# Patient Record
Sex: MALE | Race: WHITE | Employment: OTHER | URBAN - METROPOLITAN AREA
[De-identification: names, ages, dates, MRNs, and addresses within clinical notes are randomized per-mention and may not be internally consistent; named-entity substitution may affect disease eponyms.]

---

## 2019-01-01 ENCOUNTER — HOSPITAL ENCOUNTER (OUTPATIENT)
Dept: RADIATION ONCOLOGY | Age: 64
Discharge: HOME | End: 2019-08-19
Payer: SUBSIDIZED

## 2019-01-01 ENCOUNTER — HOSPITAL ENCOUNTER (OUTPATIENT)
Dept: RADIATION ONCOLOGY | Age: 64
Discharge: HOME | End: 2019-09-13
Payer: SUBSIDIZED

## 2019-01-01 ENCOUNTER — HOSPITAL ENCOUNTER (OUTPATIENT)
Dept: LAB | Age: 64
Discharge: HOME | End: 2019-08-08
Payer: SUBSIDIZED

## 2019-01-01 ENCOUNTER — HOSPITAL ENCOUNTER (OUTPATIENT)
Dept: LAB | Age: 64
Discharge: HOME | End: 2019-10-22
Payer: SUBSIDIZED

## 2019-01-01 ENCOUNTER — HOSPITAL ENCOUNTER (OUTPATIENT)
Dept: LAB | Age: 64
Discharge: HOME | End: 2019-08-27
Payer: SUBSIDIZED

## 2019-01-01 ENCOUNTER — PATIENT OUTREACH (OUTPATIENT)
Dept: CASE MANAGEMENT | Age: 64
End: 2019-01-01

## 2019-01-01 ENCOUNTER — HOSPITAL ENCOUNTER (OUTPATIENT)
Dept: RADIATION ONCOLOGY | Age: 64
Discharge: HOME | End: 2019-08-12
Payer: SUBSIDIZED

## 2019-01-01 ENCOUNTER — HOSPITAL ENCOUNTER (OUTPATIENT)
Dept: LAB | Age: 64
Discharge: HOME | End: 2019-09-03
Payer: SUBSIDIZED

## 2019-01-01 ENCOUNTER — APPOINTMENT (OUTPATIENT)
Dept: GENERAL RADIOLOGY | Age: 64
DRG: 864 | End: 2019-01-01
Attending: INTERNAL MEDICINE
Payer: SUBSIDIZED

## 2019-01-01 ENCOUNTER — HOSPITAL ENCOUNTER (OUTPATIENT)
Dept: SURGERY | Age: 64
Discharge: HOME | End: 2019-06-25

## 2019-01-01 ENCOUNTER — HOSPITAL ENCOUNTER (OUTPATIENT)
Dept: RADIATION ONCOLOGY | Age: 64
Discharge: HOME | End: 2019-09-12
Payer: SUBSIDIZED

## 2019-01-01 ENCOUNTER — HOSPITAL ENCOUNTER (OUTPATIENT)
Dept: RADIATION ONCOLOGY | Age: 64
Discharge: HOME | End: 2019-09-26
Payer: SUBSIDIZED

## 2019-01-01 ENCOUNTER — HOSPITAL ENCOUNTER (OUTPATIENT)
Dept: MRI IMAGING | Age: 64
Discharge: HOME | End: 2019-07-25
Attending: RADIOLOGY
Payer: SUBSIDIZED

## 2019-01-01 ENCOUNTER — ANESTHESIA EVENT (OUTPATIENT)
Dept: SURGERY | Age: 64
DRG: 023 | End: 2019-01-01
Payer: SUBSIDIZED

## 2019-01-01 ENCOUNTER — APPOINTMENT (OUTPATIENT)
Dept: MRI IMAGING | Age: 64
DRG: 023 | End: 2019-01-01
Attending: NEUROLOGICAL SURGERY
Payer: SUBSIDIZED

## 2019-01-01 ENCOUNTER — HOSPITAL ENCOUNTER (OUTPATIENT)
Dept: RADIATION ONCOLOGY | Age: 64
Discharge: HOME | End: 2019-07-25
Payer: SUBSIDIZED

## 2019-01-01 ENCOUNTER — HOSPITAL ENCOUNTER (OUTPATIENT)
Dept: RADIATION ONCOLOGY | Age: 64
Discharge: HOME | End: 2019-10-29
Payer: SUBSIDIZED

## 2019-01-01 ENCOUNTER — HOSPITAL ENCOUNTER (OUTPATIENT)
Dept: RADIATION ONCOLOGY | Age: 64
Discharge: HOME | End: 2019-09-04
Payer: SUBSIDIZED

## 2019-01-01 ENCOUNTER — HOSPITAL ENCOUNTER (OUTPATIENT)
Dept: RADIATION ONCOLOGY | Age: 64
Discharge: HOME | End: 2019-09-20
Payer: SUBSIDIZED

## 2019-01-01 ENCOUNTER — HOSPITAL ENCOUNTER (OUTPATIENT)
Dept: RADIATION ONCOLOGY | Age: 64
Discharge: HOME | End: 2019-08-28
Payer: SUBSIDIZED

## 2019-01-01 ENCOUNTER — HOSPITAL ENCOUNTER (OUTPATIENT)
Dept: RADIATION ONCOLOGY | Age: 64
Discharge: HOME | End: 2019-08-16
Payer: SUBSIDIZED

## 2019-01-01 ENCOUNTER — HOSPITAL ENCOUNTER (OUTPATIENT)
Dept: LAB | Age: 64
Discharge: HOME | End: 2019-09-09
Payer: SUBSIDIZED

## 2019-01-01 ENCOUNTER — HOSPITAL ENCOUNTER (OUTPATIENT)
Dept: RADIATION ONCOLOGY | Age: 64
Discharge: HOME | End: 2019-08-30
Payer: SUBSIDIZED

## 2019-01-01 ENCOUNTER — HOSPITAL ENCOUNTER (OUTPATIENT)
Dept: RADIATION ONCOLOGY | Age: 64
Discharge: HOME | End: 2019-08-20
Payer: SUBSIDIZED

## 2019-01-01 ENCOUNTER — HOSPITAL ENCOUNTER (OUTPATIENT)
Dept: RADIATION ONCOLOGY | Age: 64
Discharge: HOME | End: 2019-09-18
Payer: SUBSIDIZED

## 2019-01-01 ENCOUNTER — HOSPITAL ENCOUNTER (INPATIENT)
Age: 64
LOS: 2 days | Discharge: HOME HEALTH | DRG: 023 | End: 2019-06-28
Attending: NEUROLOGICAL SURGERY | Admitting: NEUROLOGICAL SURGERY
Payer: SUBSIDIZED

## 2019-01-01 ENCOUNTER — APPOINTMENT (OUTPATIENT)
Dept: RADIATION ONCOLOGY | Age: 64
End: 2019-01-01
Payer: SUBSIDIZED

## 2019-01-01 ENCOUNTER — HOSPITAL ENCOUNTER (OUTPATIENT)
Dept: RADIATION ONCOLOGY | Age: 64
Discharge: HOME | End: 2019-09-10
Payer: SUBSIDIZED

## 2019-01-01 ENCOUNTER — HOSPITAL ENCOUNTER (OUTPATIENT)
Dept: RADIATION ONCOLOGY | Age: 64
Discharge: HOME | End: 2019-08-07
Payer: SUBSIDIZED

## 2019-01-01 ENCOUNTER — APPOINTMENT (OUTPATIENT)
Dept: CT IMAGING | Age: 64
DRG: 864 | End: 2019-01-01
Attending: EMERGENCY MEDICINE
Payer: SUBSIDIZED

## 2019-01-01 ENCOUNTER — HOSPITAL ENCOUNTER (OUTPATIENT)
Dept: RADIATION ONCOLOGY | Age: 64
Discharge: HOME | End: 2019-09-06
Payer: SUBSIDIZED

## 2019-01-01 ENCOUNTER — HOSPITAL ENCOUNTER (OUTPATIENT)
Dept: LAB | Age: 64
Discharge: HOME | End: 2019-07-31
Payer: SUBSIDIZED

## 2019-01-01 ENCOUNTER — HOSPITAL ENCOUNTER (OUTPATIENT)
Age: 64
Discharge: HOME | End: 2019-07-23
Attending: EMERGENCY MEDICINE | Admitting: NEUROLOGICAL SURGERY
Payer: SUBSIDIZED

## 2019-01-01 ENCOUNTER — HOSPITAL ENCOUNTER (OUTPATIENT)
Dept: RADIATION ONCOLOGY | Age: 64
Discharge: HOME | End: 2019-08-15
Payer: SUBSIDIZED

## 2019-01-01 ENCOUNTER — HOSPITAL ENCOUNTER (OUTPATIENT)
Dept: MRI IMAGING | Age: 64
Discharge: HOME | End: 2019-11-12
Attending: INTERNAL MEDICINE
Payer: SUBSIDIZED

## 2019-01-01 ENCOUNTER — HOSPITAL ENCOUNTER (OUTPATIENT)
Dept: RADIATION ONCOLOGY | Age: 64
Discharge: HOME | End: 2019-09-17
Payer: SUBSIDIZED

## 2019-01-01 ENCOUNTER — DOCUMENTATION ONLY (OUTPATIENT)
Dept: HEMATOLOGY | Age: 64
End: 2019-01-01

## 2019-01-01 ENCOUNTER — HOSPITAL ENCOUNTER (OUTPATIENT)
Dept: RADIATION ONCOLOGY | Age: 64
Discharge: HOME | End: 2019-08-22
Payer: SUBSIDIZED

## 2019-01-01 ENCOUNTER — APPOINTMENT (OUTPATIENT)
Dept: CT IMAGING | Age: 64
End: 2019-01-01
Attending: EMERGENCY MEDICINE
Payer: SUBSIDIZED

## 2019-01-01 ENCOUNTER — APPOINTMENT (OUTPATIENT)
Dept: CT IMAGING | Age: 64
DRG: 023 | End: 2019-01-01
Attending: NEUROLOGICAL SURGERY
Payer: SUBSIDIZED

## 2019-01-01 ENCOUNTER — HOSPITAL ENCOUNTER (OUTPATIENT)
Dept: LAB | Age: 64
Discharge: HOME | End: 2019-07-16
Payer: SUBSIDIZED

## 2019-01-01 ENCOUNTER — APPOINTMENT (OUTPATIENT)
Dept: MRI IMAGING | Age: 64
DRG: 864 | End: 2019-01-01
Attending: NEUROLOGICAL SURGERY
Payer: SUBSIDIZED

## 2019-01-01 ENCOUNTER — HOSPITAL ENCOUNTER (OUTPATIENT)
Dept: RADIATION ONCOLOGY | Age: 64
Discharge: HOME | End: 2019-09-24
Payer: SUBSIDIZED

## 2019-01-01 ENCOUNTER — HOSPITAL ENCOUNTER (OUTPATIENT)
Dept: RADIATION ONCOLOGY | Age: 64
Discharge: HOME | End: 2019-07-24
Payer: SUBSIDIZED

## 2019-01-01 ENCOUNTER — HOSPITAL ENCOUNTER (OUTPATIENT)
Dept: LAB | Age: 64
Discharge: HOME | End: 2019-12-19
Payer: SUBSIDIZED

## 2019-01-01 ENCOUNTER — HOSPITAL ENCOUNTER (OUTPATIENT)
Dept: RADIATION ONCOLOGY | Age: 64
Discharge: HOME | End: 2019-09-25
Payer: SUBSIDIZED

## 2019-01-01 ENCOUNTER — HOSPITAL ENCOUNTER (OUTPATIENT)
Dept: RADIATION ONCOLOGY | Age: 64
Discharge: HOME | End: 2019-08-23
Payer: SUBSIDIZED

## 2019-01-01 ENCOUNTER — HOSPITAL ENCOUNTER (OUTPATIENT)
Dept: RADIATION ONCOLOGY | Age: 64
Discharge: HOME | End: 2019-09-03
Payer: SUBSIDIZED

## 2019-01-01 ENCOUNTER — ANESTHESIA (OUTPATIENT)
Dept: SURGERY | Age: 64
DRG: 023 | End: 2019-01-01
Payer: SUBSIDIZED

## 2019-01-01 ENCOUNTER — HOSPITAL ENCOUNTER (OUTPATIENT)
Dept: RADIATION ONCOLOGY | Age: 64
Discharge: HOME | End: 2019-08-06
Payer: SUBSIDIZED

## 2019-01-01 ENCOUNTER — HOSPITAL ENCOUNTER (OUTPATIENT)
Dept: RADIATION ONCOLOGY | Age: 64
Discharge: HOME | End: 2019-08-21
Payer: SUBSIDIZED

## 2019-01-01 ENCOUNTER — APPOINTMENT (OUTPATIENT)
Dept: CT IMAGING | Age: 64
End: 2019-01-01
Attending: NEUROLOGICAL SURGERY
Payer: SUBSIDIZED

## 2019-01-01 ENCOUNTER — HOSPITAL ENCOUNTER (OUTPATIENT)
Dept: RADIATION ONCOLOGY | Age: 64
Discharge: HOME | End: 2019-08-26
Payer: SUBSIDIZED

## 2019-01-01 ENCOUNTER — HOSPITAL ENCOUNTER (OUTPATIENT)
Dept: RADIATION ONCOLOGY | Age: 64
Discharge: HOME | End: 2019-09-09
Payer: SUBSIDIZED

## 2019-01-01 ENCOUNTER — HOSPITAL ENCOUNTER (OUTPATIENT)
Dept: LAB | Age: 64
Discharge: HOME | End: 2019-09-19
Payer: SUBSIDIZED

## 2019-01-01 ENCOUNTER — HOSPITAL ENCOUNTER (OUTPATIENT)
Dept: RADIATION ONCOLOGY | Age: 64
Discharge: HOME | End: 2019-11-19
Payer: SUBSIDIZED

## 2019-01-01 ENCOUNTER — HOSPITAL ENCOUNTER (OUTPATIENT)
Dept: RADIATION ONCOLOGY | Age: 64
Discharge: HOME | End: 2019-08-14
Payer: SUBSIDIZED

## 2019-01-01 ENCOUNTER — HOSPITAL ENCOUNTER (OUTPATIENT)
Dept: RADIATION ONCOLOGY | Age: 64
Discharge: HOME | End: 2019-09-05
Payer: SUBSIDIZED

## 2019-01-01 ENCOUNTER — HOSPITAL ENCOUNTER (OUTPATIENT)
Dept: RADIATION ONCOLOGY | Age: 64
Discharge: HOME | End: 2019-09-19
Payer: SUBSIDIZED

## 2019-01-01 ENCOUNTER — HOSPITAL ENCOUNTER (OUTPATIENT)
Dept: LAB | Age: 64
Discharge: HOME | End: 2019-08-19
Payer: SUBSIDIZED

## 2019-01-01 ENCOUNTER — HOSPITAL ENCOUNTER (INPATIENT)
Age: 64
LOS: 2 days | Discharge: HOME | DRG: 864 | End: 2019-08-14
Attending: EMERGENCY MEDICINE | Admitting: INTERNAL MEDICINE
Payer: SUBSIDIZED

## 2019-01-01 ENCOUNTER — HOSPITAL ENCOUNTER (OUTPATIENT)
Dept: RADIATION ONCOLOGY | Age: 64
Discharge: HOME | End: 2019-08-29
Payer: SUBSIDIZED

## 2019-01-01 ENCOUNTER — HOSPITAL ENCOUNTER (OUTPATIENT)
Dept: RADIATION ONCOLOGY | Age: 64
Discharge: HOME | End: 2019-09-16
Payer: SUBSIDIZED

## 2019-01-01 ENCOUNTER — HOSPITAL ENCOUNTER (OUTPATIENT)
Dept: RADIATION ONCOLOGY | Age: 64
Discharge: HOME | End: 2019-09-11
Payer: SUBSIDIZED

## 2019-01-01 ENCOUNTER — HOSPITAL ENCOUNTER (OUTPATIENT)
Dept: RADIATION ONCOLOGY | Age: 64
Discharge: HOME | End: 2019-08-27
Payer: SUBSIDIZED

## 2019-01-01 ENCOUNTER — HOSPITAL ENCOUNTER (OUTPATIENT)
Dept: LAB | Age: 64
Discharge: HOME | End: 2019-11-19
Payer: SUBSIDIZED

## 2019-01-01 VITALS
SYSTOLIC BLOOD PRESSURE: 109 MMHG | RESPIRATION RATE: 16 BRPM | BODY MASS INDEX: 31.92 KG/M2 | HEART RATE: 73 BPM | TEMPERATURE: 98.4 F | DIASTOLIC BLOOD PRESSURE: 69 MMHG | WEIGHT: 209.9 LBS | OXYGEN SATURATION: 92 %

## 2019-01-01 VITALS
BODY MASS INDEX: 31.28 KG/M2 | RESPIRATION RATE: 19 BRPM | SYSTOLIC BLOOD PRESSURE: 135 MMHG | WEIGHT: 206.4 LBS | OXYGEN SATURATION: 90 % | DIASTOLIC BLOOD PRESSURE: 81 MMHG | HEART RATE: 73 BPM | TEMPERATURE: 97.7 F | HEIGHT: 68 IN

## 2019-01-01 VITALS
WEIGHT: 199 LBS | HEART RATE: 78 BPM | HEIGHT: 68 IN | RESPIRATION RATE: 16 BRPM | OXYGEN SATURATION: 98 % | TEMPERATURE: 98.3 F | BODY MASS INDEX: 30.16 KG/M2 | SYSTOLIC BLOOD PRESSURE: 136 MMHG | DIASTOLIC BLOOD PRESSURE: 87 MMHG

## 2019-01-01 VITALS
RESPIRATION RATE: 16 BRPM | WEIGHT: 198.5 LBS | BODY MASS INDEX: 30.18 KG/M2 | DIASTOLIC BLOOD PRESSURE: 78 MMHG | HEART RATE: 90 BPM | OXYGEN SATURATION: 97 % | TEMPERATURE: 98.7 F | SYSTOLIC BLOOD PRESSURE: 138 MMHG

## 2019-01-01 VITALS
OXYGEN SATURATION: 94 % | BODY MASS INDEX: 30.41 KG/M2 | DIASTOLIC BLOOD PRESSURE: 71 MMHG | HEART RATE: 80 BPM | HEIGHT: 68 IN | RESPIRATION RATE: 18 BRPM | SYSTOLIC BLOOD PRESSURE: 135 MMHG | TEMPERATURE: 98.3 F | WEIGHT: 200.62 LBS

## 2019-01-01 VITALS
HEART RATE: 70 BPM | RESPIRATION RATE: 18 BRPM | OXYGEN SATURATION: 96 % | BODY MASS INDEX: 29.84 KG/M2 | TEMPERATURE: 98.3 F | WEIGHT: 196.87 LBS | HEIGHT: 68 IN | DIASTOLIC BLOOD PRESSURE: 95 MMHG | SYSTOLIC BLOOD PRESSURE: 165 MMHG

## 2019-01-01 DIAGNOSIS — C71.9 GBM (GLIOBLASTOMA MULTIFORME) (HCC): ICD-10-CM

## 2019-01-01 DIAGNOSIS — L24.A9 WOUND DRAINAGE: ICD-10-CM

## 2019-01-01 DIAGNOSIS — R68.89 INTOLERANCE TO COLD: ICD-10-CM

## 2019-01-01 DIAGNOSIS — C71.9 MALIGNANT NEOPLASM OF BRAIN, UNSPECIFIED LOCATION (HCC): ICD-10-CM

## 2019-01-01 DIAGNOSIS — G93.89 BRAIN MASS: ICD-10-CM

## 2019-01-01 DIAGNOSIS — E03.9 HYPOTHYROIDISM, UNSPECIFIED TYPE: ICD-10-CM

## 2019-01-01 DIAGNOSIS — G96.00 POSTOPERATIVE CSF LEAK: ICD-10-CM

## 2019-01-01 DIAGNOSIS — C71.9 GBM (GLIOBLASTOMA MULTIFORME) (HCC): Primary | ICD-10-CM

## 2019-01-01 DIAGNOSIS — C71.9 MALIGNANT NEOPLASM OF BRAIN, UNSPECIFIED LOCATION (HCC): Primary | ICD-10-CM

## 2019-01-01 DIAGNOSIS — R41.82 ALTERED MENTAL STATUS, UNSPECIFIED ALTERED MENTAL STATUS TYPE: ICD-10-CM

## 2019-01-01 DIAGNOSIS — G93.6 BRAIN EDEMA (HCC): Primary | ICD-10-CM

## 2019-01-01 DIAGNOSIS — G97.82 POSTOPERATIVE CSF LEAK: ICD-10-CM

## 2019-01-01 DIAGNOSIS — R11.11 NON-INTRACTABLE VOMITING WITHOUT NAUSEA, UNSPECIFIED VOMITING TYPE: Primary | ICD-10-CM

## 2019-01-01 LAB
ABO + RH BLD: NORMAL
ALBUMIN SERPL-MCNC: 3.1 G/DL (ref 3.2–4.6)
ALBUMIN SERPL-MCNC: 3.2 G/DL (ref 3.2–4.6)
ALBUMIN SERPL-MCNC: 3.2 G/DL (ref 3.2–4.6)
ALBUMIN SERPL-MCNC: 3.3 G/DL (ref 3.2–4.6)
ALBUMIN SERPL-MCNC: 3.3 G/DL (ref 3.2–4.6)
ALBUMIN SERPL-MCNC: 3.4 G/DL (ref 3.2–4.6)
ALBUMIN SERPL-MCNC: 3.5 G/DL (ref 3.2–4.6)
ALBUMIN SERPL-MCNC: 3.7 G/DL (ref 3.2–4.6)
ALBUMIN SERPL-MCNC: 3.8 G/DL (ref 3.2–4.6)
ALBUMIN/GLOB SERPL: 0.7 {RATIO} (ref 1.2–3.5)
ALBUMIN/GLOB SERPL: 0.7 {RATIO} (ref 1.2–3.5)
ALBUMIN/GLOB SERPL: 0.8 {RATIO} (ref 1.2–3.5)
ALBUMIN/GLOB SERPL: 0.9 {RATIO} (ref 1.2–3.5)
ALBUMIN/GLOB SERPL: 1 {RATIO} (ref 1.2–3.5)
ALBUMIN/GLOB SERPL: 1.1 {RATIO} (ref 1.2–3.5)
ALBUMIN/GLOB SERPL: 1.1 {RATIO} (ref 1.2–3.5)
ALP SERPL-CCNC: 54 U/L (ref 50–136)
ALP SERPL-CCNC: 58 U/L (ref 50–136)
ALP SERPL-CCNC: 64 U/L (ref 50–136)
ALP SERPL-CCNC: 67 U/L (ref 50–136)
ALP SERPL-CCNC: 68 U/L (ref 50–136)
ALP SERPL-CCNC: 69 U/L (ref 50–136)
ALP SERPL-CCNC: 70 U/L (ref 50–136)
ALP SERPL-CCNC: 70 U/L (ref 50–136)
ALP SERPL-CCNC: 77 U/L (ref 50–136)
ALP SERPL-CCNC: 77 U/L (ref 50–136)
ALP SERPL-CCNC: 78 U/L (ref 50–136)
ALT SERPL-CCNC: 22 U/L (ref 12–65)
ALT SERPL-CCNC: 23 U/L (ref 12–65)
ALT SERPL-CCNC: 28 U/L (ref 12–65)
ALT SERPL-CCNC: 29 U/L (ref 12–65)
ALT SERPL-CCNC: 30 U/L (ref 12–65)
ALT SERPL-CCNC: 33 U/L (ref 12–65)
ALT SERPL-CCNC: 34 U/L (ref 12–65)
ALT SERPL-CCNC: 34 U/L (ref 12–65)
ALT SERPL-CCNC: 35 U/L (ref 12–65)
ALT SERPL-CCNC: 37 U/L (ref 12–65)
ALT SERPL-CCNC: 38 U/L (ref 12–65)
ANION GAP SERPL CALC-SCNC: 11 MMOL/L (ref 7–16)
ANION GAP SERPL CALC-SCNC: 4 MMOL/L (ref 7–16)
ANION GAP SERPL CALC-SCNC: 5 MMOL/L (ref 7–16)
ANION GAP SERPL CALC-SCNC: 6 MMOL/L (ref 7–16)
ANION GAP SERPL CALC-SCNC: 7 MMOL/L (ref 7–16)
ANION GAP SERPL CALC-SCNC: 8 MMOL/L (ref 7–16)
ANION GAP SERPL CALC-SCNC: 9 MMOL/L (ref 7–16)
ANION GAP SERPL CALC-SCNC: 9 MMOL/L (ref 7–16)
APPEARANCE UR: CLEAR
AST SERPL-CCNC: 11 U/L (ref 15–37)
AST SERPL-CCNC: 16 U/L (ref 15–37)
AST SERPL-CCNC: 17 U/L (ref 15–37)
AST SERPL-CCNC: 17 U/L (ref 15–37)
AST SERPL-CCNC: 18 U/L (ref 15–37)
AST SERPL-CCNC: 18 U/L (ref 15–37)
AST SERPL-CCNC: 19 U/L (ref 15–37)
AST SERPL-CCNC: 45 U/L (ref 15–37)
AST SERPL-CCNC: 9 U/L (ref 15–37)
ATRIAL RATE: 79 BPM
BACTERIA SPEC CULT: ABNORMAL
BACTERIA SPEC CULT: ABNORMAL
BACTERIA SPEC CULT: NORMAL
BASOPHILS # BLD: 0 K/UL (ref 0–0.2)
BASOPHILS # BLD: 0.1 K/UL (ref 0–0.2)
BASOPHILS NFR BLD: 0 % (ref 0–2)
BASOPHILS NFR BLD: 1 % (ref 0–2)
BILIRUB SERPL-MCNC: 0.3 MG/DL (ref 0.2–1.1)
BILIRUB SERPL-MCNC: 0.4 MG/DL (ref 0.2–1.1)
BILIRUB SERPL-MCNC: 0.4 MG/DL (ref 0.2–1.1)
BILIRUB SERPL-MCNC: 0.5 MG/DL (ref 0.2–1.1)
BILIRUB SERPL-MCNC: 0.5 MG/DL (ref 0.2–1.1)
BILIRUB SERPL-MCNC: 0.6 MG/DL (ref 0.2–1.1)
BILIRUB SERPL-MCNC: 0.8 MG/DL (ref 0.2–1.1)
BILIRUB SERPL-MCNC: 0.9 MG/DL (ref 0.2–1.1)
BILIRUB UR QL: NEGATIVE
BLOOD GROUP ANTIBODIES SERPL: NORMAL
BUN SERPL-MCNC: 10 MG/DL (ref 8–23)
BUN SERPL-MCNC: 11 MG/DL (ref 8–23)
BUN SERPL-MCNC: 12 MG/DL (ref 8–23)
BUN SERPL-MCNC: 13 MG/DL (ref 8–23)
BUN SERPL-MCNC: 13 MG/DL (ref 8–23)
BUN SERPL-MCNC: 14 MG/DL (ref 8–23)
BUN SERPL-MCNC: 14 MG/DL (ref 8–23)
BUN SERPL-MCNC: 15 MG/DL (ref 8–23)
BUN SERPL-MCNC: 16 MG/DL (ref 8–23)
BUN SERPL-MCNC: 17 MG/DL (ref 8–23)
BUN SERPL-MCNC: 18 MG/DL (ref 8–23)
BUN SERPL-MCNC: 19 MG/DL (ref 8–23)
BUN SERPL-MCNC: 24 MG/DL (ref 8–23)
BUN SERPL-MCNC: 6 MG/DL (ref 8–23)
BUN SERPL-MCNC: 8 MG/DL (ref 8–23)
BUN SERPL-MCNC: 8 MG/DL (ref 8–23)
CALCIUM SERPL-MCNC: 8.4 MG/DL (ref 8.3–10.4)
CALCIUM SERPL-MCNC: 8.5 MG/DL (ref 8.3–10.4)
CALCIUM SERPL-MCNC: 8.7 MG/DL (ref 8.3–10.4)
CALCIUM SERPL-MCNC: 8.7 MG/DL (ref 8.3–10.4)
CALCIUM SERPL-MCNC: 8.8 MG/DL (ref 8.3–10.4)
CALCIUM SERPL-MCNC: 9.1 MG/DL (ref 8.3–10.4)
CALCIUM SERPL-MCNC: 9.2 MG/DL (ref 8.3–10.4)
CALCIUM SERPL-MCNC: 9.3 MG/DL (ref 8.3–10.4)
CALCIUM SERPL-MCNC: 9.3 MG/DL (ref 8.3–10.4)
CALCIUM SERPL-MCNC: 9.5 MG/DL (ref 8.3–10.4)
CALCULATED P AXIS, ECG09: 54 DEGREES
CALCULATED R AXIS, ECG10: 10 DEGREES
CALCULATED T AXIS, ECG11: 32 DEGREES
CHLORIDE SERPL-SCNC: 100 MMOL/L (ref 98–107)
CHLORIDE SERPL-SCNC: 101 MMOL/L (ref 98–107)
CHLORIDE SERPL-SCNC: 102 MMOL/L (ref 98–107)
CHLORIDE SERPL-SCNC: 102 MMOL/L (ref 98–107)
CHLORIDE SERPL-SCNC: 103 MMOL/L (ref 98–107)
CHLORIDE SERPL-SCNC: 104 MMOL/L (ref 98–107)
CHLORIDE SERPL-SCNC: 105 MMOL/L (ref 98–107)
CHLORIDE SERPL-SCNC: 105 MMOL/L (ref 98–107)
CHLORIDE SERPL-SCNC: 90 MMOL/L (ref 98–107)
CHLORIDE SERPL-SCNC: 99 MMOL/L (ref 98–107)
CHLORIDE SERPL-SCNC: 99 MMOL/L (ref 98–107)
CO2 SERPL-SCNC: 25 MMOL/L (ref 21–32)
CO2 SERPL-SCNC: 26 MMOL/L (ref 21–32)
CO2 SERPL-SCNC: 27 MMOL/L (ref 21–32)
CO2 SERPL-SCNC: 27 MMOL/L (ref 21–32)
CO2 SERPL-SCNC: 28 MMOL/L (ref 21–32)
CO2 SERPL-SCNC: 29 MMOL/L (ref 21–32)
CO2 SERPL-SCNC: 29 MMOL/L (ref 21–32)
CO2 SERPL-SCNC: 30 MMOL/L (ref 21–32)
CO2 SERPL-SCNC: 30 MMOL/L (ref 21–32)
COLOR UR: YELLOW
CREAT SERPL-MCNC: 0.61 MG/DL (ref 0.8–1.5)
CREAT SERPL-MCNC: 0.74 MG/DL (ref 0.8–1.5)
CREAT SERPL-MCNC: 0.76 MG/DL (ref 0.8–1.5)
CREAT SERPL-MCNC: 0.77 MG/DL (ref 0.8–1.5)
CREAT SERPL-MCNC: 0.78 MG/DL (ref 0.8–1.5)
CREAT SERPL-MCNC: 0.85 MG/DL (ref 0.8–1.5)
CREAT SERPL-MCNC: 0.96 MG/DL (ref 0.8–1.5)
CREAT SERPL-MCNC: 0.98 MG/DL (ref 0.8–1.5)
CREAT SERPL-MCNC: 0.98 MG/DL (ref 0.8–1.5)
CREAT SERPL-MCNC: 0.99 MG/DL (ref 0.8–1.5)
CREAT SERPL-MCNC: 1.04 MG/DL (ref 0.8–1.5)
CREAT SERPL-MCNC: 1.06 MG/DL (ref 0.8–1.5)
CREAT SERPL-MCNC: 1.14 MG/DL (ref 0.8–1.5)
CREAT SERPL-MCNC: 1.17 MG/DL (ref 0.8–1.5)
CREAT SERPL-MCNC: 1.21 MG/DL (ref 0.8–1.5)
CREAT SERPL-MCNC: 1.23 MG/DL (ref 0.8–1.5)
DIAGNOSIS, 93000: NORMAL
DIFFERENTIAL METHOD BLD: ABNORMAL
DIFFERENTIAL METHOD BLD: NORMAL
EOSINOPHIL # BLD: 0 K/UL (ref 0–0.8)
EOSINOPHIL # BLD: 0.1 K/UL (ref 0–0.8)
EOSINOPHIL # BLD: 0.2 K/UL (ref 0–0.8)
EOSINOPHIL NFR BLD: 0 % (ref 0.5–7.8)
EOSINOPHIL NFR BLD: 1 % (ref 0.5–7.8)
ERYTHROCYTE [DISTWIDTH] IN BLOOD BY AUTOMATED COUNT: 12.1 % (ref 11.9–14.6)
ERYTHROCYTE [DISTWIDTH] IN BLOOD BY AUTOMATED COUNT: 12.2 % (ref 11.9–14.6)
ERYTHROCYTE [DISTWIDTH] IN BLOOD BY AUTOMATED COUNT: 13.2 % (ref 11.9–14.6)
ERYTHROCYTE [DISTWIDTH] IN BLOOD BY AUTOMATED COUNT: 13.5 % (ref 11.9–14.6)
ERYTHROCYTE [DISTWIDTH] IN BLOOD BY AUTOMATED COUNT: 13.6 % (ref 11.9–14.6)
ERYTHROCYTE [DISTWIDTH] IN BLOOD BY AUTOMATED COUNT: 13.8 % (ref 11.9–14.6)
ERYTHROCYTE [DISTWIDTH] IN BLOOD BY AUTOMATED COUNT: 13.9 % (ref 11.9–14.6)
ERYTHROCYTE [DISTWIDTH] IN BLOOD BY AUTOMATED COUNT: 14 % (ref 11.9–14.6)
ERYTHROCYTE [DISTWIDTH] IN BLOOD BY AUTOMATED COUNT: 14.4 % (ref 11.9–14.6)
ERYTHROCYTE [DISTWIDTH] IN BLOOD BY AUTOMATED COUNT: 14.4 % (ref 11.9–14.6)
GLOBULIN SER CALC-MCNC: 3.4 G/DL (ref 2.3–3.5)
GLOBULIN SER CALC-MCNC: 3.4 G/DL (ref 2.3–3.5)
GLOBULIN SER CALC-MCNC: 3.5 G/DL (ref 2.3–3.5)
GLOBULIN SER CALC-MCNC: 3.5 G/DL (ref 2.3–3.5)
GLOBULIN SER CALC-MCNC: 3.6 G/DL (ref 2.3–3.5)
GLOBULIN SER CALC-MCNC: 3.7 G/DL (ref 2.3–3.5)
GLOBULIN SER CALC-MCNC: 4.3 G/DL (ref 2.3–3.5)
GLOBULIN SER CALC-MCNC: 4.3 G/DL (ref 2.3–3.5)
GLOBULIN SER CALC-MCNC: 4.6 G/DL (ref 2.3–3.5)
GLUCOSE BLD STRIP.AUTO-MCNC: 121 MG/DL (ref 65–100)
GLUCOSE BLD STRIP.AUTO-MCNC: 136 MG/DL (ref 65–100)
GLUCOSE BLD STRIP.AUTO-MCNC: 157 MG/DL (ref 65–100)
GLUCOSE BLD STRIP.AUTO-MCNC: 174 MG/DL (ref 65–100)
GLUCOSE SERPL-MCNC: 105 MG/DL (ref 65–100)
GLUCOSE SERPL-MCNC: 106 MG/DL (ref 65–100)
GLUCOSE SERPL-MCNC: 106 MG/DL (ref 65–100)
GLUCOSE SERPL-MCNC: 111 MG/DL (ref 65–100)
GLUCOSE SERPL-MCNC: 114 MG/DL (ref 65–100)
GLUCOSE SERPL-MCNC: 114 MG/DL (ref 65–100)
GLUCOSE SERPL-MCNC: 123 MG/DL (ref 65–100)
GLUCOSE SERPL-MCNC: 129 MG/DL (ref 65–100)
GLUCOSE SERPL-MCNC: 136 MG/DL (ref 65–100)
GLUCOSE SERPL-MCNC: 140 MG/DL (ref 65–100)
GLUCOSE SERPL-MCNC: 143 MG/DL (ref 65–100)
GLUCOSE SERPL-MCNC: 199 MG/DL (ref 65–100)
GLUCOSE SERPL-MCNC: 84 MG/DL (ref 65–100)
GLUCOSE SERPL-MCNC: 86 MG/DL (ref 65–100)
GLUCOSE SERPL-MCNC: 95 MG/DL (ref 65–100)
GLUCOSE SERPL-MCNC: 96 MG/DL (ref 65–100)
GLUCOSE UR STRIP.AUTO-MCNC: NEGATIVE MG/DL
GRAM STN SPEC: ABNORMAL
GRAM STN SPEC: ABNORMAL
HCT VFR BLD AUTO: 38.2 % (ref 41.1–50.3)
HCT VFR BLD AUTO: 38.9 % (ref 41.1–50.3)
HCT VFR BLD AUTO: 40.7 % (ref 41.1–50.3)
HCT VFR BLD AUTO: 41 % (ref 41.1–50.3)
HCT VFR BLD AUTO: 41.2 % (ref 41.1–50.3)
HCT VFR BLD AUTO: 41.4 % (ref 41.1–50.3)
HCT VFR BLD AUTO: 41.6 % (ref 41.1–50.3)
HCT VFR BLD AUTO: 41.8 % (ref 41.1–50.3)
HCT VFR BLD AUTO: 42.3 % (ref 41.1–50.3)
HCT VFR BLD AUTO: 42.9 % (ref 41.1–50.3)
HCT VFR BLD AUTO: 43.3 % (ref 41.1–50.3)
HCT VFR BLD AUTO: 43.4 % (ref 41.1–50.3)
HCT VFR BLD AUTO: 44.6 % (ref 41.1–50.3)
HGB BLD-MCNC: 13.1 G/DL (ref 13.6–17.2)
HGB BLD-MCNC: 13.7 G/DL (ref 13.6–17.2)
HGB BLD-MCNC: 13.9 G/DL (ref 13.6–17.2)
HGB BLD-MCNC: 14 G/DL (ref 13.6–17.2)
HGB BLD-MCNC: 14.2 G/DL (ref 13.6–17.2)
HGB BLD-MCNC: 14.3 G/DL (ref 13.6–17.2)
HGB BLD-MCNC: 14.3 G/DL (ref 13.6–17.2)
HGB BLD-MCNC: 14.5 G/DL (ref 13.6–17.2)
HGB BLD-MCNC: 14.5 G/DL (ref 13.6–17.2)
HGB BLD-MCNC: 14.6 G/DL (ref 13.6–17.2)
HGB BLD-MCNC: 14.6 G/DL (ref 13.6–17.2)
HGB BLD-MCNC: 14.9 G/DL (ref 13.6–17.2)
HGB BLD-MCNC: 15.1 G/DL (ref 13.6–17.2)
HGB UR QL STRIP: NEGATIVE
IMM GRANULOCYTES # BLD AUTO: 0.1 K/UL (ref 0–0.5)
IMM GRANULOCYTES # BLD AUTO: 0.2 K/UL (ref 0–0.5)
IMM GRANULOCYTES # BLD AUTO: 0.3 K/UL (ref 0–0.5)
IMM GRANULOCYTES # BLD AUTO: 0.4 K/UL (ref 0–0.5)
IMM GRANULOCYTES # BLD AUTO: 1.2 K/UL (ref 0–0.5)
IMM GRANULOCYTES NFR BLD AUTO: 1 % (ref 0–5)
IMM GRANULOCYTES NFR BLD AUTO: 2 % (ref 0–5)
IMM GRANULOCYTES NFR BLD AUTO: 3 % (ref 0–5)
IMM GRANULOCYTES NFR BLD AUTO: 3 % (ref 0–5)
IMM GRANULOCYTES NFR BLD AUTO: 9 % (ref 0–5)
KETONES UR QL STRIP.AUTO: NEGATIVE MG/DL
LACTATE BLD-SCNC: 0.8 MMOL/L (ref 0.5–1.9)
LACTATE BLD-SCNC: 1.09 MMOL/L (ref 0.5–1.9)
LEUKOCYTE ESTERASE UR QL STRIP.AUTO: NEGATIVE
LIPASE SERPL-CCNC: 182 U/L (ref 73–393)
LYMPHOCYTES # BLD: 1.2 K/UL (ref 0.5–4.6)
LYMPHOCYTES # BLD: 1.4 K/UL (ref 0.5–4.6)
LYMPHOCYTES # BLD: 1.5 K/UL (ref 0.5–4.6)
LYMPHOCYTES # BLD: 1.8 K/UL (ref 0.5–4.6)
LYMPHOCYTES # BLD: 1.9 K/UL (ref 0.5–4.6)
LYMPHOCYTES # BLD: 1.9 K/UL (ref 0.5–4.6)
LYMPHOCYTES # BLD: 2 K/UL (ref 0.5–4.6)
LYMPHOCYTES # BLD: 2 K/UL (ref 0.5–4.6)
LYMPHOCYTES # BLD: 2.1 K/UL (ref 0.5–4.6)
LYMPHOCYTES # BLD: 2.5 K/UL (ref 0.5–4.6)
LYMPHOCYTES # BLD: 2.7 K/UL (ref 0.5–4.6)
LYMPHOCYTES # BLD: 3.2 K/UL (ref 0.5–4.6)
LYMPHOCYTES # BLD: 4.3 K/UL (ref 0.5–4.6)
LYMPHOCYTES NFR BLD: 12 % (ref 13–44)
LYMPHOCYTES NFR BLD: 15 % (ref 13–44)
LYMPHOCYTES NFR BLD: 15 % (ref 13–44)
LYMPHOCYTES NFR BLD: 17 % (ref 13–44)
LYMPHOCYTES NFR BLD: 18 % (ref 13–44)
LYMPHOCYTES NFR BLD: 19 % (ref 13–44)
LYMPHOCYTES NFR BLD: 22 % (ref 13–44)
LYMPHOCYTES NFR BLD: 23 % (ref 13–44)
LYMPHOCYTES NFR BLD: 27 % (ref 13–44)
LYMPHOCYTES NFR BLD: 27 % (ref 13–44)
LYMPHOCYTES NFR BLD: 38 % (ref 13–44)
MCH RBC QN AUTO: 30.7 PG (ref 26.1–32.9)
MCH RBC QN AUTO: 30.8 PG (ref 26.1–32.9)
MCH RBC QN AUTO: 30.9 PG (ref 26.1–32.9)
MCH RBC QN AUTO: 30.9 PG (ref 26.1–32.9)
MCH RBC QN AUTO: 31 PG (ref 26.1–32.9)
MCH RBC QN AUTO: 31 PG (ref 26.1–32.9)
MCH RBC QN AUTO: 31.1 PG (ref 26.1–32.9)
MCH RBC QN AUTO: 31.2 PG (ref 26.1–32.9)
MCH RBC QN AUTO: 31.3 PG (ref 26.1–32.9)
MCH RBC QN AUTO: 31.4 PG (ref 26.1–32.9)
MCH RBC QN AUTO: 31.8 PG (ref 26.1–32.9)
MCH RBC QN AUTO: 32.1 PG (ref 26.1–32.9)
MCH RBC QN AUTO: 32.3 PG (ref 26.1–32.9)
MCHC RBC AUTO-ENTMCNC: 33.3 G/DL (ref 31.4–35)
MCHC RBC AUTO-ENTMCNC: 33.6 G/DL (ref 31.4–35)
MCHC RBC AUTO-ENTMCNC: 33.8 G/DL (ref 31.4–35)
MCHC RBC AUTO-ENTMCNC: 33.9 G/DL (ref 31.4–35)
MCHC RBC AUTO-ENTMCNC: 34 G/DL (ref 31.4–35)
MCHC RBC AUTO-ENTMCNC: 34.3 G/DL (ref 31.4–35)
MCHC RBC AUTO-ENTMCNC: 34.4 G/DL (ref 31.4–35)
MCHC RBC AUTO-ENTMCNC: 34.4 G/DL (ref 31.4–35)
MCHC RBC AUTO-ENTMCNC: 34.5 G/DL (ref 31.4–35)
MCHC RBC AUTO-ENTMCNC: 34.9 G/DL (ref 31.4–35)
MCHC RBC AUTO-ENTMCNC: 34.9 G/DL (ref 31.4–35)
MCHC RBC AUTO-ENTMCNC: 35 G/DL (ref 31.4–35)
MCHC RBC AUTO-ENTMCNC: 35.2 G/DL (ref 31.4–35)
MCV RBC AUTO: 87.8 FL (ref 79.6–97.8)
MCV RBC AUTO: 88.9 FL (ref 79.6–97.8)
MCV RBC AUTO: 90 FL (ref 79.6–97.8)
MCV RBC AUTO: 90.2 FL (ref 79.6–97.8)
MCV RBC AUTO: 90.7 FL (ref 79.6–97.8)
MCV RBC AUTO: 90.9 FL (ref 79.6–97.8)
MCV RBC AUTO: 91 FL (ref 79.6–97.8)
MCV RBC AUTO: 91.4 FL (ref 79.6–97.8)
MCV RBC AUTO: 91.6 FL (ref 79.6–97.8)
MCV RBC AUTO: 92.6 FL (ref 79.6–97.8)
MCV RBC AUTO: 92.9 FL (ref 79.6–97.8)
MCV RBC AUTO: 92.9 FL (ref 79.6–97.8)
MCV RBC AUTO: 93.9 FL (ref 79.6–97.8)
MONOCYTES # BLD: 0.2 K/UL (ref 0.1–1.3)
MONOCYTES # BLD: 0.5 K/UL (ref 0.1–1.3)
MONOCYTES # BLD: 0.6 K/UL (ref 0.1–1.3)
MONOCYTES # BLD: 0.7 K/UL (ref 0.1–1.3)
MONOCYTES # BLD: 0.8 K/UL (ref 0.1–1.3)
MONOCYTES # BLD: 1.1 K/UL (ref 0.1–1.3)
MONOCYTES NFR BLD: 4 % (ref 4–12)
MONOCYTES NFR BLD: 4 % (ref 4–12)
MONOCYTES NFR BLD: 5 % (ref 4–12)
MONOCYTES NFR BLD: 5 % (ref 4–12)
MONOCYTES NFR BLD: 6 % (ref 4–12)
MONOCYTES NFR BLD: 7 % (ref 4–12)
MONOCYTES NFR BLD: 8 % (ref 4–12)
NEUTS SEG # BLD: 10 K/UL (ref 1.7–8.2)
NEUTS SEG # BLD: 10.3 K/UL (ref 1.7–8.2)
NEUTS SEG # BLD: 3.8 K/UL (ref 1.7–8.2)
NEUTS SEG # BLD: 4.6 K/UL (ref 1.7–8.2)
NEUTS SEG # BLD: 5.9 K/UL (ref 1.7–8.2)
NEUTS SEG # BLD: 7.4 K/UL (ref 1.7–8.2)
NEUTS SEG # BLD: 7.5 K/UL (ref 1.7–8.2)
NEUTS SEG # BLD: 7.7 K/UL (ref 1.7–8.2)
NEUTS SEG # BLD: 7.9 K/UL (ref 1.7–8.2)
NEUTS SEG # BLD: 7.9 K/UL (ref 1.7–8.2)
NEUTS SEG # BLD: 8 K/UL (ref 1.7–8.2)
NEUTS SEG # BLD: 8.3 K/UL (ref 1.7–8.2)
NEUTS SEG # BLD: 9.4 K/UL (ref 1.7–8.2)
NEUTS SEG NFR BLD: 51 % (ref 43–78)
NEUTS SEG NFR BLD: 62 % (ref 43–78)
NEUTS SEG NFR BLD: 64 % (ref 43–78)
NEUTS SEG NFR BLD: 68 % (ref 43–78)
NEUTS SEG NFR BLD: 68 % (ref 43–78)
NEUTS SEG NFR BLD: 72 % (ref 43–78)
NEUTS SEG NFR BLD: 73 % (ref 43–78)
NEUTS SEG NFR BLD: 73 % (ref 43–78)
NEUTS SEG NFR BLD: 74 % (ref 43–78)
NEUTS SEG NFR BLD: 75 % (ref 43–78)
NEUTS SEG NFR BLD: 76 % (ref 43–78)
NEUTS SEG NFR BLD: 78 % (ref 43–78)
NEUTS SEG NFR BLD: 79 % (ref 43–78)
NITRITE UR QL STRIP.AUTO: NEGATIVE
NRBC # BLD: 0 K/UL (ref 0–0.2)
NRBC # BLD: 0.02 K/UL (ref 0–0.2)
OSMOLALITY SERPL: 288 MOSM/KG H2O (ref 275–295)
OSMOLALITY SERPL: 290 MOSM/KG H2O (ref 275–295)
P-R INTERVAL, ECG05: 206 MS
PH UR STRIP: 6.5 [PH] (ref 5–9)
PLATELET # BLD AUTO: 211 K/UL (ref 150–450)
PLATELET # BLD AUTO: 233 K/UL (ref 150–450)
PLATELET # BLD AUTO: 250 K/UL (ref 150–450)
PLATELET # BLD AUTO: 269 K/UL (ref 150–450)
PLATELET # BLD AUTO: 276 K/UL (ref 150–450)
PLATELET # BLD AUTO: 280 K/UL (ref 150–450)
PLATELET # BLD AUTO: 296 K/UL (ref 150–450)
PLATELET # BLD AUTO: 311 K/UL (ref 150–450)
PLATELET # BLD AUTO: 314 K/UL (ref 150–450)
PLATELET # BLD AUTO: 343 K/UL (ref 150–450)
PLATELET # BLD AUTO: 356 K/UL (ref 150–450)
PLATELET # BLD AUTO: 377 K/UL (ref 150–450)
PLATELET # BLD AUTO: 404 K/UL (ref 150–450)
PLATELET COMMENTS,PCOM: ADEQUATE
PMV BLD AUTO: 10.3 FL (ref 9.4–12.3)
PMV BLD AUTO: 8.7 FL (ref 9.4–12.3)
PMV BLD AUTO: 9 FL (ref 9.4–12.3)
PMV BLD AUTO: 9.1 FL (ref 9.4–12.3)
PMV BLD AUTO: 9.1 FL (ref 9.4–12.3)
PMV BLD AUTO: 9.2 FL (ref 9.4–12.3)
PMV BLD AUTO: 9.2 FL (ref 9.4–12.3)
PMV BLD AUTO: 9.3 FL (ref 9.4–12.3)
PMV BLD AUTO: 9.4 FL (ref 9.4–12.3)
PMV BLD AUTO: 9.5 FL (ref 9.4–12.3)
PMV BLD AUTO: 9.6 FL (ref 9.4–12.3)
PMV BLD AUTO: 9.6 FL (ref 9.4–12.3)
PMV BLD AUTO: 9.8 FL (ref 9.4–12.3)
POTASSIUM SERPL-SCNC: 3.1 MMOL/L (ref 3.5–5.1)
POTASSIUM SERPL-SCNC: 3.2 MMOL/L (ref 3.5–5.1)
POTASSIUM SERPL-SCNC: 3.3 MMOL/L (ref 3.5–5.1)
POTASSIUM SERPL-SCNC: 3.4 MMOL/L (ref 3.5–5.1)
POTASSIUM SERPL-SCNC: 3.4 MMOL/L (ref 3.5–5.1)
POTASSIUM SERPL-SCNC: 3.5 MMOL/L (ref 3.5–5.1)
POTASSIUM SERPL-SCNC: 3.6 MMOL/L (ref 3.5–5.1)
POTASSIUM SERPL-SCNC: 3.7 MMOL/L (ref 3.5–5.1)
POTASSIUM SERPL-SCNC: 3.9 MMOL/L (ref 3.5–5.1)
POTASSIUM SERPL-SCNC: 4 MMOL/L (ref 3.5–5.1)
POTASSIUM SERPL-SCNC: 4 MMOL/L (ref 3.5–5.1)
POTASSIUM SERPL-SCNC: 4.1 MMOL/L (ref 3.5–5.1)
POTASSIUM SERPL-SCNC: 4.1 MMOL/L (ref 3.5–5.1)
POTASSIUM SERPL-SCNC: 4.2 MMOL/L (ref 3.5–5.1)
POTASSIUM SERPL-SCNC: 4.2 MMOL/L (ref 3.5–5.1)
POTASSIUM SERPL-SCNC: 4.6 MMOL/L (ref 3.5–5.1)
PROCALCITONIN SERPL-MCNC: <0.1 NG/ML
PROT SERPL-MCNC: 6.7 G/DL (ref 6.3–8.2)
PROT SERPL-MCNC: 6.9 G/DL (ref 6.3–8.2)
PROT SERPL-MCNC: 6.9 G/DL (ref 6.3–8.2)
PROT SERPL-MCNC: 7.1 G/DL (ref 6.3–8.2)
PROT SERPL-MCNC: 7.1 G/DL (ref 6.3–8.2)
PROT SERPL-MCNC: 7.2 G/DL (ref 6.3–8.2)
PROT SERPL-MCNC: 7.2 G/DL (ref 6.3–8.2)
PROT SERPL-MCNC: 7.3 G/DL (ref 6.3–8.2)
PROT SERPL-MCNC: 7.5 G/DL (ref 6.3–8.2)
PROT SERPL-MCNC: 7.6 G/DL (ref 6.3–8.2)
PROT SERPL-MCNC: 7.7 G/DL (ref 6.3–8.2)
PROT UR STRIP-MCNC: NEGATIVE MG/DL
Q-T INTERVAL, ECG07: 370 MS
QRS DURATION, ECG06: 90 MS
QTC CALCULATION (BEZET), ECG08: 424 MS
RBC # BLD AUTO: 4.2 M/UL (ref 4.23–5.6)
RBC # BLD AUTO: 4.43 M/UL (ref 4.23–5.6)
RBC # BLD AUTO: 4.43 M/UL (ref 4.23–5.67)
RBC # BLD AUTO: 4.5 M/UL (ref 4.23–5.67)
RBC # BLD AUTO: 4.52 M/UL (ref 4.23–5.67)
RBC # BLD AUTO: 4.54 M/UL (ref 4.23–5.67)
RBC # BLD AUTO: 4.58 M/UL (ref 4.23–5.6)
RBC # BLD AUTO: 4.61 M/UL (ref 4.23–5.6)
RBC # BLD AUTO: 4.67 M/UL (ref 4.23–5.67)
RBC # BLD AUTO: 4.7 M/UL (ref 4.23–5.67)
RBC # BLD AUTO: 4.72 M/UL (ref 4.23–5.67)
RBC # BLD AUTO: 4.74 M/UL (ref 4.23–5.67)
RBC # BLD AUTO: 4.75 M/UL (ref 4.23–5.67)
RBC MORPH BLD: ABNORMAL
SERVICE CMNT-IMP: ABNORMAL
SERVICE CMNT-IMP: NORMAL
SODIUM SERPL-SCNC: 122 MMOL/L (ref 136–145)
SODIUM SERPL-SCNC: 125 MMOL/L (ref 136–145)
SODIUM SERPL-SCNC: 132 MMOL/L (ref 136–145)
SODIUM SERPL-SCNC: 132 MMOL/L (ref 136–145)
SODIUM SERPL-SCNC: 133 MMOL/L (ref 136–145)
SODIUM SERPL-SCNC: 133 MMOL/L (ref 136–145)
SODIUM SERPL-SCNC: 134 MMOL/L (ref 136–145)
SODIUM SERPL-SCNC: 134 MMOL/L (ref 136–145)
SODIUM SERPL-SCNC: 135 MMOL/L (ref 136–145)
SODIUM SERPL-SCNC: 136 MMOL/L (ref 136–145)
SODIUM SERPL-SCNC: 137 MMOL/L (ref 136–145)
SODIUM SERPL-SCNC: 138 MMOL/L (ref 136–145)
SODIUM SERPL-SCNC: 139 MMOL/L (ref 136–145)
SODIUM SERPL-SCNC: 139 MMOL/L (ref 136–145)
SODIUM SERPL-SCNC: 140 MMOL/L (ref 136–145)
SODIUM SERPL-SCNC: 141 MMOL/L (ref 136–145)
SP GR UR REFRACTOMETRY: 1.01 (ref 1–1.02)
SPECIMEN EXP DATE BLD: NORMAL
T4 FREE SERPL-MCNC: 0.9 NG/DL (ref 0.78–1.46)
TSH SERPL DL<=0.005 MIU/L-ACNC: 3.49 UIU/ML (ref 0.36–3.74)
TSH SERPL DL<=0.005 MIU/L-ACNC: 9.03 UIU/ML (ref 0.36–3.74)
UROBILINOGEN UR QL STRIP.AUTO: 1 EU/DL (ref 0.2–1)
VENTRICULAR RATE, ECG03: 79 BPM
WBC # BLD AUTO: 10.6 K/UL (ref 4.3–11.1)
WBC # BLD AUTO: 10.7 K/UL (ref 4.3–11.1)
WBC # BLD AUTO: 10.7 K/UL (ref 4.3–11.1)
WBC # BLD AUTO: 11.3 K/UL (ref 4.3–11.1)
WBC # BLD AUTO: 11.4 K/UL (ref 4.3–11.1)
WBC # BLD AUTO: 11.7 K/UL (ref 4.3–11.1)
WBC # BLD AUTO: 11.7 K/UL (ref 4.3–11.1)
WBC # BLD AUTO: 12.9 K/UL (ref 4.3–11.1)
WBC # BLD AUTO: 13.1 K/UL (ref 4.3–11.1)
WBC # BLD AUTO: 13.8 K/UL (ref 4.3–11.1)
WBC # BLD AUTO: 5.2 K/UL (ref 4.3–11.1)
WBC # BLD AUTO: 7.5 K/UL (ref 4.3–11.1)
WBC # BLD AUTO: 9.5 K/UL (ref 4.3–11.1)
WBC MORPH BLD: ABNORMAL

## 2019-01-01 PROCEDURE — 36415 COLL VENOUS BLD VENIPUNCTURE: CPT

## 2019-01-01 PROCEDURE — 74011250637 HC RX REV CODE- 250/637: Performed by: INTERNAL MEDICINE

## 2019-01-01 PROCEDURE — 99285 EMERGENCY DEPT VISIT HI MDM: CPT | Performed by: EMERGENCY MEDICINE

## 2019-01-01 PROCEDURE — 77338 DESIGN MLC DEVICE FOR IMRT: CPT

## 2019-01-01 PROCEDURE — 74011000250 HC RX REV CODE- 250: Performed by: NEUROLOGICAL SURGERY

## 2019-01-01 PROCEDURE — 74011000258 HC RX REV CODE- 258: Performed by: INTERNAL MEDICINE

## 2019-01-01 PROCEDURE — 99211 OFF/OP EST MAY X REQ PHY/QHP: CPT

## 2019-01-01 PROCEDURE — 74011250637 HC RX REV CODE- 250/637: Performed by: NURSE PRACTITIONER

## 2019-01-01 PROCEDURE — 99283 EMERGENCY DEPT VISIT LOW MDM: CPT | Performed by: EMERGENCY MEDICINE

## 2019-01-01 PROCEDURE — 84443 ASSAY THYROID STIM HORMONE: CPT

## 2019-01-01 PROCEDURE — 80048 BASIC METABOLIC PNL TOTAL CA: CPT

## 2019-01-01 PROCEDURE — 77386 HC IMRT TRMT DLVR COMPL: CPT

## 2019-01-01 PROCEDURE — A9575 INJ GADOTERATE MEGLUMI 0.1ML: HCPCS | Performed by: NEUROLOGICAL SURGERY

## 2019-01-01 PROCEDURE — 74011250636 HC RX REV CODE- 250/636: Performed by: NEUROLOGICAL SURGERY

## 2019-01-01 PROCEDURE — A9575 INJ GADOTERATE MEGLUMI 0.1ML: HCPCS | Performed by: INTERNAL MEDICINE

## 2019-01-01 PROCEDURE — 77030019908 HC STETH ESOPH SIMS -A: Performed by: ANESTHESIOLOGY

## 2019-01-01 PROCEDURE — 74011250636 HC RX REV CODE- 250/636: Performed by: INTERNAL MEDICINE

## 2019-01-01 PROCEDURE — 00B70ZZ EXCISION OF CEREBRAL HEMISPHERE, OPEN APPROACH: ICD-10-PCS | Performed by: NEUROLOGICAL SURGERY

## 2019-01-01 PROCEDURE — 85025 COMPLETE CBC W/AUTO DIFF WBC: CPT

## 2019-01-01 PROCEDURE — 70552 MRI BRAIN STEM W/DYE: CPT

## 2019-01-01 PROCEDURE — 76010000173 HC OR TIME 3 TO 3.5 HR INTENSV-TIER 1: Performed by: NEUROLOGICAL SURGERY

## 2019-01-01 PROCEDURE — 77334 RADIATION TREATMENT AID(S): CPT

## 2019-01-01 PROCEDURE — 77030016683 HC BUR ZYPHR1 STRY -C: Performed by: NEUROLOGICAL SURGERY

## 2019-01-01 PROCEDURE — 77030030163 HC BN WAX J&J -A: Performed by: NEUROLOGICAL SURGERY

## 2019-01-01 PROCEDURE — 70553 MRI BRAIN STEM W/O & W/DYE: CPT

## 2019-01-01 PROCEDURE — 77030014355 HC CVR BUR H TI BIOM -C: Performed by: NEUROLOGICAL SURGERY

## 2019-01-01 PROCEDURE — 77030034850: Performed by: NEUROLOGICAL SURGERY

## 2019-01-01 PROCEDURE — 77030025006 HC BUR STRY -C: Performed by: NEUROLOGICAL SURGERY

## 2019-01-01 PROCEDURE — 77030013138 HC MRK XR SPHR IZIM -B: Performed by: NEUROLOGICAL SURGERY

## 2019-01-01 PROCEDURE — 80053 COMPREHEN METABOLIC PANEL: CPT

## 2019-01-01 PROCEDURE — 74011000258 HC RX REV CODE- 258: Performed by: NEUROLOGICAL SURGERY

## 2019-01-01 PROCEDURE — 96375 TX/PRO/DX INJ NEW DRUG ADDON: CPT | Performed by: EMERGENCY MEDICINE

## 2019-01-01 PROCEDURE — 74011250636 HC RX REV CODE- 250/636

## 2019-01-01 PROCEDURE — 74011250637 HC RX REV CODE- 250/637: Performed by: NEUROLOGICAL SURGERY

## 2019-01-01 PROCEDURE — 77030002916 HC SUT ETHLN J&J -A: Performed by: NEUROLOGICAL SURGERY

## 2019-01-01 PROCEDURE — 97165 OT EVAL LOW COMPLEX 30 MIN: CPT

## 2019-01-01 PROCEDURE — 77030005401 HC CATH RAD ARRO -A: Performed by: ANESTHESIOLOGY

## 2019-01-01 PROCEDURE — 76210000006 HC OR PH I REC 0.5 TO 1 HR: Performed by: NEUROLOGICAL SURGERY

## 2019-01-01 PROCEDURE — 77030008542 HC TBNG MON PRSS EDWD -A: Performed by: ANESTHESIOLOGY

## 2019-01-01 PROCEDURE — 87205 SMEAR GRAM STAIN: CPT

## 2019-01-01 PROCEDURE — 77030040356 HC CORD BPLR FRCP COVD -A: Performed by: NEUROLOGICAL SURGERY

## 2019-01-01 PROCEDURE — 77412 RADIATION TX DELIVERY LVL 3: CPT

## 2019-01-01 PROCEDURE — 77030038600 HC TU BPLR IRR DISP STRY -B: Performed by: NEUROLOGICAL SURGERY

## 2019-01-01 PROCEDURE — 99223 1ST HOSP IP/OBS HIGH 75: CPT | Performed by: INTERNAL MEDICINE

## 2019-01-01 PROCEDURE — 82962 GLUCOSE BLOOD TEST: CPT

## 2019-01-01 PROCEDURE — 77030002946 HC SUT NRLN J&J -B: Performed by: NEUROLOGICAL SURGERY

## 2019-01-01 PROCEDURE — 92507 TX SP LANG VOICE COMM INDIV: CPT

## 2019-01-01 PROCEDURE — 77336 RADIATION PHYSICS CONSULT: CPT

## 2019-01-01 PROCEDURE — 65610000001 HC ROOM ICU GENERAL

## 2019-01-01 PROCEDURE — 77030013705 HC HK ELAS STAY COOP -B: Performed by: NEUROLOGICAL SURGERY

## 2019-01-01 PROCEDURE — 84145 PROCALCITONIN (PCT): CPT

## 2019-01-01 PROCEDURE — 84295 ASSAY OF SERUM SODIUM: CPT

## 2019-01-01 PROCEDURE — 70450 CT HEAD/BRAIN W/O DYE: CPT

## 2019-01-01 PROCEDURE — A9575 INJ GADOTERATE MEGLUMI 0.1ML: HCPCS | Performed by: RADIOLOGY

## 2019-01-01 PROCEDURE — 77030002700 HC GRFT DURA SUTRBL INLC -D: Performed by: NEUROLOGICAL SURGERY

## 2019-01-01 PROCEDURE — 92526 ORAL FUNCTION THERAPY: CPT

## 2019-01-01 PROCEDURE — 88305 TISSUE EXAM BY PATHOLOGIST: CPT

## 2019-01-01 PROCEDURE — 97162 PT EVAL MOD COMPLEX 30 MIN: CPT

## 2019-01-01 PROCEDURE — 77030030722 HC PIN SKULL MAYFLD INLC -B: Performed by: NEUROLOGICAL SURGERY

## 2019-01-01 PROCEDURE — 99239 HOSP IP/OBS DSCHRG MGMT >30: CPT | Performed by: INTERNAL MEDICINE

## 2019-01-01 PROCEDURE — 71045 X-RAY EXAM CHEST 1 VIEW: CPT

## 2019-01-01 PROCEDURE — 74011250636 HC RX REV CODE- 250/636: Performed by: NURSE PRACTITIONER

## 2019-01-01 PROCEDURE — 77030032490 HC SLV COMPR SCD KNE COVD -B: Performed by: NEUROLOGICAL SURGERY

## 2019-01-01 PROCEDURE — 74011250636 HC RX REV CODE- 250/636: Performed by: EMERGENCY MEDICINE

## 2019-01-01 PROCEDURE — 74011250636 HC RX REV CODE- 250/636: Performed by: RADIOLOGY

## 2019-01-01 PROCEDURE — 87077 CULTURE AEROBIC IDENTIFY: CPT

## 2019-01-01 PROCEDURE — 77030039425 HC BLD LARYNG TRULITE DISP TELE -A: Performed by: ANESTHESIOLOGY

## 2019-01-01 PROCEDURE — 87040 BLOOD CULTURE FOR BACTERIA: CPT

## 2019-01-01 PROCEDURE — 77399 UNLISTED PX MED RADJ PHYSICS: CPT

## 2019-01-01 PROCEDURE — 76060000038 HC ANESTHESIA 3.5 TO 4 HR: Performed by: NEUROLOGICAL SURGERY

## 2019-01-01 PROCEDURE — 77030020480 HC CLP SCLP RANY DISP J&J -A: Performed by: NEUROLOGICAL SURGERY

## 2019-01-01 PROCEDURE — 77300 RADIATION THERAPY DOSE PLAN: CPT

## 2019-01-01 PROCEDURE — 87186 SC STD MICRODIL/AGAR DIL: CPT

## 2019-01-01 PROCEDURE — 74011000250 HC RX REV CODE- 250

## 2019-01-01 PROCEDURE — 77030013292 HC BOWL MX PRSM J&J -A: Performed by: ANESTHESIOLOGY

## 2019-01-01 PROCEDURE — 77030027138 HC INCENT SPIROMETER -A

## 2019-01-01 PROCEDURE — 92523 SPEECH SOUND LANG COMPREHEN: CPT

## 2019-01-01 PROCEDURE — 77030008468 HC STPLR SKN VISIS TELE -A: Performed by: NEUROLOGICAL SURGERY

## 2019-01-01 PROCEDURE — 97166 OT EVAL MOD COMPLEX 45 MIN: CPT

## 2019-01-01 PROCEDURE — 65660000000 HC RM CCU STEPDOWN

## 2019-01-01 PROCEDURE — 81003 URINALYSIS AUTO W/O SCOPE: CPT

## 2019-01-01 PROCEDURE — 83605 ASSAY OF LACTIC ACID: CPT

## 2019-01-01 PROCEDURE — 99232 SBSQ HOSP IP/OBS MODERATE 35: CPT | Performed by: INTERNAL MEDICINE

## 2019-01-01 PROCEDURE — 65270000029 HC RM PRIVATE

## 2019-01-01 PROCEDURE — 94762 N-INVAS EAR/PLS OXIMTRY CONT: CPT

## 2019-01-01 PROCEDURE — 77301 RADIOTHERAPY DOSE PLAN IMRT: CPT

## 2019-01-01 PROCEDURE — 74011250636 HC RX REV CODE- 250/636: Performed by: ANESTHESIOLOGY

## 2019-01-01 PROCEDURE — 83690 ASSAY OF LIPASE: CPT

## 2019-01-01 PROCEDURE — 77030018390 HC SPNG HEMSTAT2 J&J -B: Performed by: NEUROLOGICAL SURGERY

## 2019-01-01 PROCEDURE — 74011000258 HC RX REV CODE- 258: Performed by: NURSE PRACTITIONER

## 2019-01-01 PROCEDURE — 77030029372 HC ADH SKN CLSR PRINEO J&J -C: Performed by: NEUROLOGICAL SURGERY

## 2019-01-01 PROCEDURE — 65270000015 HC RM PRIVATE ONCOLOGY

## 2019-01-01 PROCEDURE — 77030004472 HC BUR TAPR MEDT -B: Performed by: NEUROLOGICAL SURGERY

## 2019-01-01 PROCEDURE — 93005 ELECTROCARDIOGRAM TRACING: CPT | Performed by: HOSPITALIST

## 2019-01-01 PROCEDURE — 97161 PT EVAL LOW COMPLEX 20 MIN: CPT

## 2019-01-01 PROCEDURE — 99215 OFFICE O/P EST HI 40 MIN: CPT | Performed by: INTERNAL MEDICINE

## 2019-01-01 PROCEDURE — 77030013794 HC KT TRNSDUC BLD EDWD -B: Performed by: ANESTHESIOLOGY

## 2019-01-01 PROCEDURE — C1713 ANCHOR/SCREW BN/BN,TIS/BN: HCPCS | Performed by: NEUROLOGICAL SURGERY

## 2019-01-01 PROCEDURE — 84439 ASSAY OF FREE THYROXINE: CPT

## 2019-01-01 PROCEDURE — 97530 THERAPEUTIC ACTIVITIES: CPT

## 2019-01-01 PROCEDURE — 77030014007 HC SPNG HEMSTAT J&J -B: Performed by: NEUROLOGICAL SURGERY

## 2019-01-01 PROCEDURE — 77030003029 HC SUT VCRL J&J -B: Performed by: NEUROLOGICAL SURGERY

## 2019-01-01 PROCEDURE — 77030037088 HC TUBE ENDOTRACH ORAL NSL COVD-A: Performed by: ANESTHESIOLOGY

## 2019-01-01 PROCEDURE — 83930 ASSAY OF BLOOD OSMOLALITY: CPT

## 2019-01-01 PROCEDURE — 77030016570 HC BLNKT BAIR HGGR 3M -B: Performed by: ANESTHESIOLOGY

## 2019-01-01 PROCEDURE — 88331 PATH CONSLTJ SURG 1 BLK 1SPC: CPT

## 2019-01-01 PROCEDURE — 96374 THER/PROPH/DIAG INJ IV PUSH: CPT | Performed by: EMERGENCY MEDICINE

## 2019-01-01 PROCEDURE — 74011000250 HC RX REV CODE- 250: Performed by: ANESTHESIOLOGY

## 2019-01-01 PROCEDURE — 77030018836 HC SOL IRR NACL ICUM -A: Performed by: NEUROLOGICAL SURGERY

## 2019-01-01 PROCEDURE — 3E0Q005 INTRODUCTION OF OTHER ANTINEOPLASTIC INTO CRANIAL CAVITY AND BRAIN, OPEN APPROACH: ICD-10-PCS | Performed by: NEUROLOGICAL SURGERY

## 2019-01-01 PROCEDURE — 77470 SPECIAL RADIATION TREATMENT: CPT

## 2019-01-01 PROCEDURE — 92610 EVALUATE SWALLOWING FUNCTION: CPT

## 2019-01-01 PROCEDURE — 94760 N-INVAS EAR/PLS OXIMETRY 1: CPT

## 2019-01-01 PROCEDURE — 77030019557 HC ELECTRD VES SEAL MEDT -F: Performed by: NEUROLOGICAL SURGERY

## 2019-01-01 PROCEDURE — 86900 BLOOD TYPING SEROLOGIC ABO: CPT

## 2019-01-01 DEVICE — CLIP BONE SCR CRANIOMAXILLOFACIAL W/ 1.5X4MM TI SELF DRL HI: Type: IMPLANTABLE DEVICE | Site: SKULL | Status: FUNCTIONAL

## 2019-01-01 DEVICE — PLATE BONE LNG L16MM THK0.6MM 2 H TI STR FOR 1.5MM SCR: Type: IMPLANTABLE DEVICE | Site: BRAIN | Status: FUNCTIONAL

## 2019-01-01 DEVICE — DURAGEN® DURAL GRAFT MATRIX 1PK, 2X2 DOM
Type: IMPLANTABLE DEVICE | Site: BRAIN | Status: FUNCTIONAL
Brand: DURAGEN®

## 2019-01-01 DEVICE — COVER BUR H L DIA18.5MM THK0.5MM 5 H NEURO TI W/O TAB: Type: IMPLANTABLE DEVICE | Site: BRAIN | Status: FUNCTIONAL

## 2019-01-01 RX ORDER — MANNITOL 20 G/100ML
25 INJECTION, SOLUTION INTRAVENOUS EVERY 6 HOURS
Status: DISCONTINUED | OUTPATIENT
Start: 2019-01-01 | End: 2019-01-01 | Stop reason: SDUPTHER

## 2019-01-01 RX ORDER — SODIUM CHLORIDE 0.9 % (FLUSH) 0.9 %
10 SYRINGE (ML) INJECTION
Status: COMPLETED | OUTPATIENT
Start: 2019-01-01 | End: 2019-01-01

## 2019-01-01 RX ORDER — SODIUM CHLORIDE 0.9 % (FLUSH) 0.9 %
5-40 SYRINGE (ML) INJECTION EVERY 8 HOURS
Status: DISCONTINUED | OUTPATIENT
Start: 2019-01-01 | End: 2019-01-01 | Stop reason: SDUPTHER

## 2019-01-01 RX ORDER — CEFAZOLIN SODIUM/WATER 2 G/20 ML
2 SYRINGE (ML) INTRAVENOUS ONCE
Status: COMPLETED | OUTPATIENT
Start: 2019-01-01 | End: 2019-01-01

## 2019-01-01 RX ORDER — DEXAMETHASONE SODIUM PHOSPHATE 100 MG/10ML
10 INJECTION INTRAMUSCULAR; INTRAVENOUS
Status: COMPLETED | OUTPATIENT
Start: 2019-01-01 | End: 2019-01-01

## 2019-01-01 RX ORDER — LABETALOL HYDROCHLORIDE 5 MG/ML
5 INJECTION, SOLUTION INTRAVENOUS
Status: DISCONTINUED | OUTPATIENT
Start: 2019-01-01 | End: 2019-01-01 | Stop reason: HOSPADM

## 2019-01-01 RX ORDER — FENTANYL CITRATE 50 UG/ML
100 INJECTION, SOLUTION INTRAMUSCULAR; INTRAVENOUS ONCE
Status: ACTIVE | OUTPATIENT
Start: 2019-01-01 | End: 2019-01-01

## 2019-01-01 RX ORDER — PANTOPRAZOLE SODIUM 40 MG/1
40 TABLET, DELAYED RELEASE ORAL
Status: DISCONTINUED | OUTPATIENT
Start: 2019-01-01 | End: 2019-01-01 | Stop reason: HOSPADM

## 2019-01-01 RX ORDER — CEFAZOLIN SODIUM/WATER 2 G/20 ML
2 SYRINGE (ML) INTRAVENOUS EVERY 8 HOURS
Status: COMPLETED | OUTPATIENT
Start: 2019-01-01 | End: 2019-01-01

## 2019-01-01 RX ORDER — OXYCODONE AND ACETAMINOPHEN 5; 325 MG/1; MG/1
1 TABLET ORAL
Status: DISCONTINUED | OUTPATIENT
Start: 2019-01-01 | End: 2019-01-01 | Stop reason: HOSPADM

## 2019-01-01 RX ORDER — AMLODIPINE BESYLATE 10 MG/1
10 TABLET ORAL DAILY
Status: DISCONTINUED | OUTPATIENT
Start: 2019-01-01 | End: 2019-01-01 | Stop reason: HOSPADM

## 2019-01-01 RX ORDER — SODIUM CHLORIDE 0.9 % (FLUSH) 0.9 %
5-40 SYRINGE (ML) INJECTION AS NEEDED
Status: DISCONTINUED | OUTPATIENT
Start: 2019-01-01 | End: 2019-01-01 | Stop reason: HOSPADM

## 2019-01-01 RX ORDER — VANCOMYCIN 2 GRAM/500 ML IN 0.9 % SODIUM CHLORIDE INTRAVENOUS
2000
Status: COMPLETED | OUTPATIENT
Start: 2019-01-01 | End: 2019-01-01

## 2019-01-01 RX ORDER — FAMOTIDINE 20 MG/1
20 TABLET, FILM COATED ORAL 2 TIMES DAILY
Status: DISCONTINUED | OUTPATIENT
Start: 2019-01-01 | End: 2019-01-01

## 2019-01-01 RX ORDER — ROCURONIUM BROMIDE 10 MG/ML
INJECTION, SOLUTION INTRAVENOUS AS NEEDED
Status: DISCONTINUED | OUTPATIENT
Start: 2019-01-01 | End: 2019-01-01 | Stop reason: HOSPADM

## 2019-01-01 RX ORDER — VANCOMYCIN/0.9 % SOD CHLORIDE 1.5G/250ML
1500 PLASTIC BAG, INJECTION (ML) INTRAVENOUS EVERY 12 HOURS
Status: DISCONTINUED | OUTPATIENT
Start: 2019-01-01 | End: 2019-01-01 | Stop reason: HOSPADM

## 2019-01-01 RX ORDER — PROCHLORPERAZINE MALEATE 10 MG
10 TABLET ORAL
Qty: 30 TAB | Refills: 2 | Status: SHIPPED | OUTPATIENT
Start: 2019-01-01 | End: 2019-01-01

## 2019-01-01 RX ORDER — LEVETIRACETAM 500 MG/1
500 TABLET ORAL EVERY 12 HOURS
Status: DISCONTINUED | OUTPATIENT
Start: 2019-01-01 | End: 2019-01-01 | Stop reason: HOSPADM

## 2019-01-01 RX ORDER — MIDAZOLAM HYDROCHLORIDE 1 MG/ML
2 INJECTION, SOLUTION INTRAMUSCULAR; INTRAVENOUS
Status: ACTIVE | OUTPATIENT
Start: 2019-01-01 | End: 2019-01-01

## 2019-01-01 RX ORDER — SODIUM CHLORIDE, SODIUM LACTATE, POTASSIUM CHLORIDE, CALCIUM CHLORIDE 600; 310; 30; 20 MG/100ML; MG/100ML; MG/100ML; MG/100ML
75 INJECTION, SOLUTION INTRAVENOUS CONTINUOUS
Status: DISCONTINUED | OUTPATIENT
Start: 2019-01-01 | End: 2019-01-01 | Stop reason: HOSPADM

## 2019-01-01 RX ORDER — SODIUM CHLORIDE 9 MG/ML
25 INJECTION, SOLUTION INTRAVENOUS AS NEEDED
Status: DISCONTINUED | OUTPATIENT
Start: 2019-01-01 | End: 2019-01-01 | Stop reason: HOSPADM

## 2019-01-01 RX ORDER — 3% SODIUM CHLORIDE 3 G/100ML
10 INJECTION, SOLUTION INTRAVENOUS CONTINUOUS
Status: DISCONTINUED | OUTPATIENT
Start: 2019-01-01 | End: 2019-01-01

## 2019-01-01 RX ORDER — GADOTERATE MEGLUMINE 376.9 MG/ML
20 INJECTION INTRAVENOUS
Status: COMPLETED | OUTPATIENT
Start: 2019-01-01 | End: 2019-01-01

## 2019-01-01 RX ORDER — SODIUM CHLORIDE 9 MG/ML
100 INJECTION, SOLUTION INTRAVENOUS CONTINUOUS
Status: DISCONTINUED | OUTPATIENT
Start: 2019-01-01 | End: 2019-01-01 | Stop reason: HOSPADM

## 2019-01-01 RX ORDER — PANTOPRAZOLE SODIUM 40 MG/1
40 TABLET, DELAYED RELEASE ORAL
Qty: 30 TAB | Refills: 2 | Status: SHIPPED | OUTPATIENT
Start: 2019-01-01 | End: 2019-01-01 | Stop reason: SDUPTHER

## 2019-01-01 RX ORDER — DEXAMETHASONE 4 MG/1
4 TABLET ORAL EVERY 6 HOURS
Status: DISCONTINUED | OUTPATIENT
Start: 2019-01-01 | End: 2019-01-01 | Stop reason: HOSPADM

## 2019-01-01 RX ORDER — AMLODIPINE BESYLATE 5 MG/1
5 TABLET ORAL DAILY
Status: DISCONTINUED | OUTPATIENT
Start: 2019-01-01 | End: 2019-01-01

## 2019-01-01 RX ORDER — DEXAMETHASONE SODIUM PHOSPHATE 4 MG/ML
INJECTION, SOLUTION INTRA-ARTICULAR; INTRALESIONAL; INTRAMUSCULAR; INTRAVENOUS; SOFT TISSUE AS NEEDED
Status: DISCONTINUED | OUTPATIENT
Start: 2019-01-01 | End: 2019-01-01 | Stop reason: HOSPADM

## 2019-01-01 RX ORDER — LEVETIRACETAM 5 MG/ML
500 INJECTION INTRAVASCULAR EVERY 12 HOURS
Status: DISCONTINUED | OUTPATIENT
Start: 2019-01-01 | End: 2019-01-01 | Stop reason: SDUPTHER

## 2019-01-01 RX ORDER — NEOSTIGMINE METHYLSULFATE 1 MG/ML
INJECTION INTRAVENOUS AS NEEDED
Status: DISCONTINUED | OUTPATIENT
Start: 2019-01-01 | End: 2019-01-01 | Stop reason: HOSPADM

## 2019-01-01 RX ORDER — SULFAMETHOXAZOLE AND TRIMETHOPRIM 800; 160 MG/1; MG/1
1 TABLET ORAL
Qty: 24 TAB | Refills: 1 | Status: SHIPPED | OUTPATIENT
Start: 2019-01-01 | End: 2019-01-01 | Stop reason: SDUPTHER

## 2019-01-01 RX ORDER — PROPOFOL 10 MG/ML
INJECTION, EMULSION INTRAVENOUS AS NEEDED
Status: DISCONTINUED | OUTPATIENT
Start: 2019-01-01 | End: 2019-01-01 | Stop reason: HOSPADM

## 2019-01-01 RX ORDER — METRONIDAZOLE 500 MG/100ML
500 INJECTION, SOLUTION INTRAVENOUS EVERY 8 HOURS
Status: DISCONTINUED | OUTPATIENT
Start: 2019-01-01 | End: 2019-01-01 | Stop reason: HOSPADM

## 2019-01-01 RX ORDER — VANCOMYCIN/0.9 % SOD CHLORIDE 1.5G/250ML
1500 PLASTIC BAG, INJECTION (ML) INTRAVENOUS EVERY 12 HOURS
Status: DISCONTINUED | OUTPATIENT
Start: 2019-01-01 | End: 2019-01-01

## 2019-01-01 RX ORDER — GADOTERATE MEGLUMINE 376.9 MG/ML
18 INJECTION INTRAVENOUS
Status: COMPLETED | OUTPATIENT
Start: 2019-01-01 | End: 2019-01-01

## 2019-01-01 RX ORDER — MIDAZOLAM HYDROCHLORIDE 1 MG/ML
5 INJECTION, SOLUTION INTRAMUSCULAR; INTRAVENOUS ONCE
Status: ACTIVE | OUTPATIENT
Start: 2019-01-01 | End: 2019-01-01

## 2019-01-01 RX ORDER — DEXAMETHASONE SODIUM PHOSPHATE 100 MG/10ML
10 INJECTION INTRAMUSCULAR; INTRAVENOUS ONCE
Status: DISPENSED | OUTPATIENT
Start: 2019-01-01 | End: 2019-01-01

## 2019-01-01 RX ORDER — SODIUM CHLORIDE 0.9 % (FLUSH) 0.9 %
5-40 SYRINGE (ML) INJECTION AS NEEDED
Status: DISCONTINUED | OUTPATIENT
Start: 2019-01-01 | End: 2019-01-01 | Stop reason: SDUPTHER

## 2019-01-01 RX ORDER — DEXAMETHASONE 0.5 MG/1
2 TABLET ORAL 2 TIMES DAILY
Qty: 30 TAB | Refills: 2 | Status: SHIPPED | OUTPATIENT
Start: 2019-01-01 | End: 2019-01-01 | Stop reason: ALTCHOICE

## 2019-01-01 RX ORDER — CIPROFLOXACIN 500 MG/1
750 TABLET ORAL EVERY 12 HOURS
Status: DISCONTINUED | OUTPATIENT
Start: 2019-01-01 | End: 2019-01-01 | Stop reason: HOSPADM

## 2019-01-01 RX ORDER — EPHEDRINE SULFATE 50 MG/ML
INJECTION, SOLUTION INTRAVENOUS AS NEEDED
Status: DISCONTINUED | OUTPATIENT
Start: 2019-01-01 | End: 2019-01-01 | Stop reason: HOSPADM

## 2019-01-01 RX ORDER — DEXAMETHASONE 0.5 MG/1
2 TABLET ORAL 4 TIMES DAILY
Qty: 40 TAB | Refills: 0 | Status: SHIPPED | OUTPATIENT
Start: 2019-01-01 | End: 2019-01-01 | Stop reason: ALTCHOICE

## 2019-01-01 RX ORDER — LIDOCAINE HYDROCHLORIDE 10 MG/ML
0.1 INJECTION INFILTRATION; PERINEURAL AS NEEDED
Status: DISCONTINUED | OUTPATIENT
Start: 2019-01-01 | End: 2019-01-01 | Stop reason: HOSPADM

## 2019-01-01 RX ORDER — HYDROCODONE BITARTRATE AND ACETAMINOPHEN 5; 325 MG/1; MG/1
1 TABLET ORAL
Status: DISCONTINUED | OUTPATIENT
Start: 2019-01-01 | End: 2019-01-01 | Stop reason: HOSPADM

## 2019-01-01 RX ORDER — LIDOCAINE HYDROCHLORIDE 20 MG/ML
INJECTION, SOLUTION EPIDURAL; INFILTRATION; INTRACAUDAL; PERINEURAL AS NEEDED
Status: DISCONTINUED | OUTPATIENT
Start: 2019-01-01 | End: 2019-01-01 | Stop reason: HOSPADM

## 2019-01-01 RX ORDER — SODIUM CHLORIDE 0.9 % (FLUSH) 0.9 %
5-40 SYRINGE (ML) INJECTION EVERY 8 HOURS
Status: DISCONTINUED | OUTPATIENT
Start: 2019-01-01 | End: 2019-01-01 | Stop reason: HOSPADM

## 2019-01-01 RX ORDER — SODIUM CHLORIDE, SODIUM LACTATE, POTASSIUM CHLORIDE, CALCIUM CHLORIDE 600; 310; 30; 20 MG/100ML; MG/100ML; MG/100ML; MG/100ML
75 INJECTION, SOLUTION INTRAVENOUS CONTINUOUS
Status: DISPENSED | OUTPATIENT
Start: 2019-01-01 | End: 2019-01-01

## 2019-01-01 RX ORDER — LIDOCAINE HYDROCHLORIDE AND EPINEPHRINE 10; 10 MG/ML; UG/ML
INJECTION, SOLUTION INFILTRATION; PERINEURAL AS NEEDED
Status: DISCONTINUED | OUTPATIENT
Start: 2019-01-01 | End: 2019-01-01 | Stop reason: HOSPADM

## 2019-01-01 RX ORDER — HYDRALAZINE HYDROCHLORIDE 20 MG/ML
10 INJECTION INTRAMUSCULAR; INTRAVENOUS
Status: DISCONTINUED | OUTPATIENT
Start: 2019-01-01 | End: 2019-01-01

## 2019-01-01 RX ORDER — ESMOLOL HYDROCHLORIDE 10 MG/ML
INJECTION INTRAVENOUS AS NEEDED
Status: DISCONTINUED | OUTPATIENT
Start: 2019-01-01 | End: 2019-01-01 | Stop reason: HOSPADM

## 2019-01-01 RX ORDER — CLONAZEPAM 1 MG/1
0.5 TABLET ORAL
Status: DISCONTINUED | OUTPATIENT
Start: 2019-01-01 | End: 2019-01-01 | Stop reason: HOSPADM

## 2019-01-01 RX ORDER — LABETALOL HYDROCHLORIDE 5 MG/ML
INJECTION, SOLUTION INTRAVENOUS
Status: COMPLETED
Start: 2019-01-01 | End: 2019-01-01

## 2019-01-01 RX ORDER — OXYCODONE HYDROCHLORIDE 5 MG/1
5 TABLET ORAL
Status: DISCONTINUED | OUTPATIENT
Start: 2019-01-01 | End: 2019-01-01 | Stop reason: HOSPADM

## 2019-01-01 RX ORDER — ACETAMINOPHEN 325 MG/1
650 TABLET ORAL
Status: DISCONTINUED | OUTPATIENT
Start: 2019-01-01 | End: 2019-01-01 | Stop reason: HOSPADM

## 2019-01-01 RX ORDER — CIPROFLOXACIN 750 MG/1
750 TABLET, FILM COATED ORAL 2 TIMES DAILY
Qty: 28 TAB | Refills: 1 | Status: SHIPPED | OUTPATIENT
Start: 2019-01-01 | End: 2019-01-01 | Stop reason: ALTCHOICE

## 2019-01-01 RX ORDER — MANNITOL 20 G/100ML
25 INJECTION, SOLUTION INTRAVENOUS EVERY 6 HOURS
Status: DISCONTINUED | OUTPATIENT
Start: 2019-01-01 | End: 2019-01-01

## 2019-01-01 RX ORDER — DEXAMETHASONE 4 MG/1
4 TABLET ORAL 2 TIMES DAILY WITH MEALS
Qty: 60 TAB | Refills: 2 | Status: SHIPPED | OUTPATIENT
Start: 2019-01-01 | End: 2019-01-01

## 2019-01-01 RX ORDER — DEXAMETHASONE SODIUM PHOSPHATE 4 MG/ML
2 INJECTION, SOLUTION INTRA-ARTICULAR; INTRALESIONAL; INTRAMUSCULAR; INTRAVENOUS; SOFT TISSUE EVERY 6 HOURS
Status: DISCONTINUED | OUTPATIENT
Start: 2019-01-01 | End: 2019-01-01 | Stop reason: HOSPADM

## 2019-01-01 RX ORDER — GADOTERATE MEGLUMINE 376.9 MG/ML
19 INJECTION INTRAVENOUS
Status: COMPLETED | OUTPATIENT
Start: 2019-01-01 | End: 2019-01-01

## 2019-01-01 RX ORDER — FENTANYL CITRATE 50 UG/ML
INJECTION, SOLUTION INTRAMUSCULAR; INTRAVENOUS AS NEEDED
Status: DISCONTINUED | OUTPATIENT
Start: 2019-01-01 | End: 2019-01-01 | Stop reason: HOSPADM

## 2019-01-01 RX ORDER — ONDANSETRON 2 MG/ML
INJECTION INTRAMUSCULAR; INTRAVENOUS AS NEEDED
Status: DISCONTINUED | OUTPATIENT
Start: 2019-01-01 | End: 2019-01-01 | Stop reason: HOSPADM

## 2019-01-01 RX ORDER — MANNITOL 20 G/100ML
25 INJECTION, SOLUTION INTRAVENOUS ONCE
Status: COMPLETED | OUTPATIENT
Start: 2019-01-01 | End: 2019-01-01

## 2019-01-01 RX ORDER — HYDROMORPHONE HYDROCHLORIDE 1 MG/ML
1 INJECTION, SOLUTION INTRAMUSCULAR; INTRAVENOUS; SUBCUTANEOUS
Status: DISCONTINUED | OUTPATIENT
Start: 2019-01-01 | End: 2019-01-01 | Stop reason: HOSPADM

## 2019-01-01 RX ORDER — MANNITOL 250 MG/ML
25 INJECTION, SOLUTION INTRAVENOUS ONCE
Status: DISCONTINUED | OUTPATIENT
Start: 2019-01-01 | End: 2019-01-01 | Stop reason: SDUPTHER

## 2019-01-01 RX ORDER — SODIUM CHLORIDE AND POTASSIUM CHLORIDE .9; .15 G/100ML; G/100ML
SOLUTION INTRAVENOUS CONTINUOUS
Status: DISCONTINUED | OUTPATIENT
Start: 2019-01-01 | End: 2019-01-01

## 2019-01-01 RX ORDER — DEXAMETHASONE SODIUM PHOSPHATE 4 MG/ML
4 INJECTION, SOLUTION INTRA-ARTICULAR; INTRALESIONAL; INTRAMUSCULAR; INTRAVENOUS; SOFT TISSUE EVERY 6 HOURS
Status: DISCONTINUED | OUTPATIENT
Start: 2019-01-01 | End: 2019-01-01

## 2019-01-01 RX ORDER — ONDANSETRON 2 MG/ML
4 INJECTION INTRAMUSCULAR; INTRAVENOUS
Status: COMPLETED | OUTPATIENT
Start: 2019-01-01 | End: 2019-01-01

## 2019-01-01 RX ORDER — HYDRALAZINE HYDROCHLORIDE 20 MG/ML
10 INJECTION INTRAMUSCULAR; INTRAVENOUS
Status: DISCONTINUED | OUTPATIENT
Start: 2019-01-01 | End: 2019-01-01 | Stop reason: HOSPADM

## 2019-01-01 RX ORDER — GLYCOPYRROLATE 0.2 MG/ML
INJECTION INTRAMUSCULAR; INTRAVENOUS AS NEEDED
Status: DISCONTINUED | OUTPATIENT
Start: 2019-01-01 | End: 2019-01-01 | Stop reason: HOSPADM

## 2019-01-01 RX ORDER — SODIUM CHLORIDE 9 MG/ML
1000 INJECTION, SOLUTION INTRAVENOUS ONCE
Status: COMPLETED | OUTPATIENT
Start: 2019-01-01 | End: 2019-01-01

## 2019-01-01 RX ORDER — HYDROMORPHONE HYDROCHLORIDE 2 MG/ML
0.5 INJECTION, SOLUTION INTRAMUSCULAR; INTRAVENOUS; SUBCUTANEOUS
Status: DISCONTINUED | OUTPATIENT
Start: 2019-01-01 | End: 2019-01-01 | Stop reason: HOSPADM

## 2019-01-01 RX ORDER — VANCOMYCIN 1.75 GRAM/500 ML IN 0.9 % SODIUM CHLORIDE INTRAVENOUS
1750 EVERY 12 HOURS
Status: DISCONTINUED | OUTPATIENT
Start: 2019-01-01 | End: 2019-01-01

## 2019-01-01 RX ORDER — NALOXONE HYDROCHLORIDE 0.4 MG/ML
0.4 INJECTION, SOLUTION INTRAMUSCULAR; INTRAVENOUS; SUBCUTANEOUS AS NEEDED
Status: DISCONTINUED | OUTPATIENT
Start: 2019-01-01 | End: 2019-01-01 | Stop reason: HOSPADM

## 2019-01-01 RX ORDER — CEFAZOLIN SODIUM 1 G/3ML
INJECTION, POWDER, FOR SOLUTION INTRAMUSCULAR; INTRAVENOUS AS NEEDED
Status: DISCONTINUED | OUTPATIENT
Start: 2019-01-01 | End: 2019-01-01 | Stop reason: HOSPADM

## 2019-01-01 RX ORDER — OXYCODONE AND ACETAMINOPHEN 7.5; 325 MG/1; MG/1
1 TABLET ORAL
Status: DISCONTINUED | OUTPATIENT
Start: 2019-01-01 | End: 2019-01-01 | Stop reason: HOSPADM

## 2019-01-01 RX ORDER — SODIUM CHLORIDE 900 MG/100ML
INJECTION INTRAVENOUS AS NEEDED
Status: DISCONTINUED | OUTPATIENT
Start: 2019-01-01 | End: 2019-01-01 | Stop reason: HOSPADM

## 2019-01-01 RX ORDER — DEXAMETHASONE SODIUM PHOSPHATE 4 MG/ML
4 INJECTION, SOLUTION INTRA-ARTICULAR; INTRALESIONAL; INTRAMUSCULAR; INTRAVENOUS; SOFT TISSUE EVERY 6 HOURS
Status: DISCONTINUED | OUTPATIENT
Start: 2019-01-01 | End: 2019-01-01 | Stop reason: HOSPADM

## 2019-01-01 RX ORDER — FLUTICASONE PROPIONATE 50 MCG
1 SPRAY, SUSPENSION (ML) NASAL DAILY
Status: DISCONTINUED | OUTPATIENT
Start: 2019-01-01 | End: 2019-01-01 | Stop reason: HOSPADM

## 2019-01-01 RX ORDER — CITALOPRAM 10 MG/1
10 TABLET ORAL DAILY
Status: DISCONTINUED | OUTPATIENT
Start: 2019-01-01 | End: 2019-01-01 | Stop reason: HOSPADM

## 2019-01-01 RX ORDER — HYDROCODONE BITARTRATE AND ACETAMINOPHEN 5; 325 MG/1; MG/1
2 TABLET ORAL AS NEEDED
Status: DISCONTINUED | OUTPATIENT
Start: 2019-01-01 | End: 2019-01-01 | Stop reason: HOSPADM

## 2019-01-01 RX ORDER — ONDANSETRON 2 MG/ML
8 INJECTION INTRAMUSCULAR; INTRAVENOUS
Status: DISCONTINUED | OUTPATIENT
Start: 2019-01-01 | End: 2019-01-01 | Stop reason: HOSPADM

## 2019-01-01 RX ORDER — ONDANSETRON 2 MG/ML
4 INJECTION INTRAMUSCULAR; INTRAVENOUS
Status: DISCONTINUED | OUTPATIENT
Start: 2019-01-01 | End: 2019-01-01 | Stop reason: HOSPADM

## 2019-01-01 RX ORDER — HYDROCODONE BITARTRATE AND ACETAMINOPHEN 7.5; 325 MG/1; MG/1
1 TABLET ORAL
Qty: 15 TAB | Refills: 0 | Status: SHIPPED | OUTPATIENT
Start: 2019-01-01 | End: 2019-01-01

## 2019-01-01 RX ORDER — SODIUM CHLORIDE 0.9 % (FLUSH) 0.9 %
10 SYRINGE (ML) INJECTION
Status: ACTIVE | OUTPATIENT
Start: 2019-01-01 | End: 2019-01-01

## 2019-01-01 RX ADMIN — ESMOLOL HYDROCHLORIDE 30 MG: 10 INJECTION INTRAVENOUS at 11:06

## 2019-01-01 RX ADMIN — CEFEPIME 2 G: 2 INJECTION, POWDER, FOR SOLUTION INTRAVENOUS at 03:04

## 2019-01-01 RX ADMIN — Medication 10 ML: at 23:16

## 2019-01-01 RX ADMIN — DEXAMETHASONE SODIUM PHOSPHATE 4 MG: 4 INJECTION, SOLUTION INTRAMUSCULAR; INTRAVENOUS at 10:21

## 2019-01-01 RX ADMIN — MANNITOL 25 G: 20 INJECTION, SOLUTION INTRAVENOUS at 05:19

## 2019-01-01 RX ADMIN — FAMOTIDINE 20 MG: 10 INJECTION INTRAVENOUS at 08:09

## 2019-01-01 RX ADMIN — FAMOTIDINE 20 MG: 10 INJECTION INTRAVENOUS at 08:52

## 2019-01-01 RX ADMIN — DEXAMETHASONE 4 MG: 4 TABLET ORAL at 03:32

## 2019-01-01 RX ADMIN — DEXAMETHASONE SODIUM PHOSPHATE 2 MG: 4 INJECTION, SOLUTION INTRAMUSCULAR; INTRAVENOUS at 16:22

## 2019-01-01 RX ADMIN — DEXAMETHASONE SODIUM PHOSPHATE 10 MG: 10 INJECTION INTRAMUSCULAR; INTRAVENOUS at 19:33

## 2019-01-01 RX ADMIN — AMLODIPINE BESYLATE 10 MG: 10 TABLET ORAL at 08:52

## 2019-01-01 RX ADMIN — AMLODIPINE BESYLATE 10 MG: 10 TABLET ORAL at 08:12

## 2019-01-01 RX ADMIN — PANTOPRAZOLE SODIUM 40 MG: 40 TABLET, DELAYED RELEASE ORAL at 05:19

## 2019-01-01 RX ADMIN — VANCOMYCIN HYDROCHLORIDE 1500 MG: 10 INJECTION, POWDER, LYOPHILIZED, FOR SOLUTION INTRAVENOUS at 11:59

## 2019-01-01 RX ADMIN — CIPROFLOXACIN HYDROCHLORIDE 750 MG: 250 TABLET, FILM COATED ORAL at 08:52

## 2019-01-01 RX ADMIN — HYDROMORPHONE HYDROCHLORIDE 1 MG: 1 INJECTION, SOLUTION INTRAMUSCULAR; INTRAVENOUS; SUBCUTANEOUS at 16:07

## 2019-01-01 RX ADMIN — CEFEPIME 2 G: 2 INJECTION, POWDER, FOR SOLUTION INTRAVENOUS at 09:13

## 2019-01-01 RX ADMIN — SODIUM CHLORIDE AND POTASSIUM CHLORIDE: .9; .15 SOLUTION INTRAVENOUS at 04:57

## 2019-01-01 RX ADMIN — MANNITOL 25 G: 20 INJECTION, SOLUTION INTRAVENOUS at 11:30

## 2019-01-01 RX ADMIN — CEFTRIAXONE SODIUM 1 G: 1 INJECTION, POWDER, FOR SOLUTION INTRAMUSCULAR; INTRAVENOUS at 08:09

## 2019-01-01 RX ADMIN — Medication 10 ML: at 08:58

## 2019-01-01 RX ADMIN — NICARDIPINE HYDROCHLORIDE 5 MG/HR: 25 INJECTION INTRAVENOUS at 04:49

## 2019-01-01 RX ADMIN — MANNITOL 25 G: 20 INJECTION, SOLUTION INTRAVENOUS at 23:54

## 2019-01-01 RX ADMIN — MANNITOL 25 G: 20 INJECTION, SOLUTION INTRAVENOUS at 12:00

## 2019-01-01 RX ADMIN — DEXAMETHASONE SODIUM PHOSPHATE 4 MG: 4 INJECTION, SOLUTION INTRAMUSCULAR; INTRAVENOUS at 17:23

## 2019-01-01 RX ADMIN — CEFEPIME HYDROCHLORIDE 2 G: 2 INJECTION, POWDER, FOR SOLUTION INTRAVENOUS at 16:13

## 2019-01-01 RX ADMIN — Medication 10 ML: at 05:02

## 2019-01-01 RX ADMIN — DEXAMETHASONE 4 MG: 4 TABLET ORAL at 05:30

## 2019-01-01 RX ADMIN — FLUTICASONE PROPIONATE 1 SPRAY: 50 SPRAY, METERED NASAL at 09:20

## 2019-01-01 RX ADMIN — PROPOFOL 150 MG: 10 INJECTION, EMULSION INTRAVENOUS at 13:50

## 2019-01-01 RX ADMIN — DEXAMETHASONE 4 MG: 4 TABLET ORAL at 16:13

## 2019-01-01 RX ADMIN — Medication 10 ML: at 21:13

## 2019-01-01 RX ADMIN — PROMETHAZINE HYDROCHLORIDE 12.5 MG: 25 INJECTION INTRAMUSCULAR; INTRAVENOUS at 09:25

## 2019-01-01 RX ADMIN — DEXAMETHASONE SODIUM PHOSPHATE 2 MG: 4 INJECTION, SOLUTION INTRAMUSCULAR; INTRAVENOUS at 21:32

## 2019-01-01 RX ADMIN — FAMOTIDINE 20 MG: 10 INJECTION INTRAVENOUS at 08:32

## 2019-01-01 RX ADMIN — Medication 10 ML: at 14:00

## 2019-01-01 RX ADMIN — Medication 10 ML: at 05:15

## 2019-01-01 RX ADMIN — Medication 10 ML: at 15:04

## 2019-01-01 RX ADMIN — DEXAMETHASONE SODIUM PHOSPHATE 2 MG: 4 INJECTION, SOLUTION INTRAMUSCULAR; INTRAVENOUS at 08:52

## 2019-01-01 RX ADMIN — DEXAMETHASONE SODIUM PHOSPHATE 4 MG: 4 INJECTION, SOLUTION INTRAMUSCULAR; INTRAVENOUS at 09:44

## 2019-01-01 RX ADMIN — MANNITOL 25 G: 20 INJECTION, SOLUTION INTRAVENOUS at 00:00

## 2019-01-01 RX ADMIN — SODIUM NITROPRUSSIDE 0.6 MCG/KG/MIN: 50 INJECTION INTRAVENOUS at 09:00

## 2019-01-01 RX ADMIN — DEXAMETHASONE SODIUM PHOSPHATE 4 MG: 4 INJECTION, SOLUTION INTRAMUSCULAR; INTRAVENOUS at 03:45

## 2019-01-01 RX ADMIN — FAMOTIDINE 20 MG: 10 INJECTION INTRAVENOUS at 21:12

## 2019-01-01 RX ADMIN — DEXAMETHASONE SODIUM PHOSPHATE 4 MG: 4 INJECTION, SOLUTION INTRAMUSCULAR; INTRAVENOUS at 23:14

## 2019-01-01 RX ADMIN — DEXAMETHASONE SODIUM PHOSPHATE 2 MG: 4 INJECTION, SOLUTION INTRAMUSCULAR; INTRAVENOUS at 05:04

## 2019-01-01 RX ADMIN — HYDRALAZINE HYDROCHLORIDE 10 MG: 20 INJECTION INTRAMUSCULAR; INTRAVENOUS at 18:50

## 2019-01-01 RX ADMIN — DEXAMETHASONE SODIUM PHOSPHATE 4 MG: 4 INJECTION, SOLUTION INTRAMUSCULAR; INTRAVENOUS at 05:37

## 2019-01-01 RX ADMIN — NEOSTIGMINE METHYLSULFATE 4 MG: 1 INJECTION INTRAVENOUS at 14:03

## 2019-01-01 RX ADMIN — CEFEPIME HYDROCHLORIDE 2 G: 2 INJECTION, POWDER, FOR SOLUTION INTRAVENOUS at 23:36

## 2019-01-01 RX ADMIN — VANCOMYCIN HYDROCHLORIDE 2500 MG: 10 INJECTION, POWDER, LYOPHILIZED, FOR SOLUTION INTRAVENOUS at 00:24

## 2019-01-01 RX ADMIN — SODIUM CHLORIDE 100 ML/HR: 900 INJECTION, SOLUTION INTRAVENOUS at 23:56

## 2019-01-01 RX ADMIN — CEFTRIAXONE SODIUM 1 G: 1 INJECTION, POWDER, FOR SOLUTION INTRAMUSCULAR; INTRAVENOUS at 22:19

## 2019-01-01 RX ADMIN — VANCOMYCIN HYDROCHLORIDE 2000 MG: 10 INJECTION, POWDER, LYOPHILIZED, FOR SOLUTION INTRAVENOUS at 23:13

## 2019-01-01 RX ADMIN — DEXAMETHASONE SODIUM PHOSPHATE 4 MG: 4 INJECTION, SOLUTION INTRAMUSCULAR; INTRAVENOUS at 21:12

## 2019-01-01 RX ADMIN — DEXAMETHASONE SODIUM PHOSPHATE 4 MG: 4 INJECTION, SOLUTION INTRAMUSCULAR; INTRAVENOUS at 09:10

## 2019-01-01 RX ADMIN — Medication 10 ML: at 21:03

## 2019-01-01 RX ADMIN — MANNITOL 25 G: 20 INJECTION, SOLUTION INTRAVENOUS at 18:00

## 2019-01-01 RX ADMIN — LIDOCAINE HYDROCHLORIDE 70 MG: 20 INJECTION, SOLUTION EPIDURAL; INFILTRATION; INTRACAUDAL; PERINEURAL at 13:54

## 2019-01-01 RX ADMIN — GADOTERATE MEGLUMINE 19 ML: 376.9 INJECTION INTRAVENOUS at 15:04

## 2019-01-01 RX ADMIN — LABETALOL HYDROCHLORIDE 5 MG: 5 INJECTION INTRAVENOUS at 14:45

## 2019-01-01 RX ADMIN — VANCOMYCIN HYDROCHLORIDE 1500 MG: 10 INJECTION, POWDER, LYOPHILIZED, FOR SOLUTION INTRAVENOUS at 22:37

## 2019-01-01 RX ADMIN — Medication 10 ML: at 05:20

## 2019-01-01 RX ADMIN — MANNITOL 25 G: 20 INJECTION, SOLUTION INTRAVENOUS at 17:57

## 2019-01-01 RX ADMIN — OXYCODONE HYDROCHLORIDE AND ACETAMINOPHEN 1 TABLET: 7.5; 325 TABLET ORAL at 07:42

## 2019-01-01 RX ADMIN — Medication 10 ML: at 21:38

## 2019-01-01 RX ADMIN — FAMOTIDINE 20 MG: 10 INJECTION INTRAVENOUS at 21:32

## 2019-01-01 RX ADMIN — MANNITOL 25 G: 20 INJECTION, SOLUTION INTRAVENOUS at 23:43

## 2019-01-01 RX ADMIN — DEXAMETHASONE SODIUM PHOSPHATE 4 MG: 4 INJECTION, SOLUTION INTRAMUSCULAR; INTRAVENOUS at 03:05

## 2019-01-01 RX ADMIN — Medication 10 ML: at 14:57

## 2019-01-01 RX ADMIN — GLYCOPYRROLATE 0.6 MG: 0.2 INJECTION INTRAMUSCULAR; INTRAVENOUS at 14:03

## 2019-01-01 RX ADMIN — GADOTERATE MEGLUMINE 20 ML: 376.9 INJECTION INTRAVENOUS at 15:06

## 2019-01-01 RX ADMIN — Medication 10 ML: at 21:39

## 2019-01-01 RX ADMIN — DEXAMETHASONE 4 MG: 4 TABLET ORAL at 10:17

## 2019-01-01 RX ADMIN — CEFEPIME HYDROCHLORIDE 2 G: 2 INJECTION, POWDER, FOR SOLUTION INTRAVENOUS at 09:09

## 2019-01-01 RX ADMIN — ROCURONIUM BROMIDE 10 MG: 10 INJECTION, SOLUTION INTRAVENOUS at 12:15

## 2019-01-01 RX ADMIN — PROPOFOL 200 MG: 10 INJECTION, EMULSION INTRAVENOUS at 11:05

## 2019-01-01 RX ADMIN — AMLODIPINE BESYLATE 10 MG: 10 TABLET ORAL at 09:10

## 2019-01-01 RX ADMIN — Medication 2 G: at 11:23

## 2019-01-01 RX ADMIN — LEVETIRACETAM 500 MG: 500 TABLET ORAL at 20:54

## 2019-01-01 RX ADMIN — SODIUM CHLORIDE 10 ML/HR: 3 INJECTION, SOLUTION INTRAVENOUS at 22:29

## 2019-01-01 RX ADMIN — Medication 10 ML: at 17:23

## 2019-01-01 RX ADMIN — PANTOPRAZOLE SODIUM 40 MG: 40 TABLET, DELAYED RELEASE ORAL at 09:49

## 2019-01-01 RX ADMIN — EPHEDRINE SULFATE 5 MG: 50 INJECTION, SOLUTION INTRAVENOUS at 12:57

## 2019-01-01 RX ADMIN — MANNITOL 25 G: 20 INJECTION, SOLUTION INTRAVENOUS at 17:26

## 2019-01-01 RX ADMIN — NICARDIPINE HYDROCHLORIDE 7.5 MG/HR: 25 INJECTION INTRAVENOUS at 13:58

## 2019-01-01 RX ADMIN — CEFEPIME 2 G: 2 INJECTION, POWDER, FOR SOLUTION INTRAVENOUS at 01:50

## 2019-01-01 RX ADMIN — Medication 10 ML: at 06:00

## 2019-01-01 RX ADMIN — FAMOTIDINE 20 MG: 10 INJECTION INTRAVENOUS at 22:06

## 2019-01-01 RX ADMIN — FENTANYL CITRATE 100 MCG: 50 INJECTION, SOLUTION INTRAMUSCULAR; INTRAVENOUS at 11:05

## 2019-01-01 RX ADMIN — PANTOPRAZOLE SODIUM 40 MG: 40 TABLET, DELAYED RELEASE ORAL at 06:32

## 2019-01-01 RX ADMIN — EPHEDRINE SULFATE 5 MG: 50 INJECTION, SOLUTION INTRAVENOUS at 13:19

## 2019-01-01 RX ADMIN — Medication 10 ML: at 22:06

## 2019-01-01 RX ADMIN — Medication 10 ML: at 06:20

## 2019-01-01 RX ADMIN — LEVETIRACETAM 500 MG: 500 TABLET ORAL at 09:19

## 2019-01-01 RX ADMIN — SODIUM CHLORIDE, SODIUM LACTATE, POTASSIUM CHLORIDE, AND CALCIUM CHLORIDE 75 ML/HR: 600; 310; 30; 20 INJECTION, SOLUTION INTRAVENOUS at 08:07

## 2019-01-01 RX ADMIN — AMLODIPINE BESYLATE 10 MG: 10 TABLET ORAL at 08:33

## 2019-01-01 RX ADMIN — METRONIDAZOLE 500 MG: 500 INJECTION, SOLUTION INTRAVENOUS at 21:03

## 2019-01-01 RX ADMIN — Medication 3 AMPULE: at 08:07

## 2019-01-01 RX ADMIN — ONDANSETRON 8 MG: 2 INJECTION INTRAMUSCULAR; INTRAVENOUS at 22:46

## 2019-01-01 RX ADMIN — HYDROMORPHONE HYDROCHLORIDE 1 MG: 1 INJECTION, SOLUTION INTRAMUSCULAR; INTRAVENOUS; SUBCUTANEOUS at 22:46

## 2019-01-01 RX ADMIN — DEXAMETHASONE SODIUM PHOSPHATE 4 MG: 4 INJECTION, SOLUTION INTRAMUSCULAR; INTRAVENOUS at 22:06

## 2019-01-01 RX ADMIN — NICARDIPINE HYDROCHLORIDE 5 MG/HR: 25 INJECTION INTRAVENOUS at 09:11

## 2019-01-01 RX ADMIN — LEVETIRACETAM 500 MG: 500 TABLET ORAL at 09:20

## 2019-01-01 RX ADMIN — Medication 10 ML: at 22:07

## 2019-01-01 RX ADMIN — HYDRALAZINE HYDROCHLORIDE 10 MG: 20 INJECTION INTRAMUSCULAR; INTRAVENOUS at 10:48

## 2019-01-01 RX ADMIN — Medication 10 ML: at 21:11

## 2019-01-01 RX ADMIN — LIDOCAINE HYDROCHLORIDE 20 MG: 20 INJECTION, SOLUTION EPIDURAL; INFILTRATION; INTRACAUDAL; PERINEURAL at 11:05

## 2019-01-01 RX ADMIN — CEFEPIME 2 G: 2 INJECTION, POWDER, FOR SOLUTION INTRAVENOUS at 09:44

## 2019-01-01 RX ADMIN — CARMUSTINE 61.6 MG: 7.7 WAFER INTRACAVITARY at 14:00

## 2019-01-01 RX ADMIN — DEXAMETHASONE SODIUM PHOSPHATE 10 MG: 10 INJECTION INTRAMUSCULAR; INTRAVENOUS at 22:19

## 2019-01-01 RX ADMIN — SODIUM CHLORIDE, SODIUM LACTATE, POTASSIUM CHLORIDE, AND CALCIUM CHLORIDE 75 ML/HR: 600; 310; 30; 20 INJECTION, SOLUTION INTRAVENOUS at 15:53

## 2019-01-01 RX ADMIN — Medication 10 ML: at 13:07

## 2019-01-01 RX ADMIN — SODIUM CHLORIDE, SODIUM LACTATE, POTASSIUM CHLORIDE, AND CALCIUM CHLORIDE: 600; 310; 30; 20 INJECTION, SOLUTION INTRAVENOUS at 14:15

## 2019-01-01 RX ADMIN — CEFEPIME 2 G: 2 INJECTION, POWDER, FOR SOLUTION INTRAVENOUS at 17:38

## 2019-01-01 RX ADMIN — Medication 5 ML: at 12:19

## 2019-01-01 RX ADMIN — Medication 10 ML: at 13:24

## 2019-01-01 RX ADMIN — Medication 2 G: at 20:54

## 2019-01-01 RX ADMIN — LEVETIRACETAM 500 MG: 500 TABLET ORAL at 22:04

## 2019-01-01 RX ADMIN — Medication 10 ML: at 05:31

## 2019-01-01 RX ADMIN — MANNITOL 25 G: 20 INJECTION, SOLUTION INTRAVENOUS at 05:38

## 2019-01-01 RX ADMIN — EPHEDRINE SULFATE 5 MG: 50 INJECTION, SOLUTION INTRAVENOUS at 12:06

## 2019-01-01 RX ADMIN — CLONAZEPAM 0.5 MG: 0.5 TABLET ORAL at 00:24

## 2019-01-01 RX ADMIN — PROPOFOL 50 MG: 10 INJECTION, EMULSION INTRAVENOUS at 11:18

## 2019-01-01 RX ADMIN — DEXAMETHASONE 4 MG: 4 TABLET ORAL at 21:03

## 2019-01-01 RX ADMIN — DEXAMETHASONE SODIUM PHOSPHATE 4 MG: 4 INJECTION, SOLUTION INTRAMUSCULAR; INTRAVENOUS at 05:01

## 2019-01-01 RX ADMIN — MANNITOL 25 G: 20 INJECTION, SOLUTION INTRAVENOUS at 11:07

## 2019-01-01 RX ADMIN — SODIUM CHLORIDE 100 ML/HR: 900 INJECTION, SOLUTION INTRAVENOUS at 16:16

## 2019-01-01 RX ADMIN — CITALOPRAM HYDROBROMIDE 10 MG: 10 TABLET ORAL at 09:11

## 2019-01-01 RX ADMIN — DEXAMETHASONE SODIUM PHOSPHATE 2 MG: 4 INJECTION, SOLUTION INTRAMUSCULAR; INTRAVENOUS at 10:03

## 2019-01-01 RX ADMIN — PIPERACILLIN SODIUM,TAZOBACTAM SODIUM 3.38 G: 3; .375 INJECTION, POWDER, FOR SOLUTION INTRAVENOUS at 23:57

## 2019-01-01 RX ADMIN — CIPROFLOXACIN HYDROCHLORIDE 750 MG: 250 TABLET, FILM COATED ORAL at 21:32

## 2019-01-01 RX ADMIN — CEFEPIME 2 G: 2 INJECTION, POWDER, FOR SOLUTION INTRAVENOUS at 09:10

## 2019-01-01 RX ADMIN — DEXAMETHASONE SODIUM PHOSPHATE 10 MG: 4 INJECTION, SOLUTION INTRA-ARTICULAR; INTRALESIONAL; INTRAMUSCULAR; INTRAVENOUS; SOFT TISSUE at 11:44

## 2019-01-01 RX ADMIN — MANNITOL 25 G: 20 INJECTION, SOLUTION INTRAVENOUS at 18:30

## 2019-01-01 RX ADMIN — GADOTERATE MEGLUMINE 18 ML: 376.9 INJECTION INTRAVENOUS at 08:58

## 2019-01-01 RX ADMIN — SODIUM CHLORIDE 5 MG/HR: 900 INJECTION, SOLUTION INTRAVENOUS at 09:52

## 2019-01-01 RX ADMIN — ROCURONIUM BROMIDE 50 MG: 10 INJECTION, SOLUTION INTRAVENOUS at 11:05

## 2019-01-01 RX ADMIN — DEXAMETHASONE SODIUM PHOSPHATE 4 MG: 4 INJECTION, SOLUTION INTRAMUSCULAR; INTRAVENOUS at 23:41

## 2019-01-01 RX ADMIN — FAMOTIDINE 20 MG: 10 INJECTION INTRAVENOUS at 21:39

## 2019-01-01 RX ADMIN — DEXAMETHASONE SODIUM PHOSPHATE 4 MG: 4 INJECTION, SOLUTION INTRAMUSCULAR; INTRAVENOUS at 17:08

## 2019-01-01 RX ADMIN — SODIUM NITROPRUSSIDE 0.3 MCG/KG/MIN: 50 INJECTION INTRAVENOUS at 23:13

## 2019-01-01 RX ADMIN — SODIUM CHLORIDE AND POTASSIUM CHLORIDE: .9; .15 SOLUTION INTRAVENOUS at 16:11

## 2019-01-01 RX ADMIN — SODIUM CHLORIDE 10 ML/HR: 3 INJECTION, SOLUTION INTRAVENOUS at 13:59

## 2019-01-01 RX ADMIN — Medication 2 G: at 04:30

## 2019-01-01 RX ADMIN — LABETALOL HYDROCHLORIDE 5 MG: 5 INJECTION INTRAVENOUS at 15:00

## 2019-01-01 RX ADMIN — CEFEPIME 2 G: 2 INJECTION, POWDER, FOR SOLUTION INTRAVENOUS at 17:57

## 2019-01-01 RX ADMIN — ROCURONIUM BROMIDE 10 MG: 10 INJECTION, SOLUTION INTRAVENOUS at 12:53

## 2019-01-01 RX ADMIN — EPHEDRINE SULFATE 5 MG: 50 INJECTION, SOLUTION INTRAVENOUS at 11:36

## 2019-01-01 RX ADMIN — MANNITOL 25 G: 20 INJECTION, SOLUTION INTRAVENOUS at 06:09

## 2019-01-01 RX ADMIN — ROCURONIUM BROMIDE 10 MG: 10 INJECTION, SOLUTION INTRAVENOUS at 13:04

## 2019-01-01 RX ADMIN — FAMOTIDINE 20 MG: 10 INJECTION INTRAVENOUS at 10:21

## 2019-01-01 RX ADMIN — Medication 10 ML: at 05:04

## 2019-01-01 RX ADMIN — CEFEPIME 2 G: 2 INJECTION, POWDER, FOR SOLUTION INTRAVENOUS at 02:17

## 2019-01-01 RX ADMIN — Medication 10 ML: at 06:09

## 2019-01-01 RX ADMIN — CEFTRIAXONE SODIUM 1 G: 1 INJECTION, POWDER, FOR SOLUTION INTRAMUSCULAR; INTRAVENOUS at 09:07

## 2019-01-01 RX ADMIN — MANNITOL 25 G: 20 INJECTION, SOLUTION INTRAVENOUS at 06:19

## 2019-01-01 RX ADMIN — SODIUM NITROPRUSSIDE 0.2 MCG/KG/MIN: 50 INJECTION INTRAVENOUS at 03:02

## 2019-01-01 RX ADMIN — MANNITOL 25 G: 20 INJECTION, SOLUTION INTRAVENOUS at 23:59

## 2019-01-01 RX ADMIN — FAMOTIDINE 20 MG: 10 INJECTION INTRAVENOUS at 08:12

## 2019-01-01 RX ADMIN — Medication 10 ML: at 15:06

## 2019-01-01 RX ADMIN — Medication 10 ML: at 15:56

## 2019-01-01 RX ADMIN — DEXAMETHASONE SODIUM PHOSPHATE 4 MG: 4 INJECTION, SOLUTION INTRAMUSCULAR; INTRAVENOUS at 15:12

## 2019-01-01 RX ADMIN — ONDANSETRON 4 MG: 2 INJECTION INTRAMUSCULAR; INTRAVENOUS at 13:18

## 2019-01-01 RX ADMIN — DEXAMETHASONE SODIUM PHOSPHATE 4 MG: 4 INJECTION, SOLUTION INTRAMUSCULAR; INTRAVENOUS at 21:39

## 2019-01-01 RX ADMIN — VANCOMYCIN HYDROCHLORIDE 1500 MG: 10 INJECTION, POWDER, LYOPHILIZED, FOR SOLUTION INTRAVENOUS at 13:41

## 2019-01-01 RX ADMIN — METRONIDAZOLE 500 MG: 500 INJECTION, SOLUTION INTRAVENOUS at 05:15

## 2019-01-01 RX ADMIN — Medication 10 ML: at 22:37

## 2019-01-01 RX ADMIN — Medication 10 ML: at 16:08

## 2019-01-01 RX ADMIN — PIPERACILLIN SODIUM,TAZOBACTAM SODIUM 3.38 G: 3; .375 INJECTION, POWDER, FOR SOLUTION INTRAVENOUS at 08:31

## 2019-01-01 RX ADMIN — DEXAMETHASONE SODIUM PHOSPHATE 4 MG: 4 INJECTION, SOLUTION INTRAMUSCULAR; INTRAVENOUS at 04:03

## 2019-01-01 RX ADMIN — METRONIDAZOLE 500 MG: 500 INJECTION, SOLUTION INTRAVENOUS at 17:04

## 2019-01-01 RX ADMIN — DEXAMETHASONE 4 MG: 4 TABLET ORAL at 09:11

## 2019-01-01 RX ADMIN — SODIUM CHLORIDE 500 MG: 900 INJECTION, SOLUTION INTRAVENOUS at 11:46

## 2019-01-01 RX ADMIN — DEXAMETHASONE SODIUM PHOSPHATE 4 MG: 4 INJECTION, SOLUTION INTRAMUSCULAR; INTRAVENOUS at 05:19

## 2019-01-01 RX ADMIN — Medication 5 ML: at 16:22

## 2019-01-01 RX ADMIN — PROPOFOL 50 MG: 10 INJECTION, EMULSION INTRAVENOUS at 11:15

## 2019-01-01 RX ADMIN — DEXAMETHASONE SODIUM PHOSPHATE 4 MG: 4 INJECTION, SOLUTION INTRAMUSCULAR; INTRAVENOUS at 15:29

## 2019-01-01 RX ADMIN — CEFTRIAXONE SODIUM 1 G: 1 INJECTION, POWDER, FOR SOLUTION INTRAMUSCULAR; INTRAVENOUS at 20:37

## 2019-01-01 RX ADMIN — GADOTERATE MEGLUMINE 20 ML: 376.9 INJECTION INTRAVENOUS at 09:24

## 2019-01-01 RX ADMIN — CITALOPRAM HYDROBROMIDE 10 MG: 10 TABLET ORAL at 08:30

## 2019-01-01 RX ADMIN — DEXAMETHASONE SODIUM PHOSPHATE 4 MG: 4 INJECTION, SOLUTION INTRAMUSCULAR; INTRAVENOUS at 17:50

## 2019-01-01 RX ADMIN — MANNITOL: 20 INJECTION, SOLUTION INTRAVENOUS at 11:28

## 2019-01-01 RX ADMIN — FAMOTIDINE 20 MG: 10 INJECTION INTRAVENOUS at 20:37

## 2019-01-01 RX ADMIN — DEXAMETHASONE SODIUM PHOSPHATE 4 MG: 4 INJECTION, SOLUTION INTRAMUSCULAR; INTRAVENOUS at 11:52

## 2019-01-01 RX ADMIN — DEXAMETHASONE SODIUM PHOSPHATE 4 MG: 4 INJECTION, SOLUTION INTRAMUSCULAR; INTRAVENOUS at 22:37

## 2019-01-01 RX ADMIN — FAMOTIDINE 20 MG: 10 INJECTION INTRAVENOUS at 09:10

## 2019-01-01 RX ADMIN — DEXAMETHASONE SODIUM PHOSPHATE 4 MG: 4 INJECTION, SOLUTION INTRAMUSCULAR; INTRAVENOUS at 09:13

## 2019-01-01 RX ADMIN — Medication 10 ML: at 06:32

## 2019-01-01 RX ADMIN — DEXAMETHASONE SODIUM PHOSPHATE 4 MG: 4 INJECTION, SOLUTION INTRAMUSCULAR; INTRAVENOUS at 03:52

## 2019-01-01 RX ADMIN — ONDANSETRON 4 MG: 2 INJECTION INTRAMUSCULAR; INTRAVENOUS at 19:00

## 2019-01-01 RX ADMIN — SODIUM CHLORIDE 1000 ML: 900 INJECTION, SOLUTION INTRAVENOUS at 19:00

## 2019-01-01 RX ADMIN — VANCOMYCIN HYDROCHLORIDE 1500 MG: 10 INJECTION, POWDER, LYOPHILIZED, FOR SOLUTION INTRAVENOUS at 01:02

## 2019-01-01 RX ADMIN — MANNITOL 25 G: 20 INJECTION, SOLUTION INTRAVENOUS at 06:23

## 2019-01-01 RX ADMIN — DEXAMETHASONE SODIUM PHOSPHATE 4 MG: 4 INJECTION, SOLUTION INTRAMUSCULAR; INTRAVENOUS at 16:06

## 2019-01-01 RX ADMIN — ROCURONIUM BROMIDE 20 MG: 10 INJECTION, SOLUTION INTRAVENOUS at 11:34

## 2019-01-01 RX ADMIN — Medication 10 ML: at 05:19

## 2019-01-01 RX ADMIN — CEFEPIME 2 G: 2 INJECTION, POWDER, FOR SOLUTION INTRAVENOUS at 17:26

## 2019-06-25 PROBLEM — J61 ASBESTOSIS (HCC): Status: ACTIVE | Noted: 2019-01-01

## 2019-06-25 PROBLEM — G93.89 BRAIN MASS: Status: ACTIVE | Noted: 2019-01-01

## 2019-06-25 NOTE — PERIOP NOTES
Patient verified name and . Patient provided medical/health information and PTA medications to the best of their ability. TYPE  CASE:3  Order for consent yes found in EHR and matches case posting. Labs per surgeon:none. Results: -  Labs per anesthesia protocol: cbc,bmp-care everywhere-19, type and screen- dos. Results -  EKG  :  na    Patient provided with and instructed on education handouts including Guide to Surgery, blood transfusions, pain management, and hand hygiene for the family and community, and [facility] brochure.     hibiclens and instructions given per hospital policy. Instructed patient to continue previous medications as prescribed prior to surgery unless otherwise directed and to take the following medications the day of surgery according to anesthesia guidelines : decadron . Instructed patient to hold  the following medications: none. Original medication prescription bottles none visualized during patient appointment. Patient teach back successful and patient demonstrates knowledge of instruction.

## 2019-06-26 PROBLEM — C71.9 GBM (GLIOBLASTOMA MULTIFORME) (HCC): Status: ACTIVE | Noted: 2019-01-01

## 2019-06-26 NOTE — PROGRESS NOTES
Patient arrived to unit with Edita Alegre RN. Patient alert, opens eyes spontaneously and to voice. Patient oriented to person and place, disoriented to time and situation. Patient follows commands. Pupils equal, round, brisk, 3 mm. Purposeful movement of all extremities.  equal. Speech clear. NSR/SB on the monitor. NIBP 149/69. ART line positional and not correlating well. O2 sat 98% on 2L NC. Lung sounds clear. Abdomen semi soft, non tender, hypoactive bowel sounds. Dumont draining clear, yellow, urine. PIV's flushed and patent. Family members updated and at bedside. SCD's. HOB @ 30 degrees. Dual skin assessment performed with Duran Petty RN. Skin warm, dry, intact, no breakdown or redness noted. Small scar-like appearance above sacrum. Allevyn placed. Left head inc with transparent dressing in place. Small amount sanguinous breakthrough drainage. Staples x 2 present to right forehead. No drainage noted.

## 2019-06-26 NOTE — PROGRESS NOTES
HR 39, NIBP 171/78. ART line not correlating. Oriented x 2 with some confusion. Flaco James MD notified. Orders received to treat cuff pressure for BP, do not administer labetalol, and give hydralazine PRN to keep NIBP <160/100.

## 2019-06-26 NOTE — PROGRESS NOTES
TRANSFER - IN REPORT:    Verbal report received from YORDY Tapia on Shaka Jolly  being received from [facility]) for routine progression of care      Report consisted of patients Situation, Background, Assessment and   Recommendations(SBAR). Information from the following report(s) SBAR, Procedure Summary, Intake/Output, MAR, Recent Results and Cardiac Rhythm NSR was reviewed with the receiving nurse. Opportunity for questions and clarification was provided. Assessment completed upon patients arrival to unit and care assumed.

## 2019-06-26 NOTE — PROGRESS NOTES
LEAPFROG EVALUATION: [facility]    The patient is currently in the ICU S/P Carniotomy for tumor. Management by Dr Prudence Dunham. Patient is currently hemodynamically stable. Patient has no needs identified for Intensivist management in the ICU setting at this time. Please notify us if can be of assistance. No charge billed to the patient. Thank you.     .  Visit Vitals  BP (!) 179/92 (BP 1 Location: Left arm, BP Patient Position: At rest)   Pulse (!) 59   Temp 97.6 °F (36.4 °C)   Resp 22   Ht 5' 8\" (1.727 m)   Wt 200 lb 9.9 oz (91 kg)   SpO2 98%   BMI 30.50 kg/m²             Richie Hector NP

## 2019-06-26 NOTE — BRIEF OP NOTE
BRIEF OPERATIVE NOTE    Date of Procedure: 6/26/2019   Preoperative Diagnosis: Brain mass [G93.9]  Postoperative Diagnosis: Brain mass [G93.9]    Procedure(s):  STEALTH GUIDED LEFT FRONTAL  TEMPORAL CRANIOTOMY FOR TUMOR  Surgeon(s) and Role:     * Javier Wong MD - Primary         Surgical Assistant: NONE    Surgical Staff:  Circ-1: Delphine Jones RN  Circ-Relief: Antonella Nieves RN  Scrub Tech-1: Kush Gallardo  Scrub Tech-2: Tashia Garcia  Scrub Private/Assistant: Prabha Gaspar  Event Time In Time Out   Incision Start 1120    Incision Close       Anesthesia: General   Estimated Blood Loss: MINIMAL  Specimens:   ID Type Source Tests Collected by Time Destination   1 : left temporal tumor Frozen Section Brain  Javier Wong MD 6/26/2019 1225 Pathology   2 : left temporal tumor Preservative Brain  Javier Wong MD 6/26/2019 1327 Pathology      Findings: GBM  Complications: NONE  Implants:   Implant Name Type Inv.  Item Serial No.  Lot No. LRB No. Used Action   SCR BNE CLP RAP FIRE 5EA 1.5X4 --  - [id number]  SCR BNE CLP RAP FIRE 5EA 1.5X4 --   [employer] [id number] Left 2 Implanted   GRAFT DURA MATRX RESRB 2X2 IN -- DURAGEN ORDER AS EA - [id number]  GRAFT DURA MATRX RESRB 2X2 IN -- DURAGEN ORDER AS EA  [employer] [id number] Left 1 Implanted   PLATE BNE NEUR GAP STR 1.5MM -- 2H - [id number]  PLATE BNE NEUR GAP STR 1.5MM -- 2H  [employer] [id number] Left 2 Implanted   COVER BUR H LG 0.5X18.5MM TI --  - [id number]  COVER BUR H LG 0.5X18.5MM TI --   [employer] [id number] Left 1 Implanted

## 2019-06-26 NOTE — PERIOP NOTES
TRANSFER - OUT REPORT:    Verbal report given to Murray Libman RN (name) on Stanley Melo  being transferred to [address] [place] [address] (unit) for routine post - op       Report consisted of patients Situation, Background, Assessment and   Recommendations(SBAR). Information from the following report(s) SBAR, OR Summary and Procedure Summary was reviewed with the receiving nurse. Lines:   Peripheral IV 06/26/19 Right;Posterior Hand (Active)   Site Assessment Clean, dry, & intact 6/26/2019  2:27 PM   Phlebitis Assessment 0 6/26/2019  2:27 PM   Infiltration Assessment 0 6/26/2019  2:27 PM   Dressing Status Clean, dry, & intact 6/26/2019  2:27 PM   Dressing Type Tape;Transparent 6/26/2019  2:27 PM   Hub Color/Line Status Green; Infusing 6/26/2019  2:27 PM       Peripheral IV 06/26/19 Left Forearm (Active)   Site Assessment Clean, dry, & intact 6/26/2019  2:27 PM   Phlebitis Assessment 0 6/26/2019  2:27 PM   Infiltration Assessment 0 6/26/2019  2:27 PM   Dressing Status Clean, dry, & intact 6/26/2019  2:27 PM   Dressing Type Tape;Transparent 6/26/2019  2:27 PM   Hub Color/Line Status Green; Infusing 6/26/2019  2:27 PM       Arterial Line 06/26/19 Right Radial artery (Active)   Site Assessment Clean, dry, & intact 6/26/2019  2:27 PM   Dressing Status Clean, dry, & intact 6/26/2019  2:27 PM   Dressing Type Tape;Transparent 6/26/2019  2:27 PM   Line Status Intact and in place 6/26/2019  2:27 PM   Treatment Arm board off;Zeroed or re-zeroed 6/26/2019  2:27 PM   Affected Extremity/Extremities Color distal to insertion site pink (or appropriate for race) 6/26/2019  2:27 PM        Opportunity for questions and clarification was provided. Patient transported with:   Monitor  O2 @ 2 liters  Registered Nurse    VTE prophylaxis orders have been written for Stanley Melo. Patient and family given floor number and nurses name. Family updated re: pt status after security code verified. n/a

## 2019-06-26 NOTE — ANESTHESIA POSTPROCEDURE EVALUATION
Procedure(s): STEALTH GUIDED LEFT FRONTAL  TEMPORAL CRANIOTOMY FOR TUMOR. 
 
general 
 
Anesthesia Post Evaluation Multimodal analgesia: multimodal analgesia used between 6 hours prior to anesthesia start to PACU discharge Patient location during evaluation: bedside Patient participation: complete - patient participated Level of consciousness: awake and alert Pain score: 3 Pain management: adequate Airway patency: patent Anesthetic complications: no 
Cardiovascular status: acceptable and hemodynamically stable Respiratory status: acceptable Hydration status: acceptable Post anesthesia nausea and vomiting:  none Vitals Value Taken Time /65 6/26/2019  3:07 PM  
Temp 36.4 °C (97.5 °F) 6/26/2019  2:27 PM  
Pulse 60 6/26/2019  3:11 PM  
Resp 8 6/26/2019  3:11 PM  
SpO2 98 % 6/26/2019  3:11 PM  
Vitals shown include unvalidated device data.

## 2019-06-26 NOTE — ANESTHESIA PREPROCEDURE EVALUATION
Relevant Problems No relevant active problems Anesthetic History No history of anesthetic complications Review of Systems / Medical History Patient summary reviewed and pertinent labs reviewed Pulmonary Within defined limits Comments: Asbestosis no issues Neuro/Psych Comments: left-sided temporoparietal mass Cardiovascular Within defined limits Exercise tolerance: >4 METS 
  
GI/Hepatic/Renal 
Within defined limits Endo/Other Within defined limits Other Findings Comments: Hb 15.7 Physical Exam 
 
Airway Mallampati: II 
TM Distance: 4 - 6 cm Neck ROM: normal range of motion Mouth opening: Normal 
 
 Cardiovascular Regular rate and rhythm,  S1 and S2 normal,  no murmur, click, rub, or gallop Dental 
 
Dentition: Full upper dentures Pulmonary Breath sounds clear to auscultation Abdominal 
 
 
 
 Other Findings Anesthetic Plan ASA: 2 Anesthesia type: general 
 
Monitoring Plan: Arterial line Induction: Intravenous Anesthetic plan and risks discussed with: Patient and Family

## 2019-06-27 NOTE — PROGRESS NOTES
Interdisciplinary team rounds were held 6/27/2019 with the following team members:Nursing, Physician and Clinical Coordinator and the patient, spouse and child(harper). Plan of care discussed. See clinical pathway and/or care plan for interventions and desired outcomes.

## 2019-06-27 NOTE — PROGRESS NOTES
Problem: Mobility Impaired (Adult and Pediatric)  Goal: *Acute Goals and Plan of Care (Insert Text)  Description  LTG:  (1.)Mr. Mac Khalil will move from supine to sit and sit to supine, scoot up and down and roll side to side INDEPENDENTLY within 7 treatment day(s). (2.)Mr. Mac Khalil will transfer from bed to chair and chair to bed INDEPENDENTLY within 7 treatment day(s). (3.)Mr. Mac Khalil will ambulate with SUPERVISION for 500+ feet with the least restrictive device within 7 treatment day(s). (4.)Mr. Mac Khalil will ascend and descend 3 steps with STAND BY ASSIST using handrail(s) within 7 days. ________________________________________________________________________________________________   Outcome: Progressing Towards Goal     PHYSICAL THERAPY: Initial Assessment and AM 6/27/2019  INPATIENT: PT Visit Days : 1  Payor: SELF PAY / Plan: [facility] SELF PAY / Product Type: Self Pay /       NAME/AGE/GENDER: Keesha Treviño is a 59 y.o. male   PRIMARY DIAGNOSIS: Brain mass [G93.9]  Brain mass [G93.9]  GBM (glioblastoma multiforme) (HCC) [C71.9] GBM (glioblastoma multiforme) (HCC)   GBM (glioblastoma multiforme) (HCC)    Procedure(s) (LRB):  STEALTH GUIDED LEFT FRONTAL  TEMPORAL CRANIOTOMY FOR TUMOR (Left)  1 Day Post-Op  ICD-10: Treatment Diagnosis:    Difficulty in walking, Not elsewhere classified (R26.2)  Other abnormalities of gait and mobility (R26.89)   Precaution/Allergies:  Iodine and iodide containing products and Other plant, animal, environmental      ASSESSMENT:     Mr. Mac Khalil is a 59year old male seen for initial therapy assessment following left frontal-temporal craniotomy for glioblastoma. Pt is fully independent at baseline and works as . Family noticed changes with speech starting three weeks ago. Pt presents in supine this morning with wife present. He is confused and oriented to person only. Has difficulty at times following simple one step commands and needs repeated cueing and instruction. Transfers to sitting with supervision. Intact balance. MMT limited by cognition. AROM is WFL bilaterally with strength generally 4 to 4+/5 to best of assessment. Pt performs sit-stand transfer with CGA. Demonstrates fair+ standing balance at edge of bed; c/o nausea and has some vomiting. Stood for ~5 minutes at bedside without assistance from therapist, holding onto trash can. Took seated break and feels better after drinking water. Pt ambulates 100 ft in ICU with close CGA and gait belt for safety. Demonstrates slow, slightly unsteady gait however no major loss of balance noted. Returned to room and up to chair with CGA/SBA. Positioned comfortably in recliner with legs elevated, family present, and RN notified. /97. Laury Gomez is currently functioning well below baseline. Does have some weakness however biggest deficit appear to be cognition. Will need 24/7 supervision at discharge. Wife reports possible dc home tomorrow. This section established at most recent assessment   PROBLEM LIST (Impairments causing functional limitations):  Decreased Strength  Decreased Ambulation Ability/Technique  Decreased Balance  Increased Pain  Decreased Activity Tolerance  Decreased Cognition   INTERVENTIONS PLANNED: (Benefits and precautions of physical therapy have been discussed with the patient.)  Balance Exercise  Bed Mobility  Gait Training  Therapeutic Activites  Therapeutic Exercise/Strengthening  Transfer Training     TREATMENT PLAN: Frequency/Duration: 3 times a week for duration of hospital stay  Rehabilitation Potential For Stated Goals: Good     REHAB RECOMMENDATIONS (at time of discharge pending progress):    Placement: It is my opinion, based on this patient's performance to date, that Mr. Mariann Rodrigez may benefit from participating in 1-2 additional therapy sessions in order to continue to assess for rehab potential and then make recommendation for disposition at discharge.   Equipment:   tbd HISTORY:   History of Present Injury/Illness (Reason for Referral):  Pt s/p above surgery for glioblastoma  Past Medical History/Comorbidities:   Mr. Margot Quiroz  has a past medical history of Asbestosis ([facility] [address] (6/22/2019) and Brain mass (6/21/2019). Mr. Margot Quiroz  has a past surgical history that includes hx lumbar laminectomy and hx hernia repair (Left). Social History/Living Environment:   Home Environment: Private residence  # Steps to Enter: 3  Rails to Enter: Yes  Wheelchair Ramp: No  One/Two Story Residence: One story  Living Alone: No  Support Systems: Child(harper), Family member(s), Spouse/Significant Other/Partner  Patient Expects to be Discharged to[de-identified] Private residence  Current DME Used/Available at Home: Walker, Wheelchair  Prior Level of Function/Work/Activity:  Lives with wife in single story home. Typically fully independent; drives and works. Number of Personal Factors/Comorbidities that affect the Plan of Care: 1-2: MODERATE COMPLEXITY   EXAMINATION:   Most Recent Physical Functioning:   Gross Assessment:  AROM: Within functional limits  Strength: Generally decreased, functional  Coordination: Generally decreased, functional               Posture:  Posture (WDL): Exceptions to WDL  Posture Assessment: Forward head, Rounded shoulders  Balance:  Sitting: Intact  Standing: Impaired  Standing - Static: Fair  Standing - Dynamic : Fair Bed Mobility:  Rolling: Supervision  Supine to Sit: Supervision  Scooting: Supervision  Wheelchair Mobility:     Transfers:  Sit to Stand: Contact guard assistance  Stand to Sit: Contact guard assistance  Bed to Chair: Contact guard assistance;Stand-by assistance  Interventions: Verbal cues; Safety awareness training  Duration: 10 Minutes  Gait:     Base of Support: Center of gravity altered  Speed/Annmarie: Pace decreased (<100 feet/min); Slow  Step Length: Left shortened;Right shortened  Gait Abnormalities: Trunk sway increased;Decreased step clearance; Path deviations  Distance (ft): 100 Feet (ft)  Assistive Device: Gait belt  Ambulation - Level of Assistance: Contact guard assistance  Interventions: Verbal cues; Safety awareness training      Body Structures Involved:  Nerves  Muscles Body Functions Affected:  Mental  Sensory/Pain  Neuromusculoskeletal  Movement Related Activities and Participation Affected:  Learning and Applying Knowledge  General Tasks and Demands  Communication  Mobility  Domestic Life  Interpersonal Interactions and Relationships  Community, Social and Civic Life   Number of elements that affect the Plan of Care: 4+: HIGH COMPLEXITY   CLINICAL PRESENTATION:   Presentation: Evolving clinical presentation with changing clinical characteristics: MODERATE COMPLEXITY   CLINICAL DECISION MAKIN [address]? ?6 Clicks? Basic Mobility Inpatient Short Form  How much difficulty does the patient currently have. .. Unable A Lot A Little None   1. Turning over in bed (including adjusting bedclothes, sheets and blankets)? ? 1   ? 2   ? 3   ? 4   2. Sitting down on and standing up from a chair with arms ( e.g., wheelchair, bedside commode, etc.)   ? 1   ? 2   ? 3   ? 4   3. Moving from lying on back to sitting on the side of the bed?   ? 1   ? 2   ? 3   ? 4   How much help from another person does the patient currently need. .. Total A Lot A Little None   4. Moving to and from a bed to a chair (including a wheelchair)? ? 1   ? 2   ? 3   ? 4   5. Need to walk in hospital room? ? 1   ? 2   ? 3   ? 4   6. Climbing 3-5 steps with a railing? ? 1   ? 2   ? 3   ? 4   © , Trustees of [address] [place] [address], under license to [employer]. All rights reserved      Score:  Initial: 19 Most Recent: X (Date: -- )    Interpretation of Tool:  Represents activities that are increasingly more difficult (i.e. Bed mobility, Transfers, Gait).     Medical Necessity:     Patient demonstrates good   rehab potential due to higher previous functional level.  Reason for Services/Other Comments:  Patient continues to demonstrate capacity to improve strength, mobility, balance, activity tolerance which will increase independence, decrease amount of assistance required from caregiver and increase safety  . Use of outcome tool(s) and clinical judgement create a POC that gives a: Questionable prediction of patient's progress: MODERATE COMPLEXITY            TREATMENT:   (In addition to Assessment/Re-Assessment sessions the following treatments were rendered)   Pre-treatment Symptoms/Complaints:  \"What are we doing? \"  Pain: Initial:   Pain Intensity 1: 2  Pain Location 1: Head  Pain Intervention(s) 1: Repositioned, Ambulation/Increased Activity  Post Session:  0/10     Therapeutic Activity: (  10 Minutes ):  Therapeutic activities including Bed transfers, Chair transfers, Ambulation on level ground and standing dynamic activities  to improve mobility, strength, balance and activity tolerance . Required minimal to moderate Verbal cues; Safety awareness training to promote static and dynamic balance in standing and promote motor control of bilateral, lower extremity(s). Braces/Orthotics/Lines/Etc:   none   Treatment/Session Assessment:    Response to Treatment:  pt performs mobility with CGA/SBA  Interdisciplinary Collaboration:   Physical Therapist  Registered Nurse  After treatment position/precautions:   Up in chair  Bed/Chair-wheels locked  Bed in low position  Call light within reach  RN notified  Family at bedside   Compliance with Program/Exercises: Will assess as treatment progresses  Recommendations/Intent for next treatment session: \"Next visit will focus on advancements to more challenging activities and reduction in assistance provided\".   Total Treatment Duration:  PT Patient Time In/Time Out  Time In: 0844  Time Out: 1110 Mikala Kumar, DPT

## 2019-06-27 NOTE — CONSULTS
[facility] Hematology & Oncology        Inpatient Hematology / Oncology Consult Note    Reason for Consult:  Brain mass [G93.9]  Brain mass [G93.9]  GBM (glioblastoma multiforme) ([facility] [C71.9]  Referring Physician:  Darrick Chiu MD    History of Present Illness:  Mr. Barney Caballero is a 59 y.o. male admitted on 6/26/2019 with brain mass. He presented to Dr. Kenyatta Jimenez with c/o 2-3 week history of language dysfunction and difficulty forming words at times. CT and MRI brain showed a left frontal temporal contrast-enhancing tumor with mass-effect and edema and possibly dural based. He is s/p L frontotemporal craniotomy for resection of his malignant glioma and Gliadel wafer placement yesterday. On Dex 4mg q 6 hours. We were consulted for GBM. Review of Systems:  Constitutional Denies fever, chills, weight loss, appetite changes, fatigue, night sweats. HEENT Denies trauma, blurry vision, hearing loss, ear pain, nosebleeds, sore throat, neck pain and ear discharge. Skin Denies lesions or rashes. Lungs Denies dyspnea, cough, sputum production or hemoptysis. Cardiovascular Denies chest pain, palpitations, or lower extremity edema. Gastrointestinal Denies nausea, vomiting, changes in bowel habits, bloody or black stools, abdominal pain.  Denies dysuria, frequency or hesitancy of urination. Neuro +speech change. Denies headaches, visual changes or ataxia. Denies dizziness, tingling, tremors, sensory change, focal weakness or headaches. Hematology Denies easy bruising or bleeding, denies gingival bleeding or epistaxis. Endo Denies heat/cold intolerance, denies diabetes or thyroid abnormalities. MSK Denies back pain, arthralgias, myalgias or frequent falls. Psychiatric/Behavioral Denies depression and substance abuse. The patient is not nervous/anxious.          Allergies   Allergen Reactions    Iodine And Iodide Containing Products Unknown (comments)     Contrast dye    Other Plant, Animal, Environmental Swelling     bees     Past Medical History:   Diagnosis Date    Asbestosis ([facility] 6/22/2019    Last Assessment & Plan:   Was Diagnosed in November . Appears Asymptomatic Currently. Not Short of Breath Breathing Well.  Brain mass 6/21/2019    Last Assessment & Plan:  Is a neurosurgical consultation with Dr. Blas Baez  is evaluated the patient. Patient is on IV Decadron every 6 hourly. Patient has evidence of a left-sided temporoparietal mass  I Understanded to the plan is for surgery On Wednesday Will change decadrone to 4 mg po qid  Pt will follow with Dr Blas Baez outpatient.      Past Surgical History:   Procedure Laterality Date    HX HERNIA REPAIR Left     HX LUMBAR LAMINECTOMY       Family History   Problem Relation Age of Onset    Lung Disease Father      Social History     Socioeconomic History    Marital status:      Spouse name: Not on file    Number of children: Not on file    Years of education: Not on file    Highest education level: Not on file   Occupational History    Not on file   Social Needs    Financial resource strain: Not on file    Food insecurity:     Worry: Not on file     Inability: Not on file    Transportation needs:     Medical: Not on file     Non-medical: Not on file   Tobacco Use    Smoking status: Former Smoker    Smokeless tobacco: Never Used   Substance and Sexual Activity    Alcohol use: Not Currently    Drug use: Not on file    Sexual activity: Not on file   Lifestyle    Physical activity:     Days per week: Not on file     Minutes per session: Not on file    Stress: Not on file   Relationships    Social connections:     Talks on phone: Not on file     Gets together: Not on file     Attends [employer] service: Not on file     Active member of club or organization: Not on file     Attends meetings of clubs or organizations: Not on file     Relationship status: Not on file    Intimate partner violence:     Fear of current or ex partner: Not on file     Emotionally abused: Not on file     Physically abused: Not on file     Forced sexual activity: Not on file   Other Topics Concern    Not on file   Social History Narrative    Not on file     Current Facility-Administered Medications   Medication Dose Route Frequency Provider Last Rate Last Dose    pantoprazole (PROTONIX) tablet 40 mg  40 mg Oral ACB Sobeida Baez NP        lidocaine (XYLOCAINE) 10 mg/mL (1 %) injection 0.1 mL  0.1 mL SubCUTAneous PRN Blaze Whaley MD        sodium chloride (NS) flush 5-40 mL  5-40 mL IntraVENous Q8H Blaze Whaley MD   10 mL at 06/27/19 0502    sodium chloride (NS) flush 5-40 mL  5-40 mL IntraVENous PRN Blaze Whaley MD        fluticasone propionate (FLONASE) 50 mcg/actuation nasal spray 1 Spray  1 Spray Both Nostrils DAILY Blaze Whaley MD   1 [place] at 06/27/19 0920    sodium chloride (NS) flush 5-40 mL  5-40 mL IntraVENous Q8H Blaze Whaley MD   10 mL at 06/26/19 1608    sodium chloride (NS) flush 5-40 mL  5-40 mL IntraVENous PRN Blaze Whaley MD        dexamethasone (DECADRON) 4 mg/mL injection 4 mg  4 mg IntraVENous Q6H Blaze Whaley MD   4 mg at 06/27/19 0501    acetaminophen (TYLENOL) tablet 650 mg  650 mg Oral Q4H PRN Blaze Whaley MD        oxyCODONE-acetaminophen (PERCOCET 7.5) 7.5-325 mg per tablet 1 Tab  1 Tab Oral Q4H PRN Blaze Whaley MD   1 Tab at 06/27/19 0742    HYDROmorphone (PF) (DILAUDID) injection 1 mg  1 mg IntraVENous Q4H PRN Blaze Whaley MD   1 mg at 06/26/19 1607    levETIRAcetam (KEPPRA) tablet 500 mg  500 mg Oral Q12H Blaze Whaley MD   500 mg at 06/27/19 0920    promethazine (PHENERGAN) with saline injection 12.5 mg  12.5 mg IntraVENous Q6H PRN Blaze Whaley MD   12.5 mg at 06/27/19 0925    hydrALAZINE (APRESOLINE) 20 mg/mL injection 10 mg  10 mg IntraVENous Q4H PRN Blaze Whaley MD   10 mg at 06/26/19 7590       OBJECTIVE:  Patient Vitals for the past 8 hrs:   BP Temp Pulse Resp SpO2   19 0759 150/78 -- 69 18 92 %   19 0741 153/78 98.3 °F (36.8 °C) 75 8 94 %   19 0730 153/78 -- 70 (!) 7 95 %   19 0715 148/76 -- 73 12 95 %   19 0700 148/72 -- 64 17 93 %   19 0630 140/72 -- 70 16 94 %   19 0615 144/72 -- 64 15 94 %   19 0600 130/71 -- 72 17 93 %   19 0545 156/85 -- 88 18 94 %   19 0530 132/69 -- 65 16 94 %   19 0518 135/69 -- 66 16 93 %   19 0415 142/75 -- 75 16 97 %   19 0359 150/73 -- 73 14 96 %   19 0330 146/71 -- 65 17 97 %   19 0315 147/73 97.9 °F (36.6 °C) 73 16 98 %   19 0259 146/82 -- 84 13 96 %   19 0244 147/81 -- 63 16 97 %   19 0230 138/76 -- 64 14 97 %   19 0215 137/76 -- 65 18 97 %   19 0159 142/78 -- 65 19 97 %   19 0144 146/78 -- 69 17 98 %     Temp (24hrs), Av °F (36.7 °C), Min:97.5 °F (36.4 °C), Max:98.5 °F (36.9 °C)     0701 -  1900  In: -   Out: 400 [Urine:400]    Physical Exam:  Constitutional: Well developed, well nourished male in no acute distress, lying comfortably in the hospital bed. Family at bedside. HEENT: Normocephalic and atraumatic. Oropharynx is clear, mucous membranes are moist.  Neck supple without JVD. No thyromegaly present. Skin Warm and dry. No bruising and no rash noted. No erythema. No pallor. Respiratory Lungs are clear to auscultation bilaterally without wheezes, rales or rhonchi, normal air exchange without accessory muscle use. CVS Normal rate, regular rhythm and normal S1 and S2. No murmurs, gallops, or rubs. Abdomen Soft, nontender and nondistended, normoactive bowel sounds. No palpable mass. No hepatosplenomegaly. Neuro Somnolent. MSK Normal range of motion in general.  No edema and no tenderness. Psych Somnolent.        Labs:    Recent Results (from the past 24 hour(s))   METABOLIC PANEL, BASIC    Collection Time: 19  3:35 AM   Result Value Ref Range    Sodium 133 (L) 136 - 145 mmol/L    Potassium 4.2 3.5 - 5.1 mmol/L    Chloride 99 98 - 107 mmol/L    CO2 28 21 - 32 mmol/L    Anion gap 6 (L) 7 - 16 mmol/L    Glucose 114 (H) 65 - 100 mg/dL    BUN 16 8 - 23 MG/DL    Creatinine 0.77 (L) 0.8 - 1.5 MG/DL    GFR est AA >60 >60 ml/min/1.73m2    GFR est non-AA >60 >60 ml/min/1.73m2    Calcium 8.4 8.3 - 10.4 MG/DL   CBC WITH AUTOMATED DIFF    Collection Time: 06/27/19  3:35 AM   Result Value Ref Range    WBC 12.9 (H) 4.3 - 11.1 K/uL    RBC 4.58 4.23 - 5.6 M/uL    HGB 14.2 13.6 - 17.2 g/dL    HCT 40.7 (L) 41.1 - 50.3 %    MCV 88.9 79.6 - 97.8 FL    MCH 31.0 26.1 - 32.9 PG    MCHC 34.9 31.4 - 35.0 g/dL    RDW 12.2 11.9 - 14.6 %    PLATELET 331 346 - 750 K/uL    MPV 10.3 9.4 - 12.3 FL    ABSOLUTE NRBC 0.00 0.0 - 0.2 K/uL    DF AUTOMATED      NEUTROPHILS 79 (H) 43 - 78 %    LYMPHOCYTES 12 (L) 13 - 44 %    MONOCYTES 8 4.0 - 12.0 %    EOSINOPHILS 0 (L) 0.5 - 7.8 %    BASOPHILS 0 0.0 - 2.0 %    IMMATURE GRANULOCYTES 1 0.0 - 5.0 %    ABS. NEUTROPHILS 10.3 (H) 1.7 - 8.2 K/UL    ABS. LYMPHOCYTES 1.5 0.5 - 4.6 K/UL    ABS. MONOCYTES 1.1 0.1 - 1.3 K/UL    ABS. EOSINOPHILS 0.0 0.0 - 0.8 K/UL    ABS. BASOPHILS 0.0 0.0 - 0.2 K/UL    ABS. IMM. GRANS. 0.1 0.0 - 0.5 K/UL       Imaging:  CT HEAD W/O CONTRAST [[id number]] Collected: 06/27/19 0451   Order Status: Completed Updated: 06/27/19 0454   Narrative:     CT HEAD WITHOUT CONTRAST     HISTORY:  Brain tumor. COMPARISON: None    TECHNIQUE: Axial imaging was performed without intravenous contrast utilizing  5mm slice thickness. Sagittal and coronal reformats were performed. Radiation  dose reduction techniques were used for this study. Our CT scanner uses one or  all of the following:    Automated exposure control, adjustment of the MAS or KUB according to patient's  size and iterative reconstruction. FINDINGS:        *BRAIN:      -  There are no early signs of territorial or lacunar infarction by CT.     -  A biopsy cavity is seen in the left temporal lobe. There is minimal  hemorrhage and vasogenic edema.     -  No gross white matter abnormality by CT.    *VISUALIZED PARANASAL SINUSES: Well aerated. *MASTOIDS:  Clear. *CALVARIUM AND SCALP: Unremarkable. Impression:     IMPRESSION:    Postop changes seen in the left temporal lobe. Date of Dictation: 6/27/2019 4:51 AM   MRI BRAIN W CONT [[id number]] Collected: 06/26/19 5596   Order Status: Completed Updated: 06/26/19 0944   Narrative:     MRI brain with contrast    HISTORY: Brain mass, Stealth protocol. TECHNIQUE: Postcontrast T1-weighted images were submitted for localization  purposes. Imaging was performed on a 1.5 Ana Cristina magnet, utilizing the uneventful  administration of 20 mL of intravenous  Dotarem in order to better evaluate for  intracranial pathology. Multiplanar reformatted images were submitted. COMPARISON: 06/22/2019. FINDINGS: There is a heterogeneous enhancing intra-axial mass within the left  temporal lobe measuring approximately 5.3 x 4.4 x 4.1 cm in AP, transverse and  craniocaudal dimensions, respectively. There is slight extension into the  inferior aspect of the posterior margin of the left frontal lobe. There is mass  effect upon the left lateral ventricle. There is approximately 3 mm of  left-to-right midline shift. No additional areas of abnormal enhancement are  identified. Impression:     IMPRESSION: Redemonstrated intra-axial enhancing mass within the left temporal  lobe. ASSESSMENT:  Problem List  Never Reviewed          Codes Class Noted    * (Principal) GBM [facility]li[facility] ([facility] ICD-10-CM: C71.9  ICD-9-CM: 191.9  6/26/2019        [facility]bestosis ([facility] ICD-10-CM: B40  ICD-9-CM: 112  6/22/2019    Overview Signed 6/25/2019  7:59 AM by Irene Mattson CMA     Last Assessment & Plan:    Was Diagnosed in November  . Appears Asymptomatic Currently. Not Short of Breath Breathing Well.              Brain mass ICD-10-CM: G93.9  ICD-9-CM: 348.9  6/21/2019    Overview Signed 6/25/2019  7:59 AM by Joie Petersen CMA     Last Assessment & Plan:   Is a neurosurgical consultation with Dr. Kasi Alex   is evaluated the patient. Patient is on IV Decadron every 6 hourly. Patient has evidence of a left-sided temporoparietal mass    I Understanded to the plan is for surgery On Wednesday  Will change decadrone to 4 mg po qid    Pt will follow with Dr Kasi Alex outpatient. RECOMMENDATIONS:  Brain Mass / GBM  -  CT and MRI brain showed a left frontal temporal contrast-enhancing tumor with mass-effect and edema and possibly dural based. - POD #1 s/p L frontotemporal craniotomy for resection of his malignant glioma and Gliadel wafer placement. Awaiting final path. - On Dex 4mg q 6 hours. PPI ordered for GI prophylaxis  - Discussed possible treatment options including radiation, Temodar, and Optune. Will be discussed further as OP. Placed referral to Rad Onc. Lab studies and imaging studies were personally reviewed. Thank you for allowing us to participate in the care of Mr. Shweta Farmer. Mr. Shweta Farmer will need to f/u with Dr. Dai Christianson in a week after discharge.          Monique Burdick NP   [facility] Hematology & Oncology  [id number] [place] [address]  Office : [phone]  Fax : [phone]

## 2019-06-27 NOTE — PROGRESS NOTES
06/27/19 1030 Dual Skin Pressure Injury Assessment Dual Skin Pressure Injury Assessment WDL Second Care Provider (Based on [address] [place] [address]) 1300 [facility] Skin Integumentary Skin Integumentary (WDL) X Skin Integrity Incision (comment) (Head, left side)

## 2019-06-27 NOTE — PROGRESS NOTES
TRANSFER - IN REPORT:    Verbal report received from Caesar Wilhelm RN(name) on Laura Christopher  being received from ICU(unit) for routine progression of care      Report consisted of patients Situation, Background, Assessment and   Recommendations(SBAR). Information from the following report(s) SBAR, Kardex, Procedure Summary, Intake/Output, MAR and Recent Results was reviewed with the receiving nurse. Opportunity for questions and clarification was provided. Assessment completed upon patients arrival to unit and care assumed.

## 2019-06-27 NOTE — PROGRESS NOTES
NS   POD#1  AFEBRILE  DOING WELL  5/5 POWER  DRY DRESSINGS  MILD CONFUSION  CT:  POST OP CHANGES   LOOKS  GOOD  A/P TRANSFER TO FLOOR   ONCOLOGY  C/S  PT/OT/SPECCH  Skip Jenkins MD

## 2019-06-27 NOTE — PROGRESS NOTES
SPEECH PATHOLOGY NOTE:    Speech therapy consult received and appreciated. Attempted to see patient this AM, but unavailable. Will re-attempt at later time today. ADDENDUM:  Second speech therapy attempt this PM. Patient sleeping soundly, and wife at bedside. She reports patient with nausea and has received multiple medications that are impacting his alertness. Will hold evaluation today. Plan to follow up for speech evaluation tomorrow.     Id[address] CCC-SLP

## 2019-06-27 NOTE — PROGRESS NOTES
A follow up visit was made to the patient. Emotional support, spiritual presence and   prayer were provided. His wife, Divine Grant, and their two sons were present. One of the sons is Lou Crain. Patient appeared to be sedated.       [employer]

## 2019-06-27 NOTE — PROGRESS NOTES
Spoke with wife Jackie at bedside regarding discharge planning. Patient sleeping in bed at this time. Lives with wife in own one story home with 3 step entry. Prior to admission independent with ADL's and driving. PCP-  Dr. Cindy Lagos and wife states she is their niece and he has not actually seen him in a very long time. DME- access to walker, electric WC, BSC cane  Denies any previous [facility] services  Denies any problems affording Rx. Wife states patient has applied for Medicaid and Disability. [place] note confirms pending Medicaid and disability applications have been submitted. Therapy is recommending OP services at time of discharge but wife is requesting [facility] services. 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 \"  Order and patient information faxed to [facility] for PT/OT/ST disciplines upon discharge home. CM will continue to follow. Care Management Interventions  PCP Verified by CM: Yes  Mode of Transport at Discharge:  Other (see comment)(family)  Transition of Care Consult (CM Consult): Discharge Planning  Discharge Durable Medical Equipment: No  Physical Therapy Consult: Yes  Occupational Therapy Consult: Yes  Speech Therapy Consult: Yes  Current Support Network: Lives with Spouse, Own Home  Confirm Follow Up Transport: Family  Plan discussed with Pt/Family/Caregiver: Yes  Freedom of Choice Offered: Yes  Discharge Location  Discharge Placement: Home with home health

## 2019-06-27 NOTE — PROGRESS NOTES
Problem: Self Care Deficits Care Plan (Adult)  Goal: *Acute Goals and Plan of Care (Insert Text)  Description  1. Patient will complete full body bathing and dressing with mod I, minimal verbal cues, and adaptive equipment as needed. 2. Patient will complete toileting with mod I safely with no verbal cues needed. 3. Patient will tolerate 25 minutes of OT treatment with 2 rest breaks to increase activity tolerance for ADLs. 4. Patient will complete functional transfers with mod I safely and adaptive equipment as needed. \  5. Patient will complete grooming tasks in standing at sink level including gathering supplies independently and safely. 6. Patient will complete simple home management tasks independently and safely with no verbal cues. Timeframe: 7 visits    Outcome: Progressing Towards Goal       OCCUPATIONAL THERAPY: Initial Assessment, Daily Note and AM   6/27/2019  INPATIENT: OT Visit Days: 1  Payor: SELF PAY / Plan: [facility] SELF PAY / Product Type: Self Pay /      NAME/AGE/GENDER: Domi Saenz is a 59 y.o. male   PRIMARY DIAGNOSIS:  Brain mass [G93.9]  Brain mass [G93.9]  GBM (glioblastoma multiforme) (HCC) [C71.9] GBM (glioblastoma multiforme) (HCC)   GBM (glioblastoma multiforme) (HCC)    Procedure(s) (LRB):  STEALTH GUIDED LEFT FRONTAL  TEMPORAL CRANIOTOMY FOR TUMOR (Left)  1 Day Post-Op  ICD-10: Treatment Diagnosis:    Generalized Muscle Weakness (M62.81)  Other lack of cordination (R27.8)  Difficulty in walking, Not elsewhere classified (R26.2)   Precautions/Allergies:    Falls,  Iodine and iodide containing products and Other plant, animal, environmental      ASSESSMENT:     Mr. Marquis Sandy is a 58 YO R dominant WM admitted to the hospital after 3 weeks of symptoms of slurred speech. Pt's wife is a retired RN and knew something was wrong. Imaging found glioblastoma multiforme by Dr Sweta Montano. Pt seen for OT assessment following left frontal-temporal craniotomy completed 6/26/2019.   Pt was fully independent at baseline and works FT as , driving, manages household bills, active. Pt presents supine in bed this morning with wife present. He is confused and oriented to person only. Does recognize people when they enter his room. Has difficulty at times following simple one step commands and needs repeated cueing and instruction. Supine to sit with supervision. Intact balance, but some balance challenges while crossing legs to flo socks. MMT limited by cognition and ability to follow commands, but B UEs appear to be [facility] for basic self care tasks, with good strength 4+/5 and AROM. Pt impulsive and sit-stand transfer with CGA. Cautioned him to slow down and choose safety first.  Demonstrated fair+ standing balance at edge of bed c/o nausea and had already vomited earlier. RN gave meds for nausea and pt lethargic now. Stood for ~30 seconds at bedside without assistance from therapist, took steps to head of bed and returned to supine with SBA. Pt positioned himself in R sidelying and fell asleep almost immediately. Suspect cognition will be limiting safety factor. Will follow and see how pt does with self care tasks. Definitely needs more cognitive evaluation and work. SLP and OT will work together. Davin Quiroga is currently functioning well below his baseline and would benefit from skilled OT to maximize his rehab potential. Does have some weakness, however biggest deficit appear to be cognition. Will need 24/7 supervision at discharge. Wife reports possible dc home tomorrow. She is aware of his deficits and will seek help.     This section established at most recent assessment   PROBLEM LIST (Impairments causing functional limitations):  Decreased Strength  Decreased ADL/Functional Activities  Decreased Transfer Abilities  Decreased Ambulation Ability/Technique  Decreased Balance  Increased Pain  Decreased Activity Tolerance  Decreased Knowledge of Precautions  Decreased Cognition INTERVENTIONS PLANNED: (Benefits and precautions of occupational therapy have been discussed with the patient.)  Activities of daily living training  Adaptive equipment training  Balance training  Clothing management  Cognitive training  Community reintergration  Donning&doroblesing training  Group therapy  Neuromuscular re-eduation  Re-evaluation  Therapeutic activity  Therapeutic exercise     TREATMENT PLAN: Frequency/Duration: Follow patient 3x per week to address above goals. Rehabilitation Potential For Stated Goals: Good     REHAB RECOMMENDATIONS (at time of discharge pending progress):    Placement: It is my opinion, based on this patient's performance to date, that Mr. Aroldo Juarez may benefit from [facility] [address] after discharge due to the functional deficits listed above that are likely to improve with skilled rehabilitation because because he/she is able to safely attend regular and reoccurring therapy sessions at an outpatient facility, because he/she will benefit from the individualized approach tailored to his/her deficits and because his/her necessity for specific cognitive therapeutic intervention. Equipment:   TBD based on need               OCCUPATIONAL PROFILE AND HISTORY:   History of Present Injury/Illness (Reason for Referral):  See H&P  Past Medical History/Comorbidities:   Mr. Aroldo Juarez  has a past medical history of Asbestosis ([address] (6/22/2019) and Brain mass (6/21/2019). Mr. Aroldo Juarez  has a past surgical history that includes hx lumbar laminectomy and hx hernia repair (Left).   Social History/Living Environment:   Home Environment: Private residence  # Steps to Enter: 3  Rails to Enter: Yes  Hand Rails : Right  Wheelchair Ramp: No  One/Two Story Residence: One story  Living Alone: No  Support Systems: Spouse/Significant Other/Partner, Child(harper), Family member(s), Friends \ neighbors  Patient Expects to be Discharged to[de-identified] Private residence  Current DME Used/Available at Home: None(wife had WC and RW from prior use)  Tub or Shower Type: Shower  Prior Level of Function/Work/Activity:  Lives with wife in 1 level home with 3 steps at entrance with R HR, uses walk in shower with GBs, was independent, working as , driving, active. Wife is retired and will be with pt 24/7. Dominant Side:         RIGHT   Number of Personal Factors/Comorbidities that affect the Plan of Care: Extensive review of physical, cognitive, and psychosocial performance (3+):  HIGH COMPLEXITY   ASSESSMENT OF OCCUPATIONAL PERFORMANCE[de-identified]   Activities of Daily Living:   Basic ADLs (From Assessment) Complex ADLs (From Assessment)   Feeding: Setup  Oral Facial Hygiene/Grooming: Stand-by assistance  Bathing: Minimum assistance  Upper Body Dressing: Minimum assistance  Lower Body Dressing: Moderate assistance, Minimum assistance  Toileting: Minimum assistance(parks just removed 1 hour ago) Instrumental ADL  Meal Preparation: Maximum assistance  Homemaking: Maximum assistance  Medication Management: Maximum assistance  Financial Management: Maximum assistance   Grooming/Bathing/Dressing Activities of Daily Living     Cognitive Retraining  Safety/Judgement: Decreased awareness of need for assistance;Decreased awareness of environment;Decreased awareness of need for safety;Decreased insight into deficits; Fall prevention                 Functional Transfers  Bathroom Mobility: Contact guard assistance  Toilet Transfer : Contact guard assistance;Minimum assistance(from lower surfaces)  Tub Transfer: Maximum assistance  Shower Transfer: Minimum assistance(on wet surfaces)     Bed/Mat Mobility  Rolling: Supervision  Supine to Sit: Supervision  Sit to Supine: Supervision  Sit to Stand: Contact guard assistance  Stand to Sit: Contact guard assistance  Bed to Chair: Contact guard assistance  Scooting: Supervision     Most Recent Physical Functioning:   Gross Assessment:  AROM: Within functional limits  Strength: Generally decreased, functional  Coordination: Generally decreased, functional  Sensation: Intact               Posture:  Posture (WDL): Exceptions to WDL  Posture Assessment: Forward head, Rounded shoulders  Balance:  Sitting: Impaired  Sitting - Static: Good (unsupported)  Sitting - Dynamic: Fair (occasional)  Standing: Impaired  Standing - Static: Fair  Standing - Dynamic : Fair Bed Mobility:  Rolling: Supervision  Supine to Sit: Supervision  Sit to Supine: Supervision  Scooting: Supervision  Wheelchair Mobility:     Transfers:  Sit to Stand: Contact guard assistance  Stand to Sit: Contact guard assistance  Bed to Chair: Contact guard assistance  Interventions: Verbal cues; Safety awareness training  Duration: 10 Minutes            Patient Vitals for the past 6 hrs:   BP BP Patient Position SpO2 O2 Flow Rate (L/min) Pulse   06/27/19 0518 135/69 -- 93 % -- 66   06/27/19 0530 132/69 -- 94 % -- 65   06/27/19 0545 156/85 -- 94 % -- 88   06/27/19 0600 130/71 -- 93 % -- 72   06/27/19 0615 144/72 -- 94 % -- 64   06/27/19 0630 140/72 -- 94 % -- 70   06/27/19 0700 148/72 -- 93 % -- 64   06/27/19 0715 148/76 -- 95 % -- 73   06/27/19 0730 153/78 -- 95 % -- 70   06/27/19 0741 153/78 At rest 94 % 2 l/min 75   06/27/19 0759 150/78 -- 92 % -- 69   06/27/19 0909 (!) 142/97 -- 93 % -- 90   06/27/19 1032 163/84 -- 96 % -- 68       Mental Status  Neurologic State: Alert  Orientation Level: Oriented to person, Disoriented to place, Disoriented to situation, Disoriented to time  Cognition: Decreased attention/concentration, Recognition of people/places, Follows commands, Impaired decision making  Perception: Appears intact  Perseveration: Perseverates during conversation  Safety/Judgement: Decreased awareness of need for assistance, Decreased awareness of environment, Decreased awareness of need for safety, Decreased insight into deficits, Fall prevention                          Physical Skills Involved:  Range of Motion  Balance  Strength  Activity Tolerance  Pain (acute)  Edema  Skin Integrity Cognitive Skills Affected (resulting in the inability to perform in a timely and safe manner):  Perception  Executive Function  Short Term Recall  Sustained Attention  Divided Attention  Comprehension Psychosocial Skills Affected:  Habits/Routines  Environmental Adaptation  Social Interaction  Emotional Regulation  Self-Awareness  Awareness of Others  Social Roles   Number of elements that affect the Plan of Care: 5+:  HIGH COMPLEXITY   CLINICAL DECISION MAKIN [address]? ?6 Clicks? Daily Activity Inpatient Short Form  How much help from another person does the patient currently need. .. Total A Lot A Little None   1. Putting on and taking off regular lower body clothing? ? 1   ? 2   ? 3   ? 4   2. Bathing (including washing, rinsing, drying)? ? 1   ? 2   ? 3   ? 4   3. Toileting, which includes using toilet, bedpan or urinal?   ? 1   ? 2   ? 3   ? 4   4. Putting on and taking off regular upper body clothing? ? 1   ? 2   ? 3   ? 4   5. Taking care of personal grooming such as brushing teeth? ? 1   ? 2   ? 3   ? 4   6. Eating meals? ? 1   ? 2   ? 3   ? 4   © 2007, [employer] [place] [address], under license to [employer]. All rights reserved      Score:  Initial: 15, completed 2019 Most Recent: X (Date: -- )    Interpretation of Tool:  Represents activities that are increasingly more difficult (i.e. Bed mobility, Transfers, Gait). Medical Necessity:     Patient demonstrates good   rehab potential due to higher previous functional level. Reason for Services/Other Comments:  Patient continues to require skilled intervention due to s/p above and decreased ADLs and mobility   .    Use of outcome tool(s) and clinical judgement create a POC that gives a: MODERATE COMPLEXITY         TREATMENT:   (In addition to Assessment/Re-Assessment sessions the following treatments were rendered)     Pre-treatment Symptoms/Complaints:  \"I am just tired. \"  Pain: Initial:   Pain Intensity 1: 2  Pain Location 1: Head  Pain Orientation 1: Left  Pain Intervention(s) 1: Repositioned, Rest(RN gave meds since pt just vomited)  Post Session:  unchanged     Therapeutic Activity: (  17 mins ):  Therapeutic activities including Bed transfers, Chair transfers, Toilet transfers, Ambulation on level ground and sitting balance  to improve mobility, strength, balance, coordination and activity tolerance for ADLs . Required moderate Verbal cues; Safety awareness training to promote dynamic balance in standing. Evaluation: 15 mins     Braces/Orthotics/Lines/Etc:   IV  O2 Device: Nasal cannula  Treatment/Session Assessment:    Response to Treatment:  has been nauseous and vomiting most of the morning  Interdisciplinary Collaboration:   Physical Therapist  Occupational Therapist  Speech Therapist  Registered Nurse    Certified Nursing Assistant/Patient Care Technician  After treatment position/precautions:   Supine in bed  Bed alarm/tab alert on  Bed/Chair-wheels locked  Bed in low position  Call light within reach  RN notified  Family at bedside  Side rails x 3   Compliance with Program/Exercises: Compliant most of the time. Recommendations/Intent for next treatment session: \"Next visit will focus on advancements to more challenging activities and reduction in assistance provided\".   Total Treatment Duration:  32 mins  OT Patient Time In/Time Out  Time In: 1030  Time Out: [address] EZEQUIEL Duncan MS, OTR/L

## 2019-06-27 NOTE — OP NOTES
[facility] 
OPERATIVE REPORT Name:  Cliff Antunez 
MR#:  [id number] :  1955 ACCOUNT #:  [de-identified] DATE OF SERVICE:  2019 PREOPERATIVE DIAGNOSIS:  Left temporal lobe mass, rule out malignant neoplasm. POSTOPERATIVE DIAGNOSIS:  Left temporal lobe mass, rule out malignant neoplasm; malignant glioma. PROCEDURES PERFORMED: 1. Left frontotemporal craniotomy for resection of malignant glioma. 2.  Stealth guided intraoperative imaging. 3.  Intraoperative Gliadel wafer placement. SURGEON:  Dimple Tripp MD 
 
ASSISTANT:  None. ANESTHESIA:  General endotracheal. 
 
COMPLICATIONS:  None. SPECIMENS REMOVED:  Frozen section and permanent section of tumor. IMPLANTS:  See below. ESTIMATED BLOOD LOSS:  Minimal. 
 
PREPARATION:  ChloraPrep HISTORY OF PRESENT ILLNESS:  A 80-year-old gentleman with a 3-week history of language dysfunction. CT and MRI brain scanning confirmed a solitary contrast enhancing lesion with salient cystic features in the left frontotemporal region with edema and mass effect. The lesion does come to the cortical surface. Possible dural attachment noted preoperatively. However, likely etiology was intra-axial neoplasm. The patient was taken to the operating room today for surgery. OPERATIVE NOTE:  The patient was brought to preop holding and eight fiducial markers were placed on his scalp and he was taken to the MRI scanner where a stealth MRI scan was obtained. The stealth MRI was loaded in the stealth station and a 3D model was created. Registration points were obtained. The patient was then taken to the operating room, was carefully placed under general endotracheal anesthesia without complications. Dumont catheter, thigh-high pneumatic hose, RIO hose and arterial line were placed. He was placed in Velazquez pins, head turned slightly to the right with a left shoulder roll.   Intraoperative stealth imaging was used to obtain eight registration points and an eventual registration value of 1.6. Mapping of the tumor on the surface of the scalp was made with a marking pen. This facilitated the outline of the cranial flap. The entire scalp was then prepped and draped in the usual sterile fashion. A question theron incision was drawn out in the left frontotemporal region. This was then infiltrated with 1%  Xylocaine with epinephrine, incised with 15 blade and carried down to the temporalis muscle. Hemostasis achieved using bipolar cautery and Kevin clips. Temporalis muscle was incised with Bovie cautery, was reflected anteroinferiorly and protected with fish hook retractors. The stealth wand localization created a surface theron for the tumor on top of the bone. The Chetan  was used and a bur hole was created down the dura. The B1 attachment was used and the circular bone plate was elevated. The dura was then opened with a 15-blade Metzenbaum scissors and dural forceps and was peeled posteroinferiorly and no tumor was attached to the dura. However, tumor was evident on the cortical surface. Intraoperative stealth wanding confirmed that this was the center portion of the tumor and that the flap was directly in the center of the exposed tumor. Bipolar cautery and irrigation were used to create a circular area around the abnormal visible brain. Using micro scissors the peeled surface was then transected. Using a small pituitary rongeur several biopsies were obtained and sent for frozen section diagnosis which confirmed malignant glioma. Additional specimens were sent for permanent pathological identification. The tumor was then debulked using bipolar cautery and irrigation using intraoperative stealth wanding to guide the extent of the resection. Resection was carried down through all the abnormal bluish-colored tumor and into the white matter.   Bleeding was cauterized with bipolar cautery. Eventually all bleeding stopped. The wound was copiously irrigated until clear and a large cavity was present. Eight Gliadel wafers were carefully placed in line to the cavity and were protected by thrombin-soaked Gelfoam and Surgiflo. At this point, the dura was reapproximated however several areas required reinforcement with Duragen. The bone flap was reapproximated and placed with miniplates and screws and a nice tight fit was achieved. The wound was irrigated until clear. No further bleeding was encountered. Temporalis muscle was reapproximated with interrupted 3-0 Vicryl. The galea was reapproximated with interrupted 3-0 Vicryl and the scalp was closed with small staples. A dressing was placed. The patient tolerated the procedure well, was taken out of [facility], awakened, extubated and taken to PACU in stable condition. MD MONALISA [email or web address] 
D:  06/26/2019 13:57 
T:  06/26/2019 14:04 
JOB #:  [id number]

## 2019-06-27 NOTE — PROGRESS NOTES
TRANSFER - OUT REPORT:    Verbal report given to Neela Lu RN(name) on Domi Saenz  being transferred to 734(unit) for routine progression of care       Report consisted of patients Situation, Background, Assessment and   Recommendations(SBAR). Information from the following report(s) SBAR, Kardex, Procedure Summary, Intake/Output, MAR, Recent Results and Cardiac Rhythm NSR was reviewed with the receiving nurse. Opportunity for questions and clarification was provided.       Patient transported with:   O2 @ 2 liters  Registered Nurse

## 2019-06-28 NOTE — PROGRESS NOTES
SPEECH PATHOLOGY NOTE:    Patient participated in language evaluation this date. Patient with moderate-severe expressive/receptive aphasia. Full report to follow.       Magdi Babin MS, CCC-SLP

## 2019-06-28 NOTE — PROGRESS NOTES
[facility] Hematology & Oncology        Inpatient Hematology / Oncology Daily Progress Note    Reason for Consult:  Brain mass [G93.9]  Brain mass [G93.9]  GBM (glioblastoma multiforme) ([facility] [C71.9]  Referring Physician:  Pat Bush MD    24 Hour Events:  Afebrile, VSS  Final path pending  Going home today  Wife at bedside    ROS:  Constitutional: Negative for fever, chills. CV: Negative for chest pain, palpitations, edema. Respiratory: Negative for dyspnea, cough, wheezing. GI: Negative for nausea, abdominal pain, diarrhea. 10 point review of systems is otherwise negative with the exception of the elements mentioned above in the HPI. Allergies   Allergen Reactions    Iodine And Iodide Containing Products Unknown (comments)     Contrast dye    Other Plant, Animal, Environmental Swelling     bees     Past Medical History:   Diagnosis Date    [facility] ([facility] 6/22/2019    Last Assessment & Plan:   Was Diagnosed in November . Appears Asymptomatic Currently. Not Short of Breath Breathing Well.  Brain mass 6/21/2019    Last Assessment & Plan:  Is a neurosurgical consultation with Dr. Kiki Guillory  is evaluated the patient. Patient is on IV Decadron every 6 hourly. Patient has evidence of a left-sided temporoparietal mass  I Understanded to the plan is for surgery On Wednesday Will change decadrone to 4 mg po qid  Pt will follow with Dr Kiki Guillory outpatient.      Past Surgical History:   Procedure Laterality Date    HX HERNIA REPAIR Left     HX LUMBAR LAMINECTOMY       Family History   Problem Relation Age of Onset    Lung Disease Father      Social History     Socioeconomic History    Marital status:      Spouse name: Not on file    Number of children: Not on file    Years of education: Not on file    Highest education level: Not on file   Occupational History    Not on file   Social Needs    Financial resource strain: Not on file    Food insecurity:     Worry: Not on file Inability: Not on file    Transportation needs:     Medical: Not on file     Non-medical: Not on file   Tobacco Use    Smoking status: Former Smoker    Smokeless tobacco: Never Used   Substance and Sexual Activity    Alcohol use: Not Currently    Drug use: Not on file    Sexual activity: Not on file   Lifestyle    Physical activity:     Days per week: Not on file     Minutes per session: Not on file    Stress: Not on file   Relationships    Social connections:     Talks on phone: Not on file     Gets together: Not on file     Attends Scientologist service: Not on file     Active member of club or organization: Not on file     Attends meetings of clubs or organizations: Not on file     Relationship status: Not on file    Intimate partner violence:     Fear of current or ex partner: Not on file     Emotionally abused: Not on file     Physically abused: Not on file     Forced sexual activity: Not on file   Other Topics Concern    Not on file   Social History Narrative    Not on file     Current Facility-Administered Medications   Medication Dose Route Frequency Provider Last Rate Last Dose    pantoprazole (PROTONIX) tablet 40 mg  40 mg Oral ACB Kathy Pickard, NP   40 mg at 06/28/19 0519    lidocaine (XYLOCAINE) 10 mg/mL (1 %) injection 0.1 mL  0.1 mL SubCUTAneous PRN Rain Stack MD        fluticasone propionate (FLONASE) 50 mcg/actuation nasal spray 1 Spray  1 Spray Both Nostrils DAILY Rain Stack MD   1 [place] at 06/27/19 0920    sodium chloride (NS) flush 5-40 mL  5-40 mL IntraVENous Q8H Rain Stack MD   10 mL at 06/28/19 0520    sodium chloride (NS) flush 5-40 mL  5-40 mL IntraVENous PRN Rain Stack MD        dexamethasone (DECADRON) 4 mg/mL injection 4 mg  4 mg IntraVENous Q6H Rain Stack MD   4 mg at 06/28/19 0519    acetaminophen (TYLENOL) tablet 650 mg  650 mg Oral Q4H PRN Rain Stack MD        oxyCODONE-acetaminophen (PERCOCET 7.5) 7.5-325 mg per tablet 1 Tab  1 Tab Oral Q4H PRN Raine Pablo MD   1 Tab at 19 8898    HYDROmorphone (PF) (DILAUDID) injection 1 mg  1 mg IntraVENous Q4H PRN Raine Pablo MD   1 mg at 19 1607    levETIRAcetam (KEPPRA) tablet 500 mg  500 mg Oral Q12H Raine Pablo MD   500 mg at 19 0919    promethazine (PHENERGAN) with saline injection 12.5 mg  12.5 mg IntraVENous Q6H PRN Raine Pablo MD   12.5 mg at 19 0638    hydrALAZINE (APRESOLINE) 20 mg/mL injection 10 mg  10 mg IntraVENous Q4H PRN Raine Pablo MD   10 mg at 19 1048       OBJECTIVE:  Patient Vitals for the past 8 hrs:   BP Temp Pulse Resp SpO2   19 1039 135/71 98.3 °F (36.8 °C) 80 18 94 %   19 0713 151/87 97.6 °F (36.4 °C) 70 19 94 %   19 0321 112/68 99.1 °F (37.3 °C) 88 20 94 %     Temp (24hrs), Av.6 °F (37 °C), Min:97.6 °F (36.4 °C), Max:99.1 °F (37.3 °C)    No intake/output data recorded. Physical Exam:  Constitutional: Well developed, well nourished male in no acute distress, sitting comfortably in the hospital bed. HEENT: Normocephalic and atraumatic. Oropharynx is clear, mucous membranes are moist.  Neck supple without JVD. No thyromegaly present. Skin Warm and dry. No bruising and no rash noted. No erythema. No pallor. Respiratory Lungs are clear to auscultation bilaterally without wheezes, rales or rhonchi, normal air exchange without accessory muscle use. CVS Normal rate, regular rhythm and normal S1 and S2. No murmurs, gallops, or rubs. Abdomen Soft, nontender and nondistended, normoactive bowel sounds. No palpable mass. No hepatosplenomegaly. Neuro Aphasia. MSK Normal range of motion in general.  No edema and no tenderness. Psych Appropriate mood and affect. Labs:    No results found for this or any previous visit (from the past 24 hour(s)).     Imaging:  CT HEAD W/O CONTRAST [[id number]] Collected: 19   Order Status: Completed Updated: 19   Narrative:     CT HEAD WITHOUT CONTRAST     HISTORY:  Brain tumor. COMPARISON: None    TECHNIQUE: Axial imaging was performed without intravenous contrast utilizing  5mm slice thickness. Sagittal and coronal reformats were performed. Radiation  dose reduction techniques were used for this study. Our CT scanner uses one or  all of the following:    Automated exposure control, adjustment of the MAS or KUB according to patient's  size and iterative reconstruction. FINDINGS:        *BRAIN:      -  There are no early signs of territorial or lacunar infarction by CT.     -  A biopsy cavity is seen in the left temporal lobe. There is minimal  hemorrhage and vasogenic edema.     -  No gross white matter abnormality by CT.    *VISUALIZED PARANASAL SINUSES: Well aerated. *MASTOIDS:  Clear. *CALVARIUM AND SCALP: Unremarkable. Impression:     IMPRESSION:    Postop changes seen in the left temporal lobe. Date of Dictation: 6/27/2019 4:51 AM   MRI BRAIN W CONT [[id number]] Collected: 06/26/19 7654   Order Status: Completed Updated: 06/26/19 0944   Narrative:     MRI brain with contrast    HISTORY: Brain mass, Stealth protocol. TECHNIQUE: Postcontrast T1-weighted images were submitted for localization  purposes. Imaging was performed on a 1.5 Ana Cristina magnet, utilizing the uneventful  administration of 20 mL of intravenous  Dotarem in order to better evaluate for  intracranial pathology. Multiplanar reformatted images were submitted. COMPARISON: 06/22/2019. FINDINGS: There is a heterogeneous enhancing intra-axial mass within the left  temporal lobe measuring approximately 5.3 x 4.4 x 4.1 cm in AP, transverse and  craniocaudal dimensions, respectively. There is slight extension into the  inferior aspect of the posterior margin of the left frontal lobe. There is mass  effect upon the left lateral ventricle. There is approximately 3 mm of  left-to-right midline shift.  No additional areas of abnormal enhancement are  identified. Impression:     IMPRESSION: Redemonstrated intra-axial enhancing mass within the left temporal  lobe. ASSESSMENT:  Problem List  Never Reviewed          Codes Class Noted    * (Principal) GBM (glioblastoma multiforme) ([facility] ICD-10-CM: C71.9  ICD-9-CM: 191.9  6/26/2019        Asbestosis ([facility] ICD-10-CM: S07  ICD-9-CM: 227  6/22/2019    Overview Signed 6/25/2019  7:59 AM by Lesly Lu CMA     Last Assessment & Plan:    Was Diagnosed in November  . Appears Asymptomatic Currently. Not Short of Breath Breathing Well. Brain mass ICD-10-CM: G93.9  ICD-9-CM: 348.9  6/21/2019    Overview Signed 6/25/2019  7:59 AM by Lesly Lu CMA     Last Assessment & Plan:   Is a neurosurgical consultation with Dr. Blas Baez   is evaluated the patient. Patient is on IV Decadron every 6 hourly. Patient has evidence of a left-sided temporoparietal mass    I Understanded to the plan is for surgery On Wednesday  Will change decadrone to 4 mg po qid    Pt will follow with Dr Blas Baez outpatient. Mr. Viki Frost is a 59 y.o. male admitted on 6/26/2019 with brain mass. He presented to Dr. Blas Baez with c/o 2-3 week history of language dysfunction and difficulty forming words at times. CT and MRI brain showed a left frontal temporal contrast-enhancing tumor with mass-effect and edema and possibly dural based. He is s/p L frontotemporal craniotomy for resection of his malignant glioma and Gliadel wafer placement yesterday. On Dex 4mg q 6 hours. We were consulted for GBM. RECOMMENDATIONS:  Brain Mass / GBM  -  CT and MRI brain showed a left frontal temporal contrast-enhancing tumor with mass-effect and edema and possibly dural based. - POD #1 s/p L frontotemporal craniotomy for resection of his malignant glioma and Gliadel wafer placement. Awaiting final path. - On Dex 4mg q 6 hours.   PPI ordered for GI prophylaxis  - Discussed possible treatment options including radiation, Temodar, and Optune. Will be discussed further as OP. Placed referral to Rad Onc.  6/28 Final path pending. Con't steroids and Keppra. Mr. Micha Solis has f/u scheduled with Dr. Cresencio Soriano on 7/16. Lab studies and imaging studies were personally reviewed. Thank you for allowing us to participate in the care of Mr. Mciha Solis. Mr. Micha Solis has f/u appt with Dr. Cresencio Soriano on 7/16.          Moisés Fitch NP   [facility] Hematology & Oncology  [address] [place] [address]  Office : [phone]  Fax : [phone]

## 2019-06-28 NOTE — PROGRESS NOTES
Problem: Neurolinguistics Impaired (Adult)  Goal: *Acute Goals and Plan of Care (Insert Text)  Description  LTG: Patient will increase cognitive linguistic skills to improve communication across environments. STG: Patient will respond to simple yes/no questions with 70% accuracy given moderate verbal/visual cues. STG: Patient will identify object named in FO2 objects with 80% accuracy given moderate verbal cues. STG: Patient will follow 1 step directions with 90% accuracy with moderate visual/verbal cues. STG: Patient will complete automatic speech tasks with 90% accuracy given moderate verbal cues. STG: Patient will complete confrontation naming with 80% accuracy given moderate verbal/visual cues. Outcome: Progressing Towards Goal    SPEECH LANGUAGE PATHOLOGY: SPEECH-LANGUAGE AND COGNITIVE NOTE: Initial Assessment    NAME/AGE/GENDER: Isac Olson is a 59 y.o. male  DATE: 6/28/2019  PRIMARY DIAGNOSIS: Brain mass [G93.9]  Brain mass [G93.9]  GBM (glioblastoma multiforme) ([facility]) [C71.9]  Procedure(s) (LRB):  STEALTH GUIDED LEFT FRONTAL  TEMPORAL CRANIOTOMY FOR TUMOR (Left) 2 Days Post-Op  ICD-10: Treatment Diagnosis: R47.01 Aphasia  INTERDISCIPLINARY COLLABORATION: Registered Nurse  ASSESSMENT:   Based on the objective data described below, Mr. Dominguez Pi presents with moderate-severe expressive/receptive aphasia. Patient with nonfluent speech in conversation. Perseverative throughout utilizing jargon. Noted difficulty responding appropriately to yes/no and open ended questions in conversation. Patient was administered portions of the Brief Cognitive/Communication Evaluation yielding the following results:    Orientation: 20% accuracy (1 out of 5)  Simple Yes/No Questions: 20% accuracy (1 out of 5)  1 Step directions presented verbally 20% (1 out of 5)- When given visual model of each task 80% accuracy.   Written 1 step directions/reading comprehension: 0% accuracy- patient unable to accurately read directions  Object Identification in FO2: 80% accuracy (4 out of 5)  Automatic Speech Tasks: 40% accuracy  Naming Objects: 20% accuracy (1 out of 5)  Phrase Completion: 0% accuracy (0 out of 4)  Repeating Single Words: 0% accuracy (o out of 4)    Recommend ongoing speech therapy targeting expressive/receptive language in order to increase patient's ability to express daily wants/needs, safety with following directions during ADLs, and overall communication competence. ?????? ? ? This section established at most recent assessment??????????  PROBLEM LIST (Impairments causing functional limitations):  1. Moderate-severe expressive/receptive aphasia  REHABILITATION POTENTIAL FOR STATED GOALS: Fair  PLAN OF CARE:   Patient will benefit from skilled intervention to address the following impairments. INTERVENTIONS PLANNED: (Benefits and precautions of therapy have been discussed with the patient.)  1. Expressive and Receptive Language  FREQUENCY/DURATION: Continue to follow patient 3  times a week for duration of hospital stay to address above goals. RECOMMENDED REHABILITATION/EQUIPMENT: (at time of discharge pending progress): Due to the probability of continued deficits (see above) this patient will likely need continued skilled speech therapy after discharge. SUBJECTIVE:   Oriented to self only. Wife at bedside. \"Am I going to get better? \"  History of Present Injury/Illness: Mr. Tyler Sultana  has a past medical history of Asbestosis ([facility] (6/22/2019) and Brain mass (6/21/2019). .  He also  has a past surgical history that includes hx lumbar laminectomy and hx hernia repair (Left). Present Symptoms:    Pain Intensity 1: 0  Pain Location 1: Head  Pain Orientation 1: Left  Pain Intervention(s) 1: Repositioned, Rest(RN gave meds since pt just vomited)  Current Medications:   No current facility-administered medications on file prior to encounter.         Current Outpatient Medications on File Prior to Encounter Medication Sig Dispense Refill    dexamethasone (DECADRON) 4 mg tablet Take 4 mg by mouth every six (6) hours.  diphenhydrAMINE (BENADRYL) 25 mg capsule Take 50 mg by mouth nightly.  fluticasone propionate (FLONASE) 50 mcg/actuation nasal spray 1 [place] by Nasal route. Current Dietary Status:  Regular/thin         Social History/Home Situation:    Home Environment: Private residence  # Steps to Enter: 3  Rails to Enter: Yes  Hand Rails : Right  Wheelchair Ramp: No  One/Two Story Residence: One story  Living Alone: No  Support Systems: Spouse/Significant Other/Partner, Child(harper), Family member(s), Friends \ neighbors  Patient Expects to be Discharged to[de-identified] Private residence  Current DME Used/Available at Home: None(wife had WC and RW from prior use)  Tub or Shower Type: Shower  Work/Activity History:   OBJECTIVE:   Oral Motor Structure/Speech:  Oral-Motor Structure/Motor Speech  Intelligibility: No impairment    SPEECH-LANGUAGE COGNITIVE EVALUATION  Tests Given:Poritions of the Brief Cognitive Communication Rating Scale    Mental Status:  Neurologic State: Alert  Orientation Level: Oriented to person;Disoriented to place; Disoriented to situation;Disoriented to time  Cognition: Decreased command following  Perception: Appears intact  Perseveration: Perseverates during conversation  Safety/Judgement: Not assessed    Motor Speech:  Oral-Motor Structure/Motor Speech  Intelligibility: No impairment    Auditory Comprehension:   Auditory Comprehension  Auditory Impairment: Yes  Response to Basic Yes/No Questions (%): 20 %  One-Step Basic Commands (%): 20 %  Interfering Components: Attention - sustained; Working memory    Reading Comprehension:        Verbal Expression:   Verbal Expression  Primary Mode of Expression: Verbal  Initiation: No impairment  Automatic Speech Task: Impaired (comment)  Repetition: Impaired  Word Repetition (%): 20 %  Naming: Impaired  Confrontation (%): 20 %  Sentence Completion: Impaired  Word level (%): 0 %  Conversation: Non-fluent  Speech Characteristics: Jargon;Paraphasias;Circumlocutions;Perseveration; Word retrieval    Written Expression:        Neuro-Linguistics:                         Pragmatics:          Assessment/Reassessment only, no treatment provided today    Tool Used: Functional [place] Measure (FIMTM)   Score Comments   Eating       Comprehension       Expression  2 Primary Mode of Expression: Verbal   Social Interaction       Problem Solving       Memory          Score:  Initial: 2 Most Recent: X (Date: -- )   Interpretation of Tool: Provides a uniform system of measurement for disability based on the International Classification of Impairment, Disabilities and Handicaps; measures the level of a patient's disability and indicates how much assistance is required for the individual to carry out activities of daily living. Score 7 6 5 4 3 2 1   Modifier CH CI CJ CK CL CM CN     Payor: MEDICAID PENDING / Plan: [place] MEDICAID PENDING / Product Type: Medicaid /   __________________________________________________________________________________________________  Safety:   After treatment position/precautions:  · Call light within reach  · Family at bedside  · Upright in Bed  Treatment Assessment:  Aphasia. Progression/Medical Necessity:   · Skilled intervention continues to be required due to decreased communication with family/caregivers, decreased ability to follow commands, decreased reading comprehension, decreased independence with activities of daily living and medical complications. Compliance with Program/Exercises: Will assess as treatment progresses. Reason for Continuation of Services/Other Comments:  · Patient continues to require skilled intervention due to medical complications.   Recommendations/Intent for next treatment session: \"Treatment next visit will focus on advancements to more challenging activities, reduction in assistance provided and patient/family education. \".     Total Treatment Duration:  Time In: 5476  Time Out: Maria Scott

## 2019-06-28 NOTE — DISCHARGE INSTRUCTIONS
MAY shower-->NO tub baths    LEAVE incision open to the air    NO lifting anything heavier than 5LBS     NO driving until directed by [facility]    Avoid having pets sleep in bed with you until incision is completely healed    CALL DR. Beltran Tubbs if:  Fever >100.5    ([phone])                Incision becomes red, swollen or opens up                Incision has yellow, thick drainage or an odor                Pain is not managed with prescribed medications                Excessive nausea and/or vomiting                                      Sudden visual changes                                      Seizures                                                       HEADACHE           CHANGES IN MENTAL STATUS        DISCHARGE SUMMARY from Nurse    PATIENT INSTRUCTIONS:    After general anesthesia or intravenous sedation, for 24 hours or while taking prescription Narcotics:  · Limit your activities  · Do not drive and operate hazardous machinery  · Do not make important personal or business decisions  · Do  not drink alcoholic beverages  · If you have not urinated within 8 hours after discharge, please contact your surgeon on call. Report the following to your surgeon:  · Excessive pain, swelling, redness or odor of or around the surgical area  · Temperature over 100.5  · Nausea and vomiting lasting longer than 4 hours or if unable to take medications  · Any signs of decreased circulation or nerve impairment to extremity: change in color, persistent  numbness, tingling, coldness or increase pain  · Any questions    What to do at Home:  Recommended activity: per PT/OT/MD    If you experience any of the following symptoms per surgical instructions above, please follow up with MD.    *  Please give a list of your current medications to your Primary Care Provider. *  Please update this list whenever your medications are discontinued, doses are      changed, or new medications (including over-the-counter products) are added.     * Please carry medication information at all times in case of emergency situations. These are general instructions for a healthy lifestyle:    No smoking/ No tobacco products/ Avoid exposure to second hand smoke  Surgeon General's Warning:  Quitting smoking now greatly reduces serious risk to your health. Obesity, smoking, and sedentary lifestyle greatly increases your risk for illness    A healthy diet, regular physical exercise & weight monitoring are important for maintaining a healthy lifestyle    You may be retaining fluid if you have a history of heart failure or if you experience any of the following symptoms:  Weight gain of 3 pounds or more overnight or 5 pounds in a week, increased swelling in our hands or feet or shortness of breath while lying flat in bed. Please call your doctor as soon as you notice any of these symptoms; do not wait until your next office visit. The discharge information has been reviewed with the patient. The patient verbalized understanding. Discharge medications reviewed with the patient and appropriate educational materials and side effects teaching were provided.   ___________________________________________________________________________________________________________________________________ No

## 2019-06-28 NOTE — PROGRESS NOTES
NS   POD#2  AFEBRILE  DRY DRESSINGS  5/5 POWER  SPEECH IS BETTER  A/P PATH PENDING  OK  TO YUSRA Fry MD

## 2019-06-29 NOTE — DISCHARGE SUMMARY
[facility] 
DISCHARGE SUMMARY Name:  Myesha Fong 
MR#:  [id number] :  1955 ACCOUNT #:  [de-identified] ADMIT DATE:  2019 DISCHARGE DATE:  2019 DISCHARGE DIAGNOSES: 
1. Left frontotemporal glioblastoma multiforme. 2.  Asbestosis. OPERATIONS AND PROCEDURES:  Left frontotemporal craniotomy for tumor. Stealth guidance. Gliadel wafer placement. COMPLICATIONS:  None. DISCHARGE CONDITION:  Satisfactory. HISTORY OF PRESENT ILLNESS:  A 67-year-old gentleman with a 3- to 4-week history of language dysfunction. CT and MRI brain scanning confirmed a large left-sided lesion in the frontotemporal region with mass effect and edema consistent with neoplasm. The patient was admitted for surgery. PAST HISTORY:  As listed above. ALLERGIES:  IODINE DYE. GENERAL EXAMINATION:  Normal.  Neurologically, he was intact in terms of cranial nerves, motor sensory and reflex testings with some word-finding difficulty. HOSPITAL COURSE:  The patient was taken to the operating room on  and underwent Stealth-guided left frontotemporal craniotomy for tumor. Final pathology is pending; however, frozen section confirmed glioblastoma multiforme. The patient was placed in the ICU overnight and did well. Postoperative CT scanning showed postsurgical changes and a nicely decompressed tumor cavity. He was transferred to the floor. He was seen by Oncology and arranges were made for outpatient followup. It was decided it was safe for him to be discharged home on the second postoperative day. DISCHARGE INSTRUCTIONS:  Diet, regular. Activity as tolerated. He may shower. No heavy lifting, bending or automobile driving. Showers are permissible. He will contact the physician if he develops any fever, drainage, swelling or neurological change.   Return visit in 10-14 days for staple removal. 
 
DISCHARGE MEDICATIONS:  Include admission medicines plus Decadron 2 mg four times a day, Norco 7.5 q.8 hours p.r.n. Sushil Brown MD 
 
 
[id number] 
D:  06/28/2019 8:19 
T:  06/28/2019 8:29 
JOB #:  [id number]

## 2019-07-16 NOTE — PROGRESS NOTES
7/16/2019 Saw the patient with Dr Shelby Talavera. He is here for new diagnosis of GBM. He did see Dr Collette Kay but his wound was cultured today and antibiotic was started. Dr Shelby Talavera discussed temodar with radiation and then followed by Temodar monthly and finally Optune. We will arrange for chemo education. We will refer to radiation. Temodar printed information given to the patient as well as navigation information. Will continue to follow.

## 2019-07-17 NOTE — ED NOTES
Pt arrived via EMS with c/o draining surgical incision. Drainage does not smell but noted to have brown streaks per Home Health RN. Pt has hx of gliostoma and had surgery 2 weeks ago with Dr. Brie Alvarenga. Dressing and staples noted to left side of head. Wife states he started dry heaving this morning and more lethargic since last night. Pt stated had a fever and chills Friday (100.1), pt refused to come to ER then. Wife states he has been hot since then but her thermometer has not read a fever.

## 2019-07-17 NOTE — ED PROVIDER NOTES
Per nurses notes: \"Pt arrived via EMS with c/o draining surgical incision. Drainage does not smell but noted to have brown streaks per Home Health RN. Pt has hx of gliostoma and had surgery 2 weeks ago with Dr. Rosalva Ramey. Dressing and staples noted to left side of head. Wife states he started dry heaving this morning and more lethargic since last night. Pt stated had a fever and chills Friday (100.1), pt refused to come to ER then. Wife states he has been hot since then but her thermometer has not read a fever. \"    The history is provided by the patient and the spouse. The history is limited by the condition of the patient. Altered mental status    This is a new problem. The current episode started 6 to 12 hours ago. The problem has not changed since onset. Associated symptoms include somnolence and weakness. Pertinent negatives include no seizures, no unresponsiveness, no agitation, no delusions, no hallucinations, no self-injury, no violence, no tingling and no numbness. Mental status baseline is normal.  Risk factors: recent brain surgery. His past medical history is significant for head trauma. Past Medical History:   Diagnosis Date    Asbestosis ([address] 6/22/2019    Last Assessment & Plan:   Was Diagnosed in November . Appears Asymptomatic Currently. Not Short of Breath Breathing Well.  Brain mass 6/21/2019    Last Assessment & Plan:  Is a neurosurgical consultation with Dr. Rosalva Ramey  is evaluated the patient. Patient is on IV Decadron every 6 hourly. Patient has evidence of a left-sided temporoparietal mass  I Understanded to the plan is for surgery On Wednesday Will change decadrone to 4 mg po qid  Pt will follow with Dr Rosalva Ramey outpatient.        Past Surgical History:   Procedure Laterality Date    HX CRANIOTOMY  06/26/2019    left Frontotemporal crani for GBM    HX HERNIA REPAIR Left     HX LUMBAR LAMINECTOMY           Family History:   Problem Relation Age of Onset    Lung Disease Father Social History     Socioeconomic History    Marital status:      Spouse name: Not on file    Number of children: Not on file    Years of education: Not on file    Highest education level: Not on file   Occupational History    Not on file   Social Needs    Financial resource strain: Not on file    Food insecurity:     Worry: Not on file     Inability: Not on file    Transportation needs:     Medical: Not on file     Non-medical: Not on file   Tobacco Use    Smoking status: Former Smoker    Smokeless tobacco: Never Used   Substance and Sexual Activity    Alcohol use: Not Currently    Drug use: Not on file    Sexual activity: Not on file   Lifestyle    Physical activity:     Days per week: Not on file     Minutes per session: Not on file    Stress: Not on file   Relationships    Social connections:     Talks on phone: Not on file     Gets together: Not on file     Attends Zoroastrian service: Not on file     Active member of club or organization: Not on file     Attends meetings of clubs or organizations: Not on file     Relationship status: Not on file    Intimate partner violence:     Fear of current or ex partner: Not on file     Emotionally abused: Not on file     Physically abused: Not on file     Forced sexual activity: Not on file   Other Topics Concern    Not on file   Social History Narrative    Not on file         ALLERGIES: Iodine and iodide containing products and Other plant, animal, environmental    Review of Systems   Neurological: Positive for weakness. Negative for tingling, seizures and numbness. Psychiatric/Behavioral: Negative for agitation, hallucinations and self-injury. Vitals:    07/17/19 1704   BP: 192/83   Pulse: (!) 56   Resp: 16   SpO2: 97%   Weight: 90.3 kg (199 lb)   Height: 5' 8\" (1.727 m)            Physical Exam   Constitutional: He is oriented to person, place, and time. He appears well-developed and well-nourished. He appears lethargic.  He appears distressed (mild). HENT:   Head: Normocephalic and atraumatic. Right Ear: External ear normal.   Left Ear: External ear normal.   Mouth/Throat: Oropharynx is clear and moist.   Eyes: Pupils are equal, round, and reactive to light. Conjunctivae and EOM are normal.   Neck: Normal range of motion. Neck supple. Cardiovascular: Normal rate, regular rhythm, normal heart sounds and intact distal pulses. Pulmonary/Chest: Effort normal and breath sounds normal.   Abdominal: Soft. Bowel sounds are normal. There is no tenderness. Musculoskeletal: Normal range of motion. He exhibits no edema. Neurological: He is oriented to person, place, and time. He has normal strength. He appears lethargic. No cranial nerve deficit or sensory deficit. Skin: Skin is warm and dry. Capillary refill takes less than 2 seconds. Psychiatric: He has a normal mood and affect. He is slowed. Cognition and memory are normal.   Nursing note and vitals reviewed. MDM  Number of Diagnoses or Management Options  Altered mental status, unspecified altered mental status type:   Brain edema ([facility] [address]):   GBM (glioblastoma multiforme) ([facility]):      Amount and/or Complexity of Data Reviewed  Clinical lab tests: ordered and reviewed  Tests in the radiology section of CPT®: ordered and reviewed  Obtain history from someone other than the patient: yes  Review and summarize past medical records: yes (Rain Stack MD  Physician  Neurosurgery  Discharge Summary  Signed  Date of Service:  19 [id number]                 Hide copied text    Hover for details  [address] [place] [address]  DISCHARGE SUMMARY     Name:  Kell Cobb  MR#:  [id number]  :  1955  ACCOUNT #:  [de-identified]  ADMIT DATE:  2019  DISCHARGE DATE:  2019        DISCHARGE DIAGNOSES:  1. Left frontotemporal glioblastoma multiforme. 2.  Asbestosis.     OPERATIONS AND PROCEDURES:  Left frontotemporal craniotomy for tumor. Stealth guidance.   Gliadel wafer placement.     COMPLICATIONS:  None.     DISCHARGE CONDITION:  Satisfactory.     HISTORY OF PRESENT ILLNESS:  A 61-year-old gentleman with a 3- to 4-week history of language dysfunction. CT and MRI brain scanning confirmed a large left-sided lesion in the frontotemporal region with mass effect and edema consistent with neoplasm. The patient was admitted for surgery.     PAST HISTORY:  As listed above.     ALLERGIES:  IODINE DYE.     GENERAL EXAMINATION:  Normal.  Neurologically, he was intact in terms of cranial nerves, motor sensory and reflex testings with some word-finding difficulty.     HOSPITAL COURSE:  The patient was taken to the operating room on 06/26 and underwent Stealth-guided left frontotemporal craniotomy for tumor. Final pathology is pending; however, frozen section confirmed glioblastoma multiforme. The patient was placed in the ICU overnight and did well. Postoperative CT scanning showed postsurgical changes and a nicely decompressed tumor cavity. He was transferred to the floor. He was seen by Oncology and arranges were made for outpatient followup. It was decided it was safe for him to be discharged home on the second postoperative day.     DISCHARGE INSTRUCTIONS:  Diet, regular. Activity as tolerated. He may shower. No heavy lifting, bending or automobile driving. Showers are permissible. He will contact the physician if he develops any fever, drainage, swelling or neurological change. Return visit in 10-14 days for staple removal.     DISCHARGE MEDICATIONS:  Include admission medicines plus Decadron 2 mg four times a day, Norco 7.5 q.8 hours p.r.n.        Surya Hendrix MD      )  Discuss the patient with other providers: yes (CT reviewed by Dr. Ari Ovalle who will admit the patient for severe cerebral edema with altered mental status. Recommends 10 mg of Decadron IV.   Now)  Independent visualization of images, tracings, or specimens: yes    Risk of Complications, Morbidity, and/or Mortality  Presenting problems: high  Diagnostic procedures: high  Management options: high    Patient Progress  Patient progress: stable    ED Course as of Jul 17 2006 Wed Jul 17, 2019   [address] Patient had a reaction to IV contrast in 1994. According to spouse, the patient had an IV contrast with some sort of pretreatment prior to the CT last month without any reaction. They were told contrast he had in 1994 is no longer made or used. Plan will be to get the CT without contrast now, and pretreat the patient per protocol for 5 hours prior to getting the CT with contrast.    [BB]      ED Course User Index  [BB] Sun Nair MD       Procedures      The patient was observed in the ED. Results Reviewed:      Recent Results (from the past 24 hour(s))   CBC WITH AUTOMATED DIFF    Collection Time: 07/17/19  5:19 PM   Result Value Ref Range    WBC 9.5 4.3 - 11.1 K/uL    RBC 4.43 4.23 - 5.6 M/uL    HGB 13.7 13.6 - 17.2 g/dL    HCT 38.9 (L) 41.1 - 50.3 %    MCV 87.8 79.6 - 97.8 FL    MCH 30.9 26.1 - 32.9 PG    MCHC 35.2 (H) 31.4 - 35.0 g/dL    RDW 12.1 11.9 - 14.6 %    PLATELET 148 679 - 753 K/uL    MPV 9.0 (L) 9.4 - 12.3 FL    ABSOLUTE NRBC 0.00 0.0 - 0.2 K/uL    DF AUTOMATED      NEUTROPHILS 78 43 - 78 %    LYMPHOCYTES 15 13 - 44 %    MONOCYTES 6 4.0 - 12.0 %    EOSINOPHILS 0 (L) 0.5 - 7.8 %    BASOPHILS 0 0.0 - 2.0 %    IMMATURE GRANULOCYTES 1 0.0 - 5.0 %    ABS. NEUTROPHILS 7.4 1.7 - 8.2 K/UL    ABS. LYMPHOCYTES 1.4 0.5 - 4.6 K/UL    ABS. MONOCYTES 0.5 0.1 - 1.3 K/UL    ABS. EOSINOPHILS 0.0 0.0 - 0.8 K/UL    ABS. BASOPHILS 0.0 0.0 - 0.2 K/UL    ABS. IMM.  GRANS. 0.1 0.0 - 0.5 K/UL   METABOLIC PANEL, COMPREHENSIVE    Collection Time: 07/17/19  5:19 PM   Result Value Ref Range    Sodium 125 (L) 136 - 145 mmol/L    Potassium 3.7 3.5 - 5.1 mmol/L    Chloride 90 (L) 98 - 107 mmol/L    CO2 26 21 - 32 mmol/L    Anion gap 9 7 - 16 mmol/L    Glucose 111 (H) 65 - 100 mg/dL    BUN 6 (L) 8 - 23 MG/DL    Creatinine 0.61 (L) 0.8 - 1.5 MG/DL    GFR est AA >60 >60 ml/min/1.73m2    GFR est non-AA >60 >60 ml/min/1.73m2    Calcium 8.7 8.3 - 10.4 MG/DL    Bilirubin, total 0.9 0.2 - 1.1 MG/DL    ALT (SGPT) 30 12 - 65 U/L    AST (SGOT) 18 15 - 37 U/L    Alk. phosphatase 77 50 - 136 U/L    Protein, total 7.5 6.3 - 8.2 g/dL    Albumin 3.2 3.2 - 4.6 g/dL    Globulin 4.3 (H) 2.3 - 3.5 g/dL    A-G Ratio 0.7 (L) 1.2 - 3.5     PROCALCITONIN    Collection Time: 07/17/19  5:19 PM   Result Value Ref Range    Procalcitonin <0.1 ng/mL   POC LACTIC ACID    Collection Time: 07/17/19  5:21 PM   Result Value Ref Range    Lactic Acid (POC) 1.09 0.5 - 1.9 mmol/L       CT HEAD WO CONT   Final Result   Impression:    1. No evidence of acute hemorrhage. 2. Increased midline shift, mass effect, and edema throughout the majority of   the left cerebral hemisphere. Craniotomy changes again noted.             CT HEAD W WO CONT    (Results Pending)

## 2019-07-17 NOTE — ED NOTES
Report to [facility]SE EDUARDO [facility], RN. Care transferred at this time.

## 2019-07-18 PROBLEM — E87.1 HYPONATREMIA: Status: ACTIVE | Noted: 2019-01-01

## 2019-07-18 NOTE — PROGRESS NOTES
Initial visit was made, prayer, emotional support and a spiritual presence were provided for the patient and his wife, Dmitry Gamble.  card was left with the patient.         [employer]

## 2019-07-18 NOTE — PROGRESS NOTES
NS  ADMIT NOTE  PATIENT WITH LETHARGY AND HEADACHE  CT: INCREASED EDEMA AND SHIFT; EDEMA IS PRESSING UP BONE FLAP  RESECTION CAVITY INTACT  NA+125  A/P  AGGRESSIVE TUMOR  ADMIT   IV DECADRON  MANNITOL  ICU   IV  ABX    3% NACL IV  AND  HOSPITALIST CONSULT  MANNITOL SHOULD HELP CORRECT SODIUM  Nolan Vance MD      H+P DICTATED

## 2019-07-18 NOTE — PROGRESS NOTES
Problem: Dysphagia (Adult)  Goal: *Acute Goals and Plan of Care (Insert Text)  Description  LTG: Patient will tolerate least restrictive diet without overt signs or symptoms of airway compromise. STG: Patient will tolerate regular diet and thin liquids without overt signs or symptoms of airway compromise. Outcome: Progressing Towards Goal    Progressing Towards Goal  STG: Patient will respond to yes/no questions with 75% accuracy with moderate assistance. STG: Patient will follow 1-step commands with 75% accuracy with minimal assistance. STG: Patient will identify objects in room to assist with expressing wants and needs with 80% accuracy. STG: Patient will participate in functional assessment regarding ability to utilize communication board to assist with verbal expression. - MET 7/18/19  LTG: Patient will increase neuro-linguistic abilities to increase safety and awareness of deficits. SPEECH LANGUAGE PATHOLOGY:SPEECH LANGUAGE NOTE: Daily Note 1    NAME/AGE/GENDER: Dilcia López is a 59 y.o. male  DATE: 7/18/2019  PRIMARY DIAGNOSIS: GBM (glioblastoma multiforme) ([facility]) [C71.9]  GBM [facility]gli[facility] ([facility] [C71.9]      ICD-10: Treatment Diagnosis: R13.11 Oral Dysphagia. R47.01 Aphasia    INTERDISCIPLINARY COLLABORATION: Registered Nurse  ASSESSMENT:   Patient seen for therapeutic assessment of his ability to implement alternative communication/communication board to improved his ability to express wants and needs. Son and daughter in law at bedside. Patient presented with Complex communication board that included basic hospital items and needs. When asked to identify requested requested item on page (greater than 20 items), patient unable to grasp concept of task. With field size reduced to 3, patient identified requested item with 30% accuracy. His comprehension of task/use of board was biggest limiting factor.    Son reports patient has identified reading and writing as strength at home since last hospitalization. Structured assessment of reading abilities via single words attempted, but comprehension again limited his ability to complete task. However, when experiencing word finding difficulty in unstructured conversation, patient independently requested to write word. He was able to write desired word with minimal spelling errors. Despite errors, word was easily recognizable to clinician. On another instance, patient unable to spell word for it to be recognized by clinician. He was able to locate word on his white board (\"RN\"), and point to it independently to correct communication breakdown. Patient with strengths in reading and writing ability. Encouraged patient to utilize writing as able to improve communication. Blank paper and pen provided at end of session. Family to bring in notebook from home to encourage writing. Speech will continue following to address utilization of writing/reading to improve communication. ?????? ? ? This section established at most recent assessment??????????  PROBLEM LIST (Impairments causing functional limitations):  1. Expressive language deficits  2. Receptive language deficits  3. Oral dysphagia  REHABILITATION POTENTIAL FOR STATED GOALS: Good  PLAN OF CARE:   Patient will benefit from skilled intervention to address the following impairments. RECOMMENDATIONS AND PLANNED INTERVENTIONS (Benefits and precautions of therapy have been discussed with the patient.):  · PO:  Regular  · Liquids:  regular thin  MEDICATIONS:  · With liquid  COMPENSATORY STRATEGIES/MODIFICATIONS INCLUDING:  · None  OTHER RECOMMENDATIONS (including follow up treatment recommendations): · Family training/education  · Patient education  RECOMMENDED DIET MODIFICATIONS DISCUSSED WITH:  · Nursing  · Family  · Patient  FREQUENCY/DURATION: Continue to follow patient 3 times a week for duration of hospital stay to address above goals.    RECOMMENDED REHABILITATION/EQUIPMENT: (at time of discharge pending progress): Due to the probability of continued deficits (see above) this patient will likely need continued skilled speech therapy after discharge. SUBJECTIVE:   Up in chair talking with family. Very talkative despite language deficits. History of Present Injury/Illness: Mr. Isai Cochran  has a past medical history of Asbestosis ([facility] [address] (6/22/2019) and Brain mass (6/21/2019). .  He also  has a past surgical history that includes hx hernia repair (Left); hx lumbar laminectomy; and hx craniotomy (06/26/2019). Present Symptoms: oral dysphagia   Pain Intensity 1: 0  Current Medications:   No current facility-administered medications on file prior to encounter. Current Outpatient Medications on File Prior to Encounter   Medication Sig Dispense Refill    cefUROXime (CEFTIN) 500 mg tablet Take 1 Tab by mouth two (2) times a day. Indications: wound drainage 20 Tab 1    temozolomide (TEMODAR) 140 mg capsule 1 Cap daily. 30 Cap 1    diphenhydrAMINE (BENADRYL) 25 mg capsule Take 50 mg by mouth nightly.  fluticasone propionate (FLONASE) 50 mcg/actuation nasal spray 1 Spray by Nasal route.      Current Dietary Status:  NPO  Social History/Home Situation:    Home Environment: Private residence  One/Two Story Residence: One story  Living Alone: No  Support Systems: Child(harper), Friends \ neighbors, Spouse/Significant Other/Partner  Patient Expects to be Discharged to[de-identified] Private residence  Current DME Used/Available at Home: None  OBJECTIVE:   Respiratory Status:  Room air     Oral Motor Structure/Speech:  Oral-Motor Structure/Motor Speech  Labial: Right droop(very subtle)  Dentition: Intact  Lingual: No impairment    Cognitive and Communication Status:  Neurologic State: Alert  Orientation Level: Oriented to person;Oriented to place;Oriented to situation;Disoriented to time(Pointing to head and stating \"it's because of my heart\")  Cognition: Decreased attention/concentration;Decreased command following  Perception: Appears intact  Perseveration: Perseverates during conversation  Safety/Judgement: Insight into deficits      Auditory Comprehension:   Auditory Comprehension  Response to Basic Yes/No Questions (%): 40 %(Repsonding YES or \"sometimes\" to 8/10 questions)  One-Step Basic Commands (%): 60 %  Two-Step Basic Commands (%): 10 %(can copy with demonstration)  Object Identification: Environmental surroundings (%)(65)  Body Part Identification (%): 50 %    Reading Comprehension:   Reading Comprehension  Visual Impairment: Glasses/contacts  Scanning/Tracking : No impairment  Words : Yes  Oral Reading: Impaired (%)  Effective Techniques: Prescription glasses/contact lenses    Verbal Expression:   Verbal Expression  Initiation: No impairment  Automatic Speech Task: Impaired (comment)(Counting 1-10, but perseverative with additional numbers. Perseverated on counting with attempts at days of the week. No improvement with max cues)  Repetition: Impaired(20% for 3 word pharses)  Naming: Impaired  Confrontation (%): 0 %(increased to 75% with phonemic cues)  Sentence Formulation: Impaired (%)(characterized by jargon, errors with pronouns, perseveration)  Conversation: Non-fluent  Speech Characteristics: Jargon;Paraphasias;Perseveration; Word retrieval    Written Expression:        Neuro-Linguistics:                         Pragmatics:          Voice:                 Vocal Quality: No impairment                               Assessment/Reassessment only, no treatment provided today    Tool Used: Dysphagia Outcome and Severity Scale (GUI)    Score Comments   Normal Diet  ? 7 With no strategies or extra time needed       Functional Swallow  ? 6 May have mild oral or pharyngeal delay       Mild Dysphagia    ? 5 Which may require one diet consistency restricted (those who demonstrate penetration which is entirely cleared on MBS would be included)   Mild-Moderate Dysphagia  ?  4 With 1-2 diet consistencies restricted       Moderate Dysphagia  ? 3 With 2 or more diet consistencies restricted       Moderately Severe Dysphagia  ? 2 With partial PO strategies (trials with ST only)       Severe Dysphagia  ? 1 With inability to tolerate any PO safely          Score:  Initial: 6 Most Recent: X (Date: -- )   Interpretation of Tool: The Dysphagia Outcome and Severity Scale (GUI) is a simple, easy-to-use, 7-point scale developed to systematically rate the functional severity of dysphagia based on objective assessment and make recommendations for diet level, independence level, and type of nutrition. Score 7 6 5 4 3 2 1   Modifier CH CI CJ CK CL CM CN     Payor: MEDICAID PENDING / [facility] / Product Type: Medicaid /     ________________________________________________________________________________________________    Safety:   After treatment position/precautions:  · Upright in Bed  Progression/Medical Necessity:   · Patient is expected to demonstrate progress in diet tolerance  ·  to improve swallow safety  · .  · Patient is expected to demonstrate progress in expressive communication and receptive ability  ·  to decrease assistance required communication, increase independence with activities of daily living and increase communication with family/caregivers  · . Compliance with Program/Exercises: Will assess as treatment progresses   Reason for Continuation of Services/Other Comments:  · Patient continues to require skilled intervention due to medical complications and patient unable to attend/participate in therapy as expected  · .  ·   Recommendations/Intent for next treatment session: \"Treatment next visit will focus on advancements to more challenging activities and reduction in assistance provided\".     Total Treatment Duration:  Time In: 1507  Time Out: [address], [address] CCC-SLP

## 2019-07-18 NOTE — PROGRESS NOTES
Problem: Dysphagia (Adult)  Goal: *Acute Goals and Plan of Care (Insert Text)  Description  LTG: Patient will tolerate least restrictive diet without overt signs or symptoms of airway compromise. STG: Patient will tolerate regular diet and thin liquids without overt signs or symptoms of airway compromise. Outcome: Progressing Towards Goal  SPEECH LANGUAGE PATHOLOGY: BEDSIDE SWALLOW AND SPEECH LANGUAGE NOTE: Initial Assessment    NAME/AGE/GENDER: Rafa Rosales is a 59 y.o. male  DATE: 7/18/2019  PRIMARY DIAGNOSIS: GBM (glioblastoma multiforme) ([facility]) [C71.9]  GBM [facility]glioblastoma [facility]iforme) ([facility] [C71.9]       ICD-10: Treatment Diagnosis: R13.11 Oral Dysphagia. R47.01 Aphasia    INTERDISCIPLINARY COLLABORATION: Registered Nurse  ASSESSMENT:   Based on the objective data described below, Mr. Leonila Russell presents with mild oral dysphagia and moderate expressive-receptive language impairments. Swallowing Results: Subtle right facial droop observed on oral motor assessment. Droop did not appear to impact oral phase of swallow. However, patient with disorganized mastication with chewable textures. Adequate oral dysphagia despite disorganization. No cough, throat clear, or change in vocal quality with liquids or solids. Recommend regular diet/thin liquids. Medications whole with liquid wash. Will follow up for diet tolerance x1. Speech Language-Cognitive Results: Initial language evaluation completed. Patient talkative throughout session, but significantly impaired expressive language abilities. Expression was characterized by impaired thought organization, decreased word fining, word substitutions, errors with pronouns, perseveration, and jargon. He is able to acknowledge difficulty with word finding, but awareness appears limited during conversational speech. He compensates well by gesturing and pointing to desired items.  During confrontation naming task, he provided information on function, but could not name item. Phonemic cues were beneficial in improving naming. Receptive language also impaired with decreased awareness of deficits. However, he was able to ID objects in room with 65% accuracy. ? If he will benefit from communication board to assist with expressing wants/needs to family and staff. Patient with significant deficits in language abilities and will benefit from skilled treatment to assist with his ability to communicate with family and staff to increase quality of life. ?????? ? ? This section established at most recent assessment??????????  PROBLEM LIST (Impairments causing functional limitations):  Expressive language deficits  Receptive language deficits  Oral dysphagia  REHABILITATION POTENTIAL FOR STATED GOALS: Good  PLAN OF CARE:   Patient will benefit from skilled intervention to address the following impairments. RECOMMENDATIONS AND PLANNED INTERVENTIONS (Benefits and precautions of therapy have been discussed with the patient.):  PO:  Regular  Liquids:  regular thin  MEDICATIONS:  With liquid  COMPENSATORY STRATEGIES/MODIFICATIONS INCLUDING:  None  OTHER RECOMMENDATIONS (including follow up treatment recommendations):   Family training/education  Patient education  RECOMMENDED DIET MODIFICATIONS DISCUSSED WITH:  Nursing  Family  Patient  FREQUENCY/DURATION: Continue to follow patient 3 times a week for duration of hospital stay to address above goals. RECOMMENDED REHABILITATION/EQUIPMENT: (at time of discharge pending progress): Due to the probability of continued deficits (see above) this patient will likely need continued skilled speech therapy after discharge. SUBJECTIVE:   \"The good Lord will take care [id number] me. \"  History of Present Injury/Illness: Mr. Aroldo Juarez  has a past medical history of Asbestosis ([facility] (6/22/2019) and Brain mass (6/21/2019). .  He also  has a past surgical history that includes hx hernia repair (Left); hx lumbar laminectomy; and hx craniotomy (06/26/2019).    Present Symptoms: oral dysphagia   Pain Intensity 1: 0  Current Medications:   No current facility-administered medications on file prior to encounter. Current Outpatient Medications on File Prior to Encounter   Medication Sig Dispense Refill    cefUROXime (CEFTIN) 500 mg tablet Take 1 Tab by mouth two (2) times a day. Indications: wound drainage 20 Tab 1    temozolomide (TEMODAR) 140 mg capsule 1 Cap daily. 30 Cap 1    diphenhydrAMINE (BENADRYL) 25 mg capsule Take 50 mg by mouth nightly. fluticasone propionate (FLONASE) 50 mcg/actuation nasal spray 1 Shushan by Nasal route. Current Dietary Status:  NPO  Social History/Home Situation:    Home Environment: Private residence  One/Two Story Residence: One story  Living Alone: No  Support Systems: Child(harper), Friends \ neighbors, Spouse/Significant Other/Partner  Patient Expects to be Discharged to[de-identified] Private residence  Current DME Used/Available at Home: None  OBJECTIVE:   Respiratory Status:  Room air     Oral Motor Structure/Speech:  Oral-Motor Structure/Motor Speech  Labial: Right droop(very subtle)  Dentition: Intact  Lingual: No impairment    Cognitive and Communication Status:  Neurologic State: Alert  Orientation Level: Oriented to person;Oriented to place;Oriented to situation;Disoriented to time(Pointing to head and stating \"it's because of my heart\")  Cognition: Decreased attention/concentration;Decreased command following  Perception: Appears intact  Perseveration: Perseverates during conversation  Safety/Judgement: Insight into deficits    BEDSIDE SWALLOW EVALUATION  Oral Assessment:  Oral Assessment  Labial: Right droop(very subtle)  Dentition: Intact  Lingual: No impairment  P.O. Trials: The patient was given tsp-small bite amounts of the following:   Consistency Presented: Thin liquid; Solid;Puree;Mixed consistency  How Presented: Self-fed/presented;Spoon;Cup/sip;Straw;Successive swallows    ORAL PHASE:  Bolus Acceptance: No impairment  Bolus Formation/Control: Impaired  Propulsion: No impairment  Type of Impairment: Mastication  Oral Residue: None    PHARYNGEAL PHASE:  Initiation of Swallow: No impairment  Laryngeal Elevation: Functional  Aspiration Signs/Symptoms: None  Vocal Quality: No impairment     Effective Modifications: None     Pharyngeal Phase Characteristics: No impairment, issues, or problems     OTHER OBSERVATIONS:  Rate/bite size: WNL  Comments:    Endurance: WNL  Comments:     SPEECH-LANGUAGE COGNITIVE EVALUATION  Motor Speech:  Oral-Motor Structure/Motor Speech  Labial: Right droop(very subtle)  Dentition: Intact  Lingual: No impairment    Auditory Comprehension:   Auditory Comprehension  Response to Basic Yes/No Questions (%): 40 %(Repsonding YES or \"sometimes\" to 8/10 questions)  One-Step Basic Commands (%): 60 %  Two-Step Basic Commands (%): 10 %(can copy with demonstration)  Object Identification: Environmental surroundings (%)(65)  Body Part Identification (%): 50 %    Reading Comprehension:        Verbal Expression:   Verbal Expression  Initiation: No impairment  Automatic Speech Task: Impaired (comment)(Counting 1-10, but perseverative with additional numbers. Perseverated on counting with attempts at days of the week. No improvement with max cues)  Repetition: Impaired(20% for 3 word pharses)  Naming: Impaired  Confrontation (%): 0 %(increased to 75% with phonemic cues)  Sentence Formulation: Impaired (%)(characterized by jargon, errors with pronouns, perseveration)  Conversation: Non-fluent  Speech Characteristics: Jargon;Paraphasias;Perseveration; Word retrieval    Written Expression:        Neuro-Linguistics:                         Pragmatics:          Voice:                 Vocal Quality: No impairment                               Assessment/Reassessment only, no treatment provided today    Tool Used: Dysphagia Outcome and Severity Scale (GUI)    Score Comments   Normal Diet  ?  7 With no strategies or extra time needed       Functional Swallow  ? 6 May have mild oral or pharyngeal delay       Mild Dysphagia    ? 5 Which may require one diet consistency restricted (those who demonstrate penetration which is entirely cleared on MBS would be included)   Mild-Moderate Dysphagia  ? 4 With 1-2 diet consistencies restricted       Moderate Dysphagia  ? 3 With 2 or more diet consistencies restricted       Moderately Severe Dysphagia  ? 2 With partial PO strategies (trials with ST only)       Severe Dysphagia  ? 1 With inability to tolerate any PO safely          Score:  Initial: 6 Most Recent: X (Date: -- )   Interpretation of Tool: The Dysphagia Outcome and Severity Scale (GUI) is a simple, easy-to-use, 7-point scale developed to systematically rate the functional severity of dysphagia based on objective assessment and make recommendations for diet level, independence level, and type of nutrition. Score 7 6 5 4 3 2 1   Modifier CH CI CJ CK CL CM CN     Payor: MEDICAID PENDING / Plan: [place] MEDICAID PENDING / Product Type: Medicaid /     ________________________________________________________________________________________________    Safety:   After treatment position/precautions:  Upright in Bed  Progression/Medical Necessity:   Patient is expected to demonstrate progress in diet tolerance   to improve swallow safety  . Patient is expected to demonstrate progress in expressive communication and receptive ability   to decrease assistance required communication, increase independence with activities of daily living and increase communication with family/caregivers  . Compliance with Program/Exercises: Will assess as treatment progresses   Reason for Continuation of Services/Other Comments:  Patient continues to require skilled intervention due to medical complications and patient unable to attend/participate in therapy as expected  .     Recommendations/Intent for next treatment session: \"Treatment next visit will focus on advancements to more challenging activities and reduction in assistance provided\".     Total Treatment Duration:  Time In: 1102  Time Out: [address], MSP, CCC-SLP

## 2019-07-18 NOTE — PROGRESS NOTES
SPEECH PATHOLOGY NOTE:    Speech therapy consult received and appreciated. Patient seen this AM for dysphagia and language evaluation. Mild oral dysphagia with with disorganized mastication, but no oral residue. No s/sx of airway compromise. Recommend regular diet/thin liquids. Will follow x1 for diet tolerance. Moderate expressive and receptive language impairments noted on evaluation. Impaired ability to comprehend basic yes/no questions. Benefit from repetition and use of 1 step commands when giving instructions. May benefit from communication board as he is able to identify items in closed field. Will follow up for assessment of his ability to benefit from communication board. Full report to follow.      Idalia [address] CCC-SLP

## 2019-07-18 NOTE — PROGRESS NOTES
NS  AFEBRILE   FEELS  MUCH BETTER  LESS SWELLING  NA+  122;   3% NACL INCREASED  5/5 POWER  A/P CRITICAL EDEMA  NEEDS TO BE IN  ICU  MANNITOL  AND  DECADRON  ABX  FOR Glenny Frost MD

## 2019-07-18 NOTE — PROGRESS NOTES
Sodium level discussed with Hospitalist orders received to stop 3% saline. Sodium level will be rechecked at 1400.

## 2019-07-18 NOTE — PROGRESS NOTES
NS ADDENDUM  NA+ 132 THIS AM  WILL DECREASE 3% NACL TO  10ML/HOUR    WOUND CULTURE SHOWS A SCANT AMOUNT OF GRAM NEGATIVE RODS.   ON VANCO AND ROCEPHIN    WILL AWAIT FINAL CULTURES AND CHANGE AS NEEDED    DO NOT BELIEVE THAT SURGERY WILL BE REQUIRED AS THE INCISION Elizabeth Williamson MD

## 2019-07-18 NOTE — INTERDISCIPLINARY ROUNDS
Interdisciplinary team rounds were held 7/18/2019 with the following team members:Care Management, Nursing, Nurse Practitioner, Nutrition, Palliative Care, Pastoral Care, Pharmacy, Physician, Respiratory Therapy and Clinical Coordinator and the patient. Plan of care discussed. See clinical pathway and/or care plan for interventions and desired outcomes.

## 2019-07-18 NOTE — PROGRESS NOTES
Progress Note    Patient: Dilcia López MRN: [id number]  SSN: [id number]    YOB: 1955  Age: 59 y.o. Sex: male      Admit Date: 7/17/2019    LOS: 1 day     Mr. Joanne Beach is a 58 yo male patient with a PMH of glioblastoma, s/p brain surgery on 6/26/19. He developed intracranial edema and midline shift. Currently on decadron and mannitol. Neurosurgery contacted hospitalist for hyponatremia management ( Na 122 ). Subjective: The patient was found in no distress. He feels okay. He has no headache, no dizziness, nausea, vomiting, numbness, blurry vision. Objective:     Vitals:    07/18/19 0559 07/18/19 0615 07/18/19 0630 07/18/19 0645   BP: 129/72 124/68 121/74 130/71   Pulse: 61 62 89 66   Resp: 17 14 11 12   Temp:       SpO2: 94% 93%  95%   Weight:       Height:            Intake and Output:  Current Shift: No intake/output data recorded. Last three shifts: 07/16 1901 - 07/18 0700  In: 902.6 [I.V.:902.6]  Out: 1250 [Urine:1250]    Physical Exam:   GENERAL: alert, cooperative, no distress, appears stated age  Head: surgical site covered on sterile gauze   EYE: negative  LYMPHATIC: Cervical, supraclavicular, and axillary nodes normal.   THROAT & NECK: normal and no erythema or exudates noted. LUNG: clear to auscultation bilaterally  HEART: regular rate and rhythm, S1, S2 normal, no murmur, click, rub or gallop  ABDOMEN: soft, non-tender. Bowel sounds normal. No masses,  no organomegaly  EXTREMITIES:  extremities normal, atraumatic, no cyanosis or edema  SKIN: Normal.  NEUROLOGIC: negative, able to move 4 limbs, no palsies  PSYCHIATRIC: non focal    Lab/Data Review: All lab results for the last 24 hours reviewed. Assessment:     Active Problems:    GBM (glioblastoma multiforme) ([facility] (6/26/2019)      Hyponatremia (7/18/2019)        Plan:   -Hyponatremia/Euvolemic: likely chronic   Treated with 3% NS overnight  Sodium is 132 this morning. Corrected 10 meq in 6 hours.  He has no acute neurological signs   Stop hypertonic saline  Neuro checks   Monitor sodium q 8hr    -Glioblastoma s/p neurosurgery  CT showed increased midline shift, mass effect, and edema  On mannitol, decadron  Neurosurgery is primary     DVT ppx: compression stockings    Thank you for allowing us to participate in the care of this patient. We will follow along with you with the above stated plan. No bill charge. Same day consult.      Signed By: Lary Zepeda MD     July 18, 2019

## 2019-07-18 NOTE — H&P
[address] [place] [address]  HISTORY AND PHYSICAL    Name:  Annie Collier  MR#:  [id number]  :  1955  ACCOUNT #:  [de-identified]  ADMIT DATE:  2019      LOCATION:  Emergency room. CHIEF COMPLAINT:  Headache, lethargy and vomiting times hours. HISTORY OF PRESENT ILLNESS:  An unfortunate 69-year-old man three weeks status post craniotomy for left temporal lobe tumor which proved to be a high grade malignant glioblastoma multiforme. The patient developed some drainage from the incision two weeks postop and cultures obtained have been negative to date. He presented to the emergency room today with lethargy, headache and some nausea and vomiting. He was afebrile. White count was minimally elevated at 12.6 but he is taking dexamethasone. CT brain scanning shows persistent residual surgical resection cavity, however, significant edema, mass effect and shift from the tumor are noted with the left ventricular system located in the right hemicranium. No acute hemorrhage. He is admitted for aggressive intervention. The edema is so strong that it has elevated the bone flap out of its normal position. ALLERGIES:  IODINE AND IODIDE. PAST MEDICAL HISTORY:  Significant for asbestosis. FAMILY HISTORY:  Positive for COPD. SOCIAL HISTORY:  Reveals he is a nonsmoker, nondrinker, and he is  and lives in [facility]. REVIEW OF SYSTEMS:  Positive for headache and lethargy. No nausea currently. Vomiting earlier x1. PHYSICAL EXAMINATION:  HEENT:  Reveals staples in place on the left frontotemporal craniotomy incision. Very minimal drainage. Definite soft tissue swelling. Nose and throat clear. CHEST:  Clear bilaterally. CARDIAC EXAM:  Regular rate and rhythm. No murmurs or gallops. ABDOMEN:  Soft, benign, nontender. No masses. Bowel sounds positive. EXTREMITIES:  Free of deformities. NEUROLOGIC:  Awake, alert and oriented x3. Cranial nerves II-XII intact. Gait not assessed. Motor strength 5/5. No drift. Reflexes symmetric. IMPRESSION:  An unfortunate 45-year-old man with a very aggressive glioblastoma multiforme and now with significant edema and mass effect. No hemorrhage. PLAN:  Admit to the intensive care unit. Dexamethasone 4 mg q.6 hours and 10 mg now intravenously. Mannitol 25 g now and q.6 hours. Consultation with hospitalist for admission laboratory abnormality which reveal a sodium of 125.  3% sodium intravenously at 10 mL/hour. Wound prophylaxis to prevent sepsis with vancomycin and Rocephin.   Admission condition is critical.      MD MONALISA [id number]  D:  07/17/2019 20:38  T:  07/17/2019 20:45  JOB #:  [id number]

## 2019-07-18 NOTE — CONSULTS
HOSPITALIST H&P/CONSULT  NAME:  Wilberto Ortiz   Age:  59 y.o.  :   1955   MRN:   [id number]  PCP: May Lane NP  Consulting MD:  Treatment Team: Attending Provider: Ольга Banks MD  HPI:   Patient is a 58yoM admitted by Neurosurgery for intracranial edema and midline shift with h/o surgery 2 weeks ago for glioblastoma. Patient has been more lethargic with worsening surgical drainage. Report of low grade fever as well. Hospitalist consulted for hyponatremia as noted on admission labs. Patient has increased sleepiness, weakness, vomiting (dry heaves), subjective fevers. Complete ROS limited given current mentation of patient. Past Medical History:   Diagnosis Date    Asbestosis ([address] 2019    Last Assessment & Plan:   Was Diagnosed in November . Appears Asymptomatic Currently. Not Short of Breath Breathing Well.  Brain mass 2019    Last Assessment & Plan:  Is a neurosurgical consultation with Dr. Mateo Browne  is evaluated the patient. Patient is on IV Decadron every 6 hourly. Patient has evidence of a left-sided temporoparietal mass  I Understanded to the plan is for surgery On Wednesday Will change decadrone to 4 mg po qid  Pt will follow with Dr Mateo Browne outpatient. Past Surgical History:   Procedure Laterality Date    HX CRANIOTOMY  2019    left Frontotemporal crani for GBM    HX HERNIA REPAIR Left     HX LUMBAR LAMINECTOMY        Prior to Admission Medications   Prescriptions Last Dose Informant Patient Reported? Taking? cefUROXime (CEFTIN) 500 mg tablet   No Yes   Sig: Take 1 Tab by mouth two (2) times a day. Indications: wound drainage   diphenhydrAMINE (BENADRYL) 25 mg capsule   Yes Yes   Sig: Take 50 mg by mouth nightly. fluticasone propionate (FLONASE) 50 mcg/actuation nasal spray   Yes Yes   Si Spray by Nasal route. temozolomide (TEMODAR) 140 mg capsule   No Yes   Si Cap daily.       Facility-Administered Medications: None     Allergies Allergen Reactions    Bee Venom Protein (Honey Bee) Swelling    Iodine And Iodide Containing Products Unknown (comments)     Contrast dye      Social History     Tobacco Use    Smoking status: Former Smoker    Smokeless tobacco: Never Used   Substance Use Topics    Alcohol use: Not Currently      Family History   Problem Relation Age of Onset    Lung Disease Father       Objective:     Visit Vitals  /67   Pulse 63   Temp 97.6 °F (36.4 °C)   Resp 14   Ht 5' 8\" (1.727 m)   Wt 90.3 kg (199 lb)   SpO2 93%   BMI 30.26 kg/m²      Temp (24hrs), Av.9 °F (36.6 °C), Min:97.6 °F (36.4 °C), Max:98.2 °F (36.8 °C)    Oxygen Therapy  O2 Sat (%): 93 % (19)  Pulse via Oximetry: 63 beats per minute (19)  O2 Device: Room air (19 2306)  Physical Exam:  General:    Cooperative, no distress, appears stated age. Head:   Bandage over L lateral head  Nose:  Nares normal. No drainage or sinus tenderness. Lungs:   Clear to auscultation bilaterally. No Wheezing or Rhonchi. No rales. Heart:   Regular rate and rhythm,  no murmur, rub or gallop. Abdomen:   Soft, non-tender. Not distended. Bowel sounds normal.   Extremities: No cyanosis. No edema. No clubbing  Skin:     Texture, turgor normal. No rashes or lesions.   Not Jaundiced  Neurologic: Slightly slow speech, but interactive and cooperative, PEERL    Data Review:   Recent Results (from the past 24 hour(s))   CBC WITH AUTOMATED DIFF    Collection Time: 19  5:19 PM   Result Value Ref Range    WBC 9.5 4.3 - 11.1 K/uL    RBC 4.43 4.23 - 5.6 M/uL    HGB 13.7 13.6 - 17.2 g/dL    HCT 38.9 (L) 41.1 - 50.3 %    MCV 87.8 79.6 - 97.8 FL    MCH 30.9 26.1 - 32.9 PG    MCHC 35.2 (H) 31.4 - 35.0 g/dL    RDW 12.1 11.9 - 14.6 %    PLATELET 121 774 - 026 K/uL    MPV 9.0 (L) 9.4 - 12.3 FL    ABSOLUTE NRBC 0.00 0.0 - 0.2 K/uL    DF AUTOMATED      NEUTROPHILS 78 43 - 78 %    LYMPHOCYTES 15 13 - 44 %    MONOCYTES 6 4.0 - 12.0 %    EOSINOPHILS 0 (L) 0.5 - 7.8 %    BASOPHILS 0 0.0 - 2.0 %    IMMATURE GRANULOCYTES 1 0.0 - 5.0 %    ABS. NEUTROPHILS 7.4 1.7 - 8.2 K/UL    ABS. LYMPHOCYTES 1.4 0.5 - 4.6 K/UL    ABS. MONOCYTES 0.5 0.1 - 1.3 K/UL    ABS. EOSINOPHILS 0.0 0.0 - 0.8 K/UL    ABS. BASOPHILS 0.0 0.0 - 0.2 K/UL    ABS. IMM. GRANS. 0.1 0.0 - 0.5 K/UL   METABOLIC PANEL, COMPREHENSIVE    Collection Time: 07/17/19  5:19 PM   Result Value Ref Range    Sodium 125 (L) 136 - 145 mmol/L    Potassium 3.7 3.5 - 5.1 mmol/L    Chloride 90 (L) 98 - 107 mmol/L    CO2 26 21 - 32 mmol/L    Anion gap 9 7 - 16 mmol/L    Glucose 111 (H) 65 - 100 mg/dL    BUN 6 (L) 8 - 23 MG/DL    Creatinine 0.61 (L) 0.8 - 1.5 MG/DL    GFR est AA >60 >60 ml/min/1.73m2    GFR est non-AA >60 >60 ml/min/1.73m2    Calcium 8.7 8.3 - 10.4 MG/DL    Bilirubin, total 0.9 0.2 - 1.1 MG/DL    ALT (SGPT) 30 12 - 65 U/L    AST (SGOT) 18 15 - 37 U/L    Alk. phosphatase 77 50 - 136 U/L    Protein, total 7.5 6.3 - 8.2 g/dL    Albumin 3.2 3.2 - 4.6 g/dL    Globulin 4.3 (H) 2.3 - 3.5 g/dL    A-G Ratio 0.7 (L) 1.2 - 3.5     PROCALCITONIN    Collection Time: 07/17/19  5:19 PM   Result Value Ref Range    Procalcitonin <0.1 ng/mL   POC LACTIC ACID    Collection Time: 07/17/19  5:21 PM   Result Value Ref Range    Lactic Acid (POC) 1.09 0.5 - 1.9 mmol/L     Imaging /Procedures /Studies     Assessment and Plan:      Active Hospital Problems    Diagnosis Date Noted    Hyponatremia 07/18/2019    GBM (glioblastoma multiforme) ([facility]) 06/26/2019       PLAN  Glioblastoma s/p neurosurgery  - CT showed increased midline shift, mass effect, and edema  - On mannitol, decadron  - Neurosurgery is primary    Hyponatremia  - Agree with current management as started by primary team  - Would monitor to try to keep Na correction to ~8mmol/24hr  - Started on 3% Na solution by admitting MD  - Q6h Na checks  - If Na increases too rapidly, will need to change IVFs to NS from 3%Na  - Will follow for help with fluid management        Signed By: Bill Saldana MD     July 18, 2019

## 2019-07-18 NOTE — PROGRESS NOTES
Spiritual Care Visit, follow up visit. Visited with patient at bedside. Prayed for patient's healing and health. Visit by Harrison Godoy, Staff .  Irlanda, Dinorah.B., B.A.

## 2019-07-19 NOTE — PROGRESS NOTES
Interdisciplinary team rounds were held 7/19/2019 with the following team members:Care Management, Nursing, Nurse Practitioner, Nutrition, Palliative Care, Pastoral Care, Pharmacy, Physical Therapy, Physician, Respiratory Therapy, Speech Therapy and Clinical Coordinator and the patient and spouse. Plan of care discussed. See clinical pathway and/or care plan for interventions and desired outcomes.

## 2019-07-19 NOTE — PROGRESS NOTES
Received bedside shift report from [facility]. Patient alert, oriented to self and place, on room air, normal sinus rhythm on the monitor, parks is patent and draining, NIH completed at handoff, incision on left side of head is has dressing applied and is clean, dry and intact.

## 2019-07-19 NOTE — PROGRESS NOTES
NEUROSURGERY PROGRESS NOTE:   Admit Date: 7/17/2019  Subjective:   No acute overnight events. The patient is doing much better this am since his was started on anti-biotics and his hyponatremia was treated. Objective:  Visit Vitals  /73   Pulse (!) 102   Temp 98.1 °F (36.7 °C)   Resp 19   Ht 5' 8\" (1.727 m)   Wt 199 lb (90.3 kg)   SpO2 96%   BMI 30.26 kg/m²     General: No acute distress  Awake, alert, and oriented to person and place, not year or month   Able to name objects with impaired repetition   Paraphrasic errors appreciated as well. Mild expressive aphasia   Eyes open spontaneously   PERRL, EOMI  Hearing grossly intact   Face symmetric and tongue mid-line on protrusion   No pronation or drift on exam   Patient with 5/5 strength in the bilateral upper and bilateral lower extremities   Incision: Clean, dry, and intact with mild scalp fluctuance and boggyness, non-erythematous    Assessment and Plan:   Edelmirasaul Salazars 59 y.o. male who is now status post-op from a left frontotemporal craniotomy for resection of a malignant glioma performed on 6/26/2019 who presented on 7/17/2019 with headache, lethargy, nausea/emesis, hyponatremia, increased cerebral edema and wound culture that grew out scant enterobacter cloacae. His Na is currently 134 which improved from 122 on presentation. He is on Vancomycin and rocephin and will switch to vancomycin with cefepime per pharmacy recommendations. - Continue ICU level care   - Continue q1H neuro checks for today   - Continue to monitor Na which is 134 today would consider continuing hypertonic saline at 10 cc an hour with a goal of 140 mEq, and a rate of correction no faster than 10 mEq per 24 hours hours.  Agree with previous discontinuation   - Continue SCDs for DVT prophylaxis   - Continue IV vancomycin and change to cefepime from rocephin   - Patient with improved clinical status, at this time do not feel surgery will be necessary   - Continue mannitol 25 g IV q6H for now but will obtain a serum osmolality and hold for > 310    Andrea Eduardo, [facility]

## 2019-07-19 NOTE — PROGRESS NOTES
Bedside shift report given to [facility]. Dual neuro assessment performed.

## 2019-07-19 NOTE — PROGRESS NOTES
Patient was calm, very pleasant  Family was present (wife flavio)  Room was peaceful    Patient receptive to  presence  Provided calming presence    Spiritual:  Patient and his wife were very engaging with   Spent time to learn about patient and to build rapport with them  Patient draws great strength and encouragement from his wife and three children ( he is blessed)  Patient said that he is aware of what is going on and is ok with it (god has appointed this trial)  He is very thankful for dr Ania Balbuena ( doctor has been a family friend since 13)  This trust has given him hope ( trust in God and the care team)  Worked for [employer] for years (great relationship with them)    One of the great benenfits of this illness:    THE PATIENT HAS GROWN CLOSER TO GOD IN HIS DAILY WALK  HE READS HIS BIBLE SEVERAL TIMES EACH DAY  HE HAS A ZEAL FOR [facility]        Patient demonstrates positive coping skills  Demonstrates a positive attitude towards the care team        Will follow patient closely during this admission    Signed by Graeme Melara, staff

## 2019-07-19 NOTE — PROGRESS NOTES
Bedside shift report received from [facility] Dual NIH performed. Patient can move all extremities and does not demonstrate any drift. Patient's pupils are conjugate and reactive to light. At times patient has expressive aphasia and some slurring of speech. Slight R droop to mouth when patient is asked to smile. Patient denies pain.

## 2019-07-19 NOTE — PROGRESS NOTES
Pt seen in ICU s/p surgery with Dr. Beltran Tubbs. Alert and talkative. Family at bedside and super nice. Very supportive of pt. Wife confirms demographics. Interim did not cover pt at d/c. Stated they were out of area per wife. Pt set up currently with speech pathologist outpatient in [facility] where pt lives. [facility] might would work with family, if needs HH at d/c. If needs STR, would check with IRC/9th floor. CM to follow. Care Management Interventions  PCP Verified by CM: Yes(confirm Atul)  Mode of Transport at Discharge: Other (see comment)  Transition of Care Consult (CM Consult): Discharge Planning  Discharge Durable Medical Equipment: (walker, electric w/c, BSC, cane)  Current Support Network: Lives with Spouse(lives with wife, Giovanny Daughters -[phone] - 2 sons nearby that are supportive.  Multiple family members in medical profession. )  Confirm Follow Up Transport: Family  Plan discussed with Pt/Family/Caregiver: Yes  Freedom of Choice Offered: Yes  [employer] Information Provided?: (confirms self pay/HARIS pending)  Discharge Location  Discharge Placement: Unable to determine at this time

## 2019-07-19 NOTE — PROGRESS NOTES
Progress Note    Patient: Aric Tyler MRN: [id number]  SSN: [id number]    YOB: 1955  Age: 59 y.o. Sex: male      Admit Date: 7/17/2019    LOS: 2 days     Mr. Aroldo Juarez is a 58 yo male patient with a PMH of glioblastoma, s/p brain surgery on 6/26/19. He developed intracranial edema and midline shift. Currently on decadron and mannitol. Neurosurgery contacted hospitalist for hyponatremia management ( Na 122 ). He was started on  3% NS. Subjective:   He feels better. Denies headache. sodium levels normal.    Objective:     Vitals:    07/19/19 1129 07/19/19 1145 07/19/19 1200 07/19/19 1215   BP: 157/77 152/76 127/80 123/71   Pulse: (!) 106 (!) 116 (!) 116 (!) 109   Resp: 22 (!) 35 (!) 35 23   Temp:       SpO2: 95% 93% 95% 96%   Weight:       Height:            Intake and Output:  Current Shift: 07/19 0701 - 07/19 1900  In: 1373.6 [P.O.:720; I.V.:653.6]  Out: 1400 [Urine:1400]  Last three shifts: 07/17 1901 - 07/19 0700  In: 1836.6 [I.V.:1836.6]  Out: 5675 [Urine:5675]    Physical Exam:   GENERAL: alert, cooperative, no distress, appears stated age  Head: surgical site covered on sterile gauze   EYE: negative  LYMPHATIC: Cervical, supraclavicular, and axillary nodes normal.   THROAT & NECK: normal and no erythema or exudates noted. LUNG: clear to auscultation bilaterally  HEART: regular rate and rhythm, S1, S2 normal, no murmur, click, rub or gallop  ABDOMEN: soft, non-tender. Bowel sounds normal. No masses,  no organomegaly  EXTREMITIES:  extremities normal, atraumatic, no cyanosis or edema  SKIN: Normal.  NEUROLOGIC: negative, able to move 4 limbs, no palsies  PSYCHIATRIC: non focal    Lab/Data Review: All lab results for the last 24 hours reviewed.      Assessment:     Active Problems:    GBM (glioblastoma multiforme) ([facility] (6/26/2019)      Hyponatremia (7/18/2019)        Plan:   -Hyponatremia/Euvolemic: likely chronic   Resolved   Treated with 3% NS at some point   He has no focal deficits  No need to check sodium serially, daily is okay from now on     -Glioblastoma s/p neurosurgery  CT showed increased midline shift, mass effect, and edema  On mannitol, decadron  Neurosurgery is primary     DVT ppx: compression stockings    Thank you for allowing us to participate in the care of this patient. I am signing off. Call us prn.      Signed By: Sarah Patel MD     July 19, 2019

## 2019-07-19 NOTE — PROGRESS NOTES
Physical Therapy Note:    Participated in interdisciplinary rounds in ICU/CCU and chart reviewed. Patient is experiencing decrease in function from baseline. Patient would benefit from skilled acute therapy to increase independence with self care/ADLs, strength, endurance, and functional mobility. Recommend PT/OT consult when medically stable and MD agrees.     Thank you for your consideration,  Vivi Michael, PT, DPT

## 2019-07-19 NOTE — PROGRESS NOTES
Problem: Dysphagia (Adult)  Goal: *Acute Goals and Plan of Care (Insert Text)  Description  LTG: Patient will tolerate least restrictive diet without overt signs or symptoms of airway compromise. - MET 07/19/19  STG: Patient will tolerate regular diet and thin liquids without overt signs or symptoms of airway compromise. MET 07/19/19  Outcome: Progressing Towards Goal     Progressing Towards Goal  STG: Patient will respond to yes/no questions with 75% accuracy with moderate assistance. STG: Patient will follow 1-step commands with 75% accuracy with minimal assistance. STG: Patient will identify objects in room to assist with expressing wants and needs with 80% accuracy. STG: Patient will participate in functional assessment regarding ability to utilize communication board to assist with verbal expression. - MET 7/18/19  LTG: Patient will increase neuro-linguistic abilities to increase safety and awareness of deficits.      SPEECH LANGUAGE PATHOLOGY: BEDSIDE SWALLOW AND SPEECH LANGUAGE NOTE: Daily Note 2    NAME/AGE/GENDER: Wilberto Ortiz is a 59 y.o. male  DATE: 7/19/2019  PRIMARY DIAGNOSIS: GBM (glioblastoma multiforme) ([facility] [C71.9]  GBM (glioblastoma multiforme) ([facility] [C71.9]      ICD-10: Treatment Diagnosis: R13.11 Oral Dysphagia. R47.01 Aphasia    INTERDISCIPLINARY COLLABORATION: Registered Nurse  ASSESSMENT:   Swallowing Results:Patient seen for diet tolerance with chewable textures this PM. Mastication continues to appear slightly uncoordinated, but he appropriate clears oral cavity. No cough or throat clear observed,  Per family, he is tolerating current diet without difficulty. Recommend continue with current diet- no dysphagia treatment indicated. Speech Language-Cognitive Results: Patient seen for continued language treatment. Improved spontaneous speech today. He was able to engage in information discussion with only mild errors.   He did use circumlocution to compensate for word finding issues. Increased difficulty with more structured task. When asked to named items he has eaten today, only able to name 1 item. Unable to correctly spell items when attempting to write, but he did use correct initial phoneme. He independently used drawing strategy to increase clinician's ability to recognize word. Sentence completion cuing effective in increasing patient's ability to name target item. At times, patient unable to benefit from expressive language cues due to comprehension impairment. He was noted to perseveratively attempt to write/draw items despite mod cues for repetit He remains unable to repeat at single word level. Inconsistent errors with incorrect articulatory placement. Suspect apraxic component also impacting speech production. ion or verbal expression. Patient with significant deficits in language abilities and will benefit from skilled treatment to assist with his ability to communicate with family and staff to increase quality of life. ?????? ? ? This section established at most recent assessment??????????  PROBLEM LIST (Impairments causing functional limitations):  1. Expressive language deficits  2. Receptive language deficits  3. Oral dysphagia  REHABILITATION POTENTIAL FOR STATED GOALS: Good  PLAN OF CARE:   Patient will benefit from skilled intervention to address the following impairments. RECOMMENDATIONS AND PLANNED INTERVENTIONS (Benefits and precautions of therapy have been discussed with the patient.):  · PO:  Regular  · Liquids:  regular thin  MEDICATIONS:  · With liquid  COMPENSATORY STRATEGIES/MODIFICATIONS INCLUDING:  · None  OTHER RECOMMENDATIONS (including follow up treatment recommendations): · Family training/education  · Patient education  RECOMMENDED DIET MODIFICATIONS DISCUSSED WITH:  · Nursing  · Family  · Patient  FREQUENCY/DURATION: Continue to follow patient 3 times a week for duration of hospital stay to address above goals.    RECOMMENDED REHABILITATION/EQUIPMENT: (at time of discharge pending progress): Due to the probability of continued deficits (see above) this patient will likely need continued skilled speech therapy after discharge. SUBJECTIVE:   \"The good Lord will take care [id number] me. \"  History of Present Injury/Illness: Mr. Aroldo Juarez  has a past medical history of Asbestosis ([address] (6/22/2019) and Brain mass (6/21/2019). .  He also  has a past surgical history that includes hx hernia repair (Left); hx lumbar laminectomy; and hx craniotomy (06/26/2019). Present Symptoms: oral dysphagia   Pain Intensity 1: 0  Current Medications:   No current facility-administered medications on file prior to encounter. Current Outpatient Medications on File Prior to Encounter   Medication Sig Dispense Refill    cefUROXime (CEFTIN) 500 mg tablet Take 1 Tab by mouth two (2) times a day. Indications: wound drainage 20 Tab 1    temozolomide (TEMODAR) 140 mg capsule 1 Cap daily. 30 Cap 1    diphenhydrAMINE (BENADRYL) 25 mg capsule Take 50 mg by mouth nightly.  fluticasone propionate (FLONASE) 50 mcg/actuation nasal spray 1 Spray by Nasal route.      Current Dietary Status:  NPO  Social History/Home Situation:    Home Environment: Private residence  One/Two Story Residence: One story  Living Alone: No  Support Systems: Child(harper), Friends \ neighbors, Spouse/Significant Other/Partner  Patient Expects to be Discharged to[de-identified] Private residence  Current DME Used/Available at Home: None  OBJECTIVE:   Respiratory Status:  Room air     Oral Motor Structure/Speech:  Oral-Motor Structure/Motor Speech  Labial: Right droop(very subtle)  Dentition: Intact  Lingual: No impairment    Cognitive and Communication Status:  Neurologic State: Alert  Orientation Level: Oriented X4  Cognition: Decreased command following  Perception: Appears intact  Perseveration: Perseverates during conversation  Safety/Judgement: Insight into deficits    BEDSIDE SWALLOW EVALUATION  Oral Assessment:     P.O. Trials:  Patient Position: Up in chair    The patient was given tsp-small bite amounts of the following:   Consistency Presented: Solid; Thin liquid  How Presented: Self-fed/presented;Spoon;Straw;Successive swallows    ORAL PHASE:  Bolus Acceptance: No impairment  Bolus Formation/Control: Impaired  Propulsion: No impairment  Type of Impairment: Mastication  Oral Residue: None    PHARYNGEAL PHASE:  Initiation of Swallow: No impairment  Laryngeal Elevation: Functional  Aspiration Signs/Symptoms: None  Vocal Quality: No impairment     Effective Modifications: None     Pharyngeal Phase Characteristics: No impairment, issues, or problems     OTHER OBSERVATIONS:  Rate/bite size: WNL  Comments:    Endurance: WNL  Comments:     SPEECH-LANGUAGE COGNITIVE EVALUATION    Sentence completion: 75% accuracy, increasing to 85% with phonemic cues  Writing single words: 0% accuracy, but initial phoneme correct. Able to appropriately draw items to increase expression. Single word repetition: 0% accuracy. Vocal Quality: No impairment                               Assessment/Reassessment only, no treatment provided today    Tool Used: Dysphagia Outcome and Severity Scale (GUI)    Score Comments   Normal Diet  ? 7 With no strategies or extra time needed       Functional Swallow  ? 6 May have mild oral or pharyngeal delay       Mild Dysphagia    ? 5 Which may require one diet consistency restricted (those who demonstrate penetration which is entirely cleared on MBS would be included)   Mild-Moderate Dysphagia  ? 4 With 1-2 diet consistencies restricted       Moderate Dysphagia  ? 3 With 2 or more diet consistencies restricted       Moderately Severe Dysphagia  ? 2 With partial PO strategies (trials with ST only)       Severe Dysphagia  ?  1 With inability to tolerate any PO safely          Score:  Initial: 6 Most Recent: X (Date: -- )   Interpretation of Tool: The Dysphagia Outcome and Severity Scale (GUI) is a simple, easy-to-use, 7-point scale developed to systematically rate the functional severity of dysphagia based on objective assessment and make recommendations for diet level, independence level, and type of nutrition. Score 7 6 5 4 3 2 1   Modifier CH CI CJ CK CL CM CN     Payor: MEDICAID PENDING / Plan: [place] MEDICAID PENDING / Product Type: Medicaid /     ________________________________________________________________________________________________    Safety:   After treatment position/precautions:  · Upright in Bed  Progression/Medical Necessity:     · Patient is expected to demonstrate progress in expressive communication and receptive ability  ·  to decrease assistance required communication, increase independence with activities of daily living and increase communication with family/caregivers  · . Compliance with Program/Exercises: Will assess as treatment progresses   Reason for Continuation of Services/Other Comments:  · Patient continues to require skilled intervention due to medical complications and patient unable to attend/participate in therapy as expected  · .  ·   Recommendations/Intent for next treatment session: \"Treatment next visit will focus on advancements to more challenging activities and reduction in assistance provided\".     Total Treatment Duration:  Time In: 1424  Time Out: [facility], [facility]., CCC-SLP

## 2019-07-20 NOTE — PROGRESS NOTES
Bedside shift report received from [facility] and dual neuro assessment performed. Patient can move all extremities spontaneously and can follow all commands. Patient's pupils are conjugate and reactive to light. Patient has expressive aphasia most times but is able to communicate that he is oriented. Patient's wife at bedside.

## 2019-07-20 NOTE — PROGRESS NOTES
Bedside shift change report given to Keren Irizarry RN (oncoming nurse) by Tad Aj RN (offgoing nurse). Report included the following information SBAR, Kardex, ED Summary, OR Summary, Procedure Summary, Intake/Output, MAR, Recent Results and Cardiac Rhythm NSR.     Dual neuro assessment performed

## 2019-07-20 NOTE — PROGRESS NOTES
NEUROSURGERY PROGRESS NOTE:   Admit Date: 7/17/2019  Subjective:   No acute overnight events. The patient is awake and alert eating breakfast this am. His Na is 135 this am.     Objective:  Visit Vitals  BP (!) 176/92   Pulse 98   Temp 97.8 °F (36.6 °C)   Resp 18   Ht 5' 8\" (1.727 m)   Wt 196 lb 13.9 oz (89.3 kg)   SpO2 94%   BMI 29.93 kg/m²     General: No acute distress  Awake, alert, and oriented to person and place, not year or month   Able to name objects with impaired repetition   Paraphrasic errors appreciated as well. Mild expressive aphasia   Eyes open spontaneously   PERRL, EOMI  Hearing grossly intact   Face symmetric and tongue mid-line on protrusion   No pronation or drift on exam   Patient with 5/5 strength in the bilateral upper and bilateral lower extremities   Incision: Clean, dry, and intact with mild scalp fluctuance and boggyness, non-erythematous    Assessment and Plan:   David  59 y.o. male who is now status post-op from a left frontotemporal craniotomy for resection of a malignant glioma performed on 6/26/2019 who presented on 7/17/2019 with headache, lethargy, nausea/emesis, hyponatremia, increased cerebral edema and wound culture that grew out scant enterobacter cloacae. His Na is currently 135 which improved from 122 on presentation. He is on Vancomycin and rocephin and will switch to vancomycin with cefepime per pharmacy recommendations. - Continue ICU level care   - Transition to [facility] neuro checks for today   - Continue to monitor Na which is 135 today would continue hypertonic saline at 10 cc an hour with a goal of 140 mEq, and a rate of correction no faster than 10 mEq per 24 hours hours.   - Continue SCDs for DVT prophylaxis   - Continue IV vancomycin and change to cefepime from rocephin   - Patient with improved clinical status, at this time do not feel surgery will be necessary   - Continue mannitol 25 g IV q6H for now but will obtain a serum osmolality and hold for > [address] [facility], [address] [place] [address]

## 2019-07-21 NOTE — PROGRESS NOTES
Patient visited by spiritual care volunteer Bonifacio Fisher.   Patient was awake, alert and receptive to visit  Patient's wife Keiko Navarro was also present  Both were sharing how well the staff was caring for them  Shared story of relation with one of the doctors who is also a friend  Strong masoud  Perfectly at peace of what god is going to do  Perfect peace about going to heaven  Patient has really be devoted to reading his bible everyday  Prayer offered    This was a very encouraging visit      Stefany Rajput, staff

## 2019-07-21 NOTE — PROGRESS NOTES
NEUROSURGERY PROGRESS NOTE:   Admit Date: 7/17/2019  Subjective:   No acute overnight events. . His Na is 137 this am. He was started on Norvasc yesterday for hypertension     Objective:  Visit Vitals  /76   Pulse 82   Temp 97.8 °F (36.6 °C)   Resp 18   Ht 5' 8\" (1.727 m)   Wt 196 lb 13.9 oz (89.3 kg)   SpO2 95%   BMI 29.93 kg/m²     General: No acute distress  Awake, alert, and oriented to person and hospital and 2019 not month   Able to name objects with impaired repetition   Paraphrasic errors appreciated as well. Mild to moderate expressive aphasia   Eyes open spontaneously   PERRL, EOMI  Hearing grossly intact   Face symmetric and tongue mid-line on protrusion   No pronation or drift on exam   Patient with 5/5 strength in the bilateral upper and bilateral lower extremities   Incision: Clean, dry, and intact with mild scalp fluctuance and boggyness, non-erythematous    Assessment and Plan:   Francisco Salazar 59 y.o. male who is now status post-op from a left frontotemporal craniotomy for resection of a malignant glioma performed on 6/26/2019 who presented on 7/17/2019 with headache, lethargy, nausea/emesis, hyponatremia, increased cerebral edema and wound culture that grew out scant enterobacter cloacae. His Na is currently 137 which improved from 122 on presentation. Will continue IV anti-biotics, ICU care, and obtain repeat CT Head WO contrast today. - Continue ICU level care   - Transition to [facility] neuro checks for today   - Continue to monitor Na which is 137 will discontinue 3% and monitor with repeat Na checks   - Continue SCDs for DVT prophylaxis   - Continue IV vancomycin and change to cefepime from rocephin   - Patient with improved clinical status, at this time do not feel surgery will be necessary   - Continue mannitol 25 g IV q6H for now but will obtain a serum osmolality and hold for > 310    Andrea Karimi, [address] [place] [address]

## 2019-07-21 NOTE — PROGRESS NOTES
Bedside report received from [facility] [place]. Dual neuro assessment performed.

## 2019-07-21 NOTE — PROGRESS NOTES
Bedside shift report given to Katia Wagoner, [facility]. VSS. Dual neuro assessment complete.

## 2019-07-21 NOTE — PROGRESS NOTES
Patient was alert  In great spirits  Said he was thankful for our time together  Prayer offered    Will follow closely    Pratik Kemp, staff [address], [address] [facility]  /   [email or web address]

## 2019-07-22 PROBLEM — G96.00 POSTOPERATIVE CSF LEAK: Status: ACTIVE | Noted: 2019-01-01

## 2019-07-22 PROBLEM — G97.82 POSTOPERATIVE CSF LEAK: Status: ACTIVE | Noted: 2019-01-01

## 2019-07-22 NOTE — PROGRESS NOTES
Patient transferred to 7th floor from ICU on this date. He is currently admitted under \"outpatient\" status for glioblastoma multiforme. SW discussed case with charge RN, and reviewed patient's chart. SW met with patient and wife in room to discuss discharge planning. Per wife, patient had recently initiated [facility] services for  with [facility]. Current plan is for patient to DC to home tomorrow with resumption of care for home health. Referral to [facility]  completed by this SW today.

## 2019-07-22 NOTE — PROGRESS NOTES
NS  AFEBRILE  DOING WELL  NA+  135  CT:  MUCH BETTER  5/POWER  A/P   TO FLOOR  STOP MANNITOL  DECREASE DECADRON  ONCOLOGY  TD TO LEONID Davis MD

## 2019-07-22 NOTE — PROGRESS NOTES
TRANSFER - OUT REPORT:    Verbal report given to [facility] RN(name) on Aric Tyler  being transferred to [facility](unit) for routine progression of care       Report consisted of patients Situation, Background, Assessment and   Recommendations(SBAR). Information from the following report(s) SBAR, Kardex, ED Summary, Intake/Output, MAR, Recent Results and Cardiac Rhythm NSR was reviewed with the receiving nurse. Lines:   Peripheral IV 07/19/19 Left Wrist (Active)   Site Assessment Clean, dry, & intact 7/22/2019  7:10 AM   Phlebitis Assessment 0 7/22/2019  7:10 AM   Infiltration Assessment 0 7/22/2019  7:10 AM   Dressing Status Clean, dry, & intact 7/22/2019  7:10 AM   Dressing Type Tape;Transparent 7/22/2019  7:10 AM   Hub Color/Line Status Pink;Capped 7/22/2019  7:10 AM   Alcohol Cap Used No 7/21/2019  7:00 PM        Opportunity for questions and clarification was provided.       Patient transported with:  Belongings; chart; spouse

## 2019-07-22 NOTE — INTERDISCIPLINARY ROUNDS
Interdisciplinary team rounds were held 7/22/2019 with the following team members:Care Management, Nursing, Nutrition, Pharmacy, Physician and Clinical Coordinator and the patient and spouse. Plan of care discussed. See clinical pathway and/or care plan for interventions and desired outcomes.

## 2019-07-22 NOTE — PROGRESS NOTES
07/22/19 1030   Dual Skin Pressure Injury Assessment   Dual Skin Pressure Injury Assessment WDL   Second Care Provider (Based on [address] [place] [address]) Jorge Campbell RN   Skin Integumentary   Skin Integumentary (WDL) X   Skin Color Appropriate for ethnicity   Skin Condition/Temp Dry; Warm   Skin Integrity Incision (comment); Scars (comment)  (surgical site to L temporal lobe)    Pressure  Injury Documentation No Pressure Injury Noted-Pressure Ulcer Prevention Initiated   Wound Prevention and Protection Methods   Orientation of Wound Prevention Posterior   Location of Wound Prevention Sacrum/Coccyx   Dressing Present  Yes   Dressing Status Intact   Wound Offloading (Prevention Methods) Bed, pressure reduction mattress; Foam silicone;Pillows;Repositioning

## 2019-07-22 NOTE — PROGRESS NOTES
Chart reviewed and pt discussed in am IDR. Pt to tx to floor today and possibly d/c tomorrow per MD notes. DECO following and HARIS pending, currently self pay.  CM following for any d/c needs per MD.

## 2019-07-22 NOTE — PROGRESS NOTES
[facility] [address] CRITICAL CARE OUTREACH NURSE PROGRESS REPORT      SUBJECTIVE: Assessed patient secondary to outreach protocol. MEWS Score: 1 (07/22/19 0700)  Vitals:    07/22/19 0815 07/22/19 0900 07/22/19 1000 07/22/19 1032   BP: (!) 158/93 162/87 141/84 (!) 157/98   Pulse: 77 92 (!) 104 (!) 102   Resp: 17 22 19 20   Temp:    97.7 °F (36.5 °C)   SpO2: 94% 96% 95% 95%   Weight:       Height:              LAB DATA:    Recent Labs     07/22/19  0330 07/21/19  1530 07/21/19  0328  07/20/19  0330   * 135* 137   < > 135*   K 3.9  --  4.2  --  4.1     --  99  --  100   CO2 27  --  29  --  27   AGAP 8  --  9  --  8   *  --  129*  --  136*   BUN 19  --  18  --  14   CREA 0.78*  --  0.76*  --  0.74*   GFRAA >60  --  >60  --  >60   GFRNA >60  --  >60  --  >60   CA 8.5  --  8.7  --  8.8    < > = values in this interval not displayed. No results for input(s): WBC, HGB, HCT, PLT, HGBEXT, HCTEXT, PLTEXT in the last 72 hours. OBJECTIVE: On arrival to room, I found patient to be resting. Pain Assessment  Pain Intensity 1: 0 (07/22/19 1030)        Patient Stated Pain Goal: 0                                 ASSESSMENT:  Pt resting, wife at bedside. Pt on RA, sat 100%, AXO, incision staples, C/D/I. pt in NAD, no immediate concerns at this time. PLAN:        Will continue to follow up per outreach protocol.     Signed By:   Tal Dominguez RN    July 22, 2019 11:22 AM

## 2019-07-22 NOTE — CONSULTS
[facility] Hematology & Oncology        Inpatient Hematology / Oncology Consult Note    Reason for Consult:  GBM [facility]gli[facility] ([facility] [C71.9]  GBM (glioblastoma multiforme) ([facility] [C71.9]  Postoperative CSF leak [G97.82]  Referring Physician:  Rodger Wong MD    History of Present Illness:  Mr. Leonila Russell is a 59 y.o. male admitted on 7/17/2019 with GBM. He presented in late June with a 2-3 week history of aphasia, specifically with difficulty forming words and expressing himself. Images of the brain, both CT and MRI, showed a left fronto-temporal contrast enhancing tumor with mass effect and edema. On June 26, 2019 he underwent a craniotomy and resection of what proved to be a glioblastoma multiforme. A Gliadel wafer was placed in the operative site. He presented with c/o lethargy, headache, and N/V. Also drainage was noted from his incision. Wound +enterobacter. On Cipro. CT head on 7/17 with increased midline shift, mass effect, and edema throughout the majority of the L cerebral hemisphere. He was given Dex and Mannitol. Repeat CT today with improved L cerebral hemisphere edema and rightward midline shift. He has not started cancer-directed treatment yet. He has an appt for radiation on 7/24 and has a prescription for Temodar to start with the onset of radiation. We were consulted for recommendations for our patient.       Review of Systems:  Constitutional Denies fever, chills, weight loss, appetite changes, fatigue, night sweats. HEENT Denies trauma, blurry vision, hearing loss, ear pain, nosebleeds, sore throat, neck pain and ear discharge. Skin Denies lesions or rashes. Lungs Denies dyspnea, cough, sputum production or hemoptysis. Cardiovascular Denies chest pain, palpitations, or lower extremity edema. Gastrointestinal Denies nausea, vomiting, changes in bowel habits, bloody or black stools, abdominal pain.  Denies dysuria, frequency or hesitancy of urination.    Neuro +residual aphasia +headaches. Denies visual changes or ataxia. Denies dizziness, tingling, tremors, sensory change, speech change, focal weakness. Hematology Denies easy bruising or bleeding, denies gingival bleeding or epistaxis. Endo Denies heat/cold intolerance, denies diabetes or thyroid abnormalities. MSK Denies back pain, arthralgias, myalgias or frequent falls. Psychiatric/Behavioral Denies depression and substance abuse. The patient is not nervous/anxious. Allergies   Allergen Reactions    Bee Venom Protein (Honey Bee) Swelling    Iodine And Iodide Containing Products Unknown (comments)     Contrast dye     Past Medical History:   Diagnosis Date    Asbestosis ([facility] 6/22/2019    Last Assessment & Plan:   Was Diagnosed in November . Appears Asymptomatic Currently. Not Short of Breath Breathing Well.  Brain mass 6/21/2019    Last Assessment & Plan:  Is a neurosurgical consultation with Dr. Flaquito Pickens  is evaluated the patient. Patient is on IV Decadron every 6 hourly. Patient has evidence of a left-sided temporoparietal mass  I Understanded to the plan is for surgery On Wednesday Will change decadrone to 4 mg po qid  Pt will follow with Dr Flaquito Pickens outpatient.      Past Surgical History:   Procedure Laterality Date    HX CRANIOTOMY  06/26/2019    left Frontotemporal crani for GBM    HX HERNIA REPAIR Left     HX LUMBAR LAMINECTOMY       Family History   Problem Relation Age of Onset    Lung Disease Father      Social History     Socioeconomic History    Marital status:      Spouse name: Not on file    Number of children: Not on file    Years of education: Not on file    Highest education level: Not on file   Occupational History    Not on file   Social Needs    Financial resource strain: Not on file    Food insecurity:     Worry: Not on file     Inability: Not on file    Transportation needs:     Medical: Not on file     Non-medical: Not on file   Tobacco Use    Smoking status: Former Smoker    Smokeless tobacco: Never Used   Substance and Sexual Activity    Alcohol use: Not Currently    Drug use: Not on file    Sexual activity: Not on file   Lifestyle    Physical activity:     Days per week: Not on file     Minutes per session: Not on file    Stress: Not on file   Relationships    Social connections:     Talks on phone: Not on file     Gets together: Not on file     Attends Christianity service: Not on file     Active member of club or organization: Not on file     Attends meetings of clubs or organizations: Not on file     Relationship status: Not on file    Intimate partner violence:     Fear of current or ex partner: Not on file     Emotionally abused: Not on file     Physically abused: Not on file     Forced sexual activity: Not on file   Other Topics Concern    Not on file   Social History Narrative    Not on file     Current Facility-Administered Medications   Medication Dose Route Frequency Provider Last Rate Last Dose    dexamethasone (DECADRON) 4 mg/mL injection 2 mg  2 mg IntraVENous Q6H Raine Pablo MD   2 mg at 07/22/19 1003    ciprofloxacin HCl (CIPRO) tablet 750 mg  750 mg Oral Q12H Raine Pablo MD   750 mg at 07/22/19 0852    hydrALAZINE (APRESOLINE) 20 mg/mL injection 10 mg  10 mg IntraVENous Q6H PRN Raine Pablo MD        amLODIPine (NORVASC) tablet 10 mg  10 mg Oral DAILY Rachel Tillman MD   10 mg at 07/22/19 9291    famotidine (PF) (PEPCID) 20 mg in sodium chloride 0.9% 10 mL injection  20 mg IntraVENous Q12H Raine Pablo MD   20 mg at 07/22/19 9494    sodium chloride (NS) flush 5-40 mL  5-40 mL IntraVENous Q8H Raine Pablo MD   10 mL at 07/22/19 0609    sodium chloride (NS) flush 5-40 mL  5-40 mL IntraVENous PRN Raine Pablo MD        0.9% sodium chloride infusion 25 mL  25 mL IntraVENous PRN Raine Pablo MD        acetaminophen (TYLENOL) tablet 650 mg  650 mg Oral Q4H PRN MD [place] oxyCODONE-acetaminophen (PERCOCET) 5-325 mg per tablet 1 Tab  1 Tab Oral Q4H PRN Vadim Wolff MD        HYDROmorphone (PF) (DILAUDID) injection 1 mg  1 mg IntraVENous Q4H PRN Vadim Wolff MD   1 mg at 19    ondansetron (ZOFRAN) injection 8 mg  8 mg IntraVENous Q6H PRN Vadim Wolff MD   8 mg at 19       OBJECTIVE:  Patient Vitals for the past 8 hrs:   BP Temp Pulse Resp SpO2   19 1032 (!) 157/98 97.7 °F (36.5 °C) (!) 102 20 95 %   19 1000 141/84  (!) 104 19 95 %   19 0900 162/87  92 22 96 %   19 0815 (!) 158/93  77 17 94 %   19 0730 152/84  79 21 96 %   19 0700 174/89 98.1 °F (36.7 °C) 78 20 96 %   19 0635 153/88  82 26 95 %   19 0600 (!) 151/92  75 14 94 %   19 0544 (!) 158/102  78 16 96 %   19 0535 151/81  94 12 95 %     Temp (24hrs), Av.7 °F (36.5 °C), Min:97.4 °F (36.3 °C), Max:98.1 °F (36.7 °C)     07 -  1900  In: -   Out: 750 [Urine:750]    Physical Exam:  Constitutional: Well developed, well nourished male in no acute distress, sitting comfortably in the hospital bed. HEENT: Normocephalic and atraumatic. Oropharynx is clear, mucous membranes are moist. Extraocular muscles are intact. Sclerae anicteric. Neck supple without JVD. No thyromegaly present. Skin Warm and dry. No bruising and no rash noted. No erythema. No pallor. Well-approximated incision with staples to L scalp. Respiratory Lungs are clear to auscultation bilaterally without wheezes, rales or rhonchi, normal air exchange without accessory muscle use. CVS Mildly tachycardic rate, regular rhythm and normal S1 and S2. No murmurs, gallops, or rubs. Abdomen Soft, nontender and nondistended, normoactive bowel sounds. No palpable mass. No hepatosplenomegaly. Neuro Residual aphasia   MSK Normal range of motion in general.  No edema and no tenderness. Psych Appropriate mood and affect.         Labs:    Recent Results (from the past 24 hour(s))   SODIUM    Collection Time: 07/21/19  3:30 PM   Result Value Ref Range    Sodium 135 (L) 136 - 735 mmol/L   METABOLIC PANEL, BASIC    Collection Time: 07/22/19  3:30 AM   Result Value Ref Range    Sodium 135 (L) 136 - 145 mmol/L    Potassium 3.9 3.5 - 5.1 mmol/L    Chloride 100 98 - 107 mmol/L    CO2 27 21 - 32 mmol/L    Anion gap 8 7 - 16 mmol/L    Glucose 143 (H) 65 - 100 mg/dL    BUN 19 8 - 23 MG/DL    Creatinine 0.78 (L) 0.8 - 1.5 MG/DL    GFR est AA >60 >60 ml/min/1.73m2    GFR est non-AA >60 >60 ml/min/1.73m2    Calcium 8.5 8.3 - 10.4 MG/DL       Imaging:  CT HEAD WO CONT [[id number]] Collected: 07/22/19 0526   Order Status: Completed Updated: 07/22/19 0533   Narrative:     EXAM: Noncontrast CT head. INDICATION: Cerebral edema. COMPARISON: Prior CT head on July 17, 2019. TECHNIQUE: Axial noncontrast CT images of the head were obtained.  Radiation  dose reduction techniques were used for this study.  Our CT scanners use one or  all of the following:  Automated exposure control, adjustment of the mA and/or  kV according to patient size, iterative reconstruction. FINDINGS: Again noted is a left-sided craniotomy, with pneumocephalus in the  underlying left temporoparietal lobe surgical cavity. Surrounding vasogenic  edema in the left cerebral hemisphere has mildly improved, as has mass effect on  the left lateral ventricle and rightward midline shift. The midline shift  currently measures 5 mm, previously 9 mm. No acute infarct or hemorrhage is  identified. The ventricles are nondilated. Impression:     IMPRESSION:  Post-surgical changes, with improved left cerebral hemisphere edema  and rightward midline shift.          CT HEAD WITHOUT CONTRAST [[id number]] Collected: 07/17/19 1946   Order Status: Completed Updated: 07/17/19 1953   Narrative:     Noncontrast head CT     Clinical Indication: Abnormal drainage at the surgical incision site, 2-3 weeks  ago patient reportedly had brain surgery for a mass, acute lethargic and nausea,  fever and chills today. Technique: Noncontrast axial images were obtained through the brain. All CT  scans at this location are performed using dose modulation techniques as  appropriate including the following: Automated exposure control, adjustment of  the MA and/or kV according to patient's size, or use of iterative reconstruction  technique. Comparison: CT 6/27/2019 and MRI 6/26/2019    Findings: There are left-sided craniotomy changes. Subcutaneous gas as well as  pneumocephalus in the left temporoparietal region are again seen and slightly  increased since prior. There is worsening of diffuse edema throughout the left  cerebral hemisphere, and mass effect on the left lateral ventricle as well as  the third ventricle which are mostly effaced. The basilar cisterns are patent. There is 9 mm of rightward midline shift. No evidence of acute hyperdense  hemorrhage. Possibility of residual or recurrent mass, subacute or chronic blood  products, or superimposed infarcts cannot be excluded by CT. There is sulcal  effacement throughout the left cerebral hemisphere. Mastoids and paranasal sinuses are aerated. No skull fracture. Craniotomy defect  on the left appears mildly displaced, increased since prior. Impression:     Impression:   1. No evidence of acute hemorrhage. 2. Increased midline shift, mass effect, and edema throughout the majority of  the left cerebral hemisphere. Craniotomy changes again noted.          ASSESSMENT:  Problem List  Date Reviewed: 7/16/2019          Codes Class Noted    Postoperative CSF leak ICD-10-CM: G97.82  ICD-9-CM: 997.09  7/22/2019        Hyponatremia ICD-10-CM: E87.1  ICD-9-CM: 276.1  7/18/2019        * (Principal) [facility] ICD-10-CM: C71.9  ICD-9-CM: 191.9  6/26/2019        Asbestosis ([facility] ICD-10-CM: S18  ICD-9-CM: 804  6/22/2019    Overview Signed 6/25/2019  7:59 AM by Dian Rios CMA     Last Assessment & Plan:    Was Diagnosed in November  . Appears Asymptomatic Currently. Not Short of Breath Breathing Well. Brain mass ICD-10-CM: G93.9  ICD-9-CM: 348.9  6/21/2019    Overview Signed 6/25/2019  7:59 AM by Dian Rios CMA     Last Assessment & Plan:   Is a neurosurgical consultation with Dr. Fatoumata Rivera   is evaluated the patient. Patient is on IV Decadron every 6 hourly. Patient has evidence of a left-sided temporoparietal mass    I Understanded to the plan is for surgery On Wednesday  Will change decadrone to 4 mg po qid    Pt will follow with Dr Fatoumata Rivera outpatient. RECOMMENDATIONS:  GBM  - Initial CT head on 7/17 with increased midline shift, mass effect, and edema throughout the majority of the L cerebral hemisphere. He was given Dex and Mannitol. Repeat CT today with improved L cerebral hemisphere edema and rightward midline shift.    - He has not started cancer-directed treatment yet. He has an appt for radiation on 7/24 and has a prescription for Temodar to start with the onset of radiation. Lab studies and imaging studies were personally reviewed. Thank you for allowing us to participate in the care of Mr. Mac Khalil. We will sign off; please do not hesitate to call with any questions. Mr. Mac Khalil will need to f/u with rad onc as previously scheduled on 7/24 and Dr. Marla Garvey on 8/8. Lazara Mcdaniels NP   [facility] Hematology & Oncology  [address] [place] [address]  Office : [phone]  Fax : [phone]     Attending Addendum:  Patient seen with OZZY Haro. Mr Mac Khalil is a pleasant 66yo man with glioblastoma. He was originally diagnosed in June 2019 when he presented with aphasia. Images confirmed left frontal temporal tumor with mass-effect and vasogenic edema. He subsequently underwent craniotomy and resection with Gliadel wafer and pathology was consistent with glioblastoma.   He was admitted on 7/17/2019 with lethargy, headache and nausea vomiting. He also was noted to have drainage from his incision. Culture was positive for Enterobacter and he was started on ciprofloxacin. Of note, CT head showed increased midline shift, mass-effect and edema throughout the majority of the left cerebellar hemisphere. He was started on mannitol and dexamethasone with good response. CT from 7/22/2019 with improved edema. Today, wife, who is a nurse, was at the bedside reports significant clinical improvement. Patient will most likely be discharged tomorrow. I personally performed a face to face diagnostic evaluation on this patient. My findings are as follows: awake, + expressive aphasia, lungs clear, heart regular, abdomen benign and no LE edema. He will f/u with Dr Zahra Simms upon d/c and plan is to start concurrent XRT/temozolomide. I have reviewed and agree with the care plan.               Shellie Sotelo MD  [facility] Hematology and Oncology  [address] [place] [address]  Office : [phone]  Fax : [phone]

## 2019-07-22 NOTE — PROGRESS NOTES
Date of Outreach Update:  Jose Guillen was seen and assessed. MEWS Score: 1 (07/22/19 1521)  Vitals:    07/22/19 0900 07/22/19 1000 07/22/19 1032 07/22/19 1521   BP: 162/87 141/84 (!) 157/98 (!) 150/92   Pulse: 92 (!) 104 (!) 102 75   Resp: 22 19 20 20   Temp:   97.7 °F (36.5 °C) 97.7 °F (36.5 °C)   SpO2: 96% 95% 95% 96%   Weight:       Height:             Pain Assessment  Pain Intensity 1: 0 (07/22/19 1456)        Patient Stated Pain Goal: 0      Previous Outreach assessment has been reviewed. There have been no significant clinical changes since the completion of the last dated Outreach assessment. Will continue to follow up per outreach protocol.     Signed By:   Andrew Barroso RN    July 22, 2019 5:52 PM

## 2019-07-22 NOTE — DISCHARGE INSTRUCTIONS
DISCHARGE SUMMARY from Nurse    PATIENT INSTRUCTIONS:    After general anesthesia or intravenous sedation, for 24 hours or while taking prescription Narcotics:  · Limit your activities  · Do not drive and operate hazardous machinery  · Do not make important personal or business decisions  · Do  not drink alcoholic beverages  · If you have not urinated within 8 hours after discharge, please contact your surgeon on call. Report the following to your surgeon:  · Excessive pain, swelling, redness or odor of or around the surgical area  · Temperature over 100.5  · Nausea and vomiting lasting longer than 4 hours or if unable to take medications  · Any signs of decreased circulation or nerve impairment to extremity: change in color, persistent  numbness, tingling, coldness or increase pain  · Any questions    What to do at Home:  Recommended activity: Activity as tolerated, Per MD instructions    If you experience any of the following symptoms fever > 100.5, nausea, vomiting, pain, to MD, chest pain and/or shortness of breath to ER please follow up with MD.    *  Please give a list of your current medications to your Primary Care Provider. *  Please update this list whenever your medications are discontinued, doses are      changed, or new medications (including over-the-counter products) are added. *  Please carry medication information at all times in case of emergency situations. These are general instructions for a healthy lifestyle:    No smoking/ No tobacco products/ Avoid exposure to second hand smoke  Surgeon General's Warning:  Quitting smoking now greatly reduces serious risk to your health.     Obesity, smoking, and sedentary lifestyle greatly increases your risk for illness    A healthy diet, regular physical exercise & weight monitoring are important for maintaining a healthy lifestyle    You may be retaining fluid if you have a history of heart failure or if you experience any of the following symptoms:  Weight gain of 3 pounds or more overnight or 5 pounds in a week, increased swelling in our hands or feet or shortness of breath while lying flat in bed. Please call your doctor as soon as you notice any of these symptoms; do not wait until your next office visit. The discharge information has been reviewed with the patient. The patient verbalized understanding. Discharge medications reviewed with the patient and appropriate educational materials and side effects teaching were provided. ___________________________________________________________________________________________________________________________________      MAY shower-->NO tub baths    LEAVE incision open to the air    NO lifting anything heavier than 5LBS     NO driving until directed by [facility]    Avoid having pets sleep in bed with you until incision is completely healed    CALL DR. Wilfred Tong if:  Fever >100.5    [phone])                Incision becomes red, swollen or opens up                Incision has yellow, thick drainage or an odor                Pain is not managed with prescribed medications                Excessive nausea and/or vomiting                                      Sudden visual changes                                      Seizures                                                       HEADACHE           CHANGES IN MENTAL STATUS

## 2019-07-22 NOTE — PROGRESS NOTES
Problem: Patient Education: Go to Patient Education Activity  Goal: Patient/Family Education  Outcome: Progressing Towards Goal     Problem: Falls - Risk of  Goal: *Absence of Falls  Description  Document Maynor Ortiz Fall Risk and appropriate interventions in the flowsheet.   Outcome: Progressing Towards Goal  Note:   Fall Risk Interventions:  Mobility Interventions: Bed/chair exit alarm, Patient to call before getting OOB    Mentation Interventions: Bed/chair exit alarm, Eyeglasses and hearing aids, Evaluate medications/consider consulting pharmacy, Update white board    Medication Interventions: Assess postural VS orthostatic hypotension, Bed/chair exit alarm, Patient to call before getting OOB, Teach patient to arise slowly    Elimination Interventions: Bed/chair exit alarm, Call light in reach, Patient to call for help with toileting needs, Toilet paper/wipes in reach              Problem: Patient Education: Go to Patient Education Activity  Goal: Patient/Family Education  Outcome: Progressing Towards Goal

## 2019-07-22 NOTE — PROGRESS NOTES
TRANSFER - IN REPORT:    Verbal report received from YORDY Josue(name) on Isac Olson  being received from 3106(unit) for routine progression of care      Report consisted of patients Situation, Background, Assessment and   Recommendations(SBAR). Information from the following report(s) SBAR, Kardex, ED Summary, Procedure Summary, Intake/Output, MAR, Accordion, Recent Results, Med Rec Status and Cardiac Rhythm NS was reviewed with the receiving nurse. Opportunity for questions and clarification was provided. Assessment completed upon patients arrival to unit and care assumed.

## 2019-07-22 NOTE — PROGRESS NOTES
Problem: Dysphagia (Adult)  Goal: *Acute Goals and Plan of Care (Insert Text)  Description  LTG: Patient will tolerate least restrictive diet without overt signs or symptoms of airway compromise. - MET 07/22/19  STG: Patient will tolerate regular diet and thin liquids without overt signs or symptoms of airway compromise. MET 07/22/19  Outcome: Progressing Towards Goal     Progressing Towards Goal  STG: Patient will respond to yes/no questions with 75% accuracy with moderate assistance. STG: Patient will follow 1-step commands with 75% accuracy with minimal assistance. STG: Patient will identify objects in room to assist with expressing wants and needs with 80% accuracy. STG: Patient will participate in functional assessment regarding ability to utilize communication board to assist with verbal expression. - MET 7/18/19  LTG: Patient will increase neuro-linguistic abilities to increase safety and awareness of deficits.      SPEECH LANGUAGE PATHOLOGY: SPEECH LANGUAGE NOTE: Daily Note 3    NAME/AGE/GENDER: Nahomi Franco is a 59 y.o. male  DATE: 7/22/2019  PRIMARY DIAGNOSIS: GBM (glioblastoma multiforme) ([facility] [address] [C71.9]  GBM (glioblastoma multiforme) ([facility] [address] [C71.9]  Postoperative CSF leak [G97.82]      ICD-10: Treatment Diagnosis: R13.11 Oral Dysphagia. R47.01 Aphasia    INTERDISCIPLINARY COLLABORATION: Registered Nurse  ASSESSMENT:   Patient seen for ongoing language treatment. Relative strengths in unstructured conversation. He was able to engage in greetings and surface level conversation with clinician, but moderate deficits noted when asked to respond to open ended questions. Impaired comprehension and word finding errors noted. Speech continues to be characterized by jargon with intermittent awareness of errors. Improved ability to verbalize need for wife to answer questions when he repeatedly experienced communication breakdown with clinician and  when she entered room.    Therapeutic assessment of apraxia completed today via the Apraxia Stimulus Cards. Level B: VC structure words completed with 7/14 accuracy. Level B Advanced: CV structure completed with 7/18 accuracy. Errors characterized by incorrect and inconsistent vowel errors in both the initial and final placement. He was able to recognize errors on 54% of trials and would impulsively repeat errored responses. He benefited from moderate cues to pause and visualize production by clinician to increase accuracy to 88% across all trials. Patient's communication is impacted by deficits in expressive and receptive aphasia as well as verbal apraxia. Relative strengths in reading and writing, which he is able to utilize to improve communication. Continued speech therapy is recommended to address above mentioned deficits. ?????? ? ? This section established at most recent assessment??????????  PROBLEM LIST (Impairments causing functional limitations):  1. Expressive language deficits  2. Receptive language deficits  3. Oral dysphagia  REHABILITATION POTENTIAL FOR STATED GOALS: Good  PLAN OF CARE:   Patient will benefit from skilled intervention to address the following impairments. RECOMMENDATIONS AND PLANNED INTERVENTIONS (Benefits and precautions of therapy have been discussed with the patient.):  · PO:  Regular  · Liquids:  regular thin  MEDICATIONS:  · With liquid  COMPENSATORY STRATEGIES/MODIFICATIONS INCLUDING:  · None  OTHER RECOMMENDATIONS (including follow up treatment recommendations): · Family training/education  · Patient education  RECOMMENDED DIET MODIFICATIONS DISCUSSED WITH:  · Nursing  · Family  · Patient  FREQUENCY/DURATION: Continue to follow patient 3 times a week for duration of hospital stay to address above goals.    RECOMMENDED REHABILITATION/EQUIPMENT: (at time of discharge pending progress): Due to the probability of continued deficits (see above) this patient will likely need continued skilled speech therapy after discharge. SUBJECTIVE:   Tearful when discussing language deficits, but remains optimistic regarding recovery. History of Present Injury/Illness: Mr. Richy Tran  has a past medical history of Asbestosis ([address] (6/22/2019) and Brain mass (6/21/2019). .  He also  has a past surgical history that includes hx hernia repair (Left); hx lumbar laminectomy; and hx craniotomy (06/26/2019). Present Symptoms: oral dysphagia   Pain Intensity 1: 0  Current Medications:   No current facility-administered medications on file prior to encounter. Current Outpatient Medications on File Prior to Encounter   Medication Sig Dispense Refill    cefUROXime (CEFTIN) 500 mg tablet Take 1 Tab by mouth two (2) times a day. Indications: wound drainage 20 Tab 1    temozolomide (TEMODAR) 140 mg capsule 1 Cap daily. 30 Cap 1    diphenhydrAMINE (BENADRYL) 25 mg capsule Take 50 mg by mouth nightly.  fluticasone propionate (FLONASE) 50 mcg/actuation nasal spray 1 [place] by Nasal route. Current Dietary Status:  NPO  Social History/Home Situation:    Home Environment: Private residence  One/Two Story Residence: One story  Living Alone: No  Support Systems: Child(harper), Friends \ neighbors, Spouse/Significant Other/Partner  Patient Expects to be Discharged to[de-identified] Private residence  Current DME Used/Available at Home: None  OBJECTIVE:   Respiratory Status:  Room air     Oral Motor Structure/Speech:  Oral-Motor Structure/Motor Speech  Labial: Right droop(very subtle)  Dentition: Intact  Lingual: No impairment    Cognitive and Communication Status:  Neurologic State: Alert  Orientation Level: Oriented X4  Cognition: Follows commands             SPEECH-LANGUAGE COGNITIVE EVALUATION                                                Verbal Expression:   Verbal Expression  Conversation: Apraxic  Speech Characteristics: Jargon;Paraphasias;Circumlocutions; Word retrieval  Interfering Components: Impulsive;Limited error awareness  Effective Techniques: Word retrieval strategies; Gesturing  Overall Impairment: Moderate  Cueing type: Phonemic;Repetition;Imitation;Visual      Assessment/Reassessment only, no treatment provided today    Tool Used: Dysphagia Outcome and Severity Scale (GUI)    Score Comments   Normal Diet  ? 7 With no strategies or extra time needed       Functional Swallow  ? 6 May have mild oral or pharyngeal delay       Mild Dysphagia    ? 5 Which may require one diet consistency restricted (those who demonstrate penetration which is entirely cleared on MBS would be included)   Mild-Moderate Dysphagia  ? 4 With 1-2 diet consistencies restricted       Moderate Dysphagia  ? 3 With 2 or more diet consistencies restricted       Moderately Severe Dysphagia  ? 2 With partial PO strategies (trials with ST only)       Severe Dysphagia  ? 1 With inability to tolerate any PO safely          Score:  Initial: 6 Most Recent: X (Date: -- )   Interpretation of Tool: The Dysphagia Outcome and Severity Scale (GUI) is a simple, easy-to-use, 7-point scale developed to systematically rate the functional severity of dysphagia based on objective assessment and make recommendations for diet level, independence level, and type of nutrition. Score 7 6 5 4 3 2 1   Modifier CH CI CJ CK CL CM CN     Payor: MEDICAID PENDING / Plan: [facility] MEDICAID PENDING / Product Type: Medicaid /     ________________________________________________________________________________________________    Safety:   After treatment position/precautions:  · Upright in Bed  Progression/Medical Necessity:     · Patient is expected to demonstrate progress in expressive communication and receptive ability  ·  to decrease assistance required communication, increase independence with activities of daily living and increase communication with family/caregivers  · . Compliance with Program/Exercises:  Will assess as treatment progresses   Reason for Continuation of Services/Other Comments:  · Patient continues to require skilled intervention due to medical complications and patient unable to attend/participate in therapy as expected  · .  ·   Recommendations/Intent for next treatment session: \"Treatment next visit will focus on advancements to more challenging activities and reduction in assistance provided\".     Total Treatment Duration:  Time In: 1328  Time Out: [address], [facility] TAMELA JENNIFER ALEXANDRE, CCC-SLP

## 2019-07-23 NOTE — PROGRESS NOTES
[address] CRITICAL CARE OUTREACH NURSE PROGRESS REPORT      SUBJECTIVE: Called to assess patient secondary to outreach protocol. MEWS Score: 1 (07/22/19 2354)  Vitals:    07/22/19 1032 07/22/19 1521 07/22/19 2008 07/22/19 2354   BP: (!) 157/98 (!) 150/92 150/82 151/86   Pulse: (!) 102 75 81 74   Resp: 20 20 18 18   Temp: 97.7 °F (36.5 °C) 97.7 °F (36.5 °C) 98.2 °F (36.8 °C) 98.2 °F (36.8 °C)   SpO2: 95% 96% 95% 95%   Weight:       Height:            LAB DATA:    Recent Labs     07/22/19  1706 07/22/19  0330 07/21/19  1530 07/21/19  0328  07/20/19  0330   * 135* 135* 137   < > 135*   K  --  3.9  --  4.2  --  4.1   CL  --  100  --  99  --  100   CO2  --  27  --  29  --  27   AGAP  --  8  --  9  --  8   GLU  --  143*  --  129*  --  136*   BUN  --  19  --  18  --  14   CREA  --  0.78*  --  0.76*  --  0.74*   GFRAA  --  >60  --  >60  --  >60   GFRNA  --  >60  --  >60  --  >60   CA  --  8.5  --  8.7  --  8.8    < > = values in this interval not displayed. No results for input(s): WBC, HGB, HCT, PLT, HGBEXT, HCTEXT, PLTEXT in the last 72 hours. OBJECTIVE: On arrival to room, I found patient to be resting in bed with wife at bedside. Pain Assessment  Pain Intensity 1: 0 (07/22/19 1920)        Patient Stated Pain Goal: 0                                 ASSESSMENT:  Pt is alert and oriented x4. Pt has mild expressive aphasia. Pt got up and went to restroom with no difficulty. Left temporal lobe surgical site is c/d/i with staples in place. Pt denies pain at this time. VSS. Family is very appreciative of care. PLAN:  Will continue to monitor per protocol.

## 2019-07-23 NOTE — DISCHARGE SUMMARY
[address] [place] [address] [address]     Name:  Carlos Little  MR#:  [id number]  :  1955  ACCOUNT #:  [de-identified]  ADMIT DATE:  2019  DISCHARGE DATE:  2019    DISCHARGE DIAGNOSES:  1. Cerebral edema. 2.  Hyponatremia. 3.  Glioblastoma multiforme. 4.  Asbestosis. 5.  History of dysphagia. OPERATIONS AND PROCEDURES:  None. HISTORY OF PRESENT ILLNESS:  Unfortunate 78-year-old male recently diagnosed with left frontotemporal glioblastoma multiforme, who presented to the hospital with mental status change including lethargy, nausea, vomiting and headache. Fluid leakage from the incision. CT brain scanning showed massive edema from the tumor bed causing significant midline shift left to right as well as elevation of the bone flap due to the severe swelling. Initial sodium level was low at 125. The patient was admitted for aggressive treatment. ALLERGIES:  BEE VENOM AND IODINE. PAST MEDICAL HISTORY:  As listed above. PHYSICAL EXAMINATION:  He had significant swelling from the left craniotomy flap. Minimal drainage. Nonfocal.  Motor strength 5/5. Gait not assessed. Reflexes symmetric. Lethargy on exam.  Afebrile. LABORATORY DATA:  White blood cell count normal at 9.5. HOSPITAL COURSE:  The patient was given IV mannitol and Decadron and admitted to the intensive care unit. He was also administered 3% normal saline and the hospitalists were consulted. His neurological condition improved briskly as the edema lessened and as his hyponatremia was treated. Eventually, the hyponatremia stabilized to a normal level of 135 over several days. CT brain scanning showed significant reduction in edema and shift. He was transferred out of the intensive care unit intensity to the general care luis where he was ambulatory, afebrile, self-sufficient, and able to be discharged home.     He was seen by Oncology and arrangements have been made for radiation therapy and Temodar treatment beginning on 07/24. The patient did have a wound culture obtained from the leakage and there was a scant amount of Enterobacter cloacae; however, it was scant mixed skin imelda which was isolated as well. Sensitivities were to ciprofloxacin and therefore he was placed on 750 mg of ciprofloxacin. Prior to discharge, the staples were removed. CONDITION ON DISCHARGE:  Improved. DISCHARGE MEDICATIONS:  Cipro 750 mg b.i.d. for 2 weeks for prophylaxis. Decadron 2 mg b.i.d. prescribed also. DISCHARGE INSTRUCTIONS:  Return to my office in 1 month. Begin radiation therapy tomorrow as planned.       MD MONALISA [id number]  D:  07/23/2019 7:56  T:  07/23/2019 8:01  JOB #:  [id number]  CC:  Lilly Plunkett MD

## 2019-07-23 NOTE — PROGRESS NOTES
Problem: Delirium Treatment  Goal: *Level of consciousness restored to baseline  7/22/2019 2017 by Lyell Dakin  Outcome: Progressing Towards Goal  7/22/2019 2017 by Lyell Dakin  Reactivjackie     Problem: Falls - Risk of  Goal: *Absence of Falls  Description  Document Ren Zarcofroilan Fall Risk and appropriate interventions in the flowsheet.    7/22/2019 2017 by Lyell Dakin  Outcome: Progressing Towards Goal  7/22/2019 2017 by Lyell Dakin  Reactivjackie     Problem: Pain  Goal: *Control of Pain  Outcome: Progressing Towards Goal

## 2019-07-24 NOTE — PROGRESS NOTES
Pt here today for the initial RT consult for brain cancer (GBM). Pt is being followed by Dr. Yaquelin Gant and had surgery by Dr. Kenyatta Jimenez on 6/26/19. He is currently taking Decadron 2 mg twice daily. Pt has expressive aphasia. The plan is for pt to receive Temodar/RT. An overview of RT was given. He will have FUP with Dr. Yaquelin Gant on 8/8/19, and Dr. Kenyatta Jimenez on 8/22/19. Pt had in infection at the head wound surgical site and has been taking Cipro. Pt will have an MRI Brain tomorrow, and then complete the CT/Sim. RT consents signed.

## 2019-07-24 NOTE — CONSULTS
Patient: Aric Tyler MRN: [id number]  SSN: [id number]    YOB: 1955  Age: 59 y.o. Sex: male      Other Providers:    Tilman Kindred, MD Marcina Curling, MD    CHIEF COMPLAINT: Brain tumor    DIAGNOSIS: Left fronto-temporal glioblastoma s/p resection and Gliadel wafer placement      HISTORY OF PRESENT ILLNESS:  Aric Tyler is a 59 y.o. male who I am seeing at the request of Dr. Jessica Jiang. He presented to Dr. Kiki Guillory on 06/25/19 with a 2 to three-week history of language dysfunction and difficulty forming words. He denied headaches, nausea or vomiting. He had no focal weakness. MRI of the brain on 06/26/19 showed a heterogeneous enhancing intra-axial mass within the left temporal lobe measuring 5.3 x 4.4 x 4.1 cm. There was slight extension into the inferior aspect of the posterior margin of the left frontal lobe. Mass effect is seen upon the left lateral ventricle. 3 mm of left-to-right midline shift was noted. Differential diagnosis included meningioma versus glioma. 06/26/19 he was taken to the OR by Dr. Kiki Guillory for stealth guided left frontal temporal craniotomy for resection with intraoperative Gliadel wafer placement. 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 He was discharged home on 06/28/19 on dexamethasone 2 mg QID. He was seen by Dr. Mariel Main on 07/16/19. At that time he was noted to have residual effusion. He had weaned off steroids. He had recently run a fever of 101.9 and was placed on antibiotics. The discussed postoperative treatment with  Radiation and concurrent Temodar as well as the possibility of Optune after completion of radiation therapy. On 07/17/19 he was admitted to the ICU secondary to nausea with dry heaves, lethargy and fevers. CT scan of the head on 07/17/19 showed subcutaneous gas, as well as pneumocephalus in the left temporoparietal region slightly increased from prior scan.   There was worsening of diffuse edema throughout the left cerebral hemisphere  And mass effect on the left lateral ventricle, as well as the third ventricle which was mostly effaced. 9 mm of rightward midline shift was noted. There was no evidence of acute hemorrhage. He was seen by Dr. Ari Ovalle and noted to have edema \"so strong that it has elevated the bone flap out of its normal position. \" he was placed on dexamethasone 4 mg every 6 hours and mannitol 25 g every 6 hours. He was placed on vancomycin and Rocephin. By the following day he was much improved. Rocephin was switched to cefepime per pharmacy recommendations. CT scan of the head on 07/22/19  Showed improvement in the left cerebral hemisphere edema and rightward midline shift, now 5 mm compared with 9 mm previously. On 07/22/19 he was seen by Dr. Mer Dunbar for further discussion of chemoradiotherapy. At their meeting the patient was noted to have expressive aphasia, but otherwise doing well. He was provided a prescription for Temodar to start with initiation of radiation therapy. He was discharged on 07/24/19 and placed on Cipro 750 mg BID ×14 days, as well as dexamethasone 2 mg BID. At this time, Mr. Fany Soto is feeling reasonably well. He is much improved over the past few days. He denies headaches or recent fevers. He continues to experience expressive aphasia. He notes no other significant neurological issues. A 12-point ROS was conducted and was negative except as indicated in the above HPI. PAST MEDICAL HISTORY:    Past Medical History:   Diagnosis Date    Asbestosis ([facility] [address] 6/22/2019    Last Assessment & Plan:   Was Diagnosed in November . Appears Asymptomatic Currently. Not Short of Breath Breathing Well.  Brain mass 6/21/2019    Last Assessment & Plan:  Is a neurosurgical consultation with Dr. rAi Ovalle  is evaluated the patient. Patient is on IV Decadron every 6 hourly.   Patient has evidence of a left-sided temporoparietal mass  I Understanded to the plan is for surgery On Wednesday Will change decadrone to 4 mg po qid  Pt will follow with Dr Blas Baez outpatient. The patient denies history of collagen vascular diseases, pacemaker insertion, prior radiation or prior chemotherapy. PAST SURGICAL HISTORY:   Past Surgical History:   Procedure Laterality Date    HX CRANIOTOMY  06/26/2019    left Frontotemporal crani for GBM    HX HERNIA REPAIR Left     HX LUMBAR LAMINECTOMY         MEDICATIONS:     Current Outpatient Medications:     ciprofloxacin HCl (CIPRO) 750 mg tablet, Take 1 Tab by mouth two (2) times a day., Disp: 28 Tab, Rfl: 1    dexAMETHasone (DECADRON) 0.5 mg tablet, Take 4 Tabs by mouth two (2) times a day., Disp: 30 Tab, Rfl: 2    temozolomide (TEMODAR) 140 mg capsule, 1 Cap daily. , Disp: 30 Cap, Rfl: 1    diphenhydrAMINE (BENADRYL) 25 mg capsule, Take 50 mg by mouth nightly., Disp: , Rfl:     fluticasone propionate (FLONASE) 50 mcg/actuation nasal spray, 1 [place] by Nasal route., Disp: , Rfl:     ALLERGIES:   Allergies   Allergen Reactions    Bee Venom Protein (Honey Bee) Swelling    Iodine And Iodide Containing Products Unknown (comments)     Contrast dye       SOCIAL HISTORY:   Social History     Socioeconomic History    Marital status:      Spouse name: Not on file    Number of children: Not on file    Years of education: Not on file    Highest education level: Not on file   Occupational History    Not on file   Social Needs    Financial resource strain: Not on file    Food insecurity:     Worry: Not on file     Inability: Not on file    Transportation needs:     Medical: Not on file     Non-medical: Not on file   Tobacco Use    Smoking status: Former Smoker    Smokeless tobacco: Never Used   Substance and Sexual Activity    Alcohol use: Not Currently    Drug use: Not on file    Sexual activity: Not on file   Lifestyle    Physical activity:     Days per week: Not on file     Minutes per session: Not on file    Stress: Not on file   Relationships    Social connections:     Talks on phone: Not on file     Gets together: Not on file     Attends Restorationist service: Not on file     Active member of club or organization: Not on file     Attends meetings of clubs or organizations: Not on file     Relationship status: Not on file    Intimate partner violence:     Fear of current or ex partner: Not on file     Emotionally abused: Not on file     Physically abused: Not on file     Forced sexual activity: Not on file   Other Topics Concern    Not on file   Social History Narrative    Not on file       FAMILY HISTORY:   Family History   Problem Relation Age of Onset    Lung Disease Father        REVIEW OF SYSTEMS: Please see the completed review of systems sheet in the chart that I have reviewed today. PHYSICAL EXAMINATION:   ECOG Performance status 1  VITAL SIGNS:   Visit Vitals  /78   Pulse 90   Temp 98.7 °F (37.1 °C)   Resp 16   Wt 90 kg (198 lb 8 oz)   SpO2 97%   BMI 30.18 kg/m²        GENERAL: The patient is well-developed, ambulatory, alert and in no acute distress. HEENT: Head is normocephalic, atraumatic. Pupils are equal, round and reactive to light and accommodation. Extraocular movement intact. Hearing is intact bilaterally to finger rub. Oral cavity reveals no lesions. Mucous membranes are moist. NECK: Neck is supple with no masses. CARDIOVASCULAR: Heart has regular rate and rhythm. There are no murmurs, rubs or gallops. Radial pulses are 2+ RESPIRATORY: Lungs are clear to auscultation and percussion. There is normal respiratory effort. GASTROINTESTINAL: The abdomen is soft, non-tender, nondistended with no hepatospelnomagaly. Digital rectal examination: deferred. LYMPHATIC: There is no cervical, supraclavicular or axillary lymphadenopathy bilaterally. MUSCULOSKELETAL: Extremities reveal no cyanosis, clubbing or edema.  is 5+/5. NEURO:  Cranial nerves II-XII grossly intact.   Muscular strength and sensation are intact throughout all four extremities. He is able to perform finger to nose test without difficulty. He has some difficulty with tandem gait. PATHOLOGY:    06/26/19:     LABORATORY:   Lab Results   Component Value Date/Time    Sodium 135 (L) 07/23/2019 05:30 AM    Potassium 3.9 07/22/2019 03:30 AM    Chloride 100 07/22/2019 03:30 AM    CO2 27 07/22/2019 03:30 AM    Anion gap 8 07/22/2019 03:30 AM    Glucose 143 (H) 07/22/2019 03:30 AM    BUN 19 07/22/2019 03:30 AM    Creatinine 0.78 (L) 07/22/2019 03:30 AM    GFR est AA >60 07/22/2019 03:30 AM    GFR est non-AA >60 07/22/2019 03:30 AM    Calcium 8.5 07/22/2019 03:30 AM    Albumin 3.2 07/17/2019 05:19 PM    Protein, total 7.5 07/17/2019 05:19 PM    Globulin 4.3 (H) 07/17/2019 05:19 PM    A-G Ratio 0.7 (L) 07/17/2019 05:19 PM    AST (SGOT) 18 07/17/2019 05:19 PM    ALT (SGPT) 30 07/17/2019 05:19 PM     Lab Results   Component Value Date/Time    WBC 9.5 07/17/2019 05:19 PM    HGB 13.7 07/17/2019 05:19 PM    HCT 38.9 (L) 07/17/2019 05:19 PM    PLATELET 781 63/03/5109 05:19 PM       RADIOLOGY:    Mri Brain W Cont    Result Date: 6/26/2019  MRI brain with contrast HISTORY: Brain mass, Stealth protocol. TECHNIQUE: Postcontrast T1-weighted images were submitted for localization purposes. Imaging was performed on a 1.5 Ana Cristina magnet, utilizing the uneventful administration of 20 mL of intravenous  Dotarem in order to better evaluate for intracranial pathology. Multiplanar reformatted images were submitted. COMPARISON: 06/22/2019. FINDINGS: There is a heterogeneous enhancing intra-axial mass within the left temporal lobe measuring approximately 5.3 x 4.4 x 4.1 cm in AP, transverse and craniocaudal dimensions, respectively. There is slight extension into the inferior aspect of the posterior margin of the left frontal lobe. There is mass effect upon the left lateral ventricle. There is approximately 3 mm of left-to-right midline shift.  No additional areas of abnormal enhancement are identified. IMPRESSION: Redemonstrated intra-axial enhancing mass within the left temporal lobe. Ct Head Wo Cont    Result Date: 7/22/2019  EXAM: Noncontrast CT head. INDICATION: Cerebral edema. COMPARISON: Prior CT head on July 17, 2019. TECHNIQUE: Axial noncontrast CT images of the head were obtained. Radiation dose reduction techniques were used for this study. Our CT scanners use one or all of the following:  Automated exposure control, adjustment of the mA and/or kV according to patient size, iterative reconstruction. FINDINGS: Again noted is a left-sided craniotomy, with pneumocephalus in the underlying left temporoparietal lobe surgical cavity. Surrounding vasogenic edema in the left cerebral hemisphere has mildly improved, as has mass effect on the left lateral ventricle and rightward midline shift. The midline shift currently measures 5 mm, previously 9 mm. No acute infarct or hemorrhage is identified. The ventricles are nondilated. IMPRESSION:  Post-surgical changes, with improved left cerebral hemisphere edema and rightward midline shift. Ct Head Wo Cont    Result Date: 7/17/2019  Noncontrast head CT Clinical Indication: Abnormal drainage at the surgical incision site, 2-3 weeks ago patient reportedly had brain surgery for a mass, acute lethargic and nausea, fever and chills today. Technique: Noncontrast axial images were obtained through the brain. All CT scans at this location are performed using dose modulation techniques as appropriate including the following: Automated exposure control, adjustment of the MA and/or kV according to patient's size, or use of iterative reconstruction technique. Comparison: CT 6/27/2019 and MRI 6/26/2019 Findings: There are left-sided craniotomy changes. Subcutaneous gas as well as pneumocephalus in the left temporoparietal region are again seen and slightly increased since prior.  There is worsening of diffuse edema throughout the left cerebral hemisphere, and mass effect on the left lateral ventricle as well as the third ventricle which are mostly effaced. The basilar cisterns are patent. There is 9 mm of rightward midline shift. No evidence of acute hyperdense hemorrhage. Possibility of residual or recurrent mass, subacute or chronic blood products, or superimposed infarcts cannot be excluded by CT. There is sulcal effacement throughout the left cerebral hemisphere. Mastoids and paranasal sinuses are aerated. No skull fracture. Craniotomy defect on the left appears mildly displaced, increased since prior. Impression: 1. No evidence of acute hemorrhage. 2. Increased midline shift, mass effect, and edema throughout the majority of the left cerebral hemisphere. Craniotomy changes again noted. Ct Head Wo Cont    Result Date: 6/27/2019  CT HEAD WITHOUT CONTRAST HISTORY:  Brain tumor. COMPARISON: None TECHNIQUE: Axial imaging was performed without intravenous contrast utilizing 5mm slice thickness. Sagittal and coronal reformats were performed. Radiation dose reduction techniques were used for this study. Our CT scanner uses one or all of the following: Automated exposure control, adjustment of the MAS or KUB according to patient's size and iterative reconstruction. FINDINGS:    *BRAIN:    -  There are no early signs of territorial or lacunar infarction by CT.    -  A biopsy cavity is seen in the left temporal lobe. There is minimal hemorrhage and vasogenic edema.    -  No gross white matter abnormality by CT. *VISUALIZED PARANASAL SINUSES: Well aerated. *MASTOIDS:  Clear. *CALVARIUM AND SCALP: Unremarkable. IMPRESSION: Postop changes seen in the left temporal lobe.  Date of Dictation: 6/27/2019 4:51 AM       IMPRESSION:  Edelmira Patel is a 59 y.o. male with Left fronto-temporal glioblastoma s/p resection and Gliadel wafer placement    COUNSELING AND COORDINATION OF CARE: I have had a 60 minute consultation with . Lisa Luna and his family of which greater than have has been spent counseling him about management options for his glioblastoma. The natural history of glioblastoma was reviewed with the patient and his wife. Prognostic features including extent of resection, age, mental status, and performance status were reviewed. Standard treatment including surgery followed by adjuvant chemoradiation was discussed. This was compared and contrasted with supportive care. The practicalities, side effects, and potential complications of radiation therapy were reviewed. Alopecia, fatigue, headache, nausea and vomiting were among the acute complications discussed. Neurocognitive decline, and potential for damage to your optic and otic structures were among the long-term complications highlighted. All of his questions were answered to his satisfaction, and written informed consent was obtained. He will need a postoperative MRI of the brain. He will undergo CT simulation later this week with the intent of beginning treatment in 1-2 weeks. He will receive an initial dose of 46 Gy the tumor bed/residual disease and generous margin followed by a boost of 14 Gy.      Plan:  Genetic testing-not indicated  Smoking cessation-not indicated  Postoperative MRI of the brain  The patient will be simulated with radiation to begin shortly thereafter. A total dose of 60 Gy in 30 fractions will be prescribed. IMRT will be required.         Wm Jean MD   July 24, 2019

## 2019-07-26 NOTE — PROGRESS NOTES
I spoke with Mr. Fany Soto regarding the free Temodar application with [employer]. I went over the application with him and the income documentation that may be needed to accompany the application. He agreed to submit the application and signed in the necessary spots. Damion Soto said he could fax in the income docs. I gave Mr. Ti Santiago card with the fax number on it. Signed application was given to Wilmot Meckel. All questions were answered to patient's satisfaction and expressed understanding of the above information.

## 2019-08-12 PROBLEM — A41.9 SEPSIS (HCC): Status: ACTIVE | Noted: 2019-01-01

## 2019-08-12 PROBLEM — I10 HYPERTENSION: Chronic | Status: ACTIVE | Noted: 2019-01-01

## 2019-08-12 PROBLEM — R11.2 NAUSEA AND VOMITING: Status: ACTIVE | Noted: 2019-01-01

## 2019-08-12 PROBLEM — R50.9 FEVER: Status: ACTIVE | Noted: 2019-01-01

## 2019-08-12 PROBLEM — F32.A DEPRESSION: Chronic | Status: ACTIVE | Noted: 2019-01-01

## 2019-08-12 NOTE — ED PROVIDER NOTES
Patient is a 60 yo male with  History of glioblastoma presents with headache and vomiting. Unable to appropriately say where pain is due to mental status change since removal of glioblastoma which is unchanged. Wife states he has vomiting multiple times today and sent from Dr. Jaret Jin office for CT and further workup. Patient seen by me briefly in triage to begin workup until further evaluation and management by another provider once a room becomes available. Patient with vomiting multiple times today. Wife reports fever 101 on Saturday. Not feeling well 3 days. No cough. No dysuria. Seen at [facility] today to start radiation therapy. Took first dose of Temodar today. Had surgery to remove glioblastoma in June. Hospitalized in July for cerebral edema. Improved. Has been on Decadron 2 mg bid since June and stopped on Wednesday. Also finished Cipro on Wednesday. Vomiting Associated symptoms include chills, a fever, headaches and headaches. Pertinent negatives include no abdominal pain and no diarrhea. Past Medical History:  
Diagnosis Date  Asbestosis ([address] 6/22/2019 Last Assessment & Plan:   Was Diagnosed in November . Appears Asymptomatic Currently. Not Short of Breath Breathing Well.  Brain mass 6/21/2019 Last Assessment & Plan:  Is a neurosurgical consultation with Dr. Kasi Alex  is evaluated the patient. Patient is on IV Decadron every 6 hourly. Patient has evidence of a left-sided temporoparietal mass  I Understanded to the plan is for surgery On Wednesday Will change decadrone to 4 mg po qid  Pt will follow with Dr Kasi Alex outpatient. Past Surgical History:  
Procedure Laterality Date  HX CRANIOTOMY  06/26/2019  
 left Frontotemporal crani for GBM  
 HX HERNIA REPAIR Left  HX LUMBAR LAMINECTOMY Family History:  
Problem Relation Age of Onset  Lung Disease Father Social History Socioeconomic History  Marital status:   
 Spouse name: Not on file  Number of children: Not on file  Years of education: Not on file  Highest education level: Not on file Occupational History  Not on file Social Needs  Financial resource strain: Not on file  Food insecurity:  
  Worry: Not on file Inability: Not on file  Transportation needs:  
  Medical: Not on file Non-medical: Not on file Tobacco Use  Smoking status: Former Smoker  Smokeless tobacco: Never Used Substance and Sexual Activity  Alcohol use: Not Currently  Drug use: Not on file  Sexual activity: Not on file Lifestyle  Physical activity:  
  Days per week: Not on file Minutes per session: Not on file  Stress: Not on file Relationships  Social connections:  
  Talks on phone: Not on file Gets together: Not on file Attends Congregational service: Not on file Active member of club or organization: Not on file Attends meetings of clubs or organizations: Not on file Relationship status: Not on file  Intimate partner violence:  
  Fear of current or ex partner: Not on file Emotionally abused: Not on file Physically abused: Not on file Forced sexual activity: Not on file Other Topics Concern  Not on file Social History Narrative  Not on file ALLERGIES: Bee venom protein (honey bee) and Iodine and iodide containing products Review of Systems Constitutional: Positive for activity change, chills and fever. HENT: Negative for congestion and rhinorrhea. Eyes: Negative. Respiratory: Negative. Cardiovascular: Negative. Gastrointestinal: Positive for vomiting. Negative for abdominal pain and diarrhea. Genitourinary: Negative. Musculoskeletal: Negative. Skin: Positive for wound (healing incision scalp). Neurological: Positive for headaches. Hematological: Negative. Psychiatric/Behavioral: Negative. There were no vitals filed for this visit. Physical Exam  
Constitutional: He is oriented to person, place, and time. He appears well-developed and well-nourished. HENT:  
Head: Normocephalic. Right Ear: External ear normal.  
Left Ear: External ear normal.  
Mouth/Throat: Oropharynx is clear and moist.  
Eyes: Pupils are equal, round, and reactive to light. Conjunctivae and EOM are normal. No scleral icterus. Neck: Normal range of motion. Neck supple. No JVD present. Cardiovascular: Regular rhythm, normal heart sounds and intact distal pulses. tachycardia Pulmonary/Chest: Effort normal and breath sounds normal.  
Abdominal: Soft. Bowel sounds are normal. He exhibits no mass. There is no tenderness. Musculoskeletal: Normal range of motion. He exhibits no edema or tenderness. Neurological: He is alert and oriented to person, place, and time. Skin: Skin is warm and dry. Healing incision scalp. Not red. Minimal edema at flap. Psychiatric: He has a normal mood and affect. His behavior is normal.  
Nursing note and vitals reviewed. MDM Number of Diagnoses or Management Options Diagnosis management comments: Vomiting Glioblastoma CT head - improving Labs reviewed Decadron 10 mg IV Zofran 4 mg IV Improved. Given ice chips po Consult Neurosurgery - Dr. Talha Boucher - recommends hospitalist admission and Neurosurgery will consult. He will inform Isidoro Grijalva of patients admission. Shahana Bo - Dr. Lindy Manning - will see Amount and/or Complexity of Data Reviewed Clinical lab tests: ordered and reviewed Tests in the radiology section of CPT®: ordered and reviewed Obtain history from someone other than the patient: yes (Wife and son) Review and summarize past medical records: yes Discuss the patient with other providers: yes (Neurosurgery and Hospitalist) Independent visualization of images, tracings, or specimens: yes Critical Care Total time providing critical care: 30-74 minutes (Critical care time 35 minutes) Patient Progress Patient progress: improved Procedures

## 2019-08-12 NOTE — ED TRIAGE NOTES
Pt has a glioblastoma. Had surgery around 1 month ago. Was in ICU 2 weeks ago for increased ICP and vomiting. Saw oncologist this afternoon and was told to come to the ER for symptoms of increased ICP and need for CT scan.

## 2019-08-13 NOTE — PROGRESS NOTES
Dual skin assessment completed with secondary RN. Sacrum/coccyx visualized no redness or breakdown noted. Heels visualized- not boggy or red.

## 2019-08-13 NOTE — CONSULTS
Surgery Consult Subjective:  
  
Raul Hughes is a 59 y.o. male with a psh of L craniotomy for GBM resection scheduled for adjuvant RT who recently stopped his decadron of 4mg BID. He reports nausea and emesis 15x since stopping the decadron. He denies night sweats or neck stiffness but does report some intermittent wound drainage and a fever of 101 at home yesterday. Past Medical History:  
Diagnosis Date  Asbestosis ([address] 6/22/2019 Last Assessment & Plan:   Was Diagnosed in November . Appears Asymptomatic Currently. Not Short of Breath Breathing Well.  Brain mass 6/21/2019 Last Assessment & Plan:  Is a neurosurgical consultation with Dr. Jose Manuel Ruiz  is evaluated the patient. Patient is on IV Decadron every 6 hourly. Patient has evidence of a left-sided temporoparietal mass  I Understanded to the plan is for surgery On Wednesday Will change decadrone to 4 mg po qid  Pt will follow with Dr Jose Manuel Ruiz outpatient. Past Surgical History:  
Procedure Laterality Date  HX CRANIOTOMY  06/26/2019  
 left Frontotemporal crani for GBM  
 HX HERNIA REPAIR Left  HX LUMBAR LAMINECTOMY Family History Problem Relation Age of Onset  Lung Disease Father Social History Socioeconomic History  Marital status:  Spouse name: Not on file  Number of children: Not on file  Years of education: Not on file  Highest education level: Not on file Tobacco Use  Smoking status: Former Smoker  Smokeless tobacco: Never Used Substance and Sexual Activity  Alcohol use: Not Currently Current Outpatient Medications Medication Sig Dispense Refill  clonazePAM (KLONOPIN) 0.5 mg tablet Take 0.5 mg by mouth.  citalopram (CELEXA) 10 mg tablet Take 10 mg by mouth.  bisoprolol-hydroCHLOROthiazide (ZIAC) 2.5-6.25 mg per tablet Take 1 Tab by mouth.  temozolomide (TEMODAR) 140 mg capsule 1 Cap daily.  30 Cap 1  
  diphenhydrAMINE (BENADRYL) 25 mg capsule Take 50 mg by mouth nightly.  fluticasone propionate (FLONASE) 50 mcg/actuation nasal spray 1 Kelly by Nasal route. Allergies Allergen Reactions  Bee Venom Protein (Honey Bee) Swelling  Iodine And Iodide Containing Products Unknown (comments) Contrast dye Review of Systems: 
No change in speech, vision, weakness, no present headache, sweats new weakness, numbness or seizure Objective:  
 
  
Patient Vitals for the past 8 hrs: 
 BP Temp Pulse Resp SpO2 Height Weight 19 2211 139/81  86  91 %    
19 1753 134/82 99.5 °F (37.5 °C) (!) 117 16 91 % 5' 8\" (1.727 m) 93.9 kg (207 lb) Temp (24hrs), Av.7 °F (37.6 °C), Min:99.5 °F (37.5 °C), Max:99.9 °F (37.7 °C) Physical Exam: 
Awake alert and oriented with scanning speech L craniotomy incision healed with focal dehiscent portions No active drainage or purlence No erythema or induration No nuchal rigidity Normal cervical ROM 
 
CT: persistent intracranial air at the resection cavity with stable ephraim-tumoral edema without acute process Assessment:  
 
Hospital Problems  Date Reviewed: 2019 Codes Class Noted POA Fever ICD-10-CM: R50.9 ICD-9-CM: 780.60  2019 Yes Nausea and vomiting ICD-10-CM: R11.2 ICD-9-CM: 787.01  2019 Yes Hypertension (Chronic) ICD-10-CM: I10 
ICD-9-CM: 401.9  2019 Yes Depression (Chronic) ICD-10-CM: F32.9 ICD-9-CM: 140  2019 Yes GBM (glioblastoma multiforme) (HCC) ICD-10-CM: C71.9 ICD-9-CM: 191.9  2019 Yes Plan: - Appreciate medicine assistance 
- will order repeat MRI to eval if there is progressive enhancement to suggest chronic or progressive infection 
- His symptoms clearly worsened with decadron cessation and have completely resolved since re-dosing in the ED 
- Would resume his prior dose of decadron - Will follow along

## 2019-08-13 NOTE — PROGRESS NOTES
TRANSFER - OUT REPORT: 
 
Verbal report given to Alee Bennett RN on Aric Tyler  being transferred to [phone] after MRI for routine progression of care Report consisted of patients Situation, Background, Assessment and Recommendations(SBAR). Information from the following report(s) SBAR, Kardex, [facility] and Recent Results was reviewed with the receiving nurse. Opportunity for questions and clarification was provided.

## 2019-08-13 NOTE — H&P
Hospitalist H&P Note Admit Date:  2019  6:34 PM  
Name:  Santiago Herrera Age:  59 y.o. 
:  1955 MRN:  [id number] PCP:  Ekta Montanez NP Treatment Team: Attending Provider: Kateri Pallas, MD; Primary Nurse: Zahra Hudson RN 
 
HPI:  
 
CC:  Nausea, emesis and fever Mr. Barney Caballero is a 60 yo male with PMH of HTN, glioblastoma multiforme diagnosed  s/p craniotomy who is evaluated with fever, nausea and emesis. He is followed by Dr. Kenyatta Jimenez of neurosurgery and hematology. He did note that he abruptly stopped decadron a few weeks ago. He has had fevers for several days with associated nausea and emesis. No pain. No vision changes, some mild headache. Wife has noted mild amounts of sero sanguinous wound drainage from craniotomy site. He has received zofran and decadron while in the ED with good improvement. CT head shows stable size of left temporal mass with internal gas bubbles. Per Dr. Garima Yu of the ED, she has spoke with neurosurgery representative and CT is ok. Per patient, he took his first dose of oral chemo today, however had subsequent emesis. He is pending start to radiation. 10 systems reviewed and negative except as noted in HPI. - Past Medical History:  
Diagnosis Date  Asbestosis ([address] 2019 Last Assessment & Plan:   Was Diagnosed in November . Appears Asymptomatic Currently. Not Short of Breath Breathing Well.  Brain mass 2019 Last Assessment & Plan:  Is a neurosurgical consultation with Dr. Kenyatta Jimenez  is evaluated the patient. Patient is on IV Decadron every 6 hourly. Patient has evidence of a left-sided temporoparietal mass  I Understanded to the plan is for surgery On Wednesday Will change decadrone to 4 mg po qid  Pt will follow with Dr Kenyatta Jimenez outpatient. Past Surgical History:  
Procedure Laterality Date  HX CRANIOTOMY  2019  
 left Frontotemporal crani for GBM  
 HX HERNIA REPAIR Left  HX LUMBAR LAMINECTOMY Allergies Allergen Reactions  Bee Venom Protein (Honey Bee) Swelling  Iodine And Iodide Containing Products Unknown (comments) Contrast dye Social History Tobacco Use  Smoking status: Former Smoker  Smokeless tobacco: Never Used Substance Use Topics  Alcohol use: Not Currently Family History Problem Relation Age of Onset  Lung Disease Father There is no immunization history on file for this patient. PTA Medications: 
Prior to Admission Medications Prescriptions Last Dose Informant Patient Reported? Taking?  
bisoprolol-hydroCHLOROthiazide (ZIAC) 2.5-6.25 mg per tablet   Yes No  
Sig: Take 1 Tab by mouth.  
citalopram (CELEXA) 10 mg tablet   Yes No  
Sig: Take 10 mg by mouth.  
clonazePAM (KLONOPIN) 0.5 mg tablet   Yes No  
Sig: Take 0.5 mg by mouth. diphenhydrAMINE (BENADRYL) 25 mg capsule   Yes No  
Sig: Take 50 mg by mouth nightly. fluticasone propionate (FLONASE) 50 mcg/actuation nasal spray   Yes No  
Si Spray by Nasal route. temozolomide (TEMODAR) 140 mg capsule   No No  
Si Cap daily. Facility-Administered Medications: None Objective:  
 
Patient Vitals for the past 24 hrs: 
 Temp Pulse Resp BP SpO2  
19 99.5 °F (37.5 °C) (!) 117 16 134/82 91 % Oxygen Therapy O2 Sat (%): 91 % (19 1753) O2 Device: Room air (19) No intake or output data in the 24 hours ending 192 Physical Exam: 
General:    Alert. No distress Eyes:   Normal sclera. Extraocular movements intact. PERRLA 
ENT:  Normocephalic, atraumatic. Moist mucous membranes, mallampatti III  
CV:   RRR. No m/r/g. No edema Lungs:  CTAB. No wheezing, rhonchi, or rales. Abdomen: Soft, nontender, nondistended. Present BS, obese Extremities: Warm and dry. . 
Neurologic:  grossly intact. moes all extremities Skin:     No rashes or jaundice.   Normal coloration, wound on left parietal/temporal area clean and dry Psych:  Normal mood and affect. Repetitive verbiage I reviewed the labs, imaging, EKGs, telemetry, and other studies done this admission. Data Review:  
Recent Results (from the past 24 hour(s)) CBC WITH AUTOMATED DIFF Collection Time: 08/12/19  6:03 PM  
Result Value Ref Range WBC 10.6 4.3 - 11.1 K/uL  
 RBC 4.61 4.23 - 5.6 M/uL  
 HGB 14.3 13.6 - 17.2 g/dL HCT 41.6 41.1 - 50.3 % MCV 90.2 79.6 - 97.8 FL  
 MCH 31.0 26.1 - 32.9 PG  
 MCHC 34.4 31.4 - 35.0 g/dL  
 RDW 13.2 11.9 - 14.6 % PLATELET 088 803 - 714 K/uL MPV 9.8 9.4 - 12.3 FL ABSOLUTE NRBC 0.00 0.0 - 0.2 K/uL NEUTROPHILS 74 43 - 78 % LYMPHOCYTES 17 13 - 44 % MONOCYTES 7 4.0 - 12.0 % EOSINOPHILS 1 0.5 - 7.8 % BASOPHILS 0 0.0 - 2.0 % IMMATURE GRANULOCYTES 1 0.0 - 5.0 %  
 ABS. NEUTROPHILS 7.9 1.7 - 8.2 K/UL  
 ABS. LYMPHOCYTES 1.8 0.5 - 4.6 K/UL  
 ABS. MONOCYTES 0.7 0.1 - 1.3 K/UL  
 ABS. EOSINOPHILS 0.1 0.0 - 0.8 K/UL  
 ABS. BASOPHILS 0.0 0.0 - 0.2 K/UL  
 ABS. IMM. GRANS. 0.1 0.0 - 0.5 K/UL  
 DF AUTOMATED METABOLIC PANEL, COMPREHENSIVE Collection Time: 08/12/19  6:03 PM  
Result Value Ref Range Sodium 133 (L) 136 - 145 mmol/L Potassium 4.6 3.5 - 5.1 mmol/L Chloride 101 98 - 107 mmol/L  
 CO2 25 21 - 32 mmol/L Anion gap 7 7 - 16 mmol/L Glucose 123 (H) 65 - 100 mg/dL BUN 10 8 - 23 MG/DL Creatinine 0.96 0.8 - 1.5 MG/DL  
 GFR est AA >60 >60 ml/min/1.73m2 GFR est non-AA >60 >60 ml/min/1.73m2 Calcium 9.2 8.3 - 10.4 MG/DL Bilirubin, total 0.8 0.2 - 1.1 MG/DL  
 ALT (SGPT) 33 12 - 65 U/L  
 AST (SGOT) 45 (H) 15 - 37 U/L Alk. phosphatase 78 50 - 136 U/L Protein, total 7.7 6.3 - 8.2 g/dL Albumin 3.1 (L) 3.2 - 4.6 g/dL Globulin 4.6 (H) 2.3 - 3.5 g/dL A-G Ratio 0.7 (L) 1.2 - 3.5 LIPASE Collection Time: 08/12/19  6:03 PM  
Result Value Ref Range  Lipase 182 73 - 393 U/L  
POC LACTIC ACID  
 Collection Time: 08/12/19  7:12 PM  
Result Value Ref Range Lactic Acid (POC) 0.80 0.5 - 1.9 mmol/L All Micro Results Procedure Component Value Units Date/Time CULTURE, BLOOD [[id number]] Collected:  08/12/19 1801 Order Status:  Completed Specimen:  Blood Updated:  08/12/19 1824 CULTURE, BLOOD [[id number]] Collected:  08/12/19 1801 Order Status:  Completed Specimen:  Whole Blood Updated:  08/12/19 1824 Other Studies: 
Ct Head Wo Cont Result Date: 8/12/2019 NONCONTRAST HEAD CT CLINICAL HISTORY:  Vomiting with clinical suspicion of increased intracranial pressure status post craniotomy for glioblastoma in June 2019. TECHNIQUE:  Axial images were obtained with spiral technique. Radiation dose reduction was achieved using one or all of the following techniques: automated exposure control, weight-based dosing, iterative reconstruction. COMPARISON:  MRI of July 25, 2019 and CT of July 22, 2019. REPORT:   Standard non contrast head CT demonstrates persistent left temporal mass effect again measuring approximately 6.0 x 4.4 cm. There are small areas of increased attenuation as well as gas bubbles within it. Overlying craniotomy appears unchanged. Only minimal midline shift from left-to-right appears unchanged. The ventricles are normal in size and configuration, accounting for the patient's age. Orbits and  paranasal sinuses are clear where imaged. Bone windows demonstrate no definite fracture or destruction. IMPRESSION:   1.  WHILE THE APPROXIMATELY 6 CM LEFT TEMPORAL MASS APPEARS STABLE IN SIZE FROM JULY 25, THE PRESENCE OF INTERNAL GAS BUBBLES SUGGESTS THE POSSIBILITY OF SECONDARY INFECTION. 2.  STABLE MINIMAL MIDLINE SHIFT WITHOUT SIGNIFICANT VENTRICULOMEGALY. Julio Corona Assessment and Plan:  
 
Hospital Problems as of 8/12/2019 Date Reviewed: 8/12/2019 Codes Class Noted - Resolved POA Fever ICD-10-CM: R50.9 ICD-9-CM: 780.60  8/12/2019 - Present Yes Nausea and vomiting ICD-10-CM: R11.2 ICD-9-CM: 787.01  8/12/2019 - Present Yes Hypertension (Chronic) ICD-10-CM: I10 
ICD-9-CM: 401.9  8/12/2019 - Present Yes GBM (glioblastoma multiforme) (HCC) ICD-10-CM: C71.9 ICD-9-CM: 191.9  6/26/2019 - Present Yes · Fever:  Admit to remote tele, cover with vancomycin and zosyn, no wound drainage to culture, check CXR and UA, followup BC x 2, neurosurgery to evaluate · Nausea/ emesis: improved, supportive care with zofran and IVF, start clear liquids to advance as tolerant · Glioblastoma multiforme: starting chemo/ radiation per oncology, will consult their service to assume care tomorrow, needs PT/OT, rsume decadron · HTN: holding antihypertensive · Obesity: likely has ADRIANA and would benefot from CPAP pendng course · Depression: resume celexa, prn klonopin Discharge planning:  Pending DVT ppx: SCD Code status:  Full Estimated LOS:  Greater than 2 midnights Risk:  high Care plan: wife Reba Halsted [phone] Signed: Charles Shaffer MD

## 2019-08-13 NOTE — CONSULTS
[facility] Inpatient Hematology / Oncology Consult Note Reason for Consult:  Sepsis ([facility] [address] [A41.9] Referring Physician:  Diane Portillo MD 
 
History of Present Illness: 
Mr. Fany Soto is a 59 y.o. male admitted on 8/12/2019 with a primary diagnosis of vomiting, weakness, fever with recent dx GBM. He is a patient of Dr. Zahra Simms with newly diagnosed glioblastoma. He is planned for temodar and radiation (took 1 temodar tab yesterday but had ongoing emesis). Radiation was held due to intractable vomiting, weakness and fever (101 at home). He has been off his decadron since 8/7. He was seen at the office and sent to the ER for evaluation of the above symptoms. Of note, he recently was treated for enterobacter from wound culture from inclusion and he was treated with cipro. He was started back on steroids in the ER with resolution of neurological symptoms. His aphasia is improved and he has not had any further emesis. CT head done and showed stable mass and stable midline shift but did show internal gas bubbles suggesting possible secondary infection. He has been started on zosyn and vancomycin. ID has been consulted. Neurosurgery is following and has ordered a repeat MRI brain to further evaluate. We have been asked to assume care of our patient. Review of Systems: 
Constitutional Denies fever, chills, weight loss, appetite changes, fatigue, night sweats. HEENT Denies trauma, blurry vision, hearing loss, ear pain, nosebleeds, sore throat, neck pain Skin Denies lesions or rashes. Lungs Denies dyspnea, cough, sputum production or hemoptysis. Cardiovascular Denies chest pain, palpitations, or lower extremity edema. Gastrointestinal + vomiting - resolved. Denies nausea, changes in bowel habits, bloody or black stools, abdominal pain.  Denies dysuria, frequency or hesitancy of urination. Neuro + aphasia - improved. Denies headaches, visual changes or ataxia. Denies dizziness, tingling, tremors, sensory change, focal weakness Hematology Denies easy bruising or bleeding, denies gingival bleeding or epistaxis. Endo Denies heat/cold intolerance, denies diabetes or thyroid abnormalities. MSK Denies back pain, arthralgias, myalgias or frequent falls. Psychiatric/Behavioral Denies depression and substance abuse. The patient is not nervous/anxious. Allergies Allergen Reactions  Bee Venom Protein (Honey Bee) Swelling  Iodine And Iodide Containing Products Unknown (comments) Contrast dye Past Medical History:  
Diagnosis Date  Asbestosis ([facility] [address] 6/22/2019 Last Assessment & Plan:   Was Diagnosed in November . Appears Asymptomatic Currently. Not Short of Breath Breathing Well.  Brain mass 6/21/2019 Last Assessment & Plan:  Is a neurosurgical consultation with Dr. Georgeann Olszewski  is evaluated the patient. Patient is on IV Decadron every 6 hourly. Patient has evidence of a left-sided temporoparietal mass  I Understanded to the plan is for surgery On Wednesday Will change decadrone to 4 mg po qid  Pt will follow with Dr Georgeann Olszewski outpatient. Past Surgical History:  
Procedure Laterality Date  HX CRANIOTOMY  06/26/2019  
 left Frontotemporal crani for GBM  
 HX HERNIA REPAIR Left  HX LUMBAR LAMINECTOMY Family History Problem Relation Age of Onset  Lung Disease Father Social History Socioeconomic History  Marital status:  Spouse name: Not on file  Number of children: Not on file  Years of education: Not on file  Highest education level: Not on file Occupational History  Not on file Social Needs  Financial resource strain: Not on file  Food insecurity:  
  Worry: Not on file Inability: Not on file  Transportation needs:  
  Medical: Not on file Non-medical: Not on file Tobacco Use  
  Smoking status: Former Smoker  Smokeless tobacco: Never Used Substance and Sexual Activity  Alcohol use: Not Currently  Drug use: Not on file  Sexual activity: Not on file Lifestyle  Physical activity:  
  Days per week: Not on file Minutes per session: Not on file  Stress: Not on file Relationships  Social connections:  
  Talks on phone: Not on file Gets together: Not on file Attends Sabianism service: Not on file Active member of club or organization: Not on file Attends meetings of clubs or organizations: Not on file Relationship status: Not on file  Intimate partner violence:  
  Fear of current or ex partner: Not on file Emotionally abused: Not on file Physically abused: Not on file Forced sexual activity: Not on file Other Topics Concern  Not on file Social History Narrative  Not on file Current Facility-Administered Medications Medication Dose Route Frequency Provider Last Rate Last Dose  vancomycin (VANCOCIN) 1500 mg in  ml infusion  1,500 mg IntraVENous Q12H Roseanne Owens .3 mL/hr at 08/13/19 1341 1,500 mg at 08/13/19 1341  
 [START ON 8/14/2019] Vancomycin trough reminder   Other ONCE Call, Sonny Yanez MD      
 citalopram (CELEXA) tablet 10 mg  10 mg Oral DAILY Hellen Owens MD   10 mg at 08/13/19 0830  clonazePAM (KlonoPIN) tablet 0.5 mg  0.5 mg Oral BID PRN Hellen Owens MD   0.5 mg at 08/13/19 0024  sodium chloride (NS) flush 5-40 mL  5-40 mL IntraVENous Q8H Roseanne Owens MD   5 mL at 08/13/19 1219  
 sodium chloride (NS) flush 5-40 mL  5-40 mL IntraVENous PRN Hellen Owens MD      
 acetaminophen (TYLENOL) tablet 650 mg  650 mg Oral Q6H PRN Hellen Owens MD      
 HYDROcodone-acetaminophen (NORCO) 5-325 mg per tablet 1 Tab  1 Tab Oral Q4H PRN Hellen Owens MD      
 naloxone (NARCAN) injection 0.4 mg  0.4 mg IntraVENous PRN Servando Waldrop MD      
 ondansetron [facility]) injection 4 mg  4 mg IntraVENous Q4H PRN Anthony Owens MD      
 dexAMETHasone (DECADRON) tablet 4 mg  4 mg Oral Q6H Roseanne Owens MD   4 mg at 19 1017  
 0.9% sodium chloride infusion  100 mL/hr IntraVENous CONTINUOUS Anthony Owens  mL/hr at 19 2356 100 mL/hr at 19 2356  piperacillin-tazobactam (ZOSYN) 3.375 g in 0.9% sodium chloride (MBP/ADV) 100 mL  3.375 g IntraVENous Q8H Anthony Owens MD 25 mL/hr at 19 0831 3.375 g at 19 0831 OBJECTIVE: 
Patient Vitals for the past 8 hrs: 
 BP Temp Pulse Resp SpO2  
19 1224   (!) 102    
19 1030 122/69 98.6 °F (37 °C) 84 18 93 % Temp (24hrs), Av.9 °F (37.2 °C), Min:98.1 °F (36.7 °C), Max:99.9 °F (37.7 °C) 
 
 0701 -  1900 In: -  
Out: 125 [Urine:125] Physical Exam: 
Constitutional: Well developed, well nourished male in no acute distress, sitting comfortably in the hospital bed. HEENT: Normocephalic and atraumatic. Oropharynx is clear, mucous membranes are moist.  Neck supple. Lymph node Deferred Skin Warm and dry. No bruising and no rash noted. No erythema. No pallor. Slight erythema around left sided craniotomy site, no drainage noted Respiratory Lungs are clear to auscultation bilaterally without wheezes, rales or rhonchi, normal air exchange without accessory muscle use. CVS Normal rate, regular rhythm and normal S1 and S2. No murmurs, gallops, or rubs. Abdomen Soft, nontender and nondistended, normoactive bowel sounds. No palpable mass. No hepatosplenomegaly. Neuro + mild aphasia - slow finding some words. Grossly nonfocal with no obvious sensory or motor deficits. MSK Normal range of motion in general.  No edema and no tenderness. Psych Appropriate mood and affect. Labs:   
Recent Results (from the past 24 hour(s)) CULTURE, BLOOD Collection Time: 08/12/19  6:01 PM  
Result Value Ref Range Special Requests: RIGHT Antecubital 
    
 Culture result: NO GROWTH AFTER 13 HOURS    
CULTURE, BLOOD Collection Time: 08/12/19  6:01 PM  
Result Value Ref Range Special Requests: LAC Culture result: NO GROWTH AFTER 13 HOURS    
CBC WITH AUTOMATED DIFF Collection Time: 08/12/19  6:03 PM  
Result Value Ref Range WBC 10.6 4.3 - 11.1 K/uL  
 RBC 4.61 4.23 - 5.6 M/uL  
 HGB 14.3 13.6 - 17.2 g/dL HCT 41.6 41.1 - 50.3 % MCV 90.2 79.6 - 97.8 FL  
 MCH 31.0 26.1 - 32.9 PG  
 MCHC 34.4 31.4 - 35.0 g/dL  
 RDW 13.2 11.9 - 14.6 % PLATELET 193 265 - 703 K/uL MPV 9.8 9.4 - 12.3 FL ABSOLUTE NRBC 0.00 0.0 - 0.2 K/uL NEUTROPHILS 74 43 - 78 % LYMPHOCYTES 17 13 - 44 % MONOCYTES 7 4.0 - 12.0 % EOSINOPHILS 1 0.5 - 7.8 % BASOPHILS 0 0.0 - 2.0 % IMMATURE GRANULOCYTES 1 0.0 - 5.0 %  
 ABS. NEUTROPHILS 7.9 1.7 - 8.2 K/UL  
 ABS. LYMPHOCYTES 1.8 0.5 - 4.6 K/UL  
 ABS. MONOCYTES 0.7 0.1 - 1.3 K/UL  
 ABS. EOSINOPHILS 0.1 0.0 - 0.8 K/UL  
 ABS. BASOPHILS 0.0 0.0 - 0.2 K/UL  
 ABS. IMM. GRANS. 0.1 0.0 - 0.5 K/UL  
 DF AUTOMATED METABOLIC PANEL, COMPREHENSIVE Collection Time: 08/12/19  6:03 PM  
Result Value Ref Range Sodium 133 (L) 136 - 145 mmol/L Potassium 4.6 3.5 - 5.1 mmol/L Chloride 101 98 - 107 mmol/L  
 CO2 25 21 - 32 mmol/L Anion gap 7 7 - 16 mmol/L Glucose 123 (H) 65 - 100 mg/dL BUN 10 8 - 23 MG/DL Creatinine 0.96 0.8 - 1.5 MG/DL  
 GFR est AA >60 >60 ml/min/1.73m2 GFR est non-AA >60 >60 ml/min/1.73m2 Calcium 9.2 8.3 - 10.4 MG/DL Bilirubin, total 0.8 0.2 - 1.1 MG/DL  
 ALT (SGPT) 33 12 - 65 U/L  
 AST (SGOT) 45 (H) 15 - 37 U/L Alk. phosphatase 78 50 - 136 U/L Protein, total 7.7 6.3 - 8.2 g/dL Albumin 3.1 (L) 3.2 - 4.6 g/dL Globulin 4.6 (H) 2.3 - 3.5 g/dL A-G Ratio 0.7 (L) 1.2 - 3.5 LIPASE Collection Time: 08/12/19  6:03 PM  
Result Value Ref Range Lipase 182 73 - 393 U/L  
POC LACTIC ACID Collection Time: 08/12/19  7:12 PM  
Result Value Ref Range Lactic Acid (POC) 0.80 0.5 - 1.9 mmol/L METABOLIC PANEL, BASIC Collection Time: 08/13/19  4:04 AM  
Result Value Ref Range Sodium 136 136 - 145 mmol/L Potassium 4.1 3.5 - 5.1 mmol/L Chloride 102 98 - 107 mmol/L  
 CO2 26 21 - 32 mmol/L Anion gap 8 7 - 16 mmol/L Glucose 199 (H) 65 - 100 mg/dL BUN 13 8 - 23 MG/DL Creatinine 0.85 0.8 - 1.5 MG/DL  
 GFR est AA >60 >60 ml/min/1.73m2 GFR est non-AA >60 >60 ml/min/1.73m2 Calcium 9.1 8.3 - 10.4 MG/DL  
CBC WITH AUTOMATED DIFF Collection Time: 08/13/19  4:04 AM  
Result Value Ref Range WBC 5.2 4.3 - 11.1 K/uL  
 RBC 4.20 (L) 4.23 - 5.6 M/uL  
 HGB 13.1 (L) 13.6 - 17.2 g/dL HCT 38.2 (L) 41.1 - 50.3 % MCV 91.0 79.6 - 97.8 FL  
 MCH 31.2 26.1 - 32.9 PG  
 MCHC 34.3 31.4 - 35.0 g/dL  
 RDW 13.2 11.9 - 14.6 % PLATELET 425 559 - 259 K/uL MPV 9.6 9.4 - 12.3 FL ABSOLUTE NRBC 0.00 0.0 - 0.2 K/uL  
 DF AUTOMATED NEUTROPHILS 73 43 - 78 % LYMPHOCYTES 22 13 - 44 % MONOCYTES 4 4.0 - 12.0 % EOSINOPHILS 0 (L) 0.5 - 7.8 % BASOPHILS 0 0.0 - 2.0 % IMMATURE GRANULOCYTES 1 0.0 - 5.0 %  
 ABS. NEUTROPHILS 3.8 1.7 - 8.2 K/UL  
 ABS. LYMPHOCYTES 1.2 0.5 - 4.6 K/UL  
 ABS. MONOCYTES 0.2 0.1 - 1.3 K/UL  
 ABS. EOSINOPHILS 0.0 0.0 - 0.8 K/UL  
 ABS. BASOPHILS 0.0 0.0 - 0.2 K/UL  
 ABS. IMM. GRANS. 0.1 0.0 - 0.5 K/UL EKG, 12 LEAD, INITIAL Collection Time: 08/13/19  7:20 AM  
Result Value Ref Range Ventricular Rate 79 BPM  
 Atrial Rate 79 BPM  
 P-R Interval 206 ms QRS Duration 90 ms Q-T Interval 370 ms QTC Calculation (Bezet) 424 ms Calculated P Axis 54 degrees Calculated R Axis 10 degrees Calculated T Axis 32 degrees Diagnosis Normal sinus rhythm with sinus arrhythmia Low voltage QRS Borderline ECG No previous ECGs available Confirmed by JORGE LUIS BYRNE (), Андрей Hduson ([id number]) on 8/13/2019 7:38:23 AM 
  
URINALYSIS W/ RFLX MICROSCOPIC Collection Time: 08/13/19  9:59 AM  
Result Value Ref Range Color YELLOW Appearance CLEAR Specific gravity 1.012 1.001 - 1.023    
 pH (UA) 6.5 5.0 - 9.0 Protein NEGATIVE  NEG mg/dL Glucose NEGATIVE  mg/dL Ketone NEGATIVE  NEG mg/dL Bilirubin NEGATIVE  NEG Blood NEGATIVE  NEG Urobilinogen 1.0 0.2 - 1.0 EU/dL Nitrites NEGATIVE  NEG Leukocyte Esterase NEGATIVE  NEG    
GLUCOSE, POC Collection Time: 08/13/19 11:11 AM  
Result Value Ref Range Glucose (POC) 174 (H) 65 - 100 mg/dL Imaging: 
 
 
ASSESSMENT: 
Problem List  Date Reviewed: 8/12/2019 Codes Class Noted Fever ICD-10-CM: R50.9 ICD-9-CM: 780.60  8/12/2019 Nausea and vomiting ICD-10-CM: R11.2 ICD-9-CM: 787.01  8/12/2019 Hypertension (Chronic) ICD-10-CM: I10 
ICD-9-CM: 401.9  8/12/2019 Depression (Chronic) ICD-10-CM: F32.9 ICD-9-CM: 468  8/12/2019 Postoperative CSF leak ICD-10-CM: N75.51 
ICD-9-CM: 997.09  7/22/2019 Hyponatremia ICD-10-CM: E87.1 ICD-9-CM: 276.1  7/18/2019 GBM (glioblastoma multiforme) (HCC) ICD-10-CM: C71.9 ICD-9-CM: 191.9  6/26/2019 Asbestosis ([address] ICD-10-CM: F40 ICD-9-CM: 939  6/22/2019 Overview Signed 6/25/2019  7:59 AM by Sandra Abbott, [address] [place] [address] Last Assessment & Plan:  
 Was Diagnosed in November Madai Henson Appears Asymptomatic Currently. Not Short of Breath Breathing Well. RECOMMENDATIONS: 
Intractable vomiting 
- Now resolved with addition of decadron (on 4mg every 6 hours). Was on 4mg daily. Likely can taper soon. Add PPI for prophylaxis. Fever/? Intracranial post op infection - Internal gas bubbles seen on CT On zosyn/vancomycin. ID consulted. MRI pending. Neurosurgery following GBM 
- Planning to start temodar/radiation. Now on hold until work up and treatment of infection complete Lab studies and imaging studies (CT head) were personally reviewed. Thank you for allowing us to participate in the care of Mr. Ruben Frausto. We will gladly assume care of our patient and request transfer to 5th floor Egdar Banks NP [facility] [address] [place] [address] Office : [phone] Fax : [phone]

## 2019-08-13 NOTE — PROGRESS NOTES
NEUROSURGERY PROGRESS NOTE:  
Admit Date: 8/12/2019 Subjective: No acute overnight events. Tmax of 99.9 since admission, WBC 5.2 Objective: 
Visit Vitals /69 Pulse (!) 102 Temp 98.6 °F (37 °C) Resp 18 Ht 5' 8\" (1.727 m) Wt 205 lb (93 kg) SpO2 93% BMI 31.17 kg/m² General: No acute distress Awake, alert, and oriented to person, place, Some word finding and expressive speech difficulties. Eyes open spontaneously PERRL, EOMI Hearing grossly intact Face symmetric and tongue mid-line on protrusion Patient with 5/5 strength in the bilateral upper and bilateral lower extremities Left craniotomy incision healed with elevation of the bone flap and two small areas of dehiscences without purulent drainage today. Assessment and Plan:  
Shivam Wallace 59 y.o. male who presented with fever and complaints of nausea and emesis since stopping decadron. persistent intracranial air at the resection cavity with stable ephraim-tumoral edema without acute process. He is doing well today. - MRI Brain WWO contrast is pending today  
- His symptoms have resolved with decadron - Will continue to monitor - Please call with questions or concerns Camilo Jorge A. Augusta Fitzgerald [facility] [address] [facility]

## 2019-08-13 NOTE — PROGRESS NOTES
Initial visit to assess pt's spiritual needs. Feeling today? ok 
 
Receiving good care? yes Needs from Spiritual Care:  Nothing at this time Ministry of presence & prayer to demonstrate caring & concern, convey emotional & spiritual support.  
 
jag Townsend MDiv,[facility],PhD

## 2019-08-13 NOTE — PROGRESS NOTES
TRANSFER - IN REPORT: 
 
Verbal report received from Pilo Vieira RN on Cristiano Fine being received from ED for routine progression of care Report consisted of patients Situation, Background, Assessment and Recommendations(SBAR). Information from the following report(s) SBAR, Kardex, [place] and Recent Results was reviewed with the receiving nurse. Opportunity for questions and clarification was provided. Assessment completed upon patients arrival to unit and care assumed.

## 2019-08-13 NOTE — ROUTINE PROCESS
TRANSFER - OUT REPORT: 
 
Verbal report given to LASHAUN(name) on Shivam Wallace  being transferred to [facility](unit) for routine progression of care Report consisted of patients Situation, Background, Assessment and  
Recommendations(SBAR). Information from the following report(s) SBAR was reviewed with the receiving nurse. Lines:  
Peripheral IV 08/12/19 Left Antecubital (Active) Peripheral IV 08/12/19 Left Antecubital (Active) Peripheral IV 08/12/19 Right Antecubital (Active) Opportunity for questions and clarification was provided. Patient transported with: 
 Registered Nurse

## 2019-08-13 NOTE — PROGRESS NOTES
Pharmacokinetic Consult to Pharmacist 
 
Laury Gomez is a 59 y.o. male being treated for SSTI with vancomycin and zosyn. Height: 5' 8\" (172.7 cm)  Weight: 93 kg (205 lb) Lab Results Component Value Date/Time BUN 10 08/12/2019 06:03 PM  
 Creatinine 0.96 08/12/2019 06:03 PM  
 WBC 10.6 08/12/2019 06:03 PM  
 Procalcitonin <0.1 07/17/2019 05:19 PM  
 Lactic Acid (POC) 0.80 08/12/2019 07:12 PM  
  
Estimated Creatinine Clearance: 88.2 mL/min (based on SCr of 0.96 mg/dL). CULTURES: 
8/12 BCx: pending Day 1 of vancomycin. Goal trough is 10-20. Vancomycin dose initiated at 2.5g load x1, then 1.5g q 12h. Will continue to follow patient. Thank you, 
Rojelio Keller, PharmD Clinical Pharmacist 
[phone]

## 2019-08-13 NOTE — PROGRESS NOTES
Problem: Patient Education: Go to Patient Education Activity Goal: Patient/Family Education Outcome: Resolved/Not Met OCCUPATIONAL THERAPY: Initial Assessment, Discharge and AM 8/13/2019 INPATIENT:   
Payor: MEDICAID PENDING / Plan: [place] PENDING / Product Type: Medicaid /  
  
NAME/AGE/GENDER: Camila Galdamez is a 59 y.o. male PRIMARY DIAGNOSIS:  Sepsis ([facility] [address] [A41.9] <principal problem not specified> 
 <principal problem not specified> 
 
  
  
ICD-10: Treatment Diagnosis:  
 Generalized Muscle Weakness (M62.81) Precautions/Allergies: 
   Bee venom protein (honey bee) and Iodine and iodide containing products ASSESSMENT:  
 
Mr. Viki Frost presents for sepsis with c/o headache and vomiting. Upon arrival, pt supine in bed and wife at bedside. Pt is alert, oriented x 4, and agreeable to OT evaluation. Pt reports living with wife in a 1-story home with 0 steps to enter. At baseline, pt reports independence with all ADLs and functional mobility with no use of any DME. Pt does not drive; wife provides transport. No fall hx. Today, pt seems to be functioning at baseline for overall activity tolerance, strength, ADL performance, and functional mobility. Pt denies requiring any further OT services. Will defer to PT for functional mobility. Pt to be discharged from further OT services. This section established at most recent assessment PROBLEM LIST (Impairments causing functional limitations): 
Decreased Strength Decreased Activity Tolerance INTERVENTIONS PLANNED: (Benefits and precautions of occupational therapy have been discussed with the patient.) 
discharge TREATMENT PLAN: Frequency/Duration: discharge Rehabilitation Potential For Stated Goals: discharge REHAB RECOMMENDATIONS (at time of discharge pending progress):   
Placement:  
It is my opinion, based on this patient's performance to date, that Mr. Viki Frost may benefit from being discharged with NO further skilled therapy due to a proven ability to function at baseline. Equipment: TBD OCCUPATIONAL PROFILE AND HISTORY:  
History of Present Injury/Illness (Reason for Referral): 
See H&P Past Medical History/Comorbidities:  
Mr. Marquis Sandy  has a past medical history of Asbestosis ([address] (6/22/2019) and Brain mass (6/21/2019). Mr. Marquis Sandy  has a past surgical history that includes hx hernia repair (Left); hx lumbar laminectomy; and hx craniotomy (06/26/2019). Social History/Living Environment:  
Home Environment: Private residence # Steps to Enter: 0 One/Two Story Residence: One story Living Alone: No 
Support Systems: Spouse/Significant Other/Partner Patient Expects to be Discharged to[de-identified] Private residence Current DME Used/Available at Home: sandra Yeh Prior Level of Function/Work/Activity: 
Independent with ADLs and functional mobility. Personal Factors:   
      Sex:  male Age:  59 y.o. Other factors that influence how disability is experienced by the patient:  multiple co-morbidities Number of Personal Factors/Comorbidities that affect the Plan of Care: Brief history (0):  LOW COMPLEXITY ASSESSMENT OF OCCUPATIONAL PERFORMANCE[de-identified]  
Activities of Daily Living:  
Basic ADLs (From Assessment) Complex ADLs (From Assessment) Feeding: Independent Oral Facial Hygiene/Grooming: Independent Bathing: Supervision Upper Body Dressing: Independent Lower Body Dressing: Independent Toileting: Independent Instrumental ADL Meal Preparation: Minimum assistance Homemaking: Minimum assistance Grooming/Bathing/Dressing Activities of Daily Living Cognitive Retraining Safety/Judgement: Awareness of environment Bed/Mat Mobility Rolling: Supervision Supine to Sit: Supervision Sit to Supine: Supervision Scooting: Supervision Most Recent Physical Functioning:  
Gross Assessment: 
  
         
  
Posture: 
  
Balance: 
Sitting: Intact Bed Mobility: Rolling: Supervision Supine to Sit: Supervision Sit to Supine: Supervision Scooting: Supervision Wheelchair Mobility: 
  
Transfers: 
   
 
    
 
Patient Vitals for the past 6 hrs: 
 BP SpO2 Pulse 19 0535 135/74 92 % 85  
19 1030 122/69 93 % 84 Mental Status Neurologic State: Alert Orientation Level: Oriented X4 Cognition: Follows commands Perception: Appears intact Perseveration: No perseveration noted Safety/Judgement: Awareness of environment Physical Skills Involved: Activity Tolerance Cognitive Skills Affected (resulting in the inability to perform in a timely and safe manner): 
none  Psychosocial Skills Affected: 
Habits/Routines Environmental Adaptation Number of elements that affect the Plan of Care: 5+:  HIGH COMPLEXITY CLINICAL DECISION MAKIN[address] [place] [address] AM-PAC 6 Clicks Daily Activity Inpatient Short Form How much help from another person does the patient currently need. .. Total A Lot A Little None 1. Putting on and taking off regular lower body clothing? ? 1   ? 2   ? 3   ? 4  
2. Bathing (including washing, rinsing, drying)? ? 1   ? 2   ? 3   ? 4  
3. Toileting, which includes using toilet, bedpan or urinal?   ? 1   ? 2   ? 3   ? 4  
4. Putting on and taking off regular upper body clothing? ? 1   ? 2   ? 3   ? 4  
5. Taking care of personal grooming such as brushing teeth? ? 1   ? 2   ? 3   ? 4  
6. Eating meals? ? 1   ? 2   ? 3   ? 4  
© , Trustees of [address] [place] [address], under license to [employer]. All rights reserved Score:  Initial: 23 Most Recent: X (Date: -- ) Interpretation of Tool:  Represents activities that are increasingly more difficult (i.e. Bed mobility, Transfers, Gait). Medical Necessity:    
Eval and d/c. 
Reason for Services/Other Comments: 
Eval and d/c. Use of outcome tool(s) and clinical judgement create a POC that gives a: LOW COMPLEXITY  
 
 
 
TREATMENT:  
 (In addition to Assessment/Re-Assessment sessions the following treatments were rendered) Pre-treatment Symptoms/Complaints:  No complaints Pain: Initial:  
Pain Intensity 1: 0 /10 Post Session:  same Assessment/Reassessment only, no treatment provided today Braces/Orthotics/Lines/Etc:  
O2 Device: Room air Treatment/Session Assessment:   
Response to Treatment:  eval only. Interdisciplinary Collaboration: Occupational Therapist 
Registered Nurse After treatment position/precautions:  
Supine in bed Bed/Chair-wheels locked Bed in low position Call light within reach Family at bedside Compliance with Program/Exercises: Will assess as treatment progresses. Recommendations/Intent for next treatment session: \"Next visit will focus on advancements to more challenging activities and reduction in assistance provided\". Total Treatment Duration: OT Patient Time In/Time Out Time In: 9538 Time Out: 9894 Francisca Heart, OT

## 2019-08-13 NOTE — PROGRESS NOTES
Notified patient's wife Jackie of transfer to 18. Care update provided. Belongings taken. MRI to send patient to new room for transfer

## 2019-08-13 NOTE — PROGRESS NOTES
Verbal bedside report received from [address] [place] [address], [facility]. Patient's situation, background, assessment and recommendations were provided. Kardex, Mar, and recent results also reviewed. Opportunity for questions provided. Assumed care of patient.

## 2019-08-13 NOTE — CONSULTS
Infectious Disease Consult Today's Date: 8/13/2019 Admit Date: 8/12/2019 Impression:  
· Enterobacter post op wound infection after craniotomy (superficial wound drainage 7/16/19), CT concerning for possible secondary infection associated with temporal mass · Fever, leukocytosis: resolving · S/p (6/26/19) craniotomy for high grade malignant GBM, PO chemo/XRT planned · Nausea, vomiting: resolved Plan:  
· Change Zosyn to Cefepime and flagyl · Continue Vancomycin for now · Follow pending cultures, MRI Anti-infectives: · Zosyn 8/12- · Vanc 8/12- 
· cipro 7/22-8/7 Subjective:  
Date of Consultation:  August 13, 2019 Referring Physician: Dr Clinton Pedraza Patient is a 59 y.o. male who was diagnosed with malignant high grade glioblastoma multiforme in ~6/2019 and on 6/26/19 underwent a craniotomy. He was re-admitted 7/17-7/23 with AMS, drainage from the incision Enterobacter cloacae, Na 125, brain imaging with midline shift, started on Manitol then HD decadron taper, and cipro for 2 weeks. He was discharged home and was doing well, Wednesday last week he completed Decadron and Cipro, Friday he noted chills, Temp 100.3, symptoms progressing to continued nausea and emesis through Monday. Incision continues to ooze from a pinpoint area. Pt denies headaches, redness or warmth. CT on admission confirmed enlarging mass seen on MRI 7/25/19, but raised the possibility of gas bubble within the mass concerning for infection. BC are pending, UA benign. Decadron has been resumed and symptoms have resolved. MRI is pending and he is on Vanc, zosyn. ID consulted to assist with treatment recommendations. Patient Active Problem List  
Diagnosis Code  Asbestosis ([facility] J61  
 GBM (glioblastoma multiforme) ([facility] [address] C71.9  Hyponatremia E87.1  Postoperative CSF leak G97.82  Fever R50.9  Nausea and vomiting R11.2  Hypertension I10  
 Depression F32.9 Past Medical History:  
Diagnosis Date  Asbestosis ([address] 6/22/2019 Last Assessment & Plan:   Was Diagnosed in November . Appears Asymptomatic Currently. Not Short of Breath Breathing Well.  Brain mass 6/21/2019 Last Assessment & Plan:  Is a neurosurgical consultation with Dr. Ketan Sanabria  is evaluated the patient. Patient is on IV Decadron every 6 hourly. Patient has evidence of a left-sided temporoparietal mass  I Understanded to the plan is for surgery On Wednesday Will change decadrone to 4 mg po qid  Pt will follow with Dr Ketan Sanabria outpatient. Family History Problem Relation Age of Onset  Lung Disease Father Social History Tobacco Use  Smoking status: Former Smoker  Smokeless tobacco: Never Used Substance Use Topics  Alcohol use: Not Currently Past Surgical History:  
Procedure Laterality Date  HX CRANIOTOMY  06/26/2019  
 left Frontotemporal crani for GBM  
 HX HERNIA REPAIR Left  HX LUMBAR LAMINECTOMY Prior to Admission medications Medication Sig Start Date End Date Taking? Authorizing Provider  
clonazePAM (KLONOPIN) 0.5 mg tablet Take 0.5 mg by mouth. 8/1/19 8/31/19 Yes Provider, Historical  
citalopram (CELEXA) 10 mg tablet Take 10 mg by mouth. 8/1/19 7/31/20 Yes Provider, Historical  
bisoprolol-hydroCHLOROthiazide (ZIAC) 2.5-6.25 mg per tablet Take 1 Tab by mouth. 8/1/19 7/31/20 Yes Provider, Historical  
temozolomide (TEMODAR) 140 mg capsule 1 Cap daily. 7/16/19  Yes Arina Browning MD  
diphenhydrAMINE (BENADRYL) 25 mg capsule Take 50 mg by mouth nightly. Provider, Historical  
fluticasone propionate (FLONASE) 50 mcg/actuation nasal spray 1 [place] by Nasal route. Provider, Historical  
 
 
Allergies Allergen Reactions  Bee Venom Protein (Honey Bee) Swelling  Iodine And Iodide Containing Products Unknown (comments) Contrast dye Review of Systems:  A comprehensive review of systems was negative except for that written in the History of Present Illness. Objective:  
 
Visit Vitals /69 Pulse 84 Temp 98.6 °F (37 °C) Resp 18 Ht 5' 8\" (1.727 m) Wt 93 kg (205 lb) SpO2 93% BMI 31.17 kg/m² Temp (24hrs), Av.9 °F (37.2 °C), Min:98.1 °F (36.7 °C), Max:99.9 °F (37.7 °C) Lines:  Peripheral IV:   Multiple RUE/LUE Physical Exam:   
General:  Alert, cooperative, well noursished, well developed, appears stated age Eyes:  Sclera anicteric. Pupils equally round and reactive to light. Mouth/Throat: Mucous membranes normal, oral pharynx clear Head: Left temporal incisional defect, small pinhole with scant drainage Lungs:   Clear to auscultation bilaterally, good effort CV:  Regular rate and rhythm,no murmur, click, rub or gallop Abdomen:   Soft, non-tender. bowel sounds normal. non-distended Extremities: No cyanosis or edema Skin: Skin color, texture, turgor normal. no acute rash or lesions Lymph nodes: Cervical and supraclavicular normal  
Musculoskeletal: No swelling or deformity Lines/Devices:  Intact, no erythema, drainage or tenderness Psych: Alert and oriented, normal mood affect given the setting Data Review: CBC: 
Recent Labs 19 
0404 19 WBC 5.2 10.6 GRANS 73 74 MONOS 4 7 EOS 0* 1 ANEU 3.8 7.9 ABL 1.2 1.8 HGB 13.1* 14.3 HCT 38.2* 41.6  280 BMP: 
Recent Labs 19 
0404 19 CREA 0.85 0.96 BUN 13 10  133* K 4.1 4.6  101 CO2 26 25 AGAP 8 7 * 123* LFTS: 
Recent Labs 19 TBILI 0.8 ALT 33 SGOT 45* AP 78  
TP 7.7 ALB 3.1* Microbiology:  
 
All Micro Results Procedure Component Value Units Date/Time CULTURE, BLOOD [[id number]] Collected:  19 Order Status:  Completed Specimen:  Blood Updated:  19 Special Requests: --     
  RIGHT Antecubital 
  
  Culture result: NO GROWTH AFTER 13 HOURS     
 CULTURE, BLOOD [[id number]] Collected:  19 1801 Order Status:  Completed Specimen:  Whole Blood Updated:  19 7773 Special Requests: LAC Culture result: NO GROWTH AFTER 13 HOURS Imagin19 CT head IMPRESSION:    
  
1. WHILE THE APPROXIMATELY 6 CM LEFT TEMPORAL MASS APPEARS STABLE IN SIZE FROM 
, THE PRESENCE OF INTERNAL GAS BUBBLES SUGGESTS THE POSSIBILITY OF 
SECONDARY INFECTION. 
  
2. STABLE MINIMAL MIDLINE SHIFT WITHOUT SIGNIFICANT VENTRICULOMEGALY. . 
 
Signed By: Sandra Lazar NP 2019

## 2019-08-13 NOTE — PROGRESS NOTES
Problem: Mobility Impaired (Adult and Pediatric) Goal: *Acute Goals and Plan of Care (Insert Text) Description No goals set due to patient functioning at baseline. Outcome: Progressing Towards Goal 
  
PHYSICAL THERAPY: Initial Assessment and Discharge 8/13/2019 INPATIENT:   
Payor: MEDICAID PENDING / Plan: [employer] PENDING / Product Type: Medicaid /   
  
NAME/AGE/GENDER: Wilberto Ortiz is a 59 y.o. male PRIMARY DIAGNOSIS: Sepsis ([facility] [A41.9] <principal problem not specified> 
 <principal problem not specified> 
 
  
  
ICD-10: Treatment Diagnosis:  
 Other abnormalities of gait and mobility (R26.89) Precaution/Allergies: 
Bee venom protein (honey bee) and Iodine and iodide containing products ASSESSMENT:  
 
Mr. Maral Becerril presents supine in bed with son at bedside. He was pleasant and agreeable for PT. States that he has been receiving speech therapy prior to admission. Patient transitioned to sit independently. He stood independently and ambulated 250' with no difficulties or losses of balance. Patient appears to be functioning at baseline, no skilled PT warranted. Will discontinue. This section established at most recent assessment REHAB RECOMMENDATIONS (at time of discharge pending progress):   
Placement: It is my opinion, based on this patient's performance to date, that Mr. Maral Becerril may benefit from being discharged with NO further skilled therapy due to a proven ability to function at baseline. Equipment:  
None at this time HISTORY:  
History of Present Injury/Illness (Reason for Referral): 
Per MD note, \"  
Mr. Maral Becerril is a 60 yo male with PMH of HTN, glioblastoma multiforme diagnosed 6,2019 s/p craniotomy who is evaluated with fever, nausea and emesis. He is followed by Dr. Mateo Browne of neurosurgery and hematology. He did note that he abruptly stopped decadron a few weeks ago.  He has had fevers for several days with associated nausea and emesis. No pain. No vision changes, some mild headache. Wife has noted mild amounts of sero sanguinous wound drainage from craniotomy site. He has received zofran and decadron while in the ED with good improvement. CT head shows stable size of left temporal mass with internal gas bubbles. Per Dr. Arturo George of the ED, she has spoke with neurosurgery representative and CT is ok. Per patient, he took his first dose of oral chemo today, however had subsequent emesis. He is pending start to radiation. \" 
Past Medical History/Comorbidities:  
Mr. Raul Amador  has a past medical history of Asbestosis ([address] (6/22/2019) and Brain mass (6/21/2019). Mr. Raul Amador  has a past surgical history that includes hx hernia repair (Left); hx lumbar laminectomy; and hx craniotomy (06/26/2019). Social History/Living Environment:  
Home Environment: Private residence # Steps to Enter: 0 One/Two Story Residence: One story Living Alone: No 
Support Systems: Spouse/Significant Other/Partner Patient Expects to be Discharged to[de-identified] Private residence Current DME Used/Available at Home: sandra English Prior Level of Function/Work/Activity: 
Lives with wife, independent. Number of Personal Factors/Comorbidities that affect the Plan of Care: 0: LOW COMPLEXITY EXAMINATION:  
Most Recent Physical Functioning:  
Gross Assessment: 
AROM: Within functional limits Strength: Within functional limits Sensation: Intact Posture: 
Posture (WDL): Exceptions to [facility] Posture Assessment: Forward head, Rounded shoulders Balance: 
Sitting: Intact Standing: Intact Bed Mobility: 
Rolling: Independent Supine to Sit: Independent Sit to Supine: Independent Scooting: Independent Wheelchair Mobility: 
  
Transfers: 
Sit to Stand: Independent Stand to Sit: Independent Gait: 
  
Distance (ft): 250 Feet (ft) Ambulation - Level of Assistance: Independent Body Structures Involved: None Body Functions Affected: 
None Activities and Participation Affected: 
Communication Number of elements that affect the Plan of Care: 1-2: LOW COMPLEXITY CLINICAL PRESENTATION:  
Presentation: Stable and uncomplicated: LOW COMPLEXITY CLINICAL DECISION [employer] [place] [address] AM-PAC 6 Clicks Basic Mobility Inpatient Short Form How much difficulty does the patient currently have. .. Unable A Lot A Little None 1. Turning over in bed (including adjusting bedclothes, sheets and blankets)? ? 1   ? 2   ? 3   ? 4  
2. Sitting down on and standing up from a chair with arms ( e.g., wheelchair, bedside commode, etc.)   ? 1   ? 2   ? 3   ? 4  
3. Moving from lying on back to sitting on the side of the bed?   ? 1   ? 2   ? 3   ? 4 How much help from another person does the patient currently need. .. Total A Lot A Little None 4. Moving to and from a bed to a chair (including a wheelchair)? ? 1   ? 2   ? 3   ? 4  
5. Need to walk in hospital room? ? 1   ? 2   ? 3   ? 4  
6. Climbing 3-5 steps with a railing? ? 1   ? 2   ? 3   ? 4  
© , Trustees of [address] [place] [address], under license to [employer]. All rights reserved Score:  Initial: 24 Most Recent: X (Date: -- ) Interpretation of Tool:  Represents activities that are increasingly more difficult (i.e. Bed mobility, Transfers, Gait). Use of outcome tool(s) and clinical judgement create a POC that gives a: Clear prediction of patient's progress: LOW COMPLEXITY  
  
 
 
 
TREATMENT:  
(In addition to Assessment/Re-Assessment sessions the following treatments were rendered) Pre-treatment Symptoms/Complaints:  none. Pain: Initial:  
Pain Intensity 1: 0  Post Session:  0 Assessment/Reassessment only, no treatment provided today Braces/Orthotics/Lines/Etc:  
IV 
O2 Device: Room air Treatment/Session Assessment:   
Response to Treatment:  pleasant and cooperative.  
Interdisciplinary Collaboration:  
Physical Therapist 
 Registered Nurse After treatment position/precautions:  
Up in chair Bed/Chair-wheels locked Call light within reach Family at bedside Compliance with Program/Exercises: Compliant all of the time, Will assess as treatment progresses Total Treatment Duration: PT Patient Time In/Time Out Time In: 1120 Time Out: 1137 A Alverto Sethi PT, DPT

## 2019-08-13 NOTE — PROGRESS NOTES
Hospitalist Progress note Admit Date:  2019  6:34 PM  
Name:  Minerva Peter Age:  59 y.o. 
:  1955 MRN:  [id number] PCP:  Esther North NP Treatment Team: Attending Provider: Chandrakant Poe MD; Consulting Provider: Juanjose Godoy MD; Consulting Provider: Valeria Roberts MD 
 
subjective:  
 
Mr. Lindsey Berry is a 58 yo male with PMH of HTN, glioblastoma multiforme diagnosed  s/p craniotomy admitted on 19 for fever, nausea and emesis. He is followed by Dr. Rosalva Ramey of neurosurgery and hematology. He did note that he abruptly stopped decadron a few weeks ago. He has had fevers for several days with associated nausea and emesis. No pain. : 
Pt reports feeling well. Denies any headache, nausea/vomiting, fever, chills, blurry vision. Discussed about possibility of intracranial infection. 10 systems reviewed and negative except as noted above. Objective:  
 
Patient Vitals for the past 24 hrs: 
 Temp Pulse Resp BP SpO2  
19 0535 98.4 °F (36.9 °C) 85 18 135/74 92 % 19 2320 98.1 °F (36.7 °C) 89 16 141/77 91 % 19 2211  86  139/81 91 % 19 1753 99.5 °F (37.5 °C) (!) 117 16 134/82 91 % Oxygen Therapy O2 Sat (%): 92 % (19 0535) Pulse via Oximetry: 86 beats per minute (19 2211) O2 Device: Room air (19 2330) No intake or output data in the 24 hours ending 19 0721 Physical Exam: 
General:    Alert. No distress Eyes:   Normal sclera. Extraocular movements intact. PERRLA 
ENT:  Normocephalic, atraumatic. MMM 
CV:   RRR. No m/r/g. No edema Lungs:  CTAB. No wheezing, rhonchi, or rales. Abdomen: Soft, nontender, nondistended. Present BS, obese Extremities: Warm and dry. . 
Neurologic:  gcs 15, no motor or sensory deficits, CN 2-12 intact Skin:     No rashes or jaundice. Normal coloration, wound on left parietal/temporal area clean and dry Psych:  Normal mood and affect. Repetitive verbiage I reviewed the labs, imaging, EKGs, telemetry, and other studies done this admission. Data Review:  
Recent Results (from the past 24 hour(s)) CBC WITH AUTOMATED DIFF Collection Time: 08/12/19  6:03 PM  
Result Value Ref Range WBC 10.6 4.3 - 11.1 K/uL  
 RBC 4.61 4.23 - 5.6 M/uL  
 HGB 14.3 13.6 - 17.2 g/dL HCT 41.6 41.1 - 50.3 % MCV 90.2 79.6 - 97.8 FL  
 MCH 31.0 26.1 - 32.9 PG  
 MCHC 34.4 31.4 - 35.0 g/dL  
 RDW 13.2 11.9 - 14.6 % PLATELET 669 366 - 648 K/uL MPV 9.8 9.4 - 12.3 FL ABSOLUTE NRBC 0.00 0.0 - 0.2 K/uL NEUTROPHILS 74 43 - 78 % LYMPHOCYTES 17 13 - 44 % MONOCYTES 7 4.0 - 12.0 % EOSINOPHILS 1 0.5 - 7.8 % BASOPHILS 0 0.0 - 2.0 % IMMATURE GRANULOCYTES 1 0.0 - 5.0 %  
 ABS. NEUTROPHILS 7.9 1.7 - 8.2 K/UL  
 ABS. LYMPHOCYTES 1.8 0.5 - 4.6 K/UL  
 ABS. MONOCYTES 0.7 0.1 - 1.3 K/UL  
 ABS. EOSINOPHILS 0.1 0.0 - 0.8 K/UL  
 ABS. BASOPHILS 0.0 0.0 - 0.2 K/UL  
 ABS. IMM. GRANS. 0.1 0.0 - 0.5 K/UL  
 DF AUTOMATED METABOLIC PANEL, COMPREHENSIVE Collection Time: 08/12/19  6:03 PM  
Result Value Ref Range Sodium 133 (L) 136 - 145 mmol/L Potassium 4.6 3.5 - 5.1 mmol/L Chloride 101 98 - 107 mmol/L  
 CO2 25 21 - 32 mmol/L Anion gap 7 7 - 16 mmol/L Glucose 123 (H) 65 - 100 mg/dL BUN 10 8 - 23 MG/DL Creatinine 0.96 0.8 - 1.5 MG/DL  
 GFR est AA >60 >60 ml/min/1.73m2 GFR est non-AA >60 >60 ml/min/1.73m2 Calcium 9.2 8.3 - 10.4 MG/DL Bilirubin, total 0.8 0.2 - 1.1 MG/DL  
 ALT (SGPT) 33 12 - 65 U/L  
 AST (SGOT) 45 (H) 15 - 37 U/L Alk. phosphatase 78 50 - 136 U/L Protein, total 7.7 6.3 - 8.2 g/dL Albumin 3.1 (L) 3.2 - 4.6 g/dL Globulin 4.6 (H) 2.3 - 3.5 g/dL A-G Ratio 0.7 (L) 1.2 - 3.5 LIPASE Collection Time: 08/12/19  6:03 PM  
Result Value Ref Range Lipase 182 73 - 393 U/L  
POC LACTIC ACID Collection Time: 08/12/19  7:12 PM  
Result Value Ref Range Lactic Acid (POC) 0.80 0.5 - 1.9 mmol/L METABOLIC PANEL, BASIC Collection Time: 08/13/19  4:04 AM  
Result Value Ref Range Sodium 136 136 - 145 mmol/L Potassium 4.1 3.5 - 5.1 mmol/L Chloride 102 98 - 107 mmol/L  
 CO2 26 21 - 32 mmol/L Anion gap 8 7 - 16 mmol/L Glucose 199 (H) 65 - 100 mg/dL BUN 13 8 - 23 MG/DL Creatinine 0.85 0.8 - 1.5 MG/DL  
 GFR est AA >60 >60 ml/min/1.73m2 GFR est non-AA >60 >60 ml/min/1.73m2 Calcium 9.1 8.3 - 10.4 MG/DL  
CBC WITH AUTOMATED DIFF Collection Time: 08/13/19  4:04 AM  
Result Value Ref Range WBC 5.2 4.3 - 11.1 K/uL  
 RBC 4.20 (L) 4.23 - 5.6 M/uL  
 HGB 13.1 (L) 13.6 - 17.2 g/dL HCT 38.2 (L) 41.1 - 50.3 % MCV 91.0 79.6 - 97.8 FL  
 MCH 31.2 26.1 - 32.9 PG  
 MCHC 34.3 31.4 - 35.0 g/dL  
 RDW 13.2 11.9 - 14.6 % PLATELET 862 297 - 183 K/uL MPV 9.6 9.4 - 12.3 FL ABSOLUTE NRBC 0.00 0.0 - 0.2 K/uL  
 DF AUTOMATED NEUTROPHILS 73 43 - 78 % LYMPHOCYTES 22 13 - 44 % MONOCYTES 4 4.0 - 12.0 % EOSINOPHILS 0 (L) 0.5 - 7.8 % BASOPHILS 0 0.0 - 2.0 % IMMATURE GRANULOCYTES 1 0.0 - 5.0 %  
 ABS. NEUTROPHILS 3.8 1.7 - 8.2 K/UL  
 ABS. LYMPHOCYTES 1.2 0.5 - 4.6 K/UL  
 ABS. MONOCYTES 0.2 0.1 - 1.3 K/UL  
 ABS. EOSINOPHILS 0.0 0.0 - 0.8 K/UL  
 ABS. BASOPHILS 0.0 0.0 - 0.2 K/UL  
 ABS. IMM. GRANS. 0.1 0.0 - 0.5 K/UL All Micro Results Procedure Component Value Units Date/Time CULTURE, BLOOD [[id number]] Collected:  08/12/19 1801 Order Status:  Completed Specimen:  Blood Updated:  08/12/19 1824 CULTURE, BLOOD [[id number]] Collected:  08/12/19 1801 Order Status:  Completed Specimen:  Whole Blood Updated:  08/12/19 1824 Other Studies: Xr Chest Sngl V Result Date: 8/12/2019 EXAM: Chest x-ray. INDICATION: Fever. COMPARISON: None. TECHNIQUE: Single frontal view. FINDINGS: There is mild left lung base atelectasis or infiltrate. The right lung is clear. The heart is borderline enlarged.  No pneumothorax or pleural effusion is seen. IMPRESSION: Mild left lung base atelectasis or infiltrate. Ct Head Wo Cont Result Date: 8/12/2019 NONCONTRAST HEAD CT CLINICAL HISTORY:  Vomiting with clinical suspicion of increased intracranial pressure status post craniotomy for glioblastoma in June 2019. TECHNIQUE:  Axial images were obtained with spiral technique. Radiation dose reduction was achieved using one or all of the following techniques: automated exposure control, weight-based dosing, iterative reconstruction. COMPARISON:  MRI of July 25, 2019 and CT of July 22, 2019. REPORT:   Standard non contrast head CT demonstrates persistent left temporal mass effect again measuring approximately 6.0 x 4.4 cm. There are small areas of increased attenuation as well as gas bubbles within it. Overlying craniotomy appears unchanged. Only minimal midline shift from left-to-right appears unchanged. The ventricles are normal in size and configuration, accounting for the patient's age. Orbits and  paranasal sinuses are clear where imaged. Bone windows demonstrate no definite fracture or destruction. IMPRESSION:   1.  WHILE THE APPROXIMATELY 6 CM LEFT TEMPORAL MASS APPEARS STABLE IN SIZE FROM JULY 25, THE PRESENCE OF INTERNAL GAS BUBBLES SUGGESTS THE POSSIBILITY OF SECONDARY INFECTION. 2.  STABLE MINIMAL MIDLINE SHIFT WITHOUT SIGNIFICANT VENTRICULOMEGALY. Rogelio Chew Assessment and Plan:  
 
Hospital Problems as of 8/13/2019 Date Reviewed: 8/12/2019 Codes Class Noted - Resolved POA Fever ICD-10-CM: R50.9 ICD-9-CM: 780.60  8/12/2019 - Present Yes Nausea and vomiting ICD-10-CM: R11.2 ICD-9-CM: 787.01  8/12/2019 - Present Yes Hypertension (Chronic) ICD-10-CM: I10 
ICD-9-CM: 401.9  8/12/2019 - Present Yes Depression (Chronic) ICD-10-CM: F32.9 ICD-9-CM: 752  8/12/2019 - Present Yes GBM (glioblastoma multiforme) (HCC) ICD-10-CM: C71.9 ICD-9-CM: 191.9  6/26/2019 - Present Yes · Fever:  Resolved, no febrile episodes since admission. Continue empiric IV Vanc and zosyn. Blood cultures negative so far. ID consult to assess for intracranial post op infection. · Nausea/ emesis: resolved · Glioblastoma multiforme: oncology consult to resume care · HTN: holding antihypertensive · Obesity: likely has ADRIANA and would benefit from CPAP. Will need outpatient sleep study · Depression: continue celexa, prn klonopin Discharge planning:  Pending ID and Neurosurgery evaluation DVT ppx: SCD Code status:  Full Dispo: pending, will likely get discharged home once medically cleared Risk:  high Care plan: wife Renée Pereyra [phone] Signed: 
Joe Cruz MD

## 2019-08-13 NOTE — PROGRESS NOTES
CM met with patient confused. CM made an attempt to make contact with [facility] CM view chart and gain information. CM will continue to reach out to spouse. CM will continue to monitor. Care Management Interventions PCP Verified by CM: Manpreet Chang) Mode of Transport at Discharge: Other (see comment)(Family) Transition of Care Consult (CM Consult): Discharge Planning Discharge Durable Medical Equipment: No(Patient currently has DME's in the home such as electric wheelchair, BSC, rolling walker, cane) Physical Therapy Consult: Yes Occupational Therapy Consult: Yes Speech Therapy Consult: No 
Current Support Network: Own Home, Lives with Spouse Confirm Follow Up Transport: Family Plan discussed with Pt/Family/Caregiver: Yes Freedom of Choice Offered: Yes [employer] Information Provided?: No 
Discharge Location Discharge Placement: Unable to determine at this time

## 2019-08-14 NOTE — PROGRESS NOTES
Infectious Disease Progress Note Today's Date: 2019 Admit Date: 2019 Impression:  
· Enterobacter post op wound infection after craniotomy (superficial wound drainage 19), CT concerning for possible secondary infection associated with temporal mass · Fever, leukocytosis: resolving · S/p (19) craniotomy for high grade malignant GBM, PO chemo/XRT planned · Nausea, vomiting: resolved Plan:  
· Based on Dr. Vance García' input, would favor cessation of all IV antibiotics. 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 · ID will sign off, but please call if ?'s or concerns. Anti-infectives: · Zosyn - · Vanc - 
· cipro - Subjective:  
Date of Consultation:  2019 Referring Physician: Dr Clinton Pedraza Allergies Allergen Reactions  Bee Venom Protein (Honey Bee) Swelling  Iodine And Iodide Containing Products Unknown (comments) Contrast dye Review of Systems:  A comprehensive review of systems was negative except for that written in the History of Present Illness. Objective:  
 
Visit Vitals /81 (BP 1 Location: Right arm, BP Patient Position: At rest;Supine) Pulse 73 Temp 97.7 °F (36.5 °C) Resp 19 Ht 5' 8\" (1.727 m) Wt 93.6 kg (206 lb 6.4 oz) SpO2 90% BMI 31.38 kg/m² Temp (24hrs), Av.1 °F (36.7 °C), Min:97.6 °F (36.4 °C), Max:99 °F (37.2 °C) Lines:  Peripheral IV:   Multiple RUE/LUE Physical Exam:   
General:  Alert, cooperative, well noursished, well developed, appears stated age Head: Left temporal incisional defect, small pinhole with scant drainage Lungs:   Clear to auscultation bilaterally, good effort CV:  Regular rate and rhythm,no murmur, click, rub or gallop Abdomen:   Soft, non-tender. bowel sounds normal. non-distended Extremities: No cyanosis or edema Skin: Skin color, texture, turgor normal. no acute rash or lesions Musculoskeletal: No swelling or deformity Lines/Devices:  Intact, no erythema, drainage or tenderness Psych: Alert and oriented, normal mood affect given the setting Data Review: CBC: 
Recent Labs 19 
04019 WBC 5.2 10.6 GRANS 73 74 MONOS 4 7 EOS 0* 1 ANEU 3.8 7.9 ABL 1.2 1.8 HGB 13.1* 14.3 HCT 38.2* 41.6  280 BMP: 
Recent Labs 19 
0404 19 CREA 0.85 0.96 BUN 13 10  133* K 4.1 4.6  101 CO2 26 25 AGAP 8 7 * 123* LFTS: 
Recent Labs 19 TBILI 0.8 ALT 33 SGOT 45* AP 78  
TP 7.7 ALB 3.1* Microbiology:  
 
All Micro Results Procedure Component Value Units Date/Time CULTURE, BLOOD [[id number]] Collected:  19 Order Status:  Completed Specimen:  Whole Blood Updated:  19 Special Requests: LAC Culture result: NO GROWTH 2 DAYS     
 CULTURE, BLOOD [[id number]] Collected:  19 Order Status:  Completed Specimen:  Blood Updated:  19 Special Requests: --     
  RIGHT Antecubital 
  
  Culture result: NO GROWTH 2 DAYS Imagin19: MRI Brain w and w/o CM: Impression: The constellation of features favors progressive tumor along the inferior and 
medial margins of the left temporoparietal resection cavity, as detailed above. 19 CT head: IMPRESSION:    
1.  WHILE THE APPROXIMATELY 6 CM LEFT TEMPORAL MASS APPEARS STABLE IN SIZE FROM 
, THE PRESENCE OF INTERNAL GAS BUBBLES SUGGESTS THE POSSIBILITY OF 
SECONDARY INFECTION. 2.  STABLE MINIMAL MIDLINE SHIFT WITHOUT SIGNIFICANT VENTRICULOMEGALY. . 
 
Signed By: Naga Ramirez MD   
 2019

## 2019-08-14 NOTE — PROGRESS NOTES
Problem: Patient Education: Go to Patient Education Activity Goal: Patient/Family Education Outcome: Progressing Towards Goal 
  
Problem: Sepsis: Day 2 Goal: Off Pathway (Use only if patient is Off Pathway) Outcome: Progressing Towards Goal 
Goal: *Central Venous Pressure maintained at 8-12 mm Hg Outcome: Progressing Towards Goal 
Goal: *Hemodynamically stable Outcome: Progressing Towards Goal 
Goal: *Tolerating diet Outcome: Progressing Towards Goal 
Goal: Activity/Safety Outcome: Progressing Towards Goal 
Goal: Consults, if ordered Outcome: Progressing Towards Goal 
Goal: Diagnostic Test/Procedures Outcome: Progressing Towards Goal 
Goal: Nutrition/Diet Outcome: Progressing Towards Goal 
Goal: Discharge Planning Outcome: Progressing Towards Goal 
Goal: Medications Outcome: Progressing Towards Goal 
Goal: Respiratory Outcome: Progressing Towards Goal 
Goal: Treatments/Interventions/Procedures Outcome: Progressing Towards Goal 
Goal: Psychosocial 
Outcome: Progressing Towards Goal 
  
Problem: Sepsis: Day 3 Goal: Off Pathway (Use only if patient is Off Pathway) Outcome: Progressing Towards Goal 
Goal: *Central Venous Pressure maintained at 8-12 mm Hg Outcome: Progressing Towards Goal 
Goal: *Oxygen saturation within defined limits Outcome: Progressing Towards Goal 
Goal: *Vital sign stability Outcome: Progressing Towards Goal 
Goal: *Tolerating diet Outcome: Progressing Towards Goal 
Goal: *Demonstrates progressive activity Outcome: Progressing Towards Goal 
Goal: Activity/Safety Outcome: Progressing Towards Goal 
Goal: Consults, if ordered Outcome: Progressing Towards Goal 
Goal: Diagnostic Test/Procedures Outcome: Progressing Towards Goal 
Goal: Nutrition/Diet Outcome: Progressing Towards Goal 
Goal: Discharge Planning Outcome: Progressing Towards Goal 
Goal: Medications Outcome: Progressing Towards Goal 
Goal: Respiratory Outcome: Progressing Towards Goal 
 Goal: Treatments/Interventions/Procedures Outcome: Progressing Towards Goal 
Goal: Psychosocial 
Outcome: Progressing Towards Goal 
  
Problem: Sepsis: Day 4 Goal: Off Pathway (Use only if patient is Off Pathway) Outcome: Progressing Towards Goal 
Goal: Activity/Safety Outcome: Progressing Towards Goal 
Goal: Consults, if ordered Outcome: Progressing Towards Goal 
Goal: Diagnostic Test/Procedures Outcome: Progressing Towards Goal 
Goal: Nutrition/Diet Outcome: Progressing Towards Goal 
Goal: Discharge Planning Outcome: Progressing Towards Goal 
Goal: Medications Outcome: Progressing Towards Goal 
Goal: Respiratory Outcome: Progressing Towards Goal 
Goal: Treatments/Interventions/Procedures Outcome: Progressing Towards Goal 
Goal: Psychosocial 
Outcome: Progressing Towards Goal 
Goal: *Oxygen saturation within defined limits Outcome: Progressing Towards Goal 
Goal: *Hemodynamically stable Outcome: Progressing Towards Goal 
Goal: *Vital signs within defined limits Outcome: Progressing Towards Goal 
Goal: *Tolerating diet Outcome: Progressing Towards Goal 
Goal: *Demonstrates progressive activity Outcome: Progressing Towards Goal 
Goal: *Fluid volume maintenance Outcome: Progressing Towards Goal 
  
Problem: Sepsis: Day 5 Goal: Off Pathway (Use only if patient is Off Pathway) Outcome: Progressing Towards Goal 
Goal: *Oxygen saturation within defined limits Outcome: Progressing Towards Goal 
Goal: *Vital signs within defined limits Outcome: Progressing Towards Goal 
Goal: *Tolerating diet Outcome: Progressing Towards Goal 
Goal: *Demonstrates progressive activity Outcome: Progressing Towards Goal 
Goal: *Discharge plan identified Outcome: Progressing Towards Goal 
Goal: Activity/Safety Outcome: Progressing Towards Goal 
Goal: Consults, if ordered Outcome: Progressing Towards Goal 
Goal: Diagnostic Test/Procedures Outcome: Progressing Towards Goal 
Goal: Nutrition/Diet Outcome: Progressing Towards Goal 
Goal: Discharge Planning Outcome: Progressing Towards Goal 
Goal: Medications Outcome: Progressing Towards Goal 
Goal: Respiratory Outcome: Progressing Towards Goal 
Goal: Treatments/Interventions/Procedures Outcome: Progressing Towards Goal 
Goal: Psychosocial 
Outcome: Progressing Towards Goal 
  
Problem: Sepsis: Day 6 Goal: Off Pathway (Use only if patient is Off Pathway) Outcome: Progressing Towards Goal 
Goal: *Oxygen saturation within defined limits Outcome: Progressing Towards Goal 
Goal: *Vital signs within defined limits Outcome: Progressing Towards Goal 
Goal: *Tolerating diet Outcome: Progressing Towards Goal 
Goal: *Demonstrates progressive activity Outcome: Progressing Towards Goal 
Goal: Influenza immunization Outcome: Progressing Towards Goal 
Goal: *Pneumococcal immunization Outcome: Progressing Towards Goal 
Goal: Activity/Safety Outcome: Progressing Towards Goal 
Goal: Diagnostic Test/Procedures Outcome: Progressing Towards Goal 
Goal: Nutrition/Diet Outcome: Progressing Towards Goal 
Goal: Discharge Planning Outcome: Progressing Towards Goal 
Goal: Medications Outcome: Progressing Towards Goal 
Goal: Respiratory Outcome: Progressing Towards Goal 
Goal: Treatments/Interventions/Procedures Outcome: Progressing Towards Goal 
Goal: Psychosocial 
Outcome: Progressing Towards Goal 
  
Problem: Sepsis: Discharge Outcomes Goal: *Vital signs within defined limits Outcome: Progressing Towards Goal 
Goal: *Tolerating diet Outcome: Progressing Towards Goal 
Goal: *Verbalizes understanding and describes prescribed diet Outcome: Progressing Towards Goal 
Goal: *Demonstrates progressive activity Outcome: Progressing Towards Goal 
Goal: *Describes follow-up/return visits to physicians Outcome: Progressing Towards Goal 
Goal: *Verbalizes name, dosage, time, side effects, and number of days to continue medications Outcome: Progressing Towards Goal 
 Goal: *Influenza immunization (Oct-Mar only) Outcome: Progressing Towards Goal 
Goal: *Pneumococcal immunization Outcome: Progressing Towards Goal 
Goal: *Lungs clear or at baseline Outcome: Progressing Towards Goal 
Goal: *Oxygen saturation returns to baseline or 90% or better on room air Outcome: Progressing Towards Goal 
Goal: *Glycemic control Outcome: Progressing Towards Goal 
Goal: *Absence of deep venous thrombosis signs and symptoms(Stroke Metric) Outcome: Progressing Towards Goal 
Goal: *Describes available resources and support systems Outcome: Progressing Towards Goal 
Goal: *Optimal pain control at patient's stated goal 
Outcome: Progressing Towards Goal

## 2019-08-14 NOTE — PROGRESS NOTES
EOS Note 8/13/19: 
 
VSS, Afebrile Pain: No c/o pain during the shift. Neuro: A&Ox4. No c/o numbness or tingling. Cardio: S1/S2. SR. CV VS WNL for specified parameters. Resp: RA. Clear bilaterally. Symmetric chest wall excursion. Resp VS WNL. GI/: Voiding. Urine is yellow and clear. Last BM: 8/12/19. Stool is brown and formed. No c/o N/V. Diet: Tolerating diet. Adequate intake noted. See I&O for further details. Ambulation: Pt ambulates independently. No falls during the shift. Additional Information: ID dc'd Zosyn and started Flagyl and Cefepime. No complaints s/p transfer from 3rd floors. Continue with POC.

## 2019-08-14 NOTE — PROGRESS NOTES
TRANSFER - IN REPORT: 
 
Verbal report received from Ovid Blizzard, RN (name) on Nahomi Franco being received from 3rd Floor (unit) for routine progression of care Report consisted of patients Situation, Background, Assessment and  
Recommendations(SBAR). Information from the following report(s) SBAR, MAR and Recent Results was reviewed with the receiving nurse. Opportunity for questions and clarification was provided. Assessment completed upon patients arrival to unit and care assumed.

## 2019-08-14 NOTE — PROGRESS NOTES
08/13/19 1545 Dual Skin Pressure Injury Assessment Dual Skin Pressure Injury Assessment [facility] Second Care Provider (Based on Facility Policy) Danielle Nice RN

## 2019-08-14 NOTE — PROGRESS NOTES
END OF SHIFT NOTE: 
 
Intake/Output 08/13 1901 - 08/14 0700 In: 2124 [P.O.:840; I.V.:1284] Out: 1119 [[id number]] Voiding: YES Catheter: NO 
Drain:   
 
 
 
 
 
Stool:  0 occurrences. Stool Assessment Stool Appearance: Formed(per pt) (08/13/19 1545) Emesis:  0 occurrences. VITAL SIGNS Patient Vitals for the past 12 hrs: 
 Temp Pulse Resp BP SpO2  
08/14/19 0302 97.6 °F (36.4 °C) 91 18 (!) 146/92 92 % 08/13/19 2348 97.9 °F (36.6 °C) 95 18 152/88 92 % 08/13/19 2024 97.7 °F (36.5 °C) 82 18 149/81 92 % Pain Assessment Pain 1 Pain Scale 1: Numeric (0 - 10) (08/14/19 0130) Pain Intensity 1: 0 (08/14/19 0130) Patient Stated Pain Goal: 0 (08/14/19 0130) Pain Reassessment 1: Yes (08/13/19 1224) Ambulating Yes Additional Information:  
Remains afebrile;tolerating po well;no n/v. No new neuro deficits noted. Shift report given to oncoming nurse Lisa RN at the bedside.  
 
Izaiah Romo RN

## 2019-08-14 NOTE — NURSE NAVIGATOR
Received call from Yonatan Browne NP. Pt is being discharged and is to resume Temodar/RT today.     Margarette Faria RN

## 2019-08-14 NOTE — DISCHARGE INSTRUCTIONS
MRI of the Head: About This Test  What is it? MRI (magnetic resonance imaging) is a test that uses a magnetic field and pulses of radio wave energy to make pictures of the organs and structures inside the body. An MRI of the head can give your doctor information about your brain, eyes, ears, and nerves. When you have an MRI, you lie on a table and the table moves into the MRI machine. Why is this test done? An MRI of the head can help find problems such as tumors and infection. It also can check symptoms of a head injury or of diseases such as multiple sclerosis (MS) and stroke. How can you prepare for the test?  Talk to your doctor about all your health conditions before the test. For example, tell your doctor if:  · You are allergic to any medicines. · You are or might be pregnant. · You have a pacemaker, an artificial limb, any metal pins or metal parts in your body, metal heart valves, metal clips in your brain, metal implants in your ears, or any other implanted or prosthetic medical device. · You have an intrauterine device (IUD) in place. · You get nervous in confined spaces. You may need medicine to help you relax. · You wear a patch that contains medicine. · You have kidney disease. What happens before the test?  · You will remove all metal objects, such as hearing aids, dentures, jewelry, watches, and hairpins. · You may need to take off some of your clothes. You will be given a gown to wear during the test. If you do leave some clothes on, make sure you take everything out of your pockets. · You may have contrast material (dye) put into your arm through a tube called an IV. Contrast material helps doctors see specific organs, blood vessels, and most tumors. What happens during the test?  · You will lie on your back on a table that is part of the MRI scanner. Your head, chest, and arms may be held with straps to help you lie still.   · The table will slide into the space that contains The patients mother is calling because she is concerned that all of Jojo's issues started when she was taken off the percocet.  Please call the patients mother for further information regarding this issue.   the magnet. A device called a coil may be placed over or wrapped around your head. · Inside the scanner you will hear a fan and feel air moving. You may hear tapping, thumping, or snapping noises. You may be given earplugs or headphones to reduce the noise. · You will be asked to hold still during the scan. You may be asked to hold your breath for short periods. · You may be alone in the scanning room, but a technologist will be watching you through a window and talking with you during the test.  What else should you know about the test?  · An MRI does not hurt. · You may feel warmth in the area being examined. This is normal.  · A dye (contrast material) that contains gadolinium may be used in this test. Be sure to tell your doctor if:  ? You are pregnant or think you may be pregnant. ? You have kidney problems. ? You've had more than one test that used gadolinium. · The U.[place] Food and Drug Administration (FDA) has safety warnings about gadolinium. But for most people, the benefit of its use in this test outweighs the risk. · If a dye is used, you may feel a quick sting or pinch and some coolness when the IV is started. The dye may give you a metallic taste in your mouth. Some people feel sick to their stomach or get a headache. · If you breastfeed and are concerned about whether the dye used in this test is safe, talk to your doctor. Most experts believe that very little dye passes into breast milk and even less is passed on to the baby. But if you prefer, you can store some of your breast milk ahead of time and use it for a day or two after the test.  How long does the test take? · The test usually takes 30 to 60 minutes but can take as long as 2 hours. What happens after the test?  · You will probably be able to go home right away, depending on the reason for the test.  · You can go back to your usual activities right away. Follow-up care is a key part of your treatment and safety.  Be sure to make and go to all appointments, and call your doctor if you are having problems. It's also a good idea to keep a list of the medicines you take. Ask your doctor when you can expect to have your test results. Where can you learn more? Go to http://sudha-tiffanie.info/. Enter L170 in the search box to learn more about \"MRI of the Head: About This Test.\"  Current as of: March 28, 2019  Content Version: 12.1  © 2008-9888 [employer]. Care instructions adapted under license by [employer] (which disclaims liability or warranty for this information). If you have questions about a medical condition or this instruction, always ask your healthcare professional. [address] any warranty or liability for your use of this information. Learning About Fever  What is a fever? A fever is a high body temperature. It's one way your body fights being sick. A fever shows that the body is responding to infection or other illnesses, both minor and severe. A fever is a symptom, not an illness by itself. A fever can be a sign that you are ill, but most fevers are not caused by a serious problem. You may have a fever with a minor illness, such as a cold. But sometimes a very serious infection may cause little or no fever. It is important to look at other symptoms, other conditions you have, and how you feel in general. In children, notice how they act and see what symptoms they complain of. What is a normal body temperature? A normal body temperature is about 98. 6ºF. Some people have a normal temperature that is a little higher or a little lower than this. Your temperature may be a little lower in the morning than it is later in the day. It may go up during hot weather or when you exercise, wear heavy clothes, or take a hot bath. Your temperature may also be different depending on how you take it.  A temperature taken in the mouth (oral) or under the arm may be a little lower than your core temperature (rectal). What is a fever temperature? A core temperature of 100.4°F or above is considered a fever. What can cause a fever? A fever may be caused by:  · Infections. This is the most common cause of a fever. Examples of infections that can cause a fever include the flu, a kidney infection, or pneumonia. · Some medicines. · Severe trauma or injury, such as a heart attack, stroke, heatstroke, or burns. · Other medical conditions, such as arthritis and some cancers. How can you treat a fever at home? · Ask your doctor if you can take an over-the-counter pain medicine, such as acetaminophen (Tylenol), ibuprofen (Advil, Motrin), or naproxen (Aleve). Be safe with medicines. Read and follow all instructions on the label. · To prevent dehydration, drink plenty of fluids. Choose water and other caffeine-free clear liquids until you feel better. If you have kidney, heart, or liver disease and have to limit fluids, talk with your doctor before you increase the amount of fluids you drink. Follow-up care is a key part of your treatment and safety. Be sure to make and go to all appointments, and call your doctor if you are having problems. It's also a good idea to know your test results and keep a list of the medicines you take. Where can you learn more? Go to http://sudha-tiffanie.info/. Enter T250 in the search box to learn more about \"Learning About Fever. \"  Current as of: September 23, 2018  Content Version: 12.1  © 1541-8328 [employer]. Care instructions adapted under license by [employer] (which disclaims liability or warranty for this information). If you have questions about a medical condition or this instruction, always ask your healthcare professional. [address] any warranty or liability for your use of this information.          DISCHARGE SUMMARY from Nurse    PATIENT INSTRUCTIONS:    After general anesthesia or intravenous sedation, for 24 hours or while taking prescription Narcotics:  · Limit your activities  · Do not drive and operate hazardous machinery  · Do not make important personal or business decisions  · Do  not drink alcoholic beverages  · If you have not urinated within 8 hours after discharge, please contact your surgeon on call. Report the following to your surgeon:  · Excessive pain, swelling, redness or odor of or around the surgical area  · Temperature over 100.5  · Nausea and vomiting lasting longer than 4 hours or if unable to take medications  · Any signs of decreased circulation or nerve impairment to extremity: change in color, persistent  numbness, tingling, coldness or increase pain  · Any questions    What to do at Home:  Recommended activity: Activity as directed by your doctor. If you experience any of the following symptoms:  Uncontrolled pain or nausea/ vomiting,  Confusion,  please follow up with your doctor. *  Please give a list of your current medications to your Primary Care Provider. *  Please update this list whenever your medications are discontinued, doses are      changed, or new medications (including over-the-counter products) are added. *  Please carry medication information at all times in case of emergency situations. These are general instructions for a healthy lifestyle:    No smoking/ No tobacco products/ Avoid exposure to second hand smoke  Surgeon General's Warning:  Quitting smoking now greatly reduces serious risk to your health.     Obesity, smoking, and sedentary lifestyle greatly increases your risk for illness    A healthy diet, regular physical exercise & weight monitoring are important for maintaining a healthy lifestyle    You may be retaining fluid if you have a history of heart failure or if you experience any of the following symptoms:  Weight gain of 3 pounds or more overnight or 5 pounds in a week, increased swelling in our hands or feet or shortness of breath while lying flat in bed. Please call your doctor as soon as you notice any of these symptoms; do not wait until your next office visit. The discharge information has been reviewed with the patient. The patient verbalized understanding. Discharge medications reviewed with the patient and appropriate educational materials and side effects teaching were provided.   ___________________________________________________________________________________________________________________________________

## 2019-08-14 NOTE — DISCHARGE SUMMARY
[facility]: Inpatient Hematology / Oncology Discharge Summary Note Patient ID: 
Val Vasquez [id number] 
26 y.o. 
1955 Admit Date: 8/12/2019 Discharge Date: 8/14/2019 Admission Diagnoses: Sepsis ([facility] [address] [A41.9] Discharge Diagnoses: 
Principal Diagnosis: <principal problem not specified> Active Problems: 
  GBM (glioblastoma multiforme) ([facility] [address] (6/26/2019) Fever (8/12/2019) Nausea and vomiting (8/12/2019) Hypertension (8/12/2019) Depression (8/12/2019) Hospital Course: Mr. Tonja Gonzalez is a 59 y.o. male admitted on 8/12/2019 with a primary diagnosis of vomiting, weakness, fever with recent dx GBM. He is a patient of Dr. Tone Antunez with newly diagnosed glioblastoma. He is planned for temodar and radiation (took 1 temodar tab yesterday but had ongoing emesis). Radiation was held due to intractable vomiting, weakness and fever (101 at home). He had been off his decadron since 8/7. He was seen at the office and sent to the ER for evaluation of the above symptoms. Of note, he recently was treated for enterobacter from wound culture from inclusion and he was treated with cipro.  
  
He was started back on steroids in the ER with resolution of neurological symptoms. His aphasia is improved and he has not had any further emesis. CT head done and showed stable mass and stable midline shift but did show internal gas bubbles suggesting possible secondary infection. He has been started on zosyn and vancomycin then changed to cefepime/flagyl/vancomycin per ID. MRI brain was done and was not concerning for infection. ID felt he could discharge off antibiotics. If drainage increases again, could resume cipro outpatient. Neurosurgery recommended continuing decadron. He will discharge on 4mg BID and continue to taper outpatient as directed by the oncology team.  He will start temodar and radiation today.   He is aware to call with any progressive neurological symptoms, fevers, chills or other worrisome symptoms. Additionally, he will start bactrim prophylaxis Consults: 
IP CONSULT TO NEUROSURGERY 
IP CONSULT TO NEUROSURGERY 
IP CONSULT TO HEMATOLOGY 
IP CONSULT TO INFECTIOUS DISEASES Pertinent Diagnostic Studies:  
Labs:   
Recent Labs 08/13/19 
0404 08/12/19 
1803 WBC 5.2 10.6 HGB 13.1* 14.3  280 ANEU 3.8 7.9 Recent Labs 08/13/19 
0404 08/12/19 
1803  133* K 4.1 4.6  101 CO2 26 25 * 123* BUN 13 10 CREA 0.85 0.96  
CA 9.1 9.2 SGOT  --  45* AP  --  78  
TP  --  7.7 ALB  --  3.1* Imaging: MRI BRAIN W WO CONT [[id number]] Collected: 08/13/19 1527 Order Status: Completed Updated: 08/13/19 1555 Narrative:    
EXAMINATION: BRAIN MRI 8/13/2019 3:14 PM 
 
ACCESSION NUMBER: [id number] INDICATION: Neoplasm - CNS primary Additional clinical history from the electronic medical record: Glioblastoma has 
not yet started radiation therapy COMPARISON: Brain MRI 6/22/2019, 6/26/2019, 7/25/2019, head CT 8/12/2019, head CT 7/22/2019, 7/17/2019, 6/27/2019 TECHNIQUE: Multiplanar multisequence MRI of the brain without and with 
intravenous administration of 20 cc Dotarem contrast agent. FINDINGS: 
 
Surgical changes status post left-sided craniotomy for resection of the now 
known glioblastoma. The resection cavity and surrounding residual infiltrative tumor have enlarged, 
measuring 6.5 x 4.9 x 5.8 cm (AP x transverse x craniocaudal) (axial T2-weighted 
image 16 and coronal postcontrast image 29). This previously measured 5.8 x 3.6 
x 5.8 cm in a similar plane (remeasurement on the prior exam was performed for 
consistency). There is significant residual tumor along the anterior inferior margins of the 
resection cavity, best evidenced as T2 hypointensity along the operative margins. The residual tumor is likely worsening. This is best evidenced by the 
worsening thick restricted diffusion along the inferior and medial margins of 
the operative cavity (axial diffusion images 15-17). T2 hyperintensity surrounding the operative cavity is not significantly 
different in comparison to the prior exam. 
 
Left-to-right midline shift is minimally improved. There continues to be 
substantial mass effect upon the left lateral ventricle. A small encephalocele at the craniotomy site is less prominent than on the prior 
exam. 
 
T2 hyperintensities in the periventricular subcortical white matter are 
nonspecific but most likely reflect changes of chronic microangiopathy. The expected large central intracranial vascular flow voids are preserved. Diffusion imaging shows no evidence of acute ischemic infarction. There is expected chronic hemosiderin deposition at the operative site. T1 
hyperintensity marginating the cortex posterior to the operative site most 
likely represents cortical laminar necrosis. There are no suspicious osseous lesions. Impression:    
IMPRESSION: 
 
The constellation of features favors progressive tumor along the inferior and 
medial margins of the left temporoparietal resection cavity, as detailed above. VOICE DICTATED BY: Dr. Reena ELLISON CHEST SNGL V [[id number]] Collected: 08/12/19 2340 Order Status: Completed Updated: 08/12/19 2343 Narrative:    
EXAM: Chest x-ray. INDICATION: Fever. COMPARISON: None. TECHNIQUE: Single frontal view. FINDINGS: There is mild left lung base atelectasis or infiltrate. The right lung 
is clear. The heart is borderline enlarged. No pneumothorax or pleural effusion 
is seen. Impression:    
IMPRESSION: Mild left lung base atelectasis or infiltrate. CT HEAD WITHOUT CONTRAST [[id number]] Collected: 08/12/19 1953 Order Status: Completed Updated: 08/12/19 2002 Narrative:    
 NONCONTRAST HEAD CT 
 
CLINICAL HISTORY:  Vomiting with clinical suspicion of increased intracranial 
pressure status post craniotomy for glioblastoma in June 2019. TECHNIQUE:  Axial images were obtained with spiral technique.  Radiation dose 
reduction was achieved using one or all of the following techniques: automated 
exposure control, weight-based dosing, iterative reconstruction. COMPARISON:  MRI of July 25, 2019 and CT of July 22, 2019. REPORT:   Standard non contrast head CT demonstrates persistent left temporal 
mass effect again measuring approximately 6.0 x 4.4 cm.  There are small areas 
of increased attenuation as well as gas bubbles within it. Overlying craniotomy 
appears unchanged. Only minimal midline shift from left-to-right appears 
unchanged.  The ventricles are normal in size and configuration, accounting for 
the patient's age. Grayson Passer and  paranasal sinuses are clear where imaged. Bone 
windows demonstrate no definite fracture or destruction. Impression:    
IMPRESSION:    
 
1.  WHILE THE APPROXIMATELY 6 CM LEFT TEMPORAL MASS APPEARS STABLE IN SIZE FROM 
JULY 25, THE PRESENCE OF INTERNAL GAS BUBBLES SUGGESTS THE POSSIBILITY OF 
SECONDARY INFECTION. 2.  STABLE MINIMAL MIDLINE SHIFT WITHOUT SIGNIFICANT VENTRICULOMEGALY. [facility] Current Discharge Medication List  
  
START taking these medications Details  
dexAMETHasone (DECADRON) 4 mg tablet Take 4 mg by mouth two (2) times daily (with meals). Qty: 60 Tab, Refills: 2  
  
pantoprazole (PROTONIX) 40 mg tablet Take 1 Tab by mouth Daily (before breakfast). Qty: 30 Tab, Refills: 2  
  
trimethoprim-sulfamethoxazole (BACTRIM DS) 160-800 mg per tablet Take 1 Tab by mouth BID Mon Wed & Fri. Take while on temodar 
Qty: 24 Tab, Refills: 1 CONTINUE these medications which have NOT CHANGED Details  
clonazePAM (KLONOPIN) 0.5 mg tablet Take 0.5 mg by mouth.  
  
citalopram (CELEXA) 10 mg tablet Take 10 mg by mouth. bisoprolol-hydroCHLOROthiazide (ZIAC) 2.5-6.25 mg per tablet Take 1 Tab by mouth. temozolomide (TEMODAR) 140 mg capsule 1 Cap daily. Qty: 30 Cap, Refills: 1 diphenhydrAMINE (BENADRYL) 25 mg capsule Take 50 mg by mouth nightly. fluticasone propionate (FLONASE) 50 mcg/actuation nasal spray 1 [place] by Nasal route. OBJECTIVE: 
Patient Vitals for the past 8 hrs: 
 BP Temp Pulse Resp SpO2  
19 0805 135/81 97.7 °F (36.5 °C) 73 19 90 % 19 0800   76   Temp (24hrs), Av °F (36.7 °C), Min:97.6 °F (36.4 °C), Max:99 °F (37.2 °C) 
 
 0701 -  1900 In: 360 [P.O.:360] Out: - Physical Exam: 
Constitutional: Well developed, well nourished male in no acute distress, sitting up in bed HEENT: Normocephalic and atraumatic. Oropharynx is clear, mucous membranes are moist.   Neck supple Lymph node Deferred Skin Warm and dry. No bruising and no rash noted. No erythema. No pallor. Respiratory Lungs are clear to auscultation bilaterally without wheezes, rales or rhonchi, normal air exchange without accessory muscle use. CVS Normal rate, regular rhythm and normal S1 and S2. No murmurs, gallops, or rubs. Abdomen Soft, nontender and nondistended, normoactive bowel sounds. No palpable mass. No hepatosplenomegaly. Neuro Grossly nonfocal with no obvious sensory or motor deficits. MSK Normal range of motion in general.  No edema and no tenderness. Psych Appropriate mood and affect. ASSESSMENT: 
 
Active Problems: 
  GBM (glioblastoma multiforme) ([facility] [address] (2019) Fever (2019) Nausea and vomiting (2019) Hypertension (2019) Depression (2019) DISPOSITION: 
Follow-up Appointments Procedures  FOLLOW UP VISIT Appointment in: 3 - 5 Days - OV with Dr Renee/OZZY in 1 week  
  - OV with Dr Vidal in 1 week Standing Status:   Standing Number of Occurrences:   1 Order Specific Question:   Appointment in Answer:   3 - 5 Days Over 30 minutes was spent in discharge planning and coordination of care. Roshan Dickson NP [facility] Hematology & Oncology [address] [place] [address] Office : [phone] Fax : [phone]

## 2019-08-14 NOTE — PROGRESS NOTES
NEUROSURGERY PROGRESS NOTE:  
Admit Date: 8/12/2019 Subjective: No acute overnight events. Tmax of 99.9 since admission, WBC 5.2 on presentation. Denies any drainage from his craniotomy incision or swelling. He states he feels better today. Objective: 
Visit Vitals /81 (BP 1 Location: Right arm, BP Patient Position: At rest;Supine) Pulse 73 Temp 97.7 °F (36.5 °C) Resp 19 Ht 5' 8\" (1.727 m) Wt 206 lb 6.4 oz (93.6 kg) SpO2 90% BMI 31.38 kg/m² General: No acute distress Awake, alert, and oriented to person, place, Some word finding and expressive speech difficulties. Eyes open spontaneously PERRL, EOMI Hearing grossly intact Face symmetric and tongue mid-line on protrusion Patient with 5/5 strength in the bilateral upper and bilateral lower extremities Left craniotomy incision healed with elevation of the bone flap and two small areas of dehiscences without drainage Assessment and Plan:  
Wilberto Ortiz 59 y.o. male who presented with fever and complaints of nausea and emesis since stopping decadron. persistent intracranial air at the resection cavity with stable ephraim-tumoral edema without acute process. He is doing well today. He underwent an MRI brain with and without contrast yesterday demonstrates a 6.5 x 4.9 x 5.8 cm residual infiltrative tumor that is enlarged in the left temporal resection cavity residual tumor is slightly worsened in comparison to previous imaging. The overall intracranial encephalocele and air has decreased significantly present previous imaging. At this time there is no definitive imaging evidence concerning for an overwhelming infection that require any cranial reexploration time. - Will continue to monitor - Recommend continuing high-dose steroids - No acute neurosurgical intervention is necessary at this time - Please call with questions or concerns Shannan Talbert. [facility] [id number] [facility]

## 2019-08-19 NOTE — PROGRESS NOTES
8/19/2019 Saw the patient with Rekha Grullon NP. He is here today for hospital follow up. He started his temodar on the 12th and is taking his antibiotic MWF. He is taking Decadron 4 bid and we will not wean yet. He will have some labs drawn today. He is also having concurrent radiation. Will continue to follow.

## 2019-08-20 NOTE — PROGRESS NOTES
Patient: Keesha Treviño MRN: [id number]  SSN: [id number]    YOB: 1955  Age: 59 y.o. Sex: male      DIAGNOSIS:  Left fronto-temporal glioblastoma s/p resection and Gliadel wafer placement    SITE TREATED AND DOSE DELIVERED:  Partial brain has received 1000 cGy of 4600 cGy in 5/23 fractions. Boost to follow. He is receiving concurrent Temodar. SUBJECTIVE:  Keesha Treviño is a 59 y.o. male being treated for Left fronto-temporal glioblastoma. He is doing well without complaints week 1. He denies headaches or any neurological changes. Foods don't taste good. No other complaints. OBJECTIVE:  No findings. There were no vitals taken for this visit. Lab Results   Component Value Date/Time    Sodium 136 08/19/2019 02:02 PM    Potassium 4.0 08/19/2019 02:02 PM    Chloride 103 08/19/2019 02:02 PM    CO2 26 08/19/2019 02:02 PM    Anion gap 7 08/19/2019 02:02 PM    Glucose 140 (H) 08/19/2019 02:02 PM    BUN 17 08/19/2019 02:02 PM    Creatinine 1.14 08/19/2019 02:02 PM    GFR est AA >60 08/19/2019 02:02 PM    GFR est non-AA >60 08/19/2019 02:02 PM    Calcium 9.5 08/19/2019 02:02 PM    Albumin 3.3 08/19/2019 02:02 PM    Protein, total 7.6 08/19/2019 02:02 PM    Globulin 4.3 (H) 08/19/2019 02:02 PM    A-G Ratio 0.8 (L) 08/19/2019 02:02 PM    AST (SGOT) 16 08/19/2019 02:02 PM    ALT (SGPT) 29 08/19/2019 02:02 PM     Lab Results   Component Value Date/Time    WBC 13.8 (H) 08/19/2019 02:02 PM    HGB 14.6 08/19/2019 02:02 PM    HCT 42.3 08/19/2019 02:02 PM    PLATELET 441 03/29/2633 02:02 PM       ASSESSMENT and PLAN:  Keesha Treviño is tolerating radiation as anticipated for the current dose and fraction. We will continue on as planned with another treatment visit anticipated next week.         Darron Thrasher MD   August 20, 2019

## 2019-08-27 NOTE — PROGRESS NOTES
Patient: Shivam Wallace MRN: [id number]  SSN: [id number]    YOB: 1955  Age: 59 y.o. Sex: male      DIAGNOSIS:  Left fronto-temporal glioblastoma s/p resection and Gliadel wafer placement    SITE TREATED AND DOSE DELIVERED:  Partial brain has received 2000 cGy of 4600 cGy in 10/23 fractions. Boost to follow. He is receiving concurrent Temodar. SUBJECTIVE:  Shivam Wallace is a 59 y.o. male being treated for Left fronto-temporal glioblastoma. He is doing well without complaints week 1. He denies headaches or any neurological changes. Foods don't taste good. No other complaints. Week 2 no changes. OBJECTIVE:  No findings. There were no vitals taken for this visit. Lab Results   Component Value Date/Time    Sodium 137 08/27/2019 02:07 PM    Potassium 3.3 (L) 08/27/2019 02:07 PM    Chloride 103 08/27/2019 02:07 PM    CO2 28 08/27/2019 02:07 PM    Anion gap 6 (L) 08/27/2019 02:07 PM    Glucose 114 (H) 08/27/2019 02:07 PM    BUN 13 08/27/2019 02:07 PM    Creatinine 1.06 08/27/2019 02:07 PM    GFR est AA >60 08/27/2019 02:07 PM    GFR est non-AA >60 08/27/2019 02:07 PM    Calcium 9.2 08/27/2019 02:07 PM    Albumin 3.2 08/27/2019 02:07 PM    Protein, total 6.9 08/27/2019 02:07 PM    Globulin 3.7 (H) 08/27/2019 02:07 PM    A-G Ratio 0.9 (L) 08/27/2019 02:07 PM    AST (SGOT) 19 08/27/2019 02:07 PM    ALT (SGPT) 28 08/27/2019 02:07 PM     Lab Results   Component Value Date/Time    WBC 11.7 (H) 08/27/2019 02:07 PM    HGB 14.0 08/27/2019 02:07 PM    HCT 41.2 08/27/2019 02:07 PM    PLATELET 878 07/48/4946 02:07 PM       ASSESSMENT and PLAN:  Shivam Wallace is tolerating radiation as anticipated for the current dose and fraction. We will continue on as planned with another treatment visit anticipated next week.         Tiny Rivera MD   August 27, 2019

## 2019-08-27 NOTE — PROGRESS NOTES
8/27/2019 Saw the patient with Krista Dickerson NP. The patient is not talking because he tells his wife that \"no one understands me anyway\". She feels he is showing signs of depression and hopelessness. He started Celexa 3 weeks ago so we will give it a little more time before increasing. His wife tells us he states everything is bitter. His wife brings up a study of a drug RK15. She does report he has some secretions in his throat and Nanda Salinas has asked him to try mucinex and drink more water. He continues on decadron bid. We will have him decrease to one in morning and one half in pm.  Will continue to follow.

## 2019-09-03 NOTE — PROGRESS NOTES
9/3/2019 Saw the patient with Dr Yaquelin Gant. He has 14 radiation treatments left. He is doing well on decreased decadron from last week. His speech pattern is worsening per the family. Dr Yaquelin Gant reminded them that the radiation can cause a little irritation which causes this. Dr Yaquelin Gant spent some time discussing Optune with the patient and his wife and emphasized the commitment this would take at wearing 18 hours per day for at least 2 years. Will continue to follow.

## 2019-09-09 NOTE — PROGRESS NOTES
9/9/2019 Saw the patient with Desiree Shoulder NP. He is continuing with radiation and Temodar. He continues on the decadron 4mg bid. He has some irritation at the incision and we have asked him to talk with radiation. Will continue to follow.

## 2019-09-10 NOTE — PROGRESS NOTES
Patient: Davin Quiroga MRN: [id number]  SSN: [id number]    YOB: 1955  Age: 59 y.o. Sex: male      DIAGNOSIS:  Left fronto-temporal glioblastoma s/p resection and Gliadel wafer placement    SITE TREATED AND DOSE DELIVERED:  Partial brain has received 3800 cGy of 4600 cGy in 19/23 fractions. Boost to follow. He is receiving concurrent Temodar. SUBJECTIVE:  Davin Quiroga is a 59 y.o. male being treated for Left fronto-temporal glioblastoma. He is doing well without complaints week 1. He denies headaches or any neurological changes. Foods don't taste good. No other complaints. Week 2 no changes. Week 3 no changes. Week 4 no changes. OBJECTIVE:  No findings. There were no vitals taken for this visit. Lab Results   Component Value Date/Time    Sodium 140 09/09/2019 01:52 PM    Potassium 3.1 (L) 09/09/2019 01:52 PM    Chloride 104 09/09/2019 01:52 PM    CO2 28 09/09/2019 01:52 PM    Anion gap 8 09/09/2019 01:52 PM    Glucose 106 (H) 09/09/2019 01:52 PM    BUN 15 09/09/2019 01:52 PM    Creatinine 0.98 09/09/2019 01:52 PM    GFR est AA >60 09/09/2019 01:52 PM    GFR est non-AA >60 09/09/2019 01:52 PM    Calcium 8.5 09/09/2019 01:52 PM    Albumin 3.4 09/09/2019 01:52 PM    Protein, total 6.9 09/09/2019 01:52 PM    Globulin 3.5 09/09/2019 01:52 PM    A-G Ratio 1.0 (L) 09/09/2019 01:52 PM    AST (SGOT) 19 09/09/2019 01:52 PM    ALT (SGPT) 35 09/09/2019 01:52 PM     Lab Results   Component Value Date/Time    WBC 11.7 (H) 09/09/2019 01:52 PM    HGB 14.6 09/09/2019 01:52 PM    HCT 43.4 09/09/2019 01:52 PM    PLATELET 334 08/41/8951 01:52 PM       ASSESSMENT and PLAN:  Davin Quiroga is tolerating radiation as anticipated for the current dose and fraction. We will continue on as planned with another treatment visit anticipated next week.         Farnaz Lamas MD   September 10, 2019

## 2019-09-17 NOTE — PROGRESS NOTES
Patient: Zelalem Ocampo MRN: [id number]  SSN: [id number]    YOB: 1955  Age: 59 y.o. Sex: male      DIAGNOSIS:  Left fronto-temporal glioblastoma s/p resection and Gliadel wafer placement    SITE TREATED AND DOSE DELIVERED:  Partial brain has received 4600 cGy of 4600 cGy in 23/23 fractions. Boost has received 2 Gy of 14 Gy in 1/7 fractions. He is receiving concurrent Temodar. SUBJECTIVE:  Zelalem Ocampo is a 59 y.o. male being treated for Left fronto-temporal glioblastoma. He is doing well without complaints week 1. He denies headaches or any neurological changes. Foods don't taste good. No other complaints. Week 2 no changes. Week 3 no changes. Week 4 no changes. Week 5 no changes. OBJECTIVE:  No findings. There were no vitals taken for this visit. Lab Results   Component Value Date/Time    Sodium 140 09/09/2019 01:52 PM    Potassium 3.1 (L) 09/09/2019 01:52 PM    Chloride 104 09/09/2019 01:52 PM    CO2 28 09/09/2019 01:52 PM    Anion gap 8 09/09/2019 01:52 PM    Glucose 106 (H) 09/09/2019 01:52 PM    BUN 15 09/09/2019 01:52 PM    Creatinine 0.98 09/09/2019 01:52 PM    GFR est AA >60 09/09/2019 01:52 PM    GFR est non-AA >60 09/09/2019 01:52 PM    Calcium 8.5 09/09/2019 01:52 PM    Albumin 3.4 09/09/2019 01:52 PM    Protein, total 6.9 09/09/2019 01:52 PM    Globulin 3.5 09/09/2019 01:52 PM    A-G Ratio 1.0 (L) 09/09/2019 01:52 PM    AST (SGOT) 19 09/09/2019 01:52 PM    ALT (SGPT) 35 09/09/2019 01:52 PM     Lab Results   Component Value Date/Time    WBC 11.7 (H) 09/09/2019 01:52 PM    HGB 14.6 09/09/2019 01:52 PM    HCT 43.4 09/09/2019 01:52 PM    PLATELET 213 00/89/3682 01:52 PM       ASSESSMENT and PLAN:  Zelalem Ocampo is tolerating radiation as anticipated for the current dose and fraction. We will continue on as planned with another treatment visit anticipated next week. He will try to taper off of dexamethasone slowly. Current dose is 4 mg BID.  He will start 4 mg mornings and 2 mg evenings.        Casimiro Mims MD   September 17, 2019

## 2019-09-24 NOTE — PROGRESS NOTES
Patient: Clarisse Malik MRN: [id number]  SSN: [id number]    YOB: 1955  Age: 59 y.o. Sex: male      DIAGNOSIS:  Left fronto-temporal glioblastoma s/p resection and Gliadel wafer placement    SITE TREATED AND DOSE DELIVERED:  Partial brain has received 4600 cGy of 4600 cGy in 23/23 fractions. Boost has received 12 Gy of 14 Gy in 6/7 fractions. He is receiving concurrent Temodar. SUBJECTIVE:  Clarisse Malik is a 59 y.o. male being treated for Left fronto-temporal glioblastoma. He is doing well without complaints week 1. He denies headaches or any neurological changes. Foods don't taste good. No other complaints. Week 2 no changes. Week 3 no changes. Week 4 no changes. Week 5 no changes. Week 6 no changes. OBJECTIVE:  No findings. There were no vitals taken for this visit. Lab Results   Component Value Date/Time    Sodium 138 09/19/2019 01:20 PM    Potassium 3.6 09/19/2019 01:20 PM    Chloride 102 09/19/2019 01:20 PM    CO2 25 09/19/2019 01:20 PM    Anion gap 11 09/19/2019 01:20 PM    Glucose 86 09/19/2019 01:20 PM    BUN 12 09/19/2019 01:20 PM    Creatinine 1.21 09/19/2019 01:20 PM    GFR est AA >60 09/19/2019 01:20 PM    GFR est non-AA >60 09/19/2019 01:20 PM    Calcium 9.2 09/19/2019 01:20 PM    Albumin 3.5 09/19/2019 01:20 PM    Protein, total 7.1 09/19/2019 01:20 PM    Globulin 3.6 (H) 09/19/2019 01:20 PM    A-G Ratio 1.0 (L) 09/19/2019 01:20 PM    AST (SGOT) 11 (L) 09/19/2019 01:20 PM    ALT (SGPT) 34 09/19/2019 01:20 PM     Lab Results   Component Value Date/Time    WBC 11.4 (H) 09/19/2019 01:20 PM    HGB 14.9 09/19/2019 01:20 PM    HCT 43.3 09/19/2019 01:20 PM    PLATELET 671 53/48/4942 01:20 PM       ASSESSMENT and PLAN:  Clarisse Malik is tolerating radiation as anticipated for the current dose and fraction. We will continue on as planned. He will complete treatment tomorrow and then return for follow-up in 1 month. He will try to taper off of dexamethasone slowly. Current dose is 2 mg BID from 4 mg BID. He is tolerating taper without difficulty.       Yazmin Hilario MD   September 24, 2019

## 2019-09-25 NOTE — DISCHARGE SUMMARY
Patient: Phyllis Emmanuel MRN: [id number]  SSN: [id number]    YOB: 1955  Age: 59 y.o. Sex: male      Phyllis Emmanuel is a 59 y.o. male who was seen by radiation oncology at the request of Dr. Alex Warren. He presented to Dr. Tl Ho on 06/25/19 with a 2 to three-week history of language dysfunction and difficulty forming words. He denied headaches, nausea or vomiting. He had no focal weakness. MRI of the brain on 06/26/19 showed a heterogeneous enhancing intra-axial mass within the left temporal lobe measuring 5.3 x 4.4 x 4.1 cm. There was slight extension into the inferior aspect of the posterior margin of the left frontal lobe. Mass effect is seen upon the left lateral ventricle. 3 mm of left-to-right midline shift was noted. Differential diagnosis included meningioma versus glioma. 06/26/19 he was taken to the OR by Dr. Tl Ho for stealth guided left frontal temporal craniotomy for resection with intraoperative Gliadel wafer placement. 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 He was discharged home on 06/28/19 on dexamethasone 2 mg QID.      He was seen by Dr. Marissa Eagle on 07/16/19. At that time he was noted to have residual effusion. He had weaned off steroids. He had recently run a fever of 101.9 and was placed on antibiotics. The discussed postoperative treatment with  Radiation and concurrent Temodar as well as the possibility of Optune after completion of radiation therapy.      On 07/17/19 he was admitted to the ICU secondary to nausea with dry heaves, lethargy and fevers. CT scan of the head on 07/17/19 showed subcutaneous gas, as well as pneumocephalus in the left temporoparietal region slightly increased from prior scan. There was worsening of diffuse edema throughout the left cerebral hemisphere  And mass effect on the left lateral ventricle, as well as the third ventricle which was mostly effaced. 9 mm of rightward midline shift was noted.   There was no evidence of acute hemorrhage. He was seen by Dr. Beltran Tubbs and noted to have edema \"so strong that it has elevated the bone flap out of its normal position. \" he was placed on dexamethasone 4 mg every 6 hours and mannitol 25 g every 6 hours. He was placed on vancomycin and Rocephin. By the following day he was much improved. Rocephin was switched to cefepime per pharmacy recommendations. CT scan of the head on 07/22/19  Showed improvement in the left cerebral hemisphere edema and rightward midline shift, now 5 mm compared with 9 mm previously.      On 07/22/19 he was seen by Dr. Radha Vasquez for further discussion of chemoradiotherapy. At their meeting the patient was noted to have expressive aphasia, but otherwise doing well. He was provided a prescription for Temodar to start with initiation of radiation therapy.      He was discharged on 07/24/19 and placed on Cipro 750 mg BID ×14 days, as well as dexamethasone 2 mg BID. He was seen by Dr. Debbie Macario on 7/24/2019 to discuss radiotherapy. The recommendation was for adjuvant radiation with concurrent chemotherapy.      Please see the details of his treatment below as well as my plans for future care and surveillance. Please do not hesitate to call with questions or concerns at any time. DIAGNOSIS:Left fronto-temporal glioblastoma s/p resection and Gliadel wafer placement.     PREVIOUS TREATMENT:    1) 06/26/19 he was taken to the OR by Dr. Beltran Tubbs for stealth guided left frontal temporal craniotomy for resection with intraoperative Gliadel wafer placement    TREATMENT DATES:    1) Brain: 8/14/2019 - 9/16/2019  2) Brain boost: 9/17/2019 - 9/25/2019     ANATOMIC SITE:  Brain    DOSE:    1) Brain: 4600 cGy in 23 fractions  2) Brain boost: 1400 cGy in 7 fractions    BEAM ARRANGEMENT:    1) Brain: IMRT - 6MV  2) Brain boost: IMRT - 6MV    CHEMOTHERAPY: Temodar    TREATMENT COURSE:  Edelmira Patel did well without complaints week 1.  He denied headaches or any neurological changes. Food did not taste good. No other complaints. Reported no changes with weeks 2 through 6. There were no immediate complications. Treatment was successfully completed. PLAN:  Taper decadron - current dose 2mg bid from 4mg bid. The patient will be seen in follow up in 4 weeks to assess acute and sub acute side effects.       Nasim Razo NP

## 2019-10-29 NOTE — PROGRESS NOTES
1 Month Follow Up  MRI Brain - 11/12/2019  Dr Charline Pagan - 11/19/2019  History of Gliaclel wafer  RT End - 09/25/2019 , Temodar / RT  Maintenance Temodar  Taking Dexamethasone 2MG daily  Optune - 10/23/2019      Tiago Luu, CMA

## 2019-10-29 NOTE — PROGRESS NOTES
Patient: Jordi Worrell MRN: [id number]  SSN: [id number]    YOB: 1955  Age: 59 y.o. Sex: male      Other Providers:    MD Debora Motta MD    CHIEF COMPLAINT: Brain tumor    DIAGNOSIS: Left fronto-temporal glioblastoma s/p resection and Gliadel wafer placement    PREVIOUS TREATMENT:    1) 06/26/19 he was taken to the OR by Dr. Ashanti Corrales for stealth guided left frontal temporal craniotomy for resection with intraoperative Gliadel wafer placement  2) Radiation to brain. Dose: Brain: 4600 cGy in 23 fractions, Brain boost: 1400 cGy in 7 fractions. Treatment dates: 8/14/2019 - 9/25/2019  3) Started maintenance Temodar: 10/23/19  4) Optune placement: 10/23/19    HISTORY OF PRESENT ILLNESS:  Jordi Worrell is a 59 y.o. male seen initially by Dr. Celina Bennett at the request of Dr. Arnie Rosario. He presented to Dr. Ashanti Corrales on 06/25/19 with a 2 to three-week history of language dysfunction and difficulty forming words. He denied headaches, nausea or vomiting. He had no focal weakness. MRI of the brain on 06/26/19 showed a heterogeneous enhancing intra-axial mass within the left temporal lobe measuring 5.3 x 4.4 x 4.1 cm. There was slight extension into the inferior aspect of the posterior margin of the left frontal lobe. Mass effect is seen upon the left lateral ventricle. 3 mm of left-to-right midline shift was noted. Differential diagnosis included meningioma versus glioma. 06/26/19 he was taken to the OR by Dr. Ashanti Corrales for stealth guided left frontal temporal craniotomy for resection with intraoperative Gliadel wafer placement. 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 He was discharged home on 06/28/19 on dexamethasone 2 mg QID. He was seen by Dr. Sterling Arriaza on 07/16/19. At that time he was noted to have residual effusion. He had weaned off steroids. He had recently run a fever of 101.9 and was placed on antibiotics.  The discussed postoperative treatment with  Radiation and concurrent Temodar as well as the possibility of Optune after completion of radiation therapy. On 07/17/19 he was admitted to the ICU secondary to nausea with dry heaves, lethargy and fevers. CT scan of the head on 07/17/19 showed subcutaneous gas, as well as pneumocephalus in the left temporoparietal region slightly increased from prior scan. There was worsening of diffuse edema throughout the left cerebral hemisphere  And mass effect on the left lateral ventricle, as well as the third ventricle which was mostly effaced. 9 mm of rightward midline shift was noted. There was no evidence of acute hemorrhage. He was seen by Dr. Anita Cordero and noted to have edema \"so strong that it has elevated the bone flap out of its normal position. \" he was placed on dexamethasone 4 mg every 6 hours and mannitol 25 g every 6 hours. He was placed on vancomycin and Rocephin. By the following day he was much improved. Rocephin was switched to cefepime per pharmacy recommendations. CT scan of the head on 07/22/19  Showed improvement in the left cerebral hemisphere edema and rightward midline shift, now 5 mm compared with 9 mm previously. On 07/22/19 he was seen by Dr. Sharif Nesbitt for further discussion of chemoradiotherapy. At their meeting the patient was noted to have expressive aphasia, but otherwise doing well. He was provided a prescription for Temodar to start with initiation of radiation therapy. He was discharged on 07/24/19 and placed on Cipro 750 mg BID ×14 days, as well as dexamethasone 2 mg BID. INTERVAL HISTORY:     10/29/19:  Returns 1 month out from completing radiation therapy to brain. He continues on decadron 2mg daily. He underwent placement of Optune on 10/23/19 followed by maintenance Temodar 10/28/19. He was accompanied by his wife. She states that he had significant nausea with vomiting after taking the Temodar yesterday even with taking the zofran.  As of now, he has not taken his dose today. According to his wife, he continues to have difficulty with finding words. He is able to follow commands. His hearing has decreased since surgery and radiation therapy. He denies pain. Up until starting temodar he had an excellent appetite. Overall, she states he is doing fairly well. PAST MEDICAL HISTORY:    Past Medical History:   Diagnosis Date    Asbestosis ([facility] [address] 6/22/2019    Last Assessment & Plan:   Was Diagnosed in November . Appears Asymptomatic Currently. Not Short of Breath Breathing Well.  Brain mass 6/21/2019    Last Assessment & Plan:  Is a neurosurgical consultation with Dr. Isma Hoffman  is evaluated the patient. Patient is on IV Decadron every 6 hourly. Patient has evidence of a left-sided temporoparietal mass  I Understanded to the plan is for surgery On Wednesday Will change decadrone to 4 mg po qid  Pt will follow with Dr Isma Hoffman outpatient. The patient denies history of collagen vascular diseases, pacemaker insertion, prior radiation or prior chemotherapy. PAST SURGICAL HISTORY:   Past Surgical History:   Procedure Laterality Date    HX CRANIOTOMY  06/26/2019    left Frontotemporal crani for GBM    HX HERNIA REPAIR Left     HX LUMBAR LAMINECTOMY         MEDICATIONS:     Current Outpatient Medications:     dexAMETHasone (DECADRON) 2 mg tablet, Take 1 Tab by mouth daily. , Disp: 30 Tab, Rfl: 2    trimethoprim-sulfamethoxazole (BACTRIM DS) 160-800 mg per tablet, Take 1 Tab by mouth BID Mon Wed & Fri. Take while on temodar, Disp: 24 Tab, Rfl: 1    levothyroxine (SYNTHROID) 50 mcg tablet, Take 1 Tab by mouth Daily (before breakfast). Indications: hypothyroidism, Disp: 30 Tab, Rfl: 3    temozolomide (TEMODAR) 250 mg capsule, Take one capsule daily Monday through Friday only. Take with a 140 mg capsule., Disp: 25 Cap, Rfl: 2    temozolomide (TEMODAR) 140 mg capsule, Take 1 capsule daily, Monday through Friday only.  Take with a 250 mg capsule., Disp: 1 Cap, Rfl: 2   pantoprazole (PROTONIX) 40 mg tablet, Take 1 Tab by mouth Daily (before breakfast). , Disp: 30 Tab, Rfl: 2    citalopram (CELEXA) 10 mg tablet, Take 10 mg by mouth., Disp: , Rfl:     bisoprolol-hydroCHLOROthiazide (ZIAC) 2.5-6.25 mg per tablet, Take 1 Tab by mouth., Disp: , Rfl:     diphenhydrAMINE (BENADRYL) 25 mg capsule, Take 50 mg by mouth nightly., Disp: , Rfl:     fluticasone propionate (FLONASE) 50 mcg/actuation nasal spray, 1 [place] by Nasal route., Disp: , Rfl:     ALLERGIES:   Allergies   Allergen Reactions    Bee Venom Protein (Honey Bee) Swelling    Iodine And Iodide Containing Products Unknown (comments)     Contrast dye       SOCIAL HISTORY:   Social History     Socioeconomic History    Marital status:      Spouse name: Not on file    Number of children: Not on file    Years of education: Not on file    Highest education level: Not on file   Occupational History    Not on file   Social Needs    Financial resource strain: Not on file    Food insecurity:     Worry: Not on file     Inability: Not on file    Transportation needs:     Medical: Not on file     Non-medical: Not on file   Tobacco Use    Smoking status: Former Smoker    Smokeless tobacco: Never Used   Substance and Sexual Activity    Alcohol use: Not Currently    Drug use: Not on file    Sexual activity: Not on file   Lifestyle    Physical activity:     Days per week: Not on file     Minutes per session: Not on file    Stress: Not on file   Relationships    Social connections:     Talks on phone: Not on file     Gets together: Not on file     Attends Jain service: Not on file     Active member of club or organization: Not on file     Attends meetings of clubs or organizations: Not on file     Relationship status: Not on file    Intimate partner violence:     Fear of current or ex partner: Not on file     Emotionally abused: Not on file     Physically abused: Not on file     Forced sexual activity: Not on file Other Topics Concern    Not on file   Social History Narrative    Not on file       FAMILY HISTORY:   Family History   Problem Relation Age of Onset    Lung Disease Father        REVIEW OF SYSTEMS: Please see interval history    PHYSICAL EXAMINATION:   ECOG Performance status 1-2  VITAL SIGNS: Blood pressure 109/69  Pulse 73  Temperature 98.4  Weight 209/9     GENERAL: The patient is well-developed, ambulatory, alert and in no acute distress. HEENT: Head is normocephalic, atraumatic. [place] in place. Extraocular movement intact. Some hearing loss with L>R. Oral cavity reveals no lesions. Mucous membranes are moist. NECK: Neck is supple with no masses. MUSCULOSKELETAL: Extremities reveal no cyanosis, clubbing or edema.  is 5+/5. NEURO:  Cranial nerves II-XII grossly intact. Aphasia unchanged according to wife. Muscular strength and sensation are intact throughout all four extremities. He is able to perform finger to nose test without difficulty. He has some difficulty with tandem gait.       PATHOLOGY:    06/26/19:     LABORATORY:   Lab Results   Component Value Date/Time    Sodium 137 10/22/2019 09:05 AM    Potassium 3.5 10/22/2019 09:05 AM    Chloride 100 10/22/2019 09:05 AM    CO2 30 10/22/2019 09:05 AM    Anion gap 7 10/22/2019 09:05 AM    Glucose 95 10/22/2019 09:05 AM    BUN 8 10/22/2019 09:05 AM    Creatinine 0.99 10/22/2019 09:05 AM    GFR est AA >60 10/22/2019 09:05 AM    GFR est non-AA >60 10/22/2019 09:05 AM    Calcium 9.3 10/22/2019 09:05 AM    Albumin 3.8 10/22/2019 09:05 AM    Protein, total 7.3 10/22/2019 09:05 AM    Globulin 3.5 10/22/2019 09:05 AM    A-G Ratio 1.1 (L) 10/22/2019 09:05 AM    AST (SGOT) 17 10/22/2019 09:05 AM    ALT (SGPT) 22 10/22/2019 09:05 AM     Lab Results   Component Value Date/Time    WBC 7.5 10/22/2019 09:05 AM    HGB 14.3 10/22/2019 09:05 AM    HCT 41.0 (L) 10/22/2019 09:05 AM    PLATELET 593 50/00/0335 09:05 AM       RADIOLOGY:    Mri Brain W Cont    Result Date: 6/26/2019  MRI brain with contrast HISTORY: Brain mass, Stealth protocol. TECHNIQUE: Postcontrast T1-weighted images were submitted for localization purposes. Imaging was performed on a 1.5 Ana Cristina magnet, utilizing the uneventful administration of 20 mL of intravenous  Dotarem in order to better evaluate for intracranial pathology. Multiplanar reformatted images were submitted. COMPARISON: 06/22/2019. FINDINGS: There is a heterogeneous enhancing intra-axial mass within the left temporal lobe measuring approximately 5.3 x 4.4 x 4.1 cm in AP, transverse and craniocaudal dimensions, respectively. There is slight extension into the inferior aspect of the posterior margin of the left frontal lobe. There is mass effect upon the left lateral ventricle. There is approximately 3 mm of left-to-right midline shift. No additional areas of abnormal enhancement are identified. IMPRESSION: Redemonstrated intra-axial enhancing mass within the left temporal lobe. Ct Head Wo Cont    Result Date: 7/22/2019  EXAM: Noncontrast CT head. INDICATION: Cerebral edema. COMPARISON: Prior CT head on July 17, 2019. TECHNIQUE: Axial noncontrast CT images of the head were obtained. Radiation dose reduction techniques were used for this study. Our CT scanners use one or all of the following:  Automated exposure control, adjustment of the mA and/or kV according to patient size, iterative reconstruction. FINDINGS: Again noted is a left-sided craniotomy, with pneumocephalus in the underlying left temporoparietal lobe surgical cavity. Surrounding vasogenic edema in the left cerebral hemisphere has mildly improved, as has mass effect on the left lateral ventricle and rightward midline shift. The midline shift currently measures 5 mm, previously 9 mm. No acute infarct or hemorrhage is identified. The ventricles are nondilated. IMPRESSION:  Post-surgical changes, with improved left cerebral hemisphere edema and rightward midline shift.      Ct Head Wo Cont    Result Date: 7/17/2019  Noncontrast head CT Clinical Indication: Abnormal drainage at the surgical incision site, 2-3 weeks ago patient reportedly had brain surgery for a mass, acute lethargic and nausea, fever and chills today. Technique: Noncontrast axial images were obtained through the brain. All CT scans at this location are performed using dose modulation techniques as appropriate including the following: Automated exposure control, adjustment of the MA and/or kV according to patient's size, or use of iterative reconstruction technique. Comparison: CT 6/27/2019 and MRI 6/26/2019 Findings: There are left-sided craniotomy changes. Subcutaneous gas as well as pneumocephalus in the left temporoparietal region are again seen and slightly increased since prior. There is worsening of diffuse edema throughout the left cerebral hemisphere, and mass effect on the left lateral ventricle as well as the third ventricle which are mostly effaced. The basilar cisterns are patent. There is 9 mm of rightward midline shift. No evidence of acute hyperdense hemorrhage. Possibility of residual or recurrent mass, subacute or chronic blood products, or superimposed infarcts cannot be excluded by CT. There is sulcal effacement throughout the left cerebral hemisphere. Mastoids and paranasal sinuses are aerated. No skull fracture. Craniotomy defect on the left appears mildly displaced, increased since prior. Impression: 1. No evidence of acute hemorrhage. 2. Increased midline shift, mass effect, and edema throughout the majority of the left cerebral hemisphere. Craniotomy changes again noted. Ct Head Wo Cont    Result Date: 6/27/2019  CT HEAD WITHOUT CONTRAST HISTORY:  Brain tumor. COMPARISON: None TECHNIQUE: Axial imaging was performed without intravenous contrast utilizing 5mm slice thickness. Sagittal and coronal reformats were performed. Radiation dose reduction techniques were used for this study.  Our CT scanner uses one or all of the following: Automated exposure control, adjustment of the MAS or KUB according to patient's size and iterative reconstruction. FINDINGS:    *BRAIN:    -  There are no early signs of territorial or lacunar infarction by CT.    -  A biopsy cavity is seen in the left temporal lobe. There is minimal hemorrhage and vasogenic edema.    -  No gross white matter abnormality by CT. *VISUALIZED PARANASAL SINUSES: Well aerated. *MASTOIDS:  Clear. *CALVARIUM AND SCALP: Unremarkable. IMPRESSION: Postop changes seen in the left temporal lobe. Date of Dictation: 6/27/2019 4:51 AM       IMPRESSION:  Esperanza Shabazz is a 59 y.o. male with Left fronto-temporal glioblastoma s/p resection and Gliadel wafer placement. Completed radiation along with concurrent chemotherapy (Temodar) to brain 9/25/2019. Mr Anjelica Andrade is now 1 month out from completing radiation therapy to brain. Barney Min started 10/23/19 and maintenance Temodar on 10/28/19 under the direction of medical oncology. PLAN:  1) Decadron 2mg daily - managed by medical oncology  2) Temodar along with OPTUNE managed by medical oncology  3) Brain MRI all ready scheduled 11/12/19   4) I will see him again 3 weeks  5) Add compazine - called to pharmacy. Continue zofran. Discussed if nausea not controlled, will try Sancuso patch. They will also check with medical oncology to see if the Temodar can be divided into 2 doses  6) I have had a 30 minutesfollow up with Mr Anjelica Andrade of which over 50% was spent cousleing him about continued management of his glioblastoma. All questions were answered to the best of my ability. Jorge Youssef NP   October 29, 2019        Portions of this note were copied from prior encounters and reviewed for accuracy, currency, and represent documentation and tasks completed during this encounter. I verify and attest these portions to be unchanged from prior visits.

## 2019-11-19 NOTE — PROGRESS NOTES
Patient: Gemma Gonzales MRN: [id number]  SSN: [id number]    YOB: 1955  Age: 62 y.o. Sex: male      Other Providers:    MD Leni Gutierrez MD    CHIEF COMPLAINT: Brain tumor    DIAGNOSIS: Left fronto-temporal glioblastoma s/p resection and Gliadel wafer placement    PREVIOUS TREATMENT:    1) 06/26/19 he was taken to the OR by Dr. Delio Hunt for stealth guided left frontal temporal craniotomy for resection with intraoperative Gliadel wafer placement  2) Radiation to brain. Dose: Brain: 4600 cGy in 23 fractions, Brain boost: 1400 cGy in 7 fractions. Treatment dates: 8/14/2019 - 9/25/2019  3) Started maintenance Temodar: 10/23/19  4) Optune placement: 10/23/19    HISTORY OF PRESENT ILLNESS:  Gemma Gonzales is a 62 y.o. male seen initially by Dr. Abrahan Wadsworth at the request of Dr. Lobo Almaraz. He presented to Dr. Delio Hunt on 06/25/19 with a 2 to three-week history of language dysfunction and difficulty forming words. He denied headaches, nausea or vomiting. He had no focal weakness. MRI of the brain on 06/26/19 showed a heterogeneous enhancing intra-axial mass within the left temporal lobe measuring 5.3 x 4.4 x 4.1 cm. There was slight extension into the inferior aspect of the posterior margin of the left frontal lobe. Mass effect is seen upon the left lateral ventricle. 3 mm of left-to-right midline shift was noted. Differential diagnosis included meningioma versus glioma. 06/26/19 he was taken to the OR by Dr. Delio Hunt for stealth guided left frontal temporal craniotomy for resection with intraoperative Gliadel wafer placement. 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 He was discharged home on 06/28/19 on dexamethasone 2 mg QID. He was seen by Dr. Wilbert Leija on 07/16/19. At that time he was noted to have residual effusion. He had weaned off steroids. He had recently run a fever of 101.9 and was placed on antibiotics.  The discussed postoperative treatment with  Radiation and concurrent Temodar as well as the possibility of Optune after completion of radiation therapy. On 07/17/19 he was admitted to the ICU secondary to nausea with dry heaves, lethargy and fevers. CT scan of the head on 07/17/19 showed subcutaneous gas, as well as pneumocephalus in the left temporoparietal region slightly increased from prior scan. There was worsening of diffuse edema throughout the left cerebral hemisphere  And mass effect on the left lateral ventricle, as well as the third ventricle which was mostly effaced. 9 mm of rightward midline shift was noted. There was no evidence of acute hemorrhage. He was seen by Dr. Reyes Rodriguez and noted to have edema \"so strong that it has elevated the bone flap out of its normal position. \" he was placed on dexamethasone 4 mg every 6 hours and mannitol 25 g every 6 hours. He was placed on vancomycin and Rocephin. By the following day he was much improved. Rocephin was switched to cefepime per pharmacy recommendations. CT scan of the head on 07/22/19  Showed improvement in the left cerebral hemisphere edema and rightward midline shift, now 5 mm compared with 9 mm previously. On 07/22/19 he was seen by Dr. Tai Browning for further discussion of chemoradiotherapy. At their meeting the patient was noted to have expressive aphasia, but otherwise doing well. He was provided a prescription for Temodar to start with initiation of radiation therapy. He was discharged on 07/24/19 and placed on Cipro 750 mg BID ×14 days, as well as dexamethasone 2 mg BID. INTERVAL HISTORY:     10/29/19:  Returns 1 month out from completing radiation therapy to brain. He continues on decadron 2mg daily. He underwent placement of Optune on 10/23/19 followed by maintenance Temodar 10/28/19. He was accompanied by his wife. She states that he had significant nausea with vomiting after taking the Temodar yesterday even with taking the zofran.  As of now, he has not taken his dose today. According to his wife, he continues to have difficulty with finding words. He is able to follow commands. His hearing has decreased since surgery and radiation therapy. He denies pain. Up until starting temodar he had an excellent appetite. Overall, she states he is doing fairly well.     11/18/19: Returns 2 months out from completion of radiation therapy. Ms Leone Prader reports that Mr Leone Prader was having increasingly bad days, having more difficulty with his speech. He had been on decadron 2mg daily that was increased to 4mg daily yesterday. Since that time, she and her children have noticed a great improvement with his speech. They state that his more difficult time is in the evening when they believe he is tired. He remains active. Denies headaches. Feels well overall. PAST MEDICAL HISTORY:    Past Medical History:   Diagnosis Date    Asbestosis ([address] 6/22/2019    Last Assessment & Plan:   Was Diagnosed in November . Appears Asymptomatic Currently. Not Short of Breath Breathing Well.  Brain mass 6/21/2019    Last Assessment & Plan:  Is a neurosurgical consultation with Dr. Vinicius Ortiz  is evaluated the patient. Patient is on IV Decadron every 6 hourly. Patient has evidence of a left-sided temporoparietal mass  I Understanded to the plan is for surgery On Wednesday Will change decadrone to 4 mg po qid  Pt will follow with Dr Vinicius Ortiz outpatient. The patient denies history of collagen vascular diseases, pacemaker insertion, prior radiation or prior chemotherapy.      PAST SURGICAL HISTORY:   Past Surgical History:   Procedure Laterality Date    HX CRANIOTOMY  06/26/2019    left Frontotemporal crani for GBM    HX HERNIA REPAIR Left     HX LUMBAR LAMINECTOMY         MEDICATIONS:     Current Outpatient Medications:     dexAMETHasone (DECADRON) 2 mg tablet, Take 1 Tab by mouth two (2) times a day., Disp: 30 Tab, Rfl: 2    trimethoprim-sulfamethoxazole (BACTRIM DS) 160-800 mg per tablet, Take 1 Tab by mouth BID Mon Wed & Fri. Take while on temodar, Disp: 24 Tab, Rfl: 1    levothyroxine (SYNTHROID) 50 mcg tablet, Take 1 Tab by mouth Daily (before breakfast). Indications: hypothyroidism, Disp: 30 Tab, Rfl: 3    temozolomide (TEMODAR) 250 mg capsule, Take one capsule daily Monday through Friday only. Take with a 140 mg capsule., Disp: 25 Cap, Rfl: 2    temozolomide (TEMODAR) 140 mg capsule, Take 1 capsule daily, Monday through Friday only. Take with a 250 mg capsule., Disp: 1 Cap, Rfl: 2    pantoprazole (PROTONIX) 40 mg tablet, Take 1 Tab by mouth Daily (before breakfast). , Disp: 30 Tab, Rfl: 2    citalopram (CELEXA) 10 mg tablet, Take 10 mg by mouth., Disp: , Rfl:     bisoprolol-hydroCHLOROthiazide (ZIAC) 2.5-6.25 mg per tablet, Take 1 Tab by mouth., Disp: , Rfl:     diphenhydrAMINE (BENADRYL) 25 mg capsule, Take 50 mg by mouth nightly., Disp: , Rfl:     fluticasone propionate (FLONASE) 50 mcg/actuation nasal spray, 1 Lyman by Nasal route., Disp: , Rfl:     ALLERGIES:   Allergies   Allergen Reactions    Bee Venom Protein (Honey Bee) Swelling    Iodine And Iodide Containing Products Unknown (comments)     Contrast dye       SOCIAL HISTORY:   Social History     Socioeconomic History    Marital status:      Spouse name: Not on file    Number of children: Not on file    Years of education: Not on file    Highest education level: Not on file   Occupational History    Not on file   Social Needs    Financial resource strain: Not on file    Food insecurity:     Worry: Not on file     Inability: Not on file    Transportation needs:     Medical: Not on file     Non-medical: Not on file   Tobacco Use    Smoking status: Former Smoker    Smokeless tobacco: Never Used   Substance and Sexual Activity    Alcohol use: Not Currently    Drug use: Not on file    Sexual activity: Not on file   Lifestyle    Physical activity:     Days per week: Not on file     Minutes per session: Not on file    Stress: Not on file   Relationships    Social connections:     Talks on phone: Not on file     Gets together: Not on file     Attends Restoration service: Not on file     Active member of club or organization: Not on file     Attends meetings of clubs or organizations: Not on file     Relationship status: Not on file    Intimate partner violence:     Fear of current or ex partner: Not on file     Emotionally abused: Not on file     Physically abused: Not on file     Forced sexual activity: Not on file   Other Topics Concern    Not on file   Social History Narrative    Not on file       FAMILY HISTORY:   Family History   Problem Relation Age of Onset    Lung Disease Father        REVIEW OF SYSTEMS: Please see interval history    PHYSICAL EXAMINATION:   ECOG Performance status 2  VITAL SIGNS: note flow sheet      GENERAL: The patient is well-developed, ambulatory, alert and in no acute distress. HEENT: Head is normocephalic, atraumatic. Luster Kobus in place. Extraocular movement intact. Some hearing loss with L>R. Oral cavity reveals no lesions. Mucous membranes are moist. NECK: Neck is supple with no masses. MUSCULOSKELETAL: Extremities reveal no cyanosis, clubbing or edema.  is 5+/5. NEURO:  Cranial nerves II-XII grossly intact. Aphasia unchanged according to wife. Muscular strength and sensation are intact throughout all four extremities. He is able to perform finger to nose test with some difficulty.        PATHOLOGY:    06/26/19:     LABORATORY:   Lab Results   Component Value Date/Time    Sodium 135 (L) 11/19/2019 01:18 PM    Potassium 3.2 (L) 11/19/2019 01:18 PM    Chloride 103 11/19/2019 01:18 PM    CO2 26 11/19/2019 01:18 PM    Anion gap 6 (L) 11/19/2019 01:18 PM    Glucose 106 (H) 11/19/2019 01:18 PM    BUN 8 11/19/2019 01:18 PM    Creatinine 1.04 11/19/2019 01:18 PM    GFR est AA >60 11/19/2019 01:18 PM    GFR est non-AA >60 11/19/2019 01:18 PM    Calcium 9.2 11/19/2019 01:18 PM    Albumin 3.7 11/19/2019 01:18 PM    Protein, total 7.1 11/19/2019 01:18 PM    Globulin 3.4 11/19/2019 01:18 PM    A-G Ratio 1.1 (L) 11/19/2019 01:18 PM    AST (SGOT) 9 (L) 11/19/2019 01:18 PM    ALT (SGPT) 23 11/19/2019 01:18 PM     Lab Results   Component Value Date/Time    WBC 10.7 11/19/2019 01:18 PM    HGB 14.5 11/19/2019 01:18 PM    HCT 41.4 11/19/2019 01:18 PM    PLATELET 843 08/81/0707 01:18 PM       RADIOLOGY:    Mri Brain W Cont    Result Date: 6/26/2019  MRI brain with contrast HISTORY: Brain mass, Stealth protocol. TECHNIQUE: Postcontrast T1-weighted images were submitted for localization purposes. Imaging was performed on a 1.5 Ana Cristina magnet, utilizing the uneventful administration of 20 mL of intravenous  Dotarem in order to better evaluate for intracranial pathology. Multiplanar reformatted images were submitted. COMPARISON: 06/22/2019. FINDINGS: There is a heterogeneous enhancing intra-axial mass within the left temporal lobe measuring approximately 5.3 x 4.4 x 4.1 cm in AP, transverse and craniocaudal dimensions, respectively. There is slight extension into the inferior aspect of the posterior margin of the left frontal lobe. There is mass effect upon the left lateral ventricle. There is approximately 3 mm of left-to-right midline shift. No additional areas of abnormal enhancement are identified. IMPRESSION: Redemonstrated intra-axial enhancing mass within the left temporal lobe. Ct Head Wo Cont    Result Date: 7/22/2019  EXAM: Noncontrast CT head. INDICATION: Cerebral edema. COMPARISON: Prior CT head on July 17, 2019. TECHNIQUE: Axial noncontrast CT images of the head were obtained. Radiation dose reduction techniques were used for this study. Our CT scanners use one or all of the following:  Automated exposure control, adjustment of the mA and/or kV according to patient size, iterative reconstruction.  FINDINGS: Again noted is a left-sided craniotomy, with pneumocephalus in the underlying left temporoparietal lobe surgical cavity. Surrounding vasogenic edema in the left cerebral hemisphere has mildly improved, as has mass effect on the left lateral ventricle and rightward midline shift. The midline shift currently measures 5 mm, previously 9 mm. No acute infarct or hemorrhage is identified. The ventricles are nondilated. IMPRESSION:  Post-surgical changes, with improved left cerebral hemisphere edema and rightward midline shift. Ct Head Wo Cont    Result Date: 7/17/2019  Noncontrast head CT Clinical Indication: Abnormal drainage at the surgical incision site, 2-3 weeks ago patient reportedly had brain surgery for a mass, acute lethargic and nausea, fever and chills today. Technique: Noncontrast axial images were obtained through the brain. All CT scans at this location are performed using dose modulation techniques as appropriate including the following: Automated exposure control, adjustment of the MA and/or kV according to patient's size, or use of iterative reconstruction technique. Comparison: CT 6/27/2019 and MRI 6/26/2019 Findings: There are left-sided craniotomy changes. Subcutaneous gas as well as pneumocephalus in the left temporoparietal region are again seen and slightly increased since prior. There is worsening of diffuse edema throughout the left cerebral hemisphere, and mass effect on the left lateral ventricle as well as the third ventricle which are mostly effaced. The basilar cisterns are patent. There is 9 mm of rightward midline shift. No evidence of acute hyperdense hemorrhage. Possibility of residual or recurrent mass, subacute or chronic blood products, or superimposed infarcts cannot be excluded by CT. There is sulcal effacement throughout the left cerebral hemisphere. Mastoids and paranasal sinuses are aerated. No skull fracture. Craniotomy defect on the left appears mildly displaced, increased since prior. Impression: 1. No evidence of acute hemorrhage.  2. Increased midline shift, mass effect, and edema throughout the majority of the left cerebral hemisphere. Craniotomy changes again noted. Ct Head Wo Cont    Result Date: 6/27/2019  CT HEAD WITHOUT CONTRAST HISTORY:  Brain tumor. COMPARISON: None TECHNIQUE: Axial imaging was performed without intravenous contrast utilizing 5mm slice thickness. Sagittal and coronal reformats were performed. Radiation dose reduction techniques were used for this study. Our CT scanner uses one or all of the following: Automated exposure control, adjustment of the MAS or KUB according to patient's size and iterative reconstruction. FINDINGS:    *BRAIN:    -  There are no early signs of territorial or lacunar infarction by CT.    -  A biopsy cavity is seen in the left temporal lobe. There is minimal hemorrhage and vasogenic edema.    -  No gross white matter abnormality by CT. *VISUALIZED PARANASAL SINUSES: Well aerated. *MASTOIDS:  Clear. *CALVARIUM AND SCALP: Unremarkable. IMPRESSION: Postop changes seen in the left temporal lobe.  Date of Dictation: 6/27/2019 4:51 AM       EXAMINATION: BRAIN MRI 11/12/2019      ACCESSION NUMBER: [id number]     INDICATION: hx glioblastoma, craniotomy June 2019     Additional clinical history from the electronic medical record: Has completed  radiation therapy, Gliadel wafer, on Temodar     COMPARISON: Brain MRI 8/13/2019, 7/25/2019, 6/26/2019, 6/22/2019     FINDINGS:     Surgical changes status post left-sided craniotomy for resection of the known  glioblastoma.     There are expected blood products within the operative cavity.     Significant worsening of infiltrative T2 hyperintensity surrounding the  operative cavity in the deep white matter of the posterior left frontal lobe,  anterior left parietal lobe, posterior left temporal lobe, left internal  capsule, left external capsule, left thalamus, and left midbrain has worsened in  comparison to the examination of 8/13/2019.     The operative cavity has collapsed in the interval since the 8/13/2019 exam. The  thick enhancement along the margins of the operative cavity is worse than on the  prior exam, particularly inferiorly. The combination of operative cavity and  thick enhancement measures 4.6 x 3.9 x 4.2 cm, previously 6.5 x 4.8 x 5.8 cm  (axial T2-weighted image 16 and coronal postcontrast image 27).    Abnormal diffusion along the margins of the operative cavity has improved.     There is effacement of the left lateral ventricle. Minimal left right midline  shift is unchanged.     The suprasellar and quadrigeminal plate cisterns are patent. The prepontine  cistern and foramen magnum are patent. There is no cerebellar tonsillar ectopia  or herniation.     There are no suspicious osseous lesions. IMPRESSION:     The constellation of features favors operative cavity collapse with progressive  tumor. This is best evidenced by worsening thick postcontrast enhancement along  the margins of the operative cavity and substantial worsening of perilesional T2  hyperintensity, as fully detailed above.           IMPRESSION:  Helena Maxwell is a 62 y.o. male with Left fronto-temporal glioblastoma s/p resection and Gliadel wafer placement. Completed radiation along with concurrent chemotherapy (Temodar) to brain 9/25/2019. Mr Tamara Vanegas is now 2 months out from completing radiation therapy to brain. Miguel Angelaugien Kosta started 10/23/19 and maintenance Temodar on 10/28/19 under the direction of medical oncology. Brain MRI done 11/12/19 revealed constellation of features favors operative cavity collapse with progressive tumor. MRI reviewed with Dr. Emerald Kendrick. The tumor cavity has collapsed, making it more difficult to compare to previous brain MRI. The post contrast enhancement along the margins of the operative cavity may be related to post treatment changes. After reviewing the MRI, Dr. Emerald Kendrick is in agreement with Dr. Elizabeth Blanchard that the brain MRI looks essentially stable.   Will have a better comparison with the next brain MRI when compared to this MRI. PLAN:  1) Decadron 4mg daily - managed by medical oncology  2) Temodar along with OPTUNE managed by medical oncology  3)  Schedule brain MRI for 3 months. I will see him shortly there after  4) I will see him again 3 weeks  5) I have had a 30 minutesfollow up with Mr Durrell Lennox of which over 50% was spent cousleing him about continued management of his glioblastoma. All questions were answered to the best of my ability. Media Danyelle, NP   November 19, 2019        Portions of this note were copied from prior encounters and reviewed for accuracy, currency, and represent documentation and tasks completed during this encounter. I verify and attest these portions to be unchanged from prior visits.

## 2019-11-19 NOTE — PROGRESS NOTES
Pt here today for FUP post RT to the brain. The 11/12/19 MRI Brain report indicated cavity collapse and tumor progression, however, pt and his family just saw Dr. Harper Mcneill in [address] [place] [address], and Dr. Harper Mcneill told the family that he did not believe that there is disease progression and Dr. Harper Mcneill reviewed the scan images with the pt and his family. Pt's wife stated that pt was holding his head in his hands yesterday and had dry heaves. Pt denied having a headache. Pt has Optune attached to his head.

## 2020-01-01 ENCOUNTER — APPOINTMENT (OUTPATIENT)
Dept: GENERAL RADIOLOGY | Age: 65
DRG: 207 | End: 2020-01-01
Attending: NURSE PRACTITIONER
Payer: SUBSIDIZED

## 2020-01-01 ENCOUNTER — APPOINTMENT (OUTPATIENT)
Dept: CT IMAGING | Age: 65
DRG: 207 | End: 2020-01-01
Attending: NURSE PRACTITIONER
Payer: SUBSIDIZED

## 2020-01-01 ENCOUNTER — APPOINTMENT (OUTPATIENT)
Dept: GENERAL RADIOLOGY | Age: 65
DRG: 207 | End: 2020-01-01
Attending: INTERNAL MEDICINE
Payer: SUBSIDIZED

## 2020-01-01 ENCOUNTER — HOME CARE VISIT (OUTPATIENT)
Dept: SCHEDULING | Facility: HOME HEALTH | Age: 65
End: 2020-01-01
Payer: SUBSIDIZED

## 2020-01-01 ENCOUNTER — APPOINTMENT (OUTPATIENT)
Dept: CT IMAGING | Age: 65
DRG: 207 | End: 2020-01-01
Attending: INTERNAL MEDICINE
Payer: SUBSIDIZED

## 2020-01-01 ENCOUNTER — HOME CARE VISIT (OUTPATIENT)
Dept: HOME HEALTH SERVICES | Facility: HOME HEALTH | Age: 65
End: 2020-01-01
Payer: SUBSIDIZED

## 2020-01-01 ENCOUNTER — APPOINTMENT (OUTPATIENT)
Dept: ULTRASOUND IMAGING | Age: 65
DRG: 207 | End: 2020-01-01
Attending: INTERNAL MEDICINE
Payer: SUBSIDIZED

## 2020-01-01 ENCOUNTER — HOSPITAL ENCOUNTER (INPATIENT)
Age: 65
LOS: 12 days | DRG: 207 | End: 2020-04-12
Attending: EMERGENCY MEDICINE | Admitting: INTERNAL MEDICINE
Payer: SUBSIDIZED

## 2020-01-01 ENCOUNTER — APPOINTMENT (OUTPATIENT)
Dept: GENERAL RADIOLOGY | Age: 65
DRG: 207 | End: 2020-01-01
Attending: EMERGENCY MEDICINE
Payer: SUBSIDIZED

## 2020-01-01 ENCOUNTER — HOSPITAL ENCOUNTER (OUTPATIENT)
Dept: LAB | Age: 65
Discharge: HOME | End: 2020-02-25
Payer: SUBSIDIZED

## 2020-01-01 ENCOUNTER — ANESTHESIA (OUTPATIENT)
Dept: CARDIAC CATH/INVASIVE PROCEDURES | Age: 65
DRG: 207 | End: 2020-01-01
Payer: SUBSIDIZED

## 2020-01-01 ENCOUNTER — HOSPITAL ENCOUNTER (OUTPATIENT)
Dept: LAB | Age: 65
Discharge: HOME | End: 2020-01-23
Payer: SUBSIDIZED

## 2020-01-01 ENCOUNTER — HOME HEALTH ADMISSION (OUTPATIENT)
Dept: HOME HEALTH SERVICES | Facility: HOME HEALTH | Age: 65
End: 2020-01-01
Payer: SUBSIDIZED

## 2020-01-01 ENCOUNTER — ANESTHESIA EVENT (OUTPATIENT)
Dept: CARDIAC CATH/INVASIVE PROCEDURES | Age: 65
DRG: 207 | End: 2020-01-01
Payer: SUBSIDIZED

## 2020-01-01 ENCOUNTER — HOSPITAL ENCOUNTER (OUTPATIENT)
Dept: MRI IMAGING | Age: 65
Discharge: HOME | End: 2020-01-20
Attending: INTERNAL MEDICINE
Payer: SUBSIDIZED

## 2020-01-01 VITALS
HEIGHT: 68 IN | SYSTOLIC BLOOD PRESSURE: 110 MMHG | OXYGEN SATURATION: 100 % | WEIGHT: 250 LBS | TEMPERATURE: 98.9 F | BODY MASS INDEX: 37.89 KG/M2 | RESPIRATION RATE: 32 BRPM | DIASTOLIC BLOOD PRESSURE: 57 MMHG

## 2020-01-01 VITALS
HEART RATE: 78 BPM | TEMPERATURE: 97 F | SYSTOLIC BLOOD PRESSURE: 110 MMHG | RESPIRATION RATE: 14 BRPM | OXYGEN SATURATION: 98 % | DIASTOLIC BLOOD PRESSURE: 60 MMHG

## 2020-01-01 DIAGNOSIS — R29.818 SUSPECTED SLEEP APNEA: ICD-10-CM

## 2020-01-01 DIAGNOSIS — E66.01 MORBID OBESITY (HCC): ICD-10-CM

## 2020-01-01 DIAGNOSIS — R09.02 HYPOXIA: ICD-10-CM

## 2020-01-01 DIAGNOSIS — R65.21 SEPTIC SHOCK (HCC): ICD-10-CM

## 2020-01-01 DIAGNOSIS — I50.9 NEW ONSET OF CONGESTIVE HEART FAILURE (HCC): Primary | ICD-10-CM

## 2020-01-01 DIAGNOSIS — Z20.822 SUSPECTED COVID-19 VIRUS INFECTION: ICD-10-CM

## 2020-01-01 DIAGNOSIS — R53.81 DEBILITY: ICD-10-CM

## 2020-01-01 DIAGNOSIS — R53.83 FATIGUE, UNSPECIFIED TYPE: ICD-10-CM

## 2020-01-01 DIAGNOSIS — J61 ASBESTOSIS (HCC): Chronic | ICD-10-CM

## 2020-01-01 DIAGNOSIS — E87.1 HYPONATREMIA: ICD-10-CM

## 2020-01-01 DIAGNOSIS — D69.2 PURPURA (HCC): ICD-10-CM

## 2020-01-01 DIAGNOSIS — R74.01 TRANSAMINITIS: ICD-10-CM

## 2020-01-01 DIAGNOSIS — I95.9 HYPOTENSION, UNSPECIFIED HYPOTENSION TYPE: ICD-10-CM

## 2020-01-01 DIAGNOSIS — C71.9 GBM (GLIOBLASTOMA MULTIFORME) (HCC): ICD-10-CM

## 2020-01-01 DIAGNOSIS — J80 ARDS (ADULT RESPIRATORY DISTRESS SYNDROME) (HCC): ICD-10-CM

## 2020-01-01 DIAGNOSIS — J96.01 ACUTE HYPOXEMIC RESPIRATORY FAILURE (HCC): ICD-10-CM

## 2020-01-01 DIAGNOSIS — A41.9 SEPTIC SHOCK (HCC): ICD-10-CM

## 2020-01-01 DIAGNOSIS — Z51.5 ENCOUNTER FOR PALLIATIVE CARE: ICD-10-CM

## 2020-01-01 LAB
ALBUMIN SERPL-MCNC: 1.9 G/DL (ref 3.2–4.6)
ALBUMIN SERPL-MCNC: 1.9 G/DL (ref 3.2–4.6)
ALBUMIN SERPL-MCNC: 2 G/DL (ref 3.2–4.6)
ALBUMIN SERPL-MCNC: 2.1 G/DL (ref 3.2–4.6)
ALBUMIN SERPL-MCNC: 2.2 G/DL (ref 3.2–4.6)
ALBUMIN SERPL-MCNC: 2.5 G/DL (ref 3.2–4.6)
ALBUMIN SERPL-MCNC: 2.6 G/DL (ref 3.2–4.6)
ALBUMIN SERPL-MCNC: 2.7 G/DL (ref 3.2–4.6)
ALBUMIN SERPL-MCNC: 2.8 G/DL (ref 3.2–4.6)
ALBUMIN SERPL-MCNC: 3.4 G/DL (ref 3.2–4.6)
ALBUMIN SERPL-MCNC: 3.4 G/DL (ref 3.2–4.6)
ALBUMIN/GLOB SERPL: 0.5 {RATIO} (ref 1.2–3.5)
ALBUMIN/GLOB SERPL: 0.6 {RATIO} (ref 1.2–3.5)
ALBUMIN/GLOB SERPL: 0.6 {RATIO} (ref 1.2–3.5)
ALBUMIN/GLOB SERPL: 0.7 {RATIO} (ref 1.2–3.5)
ALBUMIN/GLOB SERPL: 0.8 {RATIO} (ref 1.2–3.5)
ALBUMIN/GLOB SERPL: 1 {RATIO} (ref 1.2–3.5)
ALBUMIN/GLOB SERPL: 1 {RATIO} (ref 1.2–3.5)
ALP SERPL-CCNC: 104 U/L (ref 50–136)
ALP SERPL-CCNC: 117 U/L (ref 50–136)
ALP SERPL-CCNC: 122 U/L (ref 50–136)
ALP SERPL-CCNC: 129 U/L (ref 50–136)
ALP SERPL-CCNC: 139 U/L (ref 50–136)
ALP SERPL-CCNC: 145 U/L (ref 50–136)
ALP SERPL-CCNC: 61 U/L (ref 50–136)
ALP SERPL-CCNC: 67 U/L (ref 50–136)
ALP SERPL-CCNC: 68 U/L (ref 50–136)
ALP SERPL-CCNC: 70 U/L (ref 50–136)
ALP SERPL-CCNC: 70 U/L (ref 50–136)
ALP SERPL-CCNC: 72 U/L (ref 50–136)
ALP SERPL-CCNC: 72 U/L (ref 50–136)
ALP SERPL-CCNC: 85 U/L (ref 50–136)
ALP SERPL-CCNC: 94 U/L (ref 50–136)
ALT SERPL-CCNC: 105 U/L (ref 12–65)
ALT SERPL-CCNC: 24 U/L (ref 12–65)
ALT SERPL-CCNC: 28 U/L (ref 12–65)
ALT SERPL-CCNC: 28 U/L (ref 12–65)
ALT SERPL-CCNC: 30 U/L (ref 12–65)
ALT SERPL-CCNC: 37 U/L (ref 12–65)
ALT SERPL-CCNC: 39 U/L (ref 12–65)
ALT SERPL-CCNC: 41 U/L (ref 12–65)
ALT SERPL-CCNC: 51 U/L (ref 12–65)
ALT SERPL-CCNC: 57 U/L (ref 12–65)
ALT SERPL-CCNC: 61 U/L (ref 12–65)
ALT SERPL-CCNC: 72 U/L (ref 12–65)
ALT SERPL-CCNC: 77 U/L (ref 12–65)
ALT SERPL-CCNC: 83 U/L (ref 12–65)
ALT SERPL-CCNC: 87 U/L (ref 12–65)
AMORPH CRY URNS QL MICRO: ABNORMAL
ANION GAP SERPL CALC-SCNC: 11 MMOL/L (ref 7–16)
ANION GAP SERPL CALC-SCNC: 5 MMOL/L (ref 7–16)
ANION GAP SERPL CALC-SCNC: 6 MMOL/L (ref 7–16)
ANION GAP SERPL CALC-SCNC: 7 MMOL/L (ref 7–16)
ANION GAP SERPL CALC-SCNC: 8 MMOL/L (ref 7–16)
ANION GAP SERPL CALC-SCNC: 9 MMOL/L (ref 7–16)
ANION GAP SERPL CALC-SCNC: 9 MMOL/L (ref 7–16)
APPEARANCE UR: ABNORMAL
APTT PPP: 29 SEC (ref 24.3–35.4)
ARTERIAL PATENCY WRIST A: ABNORMAL
ARTERIAL PATENCY WRIST A: NO
ARTERIAL PATENCY WRIST A: YES
AST SERPL-CCNC: 16 U/L (ref 15–37)
AST SERPL-CCNC: 17 U/L (ref 15–37)
AST SERPL-CCNC: 18 U/L (ref 15–37)
AST SERPL-CCNC: 24 U/L (ref 15–37)
AST SERPL-CCNC: 30 U/L (ref 15–37)
AST SERPL-CCNC: 33 U/L (ref 15–37)
AST SERPL-CCNC: 34 U/L (ref 15–37)
AST SERPL-CCNC: 35 U/L (ref 15–37)
AST SERPL-CCNC: 42 U/L (ref 15–37)
AST SERPL-CCNC: 54 U/L (ref 15–37)
AST SERPL-CCNC: 56 U/L (ref 15–37)
AST SERPL-CCNC: 62 U/L (ref 15–37)
AST SERPL-CCNC: 63 U/L (ref 15–37)
AST SERPL-CCNC: 64 U/L (ref 15–37)
AST SERPL-CCNC: 69 U/L (ref 15–37)
ATRIAL RATE: 99 BPM
BACTERIA SPEC CULT: ABNORMAL
BACTERIA SPEC CULT: ABNORMAL
BACTERIA SPEC CULT: NORMAL
BACTERIA URNS QL MICRO: ABNORMAL /HPF
BASE DEFICIT BLD-SCNC: 1 MMOL/L
BASE DEFICIT BLD-SCNC: 2 MMOL/L
BASE DEFICIT BLD-SCNC: 3 MMOL/L
BASE EXCESS BLD CALC-SCNC: 0 MMOL/L
BASE EXCESS BLD CALC-SCNC: 1 MMOL/L
BASE EXCESS BLD CALC-SCNC: 1 MMOL/L
BASE EXCESS BLD CALC-SCNC: 2 MMOL/L
BASE EXCESS BLD CALC-SCNC: 3 MMOL/L
BASE EXCESS BLD CALC-SCNC: 5 MMOL/L
BASE EXCESS BLD CALC-SCNC: 5 MMOL/L
BASE EXCESS BLD CALC-SCNC: 6 MMOL/L
BASE EXCESS BLD CALC-SCNC: 6 MMOL/L
BASE EXCESS BLD CALC-SCNC: 7 MMOL/L
BASOPHILS # BLD: 0 K/UL (ref 0–0.2)
BASOPHILS NFR BLD: 0 % (ref 0–2)
BDY SITE: ABNORMAL
BILIRUB DIRECT SERPL-MCNC: 0.1 MG/DL
BILIRUB DIRECT SERPL-MCNC: 0.1 MG/DL
BILIRUB DIRECT SERPL-MCNC: 0.2 MG/DL
BILIRUB DIRECT SERPL-MCNC: 0.2 MG/DL
BILIRUB DIRECT SERPL-MCNC: 0.3 MG/DL
BILIRUB DIRECT SERPL-MCNC: 0.3 MG/DL
BILIRUB DIRECT SERPL-MCNC: 0.4 MG/DL
BILIRUB DIRECT SERPL-MCNC: <0.1 MG/DL
BILIRUB SERPL-MCNC: 0.1 MG/DL (ref 0.2–1.1)
BILIRUB SERPL-MCNC: 0.4 MG/DL (ref 0.2–1.1)
BILIRUB SERPL-MCNC: 0.4 MG/DL (ref 0.2–1.1)
BILIRUB SERPL-MCNC: 0.5 MG/DL (ref 0.2–1.1)
BILIRUB SERPL-MCNC: 0.5 MG/DL (ref 0.2–1.1)
BILIRUB SERPL-MCNC: 0.6 MG/DL (ref 0.2–1.1)
BILIRUB SERPL-MCNC: 0.6 MG/DL (ref 0.2–1.1)
BILIRUB SERPL-MCNC: 0.7 MG/DL (ref 0.2–1.1)
BILIRUB SERPL-MCNC: 1.1 MG/DL (ref 0.2–1.1)
BILIRUB SERPL-MCNC: 1.6 MG/DL (ref 0.2–1.1)
BILIRUB UR QL: NEGATIVE
BNP SERPL-MCNC: 966 PG/ML (ref 5–125)
BUN SERPL-MCNC: 10 MG/DL (ref 8–23)
BUN SERPL-MCNC: 11 MG/DL (ref 8–23)
BUN SERPL-MCNC: 11 MG/DL (ref 8–23)
BUN SERPL-MCNC: 12 MG/DL (ref 8–23)
BUN SERPL-MCNC: 12 MG/DL (ref 8–23)
BUN SERPL-MCNC: 13 MG/DL (ref 8–23)
BUN SERPL-MCNC: 13 MG/DL (ref 8–23)
BUN SERPL-MCNC: 15 MG/DL (ref 8–23)
BUN SERPL-MCNC: 17 MG/DL (ref 8–23)
BUN SERPL-MCNC: 26 MG/DL (ref 8–23)
BUN SERPL-MCNC: 53 MG/DL (ref 8–23)
BUN SERPL-MCNC: 7 MG/DL (ref 8–23)
BUN SERPL-MCNC: 9 MG/DL (ref 8–23)
BUN SERPL-MCNC: 9 MG/DL (ref 8–23)
CALCIUM SERPL-MCNC: 8.4 MG/DL (ref 8.3–10.4)
CALCIUM SERPL-MCNC: 8.5 MG/DL (ref 8.3–10.4)
CALCIUM SERPL-MCNC: 8.6 MG/DL (ref 8.3–10.4)
CALCIUM SERPL-MCNC: 8.6 MG/DL (ref 8.3–10.4)
CALCIUM SERPL-MCNC: 8.7 MG/DL (ref 8.3–10.4)
CALCIUM SERPL-MCNC: 8.7 MG/DL (ref 8.3–10.4)
CALCIUM SERPL-MCNC: 8.9 MG/DL (ref 8.3–10.4)
CALCIUM SERPL-MCNC: 9 MG/DL (ref 8.3–10.4)
CALCIUM SERPL-MCNC: 9.1 MG/DL (ref 8.3–10.4)
CALCIUM SERPL-MCNC: 9.5 MG/DL (ref 8.3–10.4)
CALCULATED P AXIS, ECG09: 37 DEGREES
CALCULATED R AXIS, ECG10: 58 DEGREES
CALCULATED T AXIS, ECG11: 49 DEGREES
CHLORIDE SERPL-SCNC: 100 MMOL/L (ref 98–107)
CHLORIDE SERPL-SCNC: 102 MMOL/L (ref 98–107)
CHLORIDE SERPL-SCNC: 102 MMOL/L (ref 98–107)
CHLORIDE SERPL-SCNC: 104 MMOL/L (ref 98–107)
CHLORIDE SERPL-SCNC: 104 MMOL/L (ref 98–107)
CHLORIDE SERPL-SCNC: 105 MMOL/L (ref 98–107)
CHLORIDE SERPL-SCNC: 106 MMOL/L (ref 98–107)
CHLORIDE SERPL-SCNC: 108 MMOL/L (ref 98–107)
CHLORIDE SERPL-SCNC: 109 MMOL/L (ref 98–107)
CHLORIDE SERPL-SCNC: 111 MMOL/L (ref 98–107)
CHLORIDE SERPL-SCNC: 112 MMOL/L (ref 98–107)
CHLORIDE SERPL-SCNC: 99 MMOL/L (ref 98–107)
CO2 BLD-SCNC: 24 MMOL/L
CO2 BLD-SCNC: 25 MMOL/L
CO2 BLD-SCNC: 26 MMOL/L
CO2 BLD-SCNC: 26 MMOL/L
CO2 BLD-SCNC: 27 MMOL/L
CO2 BLD-SCNC: 28 MMOL/L
CO2 BLD-SCNC: 29 MMOL/L
CO2 BLD-SCNC: 29 MMOL/L
CO2 BLD-SCNC: 30 MMOL/L
CO2 BLD-SCNC: 31 MMOL/L
CO2 BLD-SCNC: 33 MMOL/L
CO2 BLD-SCNC: 35 MMOL/L
CO2 BLD-SCNC: 36 MMOL/L
CO2 BLD-SCNC: 38 MMOL/L
CO2 BLD-SCNC: 38 MMOL/L
CO2 SERPL-SCNC: 25 MMOL/L (ref 21–32)
CO2 SERPL-SCNC: 26 MMOL/L (ref 21–32)
CO2 SERPL-SCNC: 27 MMOL/L (ref 21–32)
CO2 SERPL-SCNC: 27 MMOL/L (ref 21–32)
CO2 SERPL-SCNC: 28 MMOL/L (ref 21–32)
CO2 SERPL-SCNC: 28 MMOL/L (ref 21–32)
CO2 SERPL-SCNC: 29 MMOL/L (ref 21–32)
CO2 SERPL-SCNC: 33 MMOL/L (ref 21–32)
CO2 SERPL-SCNC: 34 MMOL/L (ref 21–32)
COLLECT TIME,HTIME: 1002
COLLECT TIME,HTIME: 1515
COLLECT TIME,HTIME: 1830
COLLECT TIME,HTIME: 2148
COLLECT TIME,HTIME: 245
COLLECT TIME,HTIME: 258
COLLECT TIME,HTIME: 310
COLLECT TIME,HTIME: 313
COLLECT TIME,HTIME: 318
COLLECT TIME,HTIME: 320
COLLECT TIME,HTIME: 344
COLLECT TIME,HTIME: 345
COLLECT TIME,HTIME: 415
COLLECT TIME,HTIME: 605
COLLECT TIME,HTIME: 630
COLLECT TIME,HTIME: 720
COLLECT TIME,HTIME: 930
COLOR UR: ABNORMAL
COVID-19, XGCOVT: NOT DETECTED
CREAT SERPL-MCNC: 0.46 MG/DL (ref 0.8–1.5)
CREAT SERPL-MCNC: 0.53 MG/DL (ref 0.8–1.5)
CREAT SERPL-MCNC: 0.56 MG/DL (ref 0.8–1.5)
CREAT SERPL-MCNC: 0.64 MG/DL (ref 0.8–1.5)
CREAT SERPL-MCNC: 0.65 MG/DL (ref 0.8–1.5)
CREAT SERPL-MCNC: 0.69 MG/DL (ref 0.8–1.5)
CREAT SERPL-MCNC: 0.74 MG/DL (ref 0.8–1.5)
CREAT SERPL-MCNC: 0.97 MG/DL (ref 0.8–1.5)
CREAT SERPL-MCNC: 1.01 MG/DL (ref 0.8–1.5)
CREAT SERPL-MCNC: 1.05 MG/DL (ref 0.8–1.5)
CREAT SERPL-MCNC: 1.09 MG/DL (ref 0.8–1.5)
CREAT SERPL-MCNC: 1.17 MG/DL (ref 0.8–1.5)
CREAT SERPL-MCNC: 1.21 MG/DL (ref 0.8–1.5)
CREAT SERPL-MCNC: 1.26 MG/DL (ref 0.8–1.5)
CRP SERPL HS-MCNC: 150 MG/L
D DIMER PPP FEU-MCNC: >20 UG/ML(FEU)
DIAGNOSIS, 93000: NORMAL
DIFFERENTIAL METHOD BLD: ABNORMAL
EMERGENT DISEASE PANEL, EDPR: NOT DETECTED
EOSINOPHIL # BLD: 0 K/UL (ref 0–0.8)
EOSINOPHIL NFR BLD: 0 % (ref 0.5–7.8)
ERYTHROCYTE [DISTWIDTH] IN BLOOD BY AUTOMATED COUNT: 13.6 % (ref 11.9–14.6)
ERYTHROCYTE [DISTWIDTH] IN BLOOD BY AUTOMATED COUNT: 13.7 % (ref 11.9–14.6)
ERYTHROCYTE [DISTWIDTH] IN BLOOD BY AUTOMATED COUNT: 13.7 % (ref 11.9–14.6)
ERYTHROCYTE [DISTWIDTH] IN BLOOD BY AUTOMATED COUNT: 13.8 % (ref 11.9–14.6)
ERYTHROCYTE [DISTWIDTH] IN BLOOD BY AUTOMATED COUNT: 13.9 % (ref 11.9–14.6)
ERYTHROCYTE [DISTWIDTH] IN BLOOD BY AUTOMATED COUNT: 14 % (ref 11.9–14.6)
ERYTHROCYTE [DISTWIDTH] IN BLOOD BY AUTOMATED COUNT: 14.1 % (ref 11.9–14.6)
ERYTHROCYTE [DISTWIDTH] IN BLOOD BY AUTOMATED COUNT: 14.1 % (ref 11.9–14.6)
ERYTHROCYTE [DISTWIDTH] IN BLOOD BY AUTOMATED COUNT: 14.2 % (ref 11.9–14.6)
ERYTHROCYTE [DISTWIDTH] IN BLOOD BY AUTOMATED COUNT: 14.5 % (ref 11.9–14.6)
ERYTHROCYTE [DISTWIDTH] IN BLOOD BY AUTOMATED COUNT: 14.6 % (ref 11.9–14.6)
EXHALED MINUTE VOLUME, VE: 10 L/MIN
EXHALED MINUTE VOLUME, VE: 10.5 L/MIN
EXHALED MINUTE VOLUME, VE: 10.6 L/MIN
EXHALED MINUTE VOLUME, VE: 11 L/MIN
EXHALED MINUTE VOLUME, VE: 11.4 L/MIN
EXHALED MINUTE VOLUME, VE: 11.7 L/MIN
EXHALED MINUTE VOLUME, VE: 12.4 L/MIN
EXHALED MINUTE VOLUME, VE: 12.5 L/MIN
EXHALED MINUTE VOLUME, VE: 12.8 L/MIN
EXHALED MINUTE VOLUME, VE: 12.9 L/MIN
EXHALED MINUTE VOLUME, VE: 8.3 L/MIN
EXHALED MINUTE VOLUME, VE: 8.4 L/MIN
EXHALED MINUTE VOLUME, VE: 9.2 L/MIN
EXHALED MINUTE VOLUME, VE: 9.4 L/MIN
EXHALED MINUTE VOLUME, VE: 9.8 L/MIN
EXHALED MINUTE VOLUME, VE: 9.9 L/MIN
FIBRIN DEGRADATION PRODUCTS, XFDPT: >20
FIBRINOGEN PPP-MCNC: 628 MG/DL (ref 190–501)
FLOW RATE ISTAT,IFRATE: 11 L/MIN
GALACTOMANNAN AG SPEC IA-ACNC: 0.08 INDEX (ref 0–0.49)
GAS FLOW.O2 O2 DELIVERY SYS: ABNORMAL L/MIN
GAS FLOW.O2 SETTING OXYMISER: 16 BPM
GAS FLOW.O2 SETTING OXYMISER: 20 BPM
GAS FLOW.O2 SETTING OXYMISER: 24 BPM
GAS FLOW.O2 SETTING OXYMISER: 28 BPM
GAS FLOW.O2 SETTING OXYMISER: 32 BPM
GAS FLOW.O2 SETTING OXYMISER: 32 BPM
GLOBULIN SER CALC-MCNC: 3.3 G/DL (ref 2.3–3.5)
GLOBULIN SER CALC-MCNC: 3.4 G/DL (ref 2.3–3.5)
GLOBULIN SER CALC-MCNC: 3.5 G/DL (ref 2.3–3.5)
GLOBULIN SER CALC-MCNC: 3.6 G/DL (ref 2.3–3.5)
GLOBULIN SER CALC-MCNC: 3.8 G/DL (ref 2.3–3.5)
GLOBULIN SER CALC-MCNC: 3.8 G/DL (ref 2.3–3.5)
GLOBULIN SER CALC-MCNC: 4 G/DL (ref 2.3–3.5)
GLOBULIN SER CALC-MCNC: 4 G/DL (ref 2.3–3.5)
GLOBULIN SER CALC-MCNC: 4.2 G/DL (ref 2.3–3.5)
GLOBULIN SER CALC-MCNC: 4.2 G/DL (ref 2.3–3.5)
GLOBULIN SER CALC-MCNC: 4.4 G/DL (ref 2.3–3.5)
GLOBULIN SER CALC-MCNC: 4.9 G/DL (ref 2.3–3.5)
GLOBULIN SER CALC-MCNC: 5.1 G/DL (ref 2.3–3.5)
GLUCOSE SERPL-MCNC: 104 MG/DL (ref 65–100)
GLUCOSE SERPL-MCNC: 106 MG/DL (ref 65–100)
GLUCOSE SERPL-MCNC: 112 MG/DL (ref 65–100)
GLUCOSE SERPL-MCNC: 126 MG/DL (ref 65–100)
GLUCOSE SERPL-MCNC: 137 MG/DL (ref 65–100)
GLUCOSE SERPL-MCNC: 137 MG/DL (ref 65–100)
GLUCOSE SERPL-MCNC: 146 MG/DL (ref 65–100)
GLUCOSE SERPL-MCNC: 150 MG/DL (ref 65–100)
GLUCOSE SERPL-MCNC: 157 MG/DL (ref 65–100)
GLUCOSE SERPL-MCNC: 171 MG/DL (ref 65–100)
GLUCOSE SERPL-MCNC: 199 MG/DL (ref 65–100)
GLUCOSE SERPL-MCNC: 203 MG/DL (ref 65–100)
GLUCOSE SERPL-MCNC: 92 MG/DL (ref 65–100)
GLUCOSE SERPL-MCNC: 93 MG/DL (ref 65–100)
GLUCOSE UR STRIP.AUTO-MCNC: NEGATIVE MG/DL
GRAM STN SPEC: ABNORMAL
HAV IGM SERPL QL IA: NEGATIVE
HBV CORE IGM SERPL QL IA: NEGATIVE
HBV SURFACE AG SERPL QL IA: NEGATIVE
HCO3 BLD-SCNC: 23.1 MMOL/L (ref 22–26)
HCO3 BLD-SCNC: 23.8 MMOL/L (ref 22–26)
HCO3 BLD-SCNC: 24.5 MMOL/L (ref 22–26)
HCO3 BLD-SCNC: 25 MMOL/L (ref 22–26)
HCO3 BLD-SCNC: 25.6 MMOL/L (ref 22–26)
HCO3 BLD-SCNC: 26.4 MMOL/L (ref 22–26)
HCO3 BLD-SCNC: 26.4 MMOL/L (ref 22–26)
HCO3 BLD-SCNC: 27 MMOL/L (ref 22–26)
HCO3 BLD-SCNC: 27.1 MMOL/L (ref 22–26)
HCO3 BLD-SCNC: 27.2 MMOL/L (ref 22–26)
HCO3 BLD-SCNC: 28.4 MMOL/L (ref 22–26)
HCO3 BLD-SCNC: 28.8 MMOL/L (ref 22–26)
HCO3 BLD-SCNC: 31.6 MMOL/L (ref 22–26)
HCO3 BLD-SCNC: 33.2 MMOL/L (ref 22–26)
HCO3 BLD-SCNC: 33.5 MMOL/L (ref 22–26)
HCO3 BLD-SCNC: 35.7 MMOL/L (ref 22–26)
HCO3 BLD-SCNC: 35.8 MMOL/L (ref 22–26)
HCT VFR BLD AUTO: 25.8 % (ref 41.1–50.3)
HCT VFR BLD AUTO: 25.8 % (ref 41.1–50.3)
HCT VFR BLD AUTO: 27.2 % (ref 41.1–50.3)
HCT VFR BLD AUTO: 28 % (ref 41.1–50.3)
HCT VFR BLD AUTO: 28.8 % (ref 41.1–50.3)
HCT VFR BLD AUTO: 28.8 % (ref 41.1–50.3)
HCT VFR BLD AUTO: 29.9 % (ref 41.1–50.3)
HCT VFR BLD AUTO: 32.1 % (ref 41.1–50.3)
HCT VFR BLD AUTO: 36.6 % (ref 41.1–50.3)
HCT VFR BLD AUTO: 40.1 % (ref 41.1–50.3)
HCT VFR BLD AUTO: 40.4 % (ref 41.1–50.3)
HCT VFR BLD AUTO: 41 % (ref 41.1–50.3)
HCT VFR BLD AUTO: 41.7 % (ref 41.1–50.3)
HCV AB S/CO SERPL IA: <0.1 S/CO RATIO (ref 0–0.9)
HEPARIN INDUCED PLT,XHIPA: NEGATIVE
HGB BLD-MCNC: 10.7 G/DL (ref 13.6–17.2)
HGB BLD-MCNC: 12.8 G/DL (ref 13.6–17.2)
HGB BLD-MCNC: 13.4 G/DL (ref 13.6–17.2)
HGB BLD-MCNC: 13.9 G/DL (ref 13.6–17.2)
HGB BLD-MCNC: 14 G/DL (ref 13.6–17.2)
HGB BLD-MCNC: 14.7 G/DL (ref 13.6–17.2)
HGB BLD-MCNC: 7.7 G/DL (ref 13.6–17.2)
HGB BLD-MCNC: 8.4 G/DL (ref 13.6–17.2)
HGB BLD-MCNC: 8.9 G/DL (ref 13.6–17.2)
HGB BLD-MCNC: 9.2 G/DL (ref 13.6–17.2)
HGB BLD-MCNC: 9.5 G/DL (ref 13.6–17.2)
HGB UR QL STRIP: NEGATIVE
HIT INTERPRETATION,XINTPR: NEGATIVE
HIT PROFILE,XHITT: NORMAL
IMM GRANULOCYTES # BLD AUTO: 0.1 K/UL (ref 0–0.5)
IMM GRANULOCYTES # BLD AUTO: 0.2 K/UL (ref 0–0.5)
IMM GRANULOCYTES # BLD AUTO: 0.3 K/UL (ref 0–0.5)
IMM GRANULOCYTES NFR BLD AUTO: 1 % (ref 0–5)
IMM GRANULOCYTES NFR BLD AUTO: 2 % (ref 0–5)
IMM GRANULOCYTES NFR BLD AUTO: 4 % (ref 0–5)
INR PPP: 1.1
INSPIRATION.DURATION SETTING TIME VENT: 0.91 SEC
INSPIRATION.DURATION SETTING TIME VENT: 0.93 SEC
INSPIRATION.DURATION SETTING TIME VENT: 1 SEC
INSPIRATION.DURATION SETTING TIME VENT: 1.04 SEC
INSPIRATION.DURATION SETTING TIME VENT: 1.15 SEC
INSPIRATION.DURATION SETTING TIME VENT: 1.16 SEC
INSPIRATION.DURATION SETTING TIME VENT: 1.22 SEC
KETONES UR QL STRIP.AUTO: NEGATIVE MG/DL
L PNEUMO1 AG UR QL IA: NEGATIVE
LEUKOCYTE ESTERASE UR QL STRIP.AUTO: ABNORMAL
LIPASE SERPL-CCNC: 128 U/L (ref 73–393)
LYMPHOCYTES # BLD: 0.3 K/UL (ref 0.5–4.6)
LYMPHOCYTES # BLD: 0.4 K/UL (ref 0.5–4.6)
LYMPHOCYTES # BLD: 0.5 K/UL (ref 0.5–4.6)
LYMPHOCYTES # BLD: 1.2 K/UL (ref 0.5–4.6)
LYMPHOCYTES # BLD: 1.5 K/UL (ref 0.5–4.6)
LYMPHOCYTES # BLD: 2 K/UL (ref 0.5–4.6)
LYMPHOCYTES NFR BLD: 13 % (ref 13–44)
LYMPHOCYTES NFR BLD: 17 % (ref 13–44)
LYMPHOCYTES NFR BLD: 18 % (ref 13–44)
LYMPHOCYTES NFR BLD: 6 % (ref 13–44)
LYMPHOCYTES NFR BLD: 7 % (ref 13–44)
LYMPHOCYTES NFR BLD: 7 % (ref 13–44)
MAGNESIUM SERPL-MCNC: 2.3 MG/DL (ref 1.8–2.4)
MCH RBC QN AUTO: 31.8 PG (ref 26.1–32.9)
MCH RBC QN AUTO: 32.1 PG (ref 26.1–32.9)
MCH RBC QN AUTO: 32.2 PG (ref 26.1–32.9)
MCH RBC QN AUTO: 32.4 PG (ref 26.1–32.9)
MCH RBC QN AUTO: 32.5 PG (ref 26.1–32.9)
MCH RBC QN AUTO: 32.5 PG (ref 26.1–32.9)
MCH RBC QN AUTO: 32.6 PG (ref 26.1–32.9)
MCH RBC QN AUTO: 32.7 PG (ref 26.1–32.9)
MCH RBC QN AUTO: 32.7 PG (ref 26.1–32.9)
MCH RBC QN AUTO: 33 PG (ref 26.1–32.9)
MCH RBC QN AUTO: 33.1 PG (ref 26.1–32.9)
MCH RBC QN AUTO: 33.3 PG (ref 26.1–32.9)
MCH RBC QN AUTO: 33.7 PG (ref 26.1–32.9)
MCHC RBC AUTO-ENTMCNC: 29.8 G/DL (ref 31.4–35)
MCHC RBC AUTO-ENTMCNC: 30.9 G/DL (ref 31.4–35)
MCHC RBC AUTO-ENTMCNC: 31.8 G/DL (ref 31.4–35)
MCHC RBC AUTO-ENTMCNC: 31.8 G/DL (ref 31.4–35)
MCHC RBC AUTO-ENTMCNC: 31.9 G/DL (ref 31.4–35)
MCHC RBC AUTO-ENTMCNC: 32.6 G/DL (ref 31.4–35)
MCHC RBC AUTO-ENTMCNC: 32.7 G/DL (ref 31.4–35)
MCHC RBC AUTO-ENTMCNC: 33.3 G/DL (ref 31.4–35)
MCHC RBC AUTO-ENTMCNC: 33.4 G/DL (ref 31.4–35)
MCHC RBC AUTO-ENTMCNC: 33.9 G/DL (ref 31.4–35)
MCHC RBC AUTO-ENTMCNC: 34.7 G/DL (ref 31.4–35)
MCHC RBC AUTO-ENTMCNC: 35 G/DL (ref 31.4–35)
MCHC RBC AUTO-ENTMCNC: 35.3 G/DL (ref 31.4–35)
MCV RBC AUTO: 100 FL (ref 79.6–97.8)
MCV RBC AUTO: 101.4 FL (ref 79.6–97.8)
MCV RBC AUTO: 101.4 FL (ref 79.6–97.8)
MCV RBC AUTO: 102.4 FL (ref 79.6–97.8)
MCV RBC AUTO: 102.4 FL (ref 79.6–97.8)
MCV RBC AUTO: 105.1 FL (ref 79.6–97.8)
MCV RBC AUTO: 106.6 FL (ref 79.6–97.8)
MCV RBC AUTO: 92.5 FL (ref 79.6–97.8)
MCV RBC AUTO: 92.7 FL (ref 79.6–97.8)
MCV RBC AUTO: 96.3 FL (ref 79.6–97.8)
MCV RBC AUTO: 97.4 FL (ref 79.6–97.8)
MCV RBC AUTO: 98.2 FL (ref 79.6–97.8)
MCV RBC AUTO: 99 FL (ref 79.6–97.8)
MM INDURATION POC: 0 MM (ref 0–5)
MM INDURATION POC: 0 MM (ref 0–5)
MM INDURATION POC: NORMAL (ref 0–5)
MONOCYTES # BLD: 0.1 K/UL (ref 0.1–1.3)
MONOCYTES # BLD: 0.2 K/UL (ref 0.1–1.3)
MONOCYTES # BLD: 0.3 K/UL (ref 0.1–1.3)
MONOCYTES # BLD: 0.4 K/UL (ref 0.1–1.3)
MONOCYTES # BLD: 0.5 K/UL (ref 0.1–1.3)
MONOCYTES # BLD: 0.7 K/UL (ref 0.1–1.3)
MONOCYTES NFR BLD: 2 % (ref 4–12)
MONOCYTES NFR BLD: 3 % (ref 4–12)
MONOCYTES NFR BLD: 5 % (ref 4–12)
MONOCYTES NFR BLD: 6 % (ref 4–12)
MONOCYTES NFR BLD: 6 % (ref 4–12)
NEUTS SEG # BLD: 4.4 K/UL (ref 1.7–8.2)
NEUTS SEG # BLD: 4.9 K/UL (ref 1.7–8.2)
NEUTS SEG # BLD: 5.7 K/UL (ref 1.7–8.2)
NEUTS SEG # BLD: 6.4 K/UL (ref 1.7–8.2)
NEUTS SEG # BLD: 6.6 K/UL (ref 1.7–8.2)
NEUTS SEG # BLD: 6.8 K/UL (ref 1.7–8.2)
NEUTS SEG # BLD: 7.7 K/UL (ref 1.7–8.2)
NEUTS SEG # BLD: 7.9 K/UL (ref 1.7–8.2)
NEUTS SEG NFR BLD: 73 % (ref 43–78)
NEUTS SEG NFR BLD: 75 % (ref 43–78)
NEUTS SEG NFR BLD: 83 % (ref 43–78)
NEUTS SEG NFR BLD: 86 % (ref 43–78)
NEUTS SEG NFR BLD: 87 % (ref 43–78)
NEUTS SEG NFR BLD: 88 % (ref 43–78)
NEUTS SEG NFR BLD: 89 % (ref 43–78)
NEUTS SEG NFR BLD: 90 % (ref 43–78)
NITRITE UR QL STRIP.AUTO: NEGATIVE
NRBC # BLD: 0 K/UL (ref 0–0.2)
NRBC # BLD: 0.02 K/UL (ref 0–0.2)
NRBC # BLD: 0.04 K/UL (ref 0–0.2)
NRBC # BLD: 0.08 K/UL (ref 0–0.2)
NRBC # BLD: 0.09 K/UL (ref 0–0.2)
NRBC # BLD: 0.43 K/UL (ref 0–0.2)
O2/TOTAL GAS SETTING VFR VENT: 100 %
O2/TOTAL GAS SETTING VFR VENT: 70 %
O2/TOTAL GAS SETTING VFR VENT: 80 %
OPTICAL DENSITY READ,XHITAO: 0.3 ABS
OTHER OBSERVATIONS,UCOM: ABNORMAL
P-R INTERVAL, ECG05: 164 MS
PCO2 BLD: 32.6 MMHG (ref 35–45)
PCO2 BLD: 44.8 MMHG (ref 35–45)
PCO2 BLD: 46 MMHG (ref 35–45)
PCO2 BLD: 46.2 MMHG (ref 35–45)
PCO2 BLD: 46.8 MMHG (ref 35–45)
PCO2 BLD: 47 MMHG (ref 35–45)
PCO2 BLD: 47.7 MMHG (ref 35–45)
PCO2 BLD: 47.8 MMHG (ref 35–45)
PCO2 BLD: 47.9 MMHG (ref 35–45)
PCO2 BLD: 47.9 MMHG (ref 35–45)
PCO2 BLD: 51.6 MMHG (ref 35–45)
PCO2 BLD: 56.4 MMHG (ref 35–45)
PCO2 BLD: 57.2 MMHG (ref 35–45)
PCO2 BLD: 70.1 MMHG (ref 35–45)
PCO2 BLD: 75.7 MMHG (ref 35–45)
PCO2 BLD: 76.1 MMHG (ref 35–45)
PCO2 BLD: 83.6 MMHG (ref 35–45)
PEEP RESPIRATORY: 12 CMH2O
PEEP RESPIRATORY: 14 CMH2O
PEEP RESPIRATORY: 16 CMH2O
PEEP RESPIRATORY: 18 CMH2O
PEEP RESPIRATORY: 20 CMH2O
PEEP RESPIRATORY: 8 CMH2O
PEEP RESPIRATORY: 8 CMH2O
PH BLD: 7.24 [PH] (ref 7.35–7.45)
PH BLD: 7.25 [PH] (ref 7.35–7.45)
PH BLD: 7.28 [PH] (ref 7.35–7.45)
PH BLD: 7.29 [PH] (ref 7.35–7.45)
PH BLD: 7.3 [PH] (ref 7.35–7.45)
PH BLD: 7.32 [PH] (ref 7.35–7.45)
PH BLD: 7.32 [PH] (ref 7.35–7.45)
PH BLD: 7.33 [PH] (ref 7.35–7.45)
PH BLD: 7.34 [PH] (ref 7.35–7.45)
PH BLD: 7.34 [PH] (ref 7.35–7.45)
PH BLD: 7.35 [PH] (ref 7.35–7.45)
PH BLD: 7.36 [PH] (ref 7.35–7.45)
PH BLD: 7.37 [PH] (ref 7.35–7.45)
PH BLD: 7.37 [PH] (ref 7.35–7.45)
PH BLD: 7.38 [PH] (ref 7.35–7.45)
PH BLD: 7.38 [PH] (ref 7.35–7.45)
PH BLD: 7.46 [PH] (ref 7.35–7.45)
PH UR STRIP: 6 [PH] (ref 5–9)
PHOSPHATE SERPL-MCNC: 2.9 MG/DL (ref 2.3–3.7)
PLATELET # BLD AUTO: 145 K/UL (ref 150–450)
PLATELET # BLD AUTO: 181 K/UL (ref 150–450)
PLATELET # BLD AUTO: 193 K/UL (ref 150–450)
PLATELET # BLD AUTO: 206 K/UL (ref 150–450)
PLATELET # BLD AUTO: 209 K/UL (ref 150–450)
PLATELET # BLD AUTO: 212 K/UL (ref 150–450)
PLATELET # BLD AUTO: 219 K/UL (ref 150–450)
PLATELET # BLD AUTO: 221 K/UL (ref 150–450)
PLATELET # BLD AUTO: 224 K/UL (ref 150–450)
PLATELET # BLD AUTO: 295 K/UL (ref 150–450)
PLATELET # BLD AUTO: 312 K/UL (ref 150–450)
PLATELET # BLD AUTO: 314 K/UL (ref 150–450)
PLATELET # BLD AUTO: 320 K/UL (ref 150–450)
PMV BLD AUTO: 10.1 FL (ref 9.4–12.3)
PMV BLD AUTO: 10.3 FL (ref 9.4–12.3)
PMV BLD AUTO: 9.1 FL (ref 9.4–12.3)
PMV BLD AUTO: 9.3 FL (ref 9.4–12.3)
PMV BLD AUTO: 9.3 FL (ref 9.4–12.3)
PMV BLD AUTO: 9.4 FL (ref 9.4–12.3)
PMV BLD AUTO: 9.5 FL (ref 9.4–12.3)
PMV BLD AUTO: 9.6 FL (ref 9.4–12.3)
PMV BLD AUTO: 9.6 FL (ref 9.4–12.3)
PMV BLD AUTO: 9.7 FL (ref 9.4–12.3)
PMV BLD AUTO: 9.8 FL (ref 9.4–12.3)
PO2 BLD: 103 MMHG (ref 75–100)
PO2 BLD: 112 MMHG (ref 75–100)
PO2 BLD: 112 MMHG (ref 75–100)
PO2 BLD: 147 MMHG (ref 75–100)
PO2 BLD: 162 MMHG (ref 75–100)
PO2 BLD: 193 MMHG (ref 75–100)
PO2 BLD: 54 MMHG (ref 75–100)
PO2 BLD: 57 MMHG (ref 75–100)
PO2 BLD: 67 MMHG (ref 75–100)
PO2 BLD: 67 MMHG (ref 75–100)
PO2 BLD: 70 MMHG (ref 75–100)
PO2 BLD: 76 MMHG (ref 75–100)
PO2 BLD: 77 MMHG (ref 75–100)
PO2 BLD: 78 MMHG (ref 75–100)
PO2 BLD: 93 MMHG (ref 75–100)
PO2 BLD: 98 MMHG (ref 75–100)
PO2 BLD: 98 MMHG (ref 75–100)
POTASSIUM SERPL-SCNC: 3.2 MMOL/L (ref 3.5–5.1)
POTASSIUM SERPL-SCNC: 3.3 MMOL/L (ref 3.5–5.1)
POTASSIUM SERPL-SCNC: 3.3 MMOL/L (ref 3.5–5.1)
POTASSIUM SERPL-SCNC: 3.4 MMOL/L (ref 3.5–5.1)
POTASSIUM SERPL-SCNC: 3.5 MMOL/L (ref 3.5–5.1)
POTASSIUM SERPL-SCNC: 3.7 MMOL/L (ref 3.5–5.1)
POTASSIUM SERPL-SCNC: 3.7 MMOL/L (ref 3.5–5.1)
POTASSIUM SERPL-SCNC: 3.8 MMOL/L (ref 3.5–5.1)
POTASSIUM SERPL-SCNC: 3.9 MMOL/L (ref 3.5–5.1)
POTASSIUM SERPL-SCNC: 3.9 MMOL/L (ref 3.5–5.1)
POTASSIUM SERPL-SCNC: 4.2 MMOL/L (ref 3.5–5.1)
POTASSIUM SERPL-SCNC: 4.3 MMOL/L (ref 3.5–5.1)
POTASSIUM SERPL-SCNC: 4.9 MMOL/L (ref 3.5–5.1)
POTASSIUM SERPL-SCNC: 5.5 MMOL/L (ref 3.5–5.1)
PPD POC: NEGATIVE NEGATIVE
PPD POC: NEGATIVE NEGATIVE
PPD POC: NORMAL
PRESSURE CONTROL, IPC: 12
PRESSURE CONTROL, IPC: 14
PROCALCITONIN SERPL-MCNC: 0.14 NG/ML
PROCALCITONIN SERPL-MCNC: 0.21 NG/ML
PROT SERPL-MCNC: 5.7 G/DL (ref 6.3–8.2)
PROT SERPL-MCNC: 5.8 G/DL (ref 6.3–8.2)
PROT SERPL-MCNC: 5.8 G/DL (ref 6.3–8.2)
PROT SERPL-MCNC: 5.9 G/DL (ref 6.3–8.2)
PROT SERPL-MCNC: 6 G/DL (ref 6.3–8.2)
PROT SERPL-MCNC: 6.1 G/DL (ref 6.3–8.2)
PROT SERPL-MCNC: 6.1 G/DL (ref 6.3–8.2)
PROT SERPL-MCNC: 6.4 G/DL (ref 6.3–8.2)
PROT SERPL-MCNC: 6.8 G/DL (ref 6.3–8.2)
PROT SERPL-MCNC: 6.9 G/DL (ref 6.3–8.2)
PROT SERPL-MCNC: 7 G/DL (ref 6.3–8.2)
PROT SERPL-MCNC: 7.6 G/DL (ref 6.3–8.2)
PROT SERPL-MCNC: 7.6 G/DL (ref 6.3–8.2)
PROT UR STRIP-MCNC: ABNORMAL MG/DL
PROTHROMBIN TIME: 14.8 SEC (ref 12–14.7)
Q-T INTERVAL, ECG07: 358 MS
QRS DURATION, ECG06: 84 MS
QTC CALCULATION (BEZET), ECG08: 459 MS
RBC # BLD AUTO: 2.42 M/UL (ref 4.23–5.6)
RBC # BLD AUTO: 2.52 M/UL (ref 4.23–5.6)
RBC # BLD AUTO: 2.72 M/UL (ref 4.23–5.6)
RBC # BLD AUTO: 2.74 M/UL (ref 4.23–5.6)
RBC # BLD AUTO: 2.76 M/UL (ref 4.23–5.6)
RBC # BLD AUTO: 2.84 M/UL (ref 4.23–5.6)
RBC # BLD AUTO: 2.92 M/UL (ref 4.23–5.6)
RBC # BLD AUTO: 3.27 M/UL (ref 4.23–5.6)
RBC # BLD AUTO: 3.8 M/UL (ref 4.23–5.6)
RBC # BLD AUTO: 4.05 M/UL (ref 4.23–5.6)
RBC # BLD AUTO: 4.21 M/UL (ref 4.23–5.6)
RBC # BLD AUTO: 4.36 M/UL (ref 4.23–5.67)
RBC # BLD AUTO: 4.51 M/UL (ref 4.23–5.67)
SAO2 % BLD: 100 % (ref 95–98)
SAO2 % BLD: 82 % (ref 95–98)
SAO2 % BLD: 89 % (ref 95–98)
SAO2 % BLD: 89 % (ref 95–98)
SAO2 % BLD: 90 % (ref 95–98)
SAO2 % BLD: 92 % (ref 95–98)
SAO2 % BLD: 92 % (ref 95–98)
SAO2 % BLD: 94 % (ref 95–98)
SAO2 % BLD: 95 % (ref 95–98)
SAO2 % BLD: 97 % (ref 95–98)
SAO2 % BLD: 98 % (ref 95–98)
SAO2 % BLD: 99 % (ref 95–98)
SAO2 % BLD: 99 % (ref 95–98)
SERVICE CMNT-IMP: ABNORMAL
SERVICE CMNT-IMP: NORMAL
SODIUM SERPL-SCNC: 136 MMOL/L (ref 136–145)
SODIUM SERPL-SCNC: 138 MMOL/L (ref 136–145)
SODIUM SERPL-SCNC: 139 MMOL/L (ref 136–145)
SODIUM SERPL-SCNC: 141 MMOL/L (ref 136–145)
SODIUM SERPL-SCNC: 144 MMOL/L (ref 136–145)
SODIUM SERPL-SCNC: 145 MMOL/L (ref 136–145)
SODIUM SERPL-SCNC: 145 MMOL/L (ref 136–145)
SP GR UR REFRACTOMETRY: 1.03 (ref 1–1.02)
SPECIMEN SOURCE: NORMAL
SPECIMEN TYPE: ABNORMAL
TROPONIN I SERPL-MCNC: <0.02 NG/ML (ref 0.02–0.05)
TSH SERPL DL<=0.005 MIU/L-ACNC: 0.97 UIU/ML (ref 0.36–3.74)
UROBILINOGEN UR QL STRIP.AUTO: 0.2 EU/DL (ref 0.2–1)
VANCOMYCIN SERPL-MCNC: 38.5 UG/ML
VANCOMYCIN TROUGH SERPL-MCNC: 17.7 UG/ML (ref 5–20)
VENTILATION MODE VENT: ABNORMAL
VENTRICULAR RATE, ECG03: 99 BPM
VT SETTING VENT: 350 ML
VT SETTING VENT: 400 ML
VT SETTING VENT: 410 ML
VT SETTING VENT: 450 ML
VT SETTING VENT: 500 ML
VT SETTING VENT: 783 ML
WBC # BLD AUTO: 10.4 K/UL (ref 4.3–11.1)
WBC # BLD AUTO: 10.8 K/UL (ref 4.3–11.1)
WBC # BLD AUTO: 10.8 K/UL (ref 4.3–11.1)
WBC # BLD AUTO: 4.9 K/UL (ref 4.3–11.1)
WBC # BLD AUTO: 5.5 K/UL (ref 4.3–11.1)
WBC # BLD AUTO: 5.8 K/UL (ref 4.3–11.1)
WBC # BLD AUTO: 6.7 K/UL (ref 4.3–11.1)
WBC # BLD AUTO: 7.2 K/UL (ref 4.3–11.1)
WBC # BLD AUTO: 7.7 K/UL (ref 4.3–11.1)
WBC # BLD AUTO: 7.8 K/UL (ref 4.3–11.1)
WBC # BLD AUTO: 8.8 K/UL (ref 4.3–11.1)
WBC # BLD AUTO: 8.9 K/UL (ref 4.3–11.1)
WBC # BLD AUTO: 9.3 K/UL (ref 4.3–11.1)
WBC URNS QL MICRO: ABNORMAL /HPF

## 2020-01-01 PROCEDURE — 74011000258 HC RX REV CODE- 258: Performed by: INTERNAL MEDICINE

## 2020-01-01 PROCEDURE — 99284 EMERGENCY DEPT VISIT MOD MDM: CPT

## 2020-01-01 PROCEDURE — 36592 COLLECT BLOOD FROM PICC: CPT

## 2020-01-01 PROCEDURE — 36600 WITHDRAWAL OF ARTERIAL BLOOD: CPT

## 2020-01-01 PROCEDURE — 83690 ASSAY OF LIPASE: CPT

## 2020-01-01 PROCEDURE — 74011250637 HC RX REV CODE- 250/637: Performed by: INTERNAL MEDICINE

## 2020-01-01 PROCEDURE — 65610000001 HC ROOM ICU GENERAL

## 2020-01-01 PROCEDURE — 74011250636 HC RX REV CODE- 250/636: Performed by: INTERNAL MEDICINE

## 2020-01-01 PROCEDURE — 85025 COMPLETE CBC W/AUTO DIFF WBC: CPT

## 2020-01-01 PROCEDURE — 81001 URINALYSIS AUTO W/SCOPE: CPT

## 2020-01-01 PROCEDURE — 74011000250 HC RX REV CODE- 250: Performed by: INTERNAL MEDICINE

## 2020-01-01 PROCEDURE — 85362 FIBRIN DEGRADATION PRODUCTS: CPT

## 2020-01-01 PROCEDURE — 85027 COMPLETE CBC AUTOMATED: CPT

## 2020-01-01 PROCEDURE — 85730 THROMBOPLASTIN TIME PARTIAL: CPT

## 2020-01-01 PROCEDURE — 74011000258 HC RX REV CODE- 258: Performed by: NURSE PRACTITIONER

## 2020-01-01 PROCEDURE — 74011250637 HC RX REV CODE- 250/637: Performed by: NURSE PRACTITIONER

## 2020-01-01 PROCEDURE — 71045 X-RAY EXAM CHEST 1 VIEW: CPT

## 2020-01-01 PROCEDURE — 74011000258 HC RX REV CODE- 258: Performed by: RADIOLOGY

## 2020-01-01 PROCEDURE — 0BH17EZ INSERTION OF ENDOTRACHEAL AIRWAY INTO TRACHEA, VIA NATURAL OR ARTIFICIAL OPENING: ICD-10-PCS | Performed by: ANESTHESIOLOGY

## 2020-01-01 PROCEDURE — 80076 HEPATIC FUNCTION PANEL: CPT

## 2020-01-01 PROCEDURE — 36415 COLL VENOUS BLD VENIPUNCTURE: CPT

## 2020-01-01 PROCEDURE — 82248 BILIRUBIN DIRECT: CPT

## 2020-01-01 PROCEDURE — 65270000029 HC RM PRIVATE

## 2020-01-01 PROCEDURE — 74011250636 HC RX REV CODE- 250/636: Performed by: NURSE PRACTITIONER

## 2020-01-01 PROCEDURE — 87635 SARS-COV-2 COVID-19 AMP PRB: CPT

## 2020-01-01 PROCEDURE — 80053 COMPREHEN METABOLIC PANEL: CPT

## 2020-01-01 PROCEDURE — 99291 CRITICAL CARE FIRST HOUR: CPT | Performed by: INTERNAL MEDICINE

## 2020-01-01 PROCEDURE — 65610000006 HC RM INTENSIVE CARE

## 2020-01-01 PROCEDURE — 99222 1ST HOSP IP/OBS MODERATE 55: CPT | Performed by: INTERNAL MEDICINE

## 2020-01-01 PROCEDURE — 84145 PROCALCITONIN (PCT): CPT

## 2020-01-01 PROCEDURE — 94003 VENT MGMT INPAT SUBQ DAY: CPT

## 2020-01-01 PROCEDURE — 94640 AIRWAY INHALATION TREATMENT: CPT

## 2020-01-01 PROCEDURE — G0299 HHS/HOSPICE OF RN EA 15 MIN: HCPCS

## 2020-01-01 PROCEDURE — 84443 ASSAY THYROID STIM HORMONE: CPT

## 2020-01-01 PROCEDURE — 87070 CULTURE OTHR SPECIMN AEROBIC: CPT

## 2020-01-01 PROCEDURE — 74011250637 HC RX REV CODE- 250/637: Performed by: EMERGENCY MEDICINE

## 2020-01-01 PROCEDURE — C8929 TTE W OR WO FOL WCON,DOPPLER: HCPCS

## 2020-01-01 PROCEDURE — 36556 INSERT NON-TUNNEL CV CATH: CPT

## 2020-01-01 PROCEDURE — 77010033711 HC HIGH FLOW OXYGEN

## 2020-01-01 PROCEDURE — 87798 DETECT AGENT NOS DNA AMP: CPT

## 2020-01-01 PROCEDURE — 36620 INSERTION CATHETER ARTERY: CPT | Performed by: INTERNAL MEDICINE

## 2020-01-01 PROCEDURE — 74011250636 HC RX REV CODE- 250/636: Performed by: EMERGENCY MEDICINE

## 2020-01-01 PROCEDURE — 74011250636 HC RX REV CODE- 250/636

## 2020-01-01 PROCEDURE — 99232 SBSQ HOSP IP/OBS MODERATE 35: CPT | Performed by: INTERNAL MEDICINE

## 2020-01-01 PROCEDURE — 80074 ACUTE HEPATITIS PANEL: CPT

## 2020-01-01 PROCEDURE — 74011636320 HC RX REV CODE- 636/320: Performed by: INTERNAL MEDICINE

## 2020-01-01 PROCEDURE — 5A1955Z RESPIRATORY VENTILATION, GREATER THAN 96 CONSECUTIVE HOURS: ICD-10-PCS | Performed by: ANESTHESIOLOGY

## 2020-01-01 PROCEDURE — 74011000250 HC RX REV CODE- 250: Performed by: RADIOLOGY

## 2020-01-01 PROCEDURE — 77030021668 HC NEB PREFIL KT VYRM -A

## 2020-01-01 PROCEDURE — 85384 FIBRINOGEN ACTIVITY: CPT

## 2020-01-01 PROCEDURE — 82803 BLOOD GASES ANY COMBINATION: CPT

## 2020-01-01 PROCEDURE — 99254 IP/OBS CNSLTJ NEW/EST MOD 60: CPT | Performed by: INTERNAL MEDICINE

## 2020-01-01 PROCEDURE — 86580 TB INTRADERMAL TEST: CPT | Performed by: INTERNAL MEDICINE

## 2020-01-01 PROCEDURE — 36620 INSERTION CATHETER ARTERY: CPT

## 2020-01-01 PROCEDURE — 87641 MR-STAPH DNA AMP PROBE: CPT

## 2020-01-01 PROCEDURE — 85610 PROTHROMBIN TIME: CPT

## 2020-01-01 PROCEDURE — P9047 ALBUMIN (HUMAN), 25%, 50ML: HCPCS | Performed by: INTERNAL MEDICINE

## 2020-01-01 PROCEDURE — 86022 PLATELET ANTIBODIES: CPT

## 2020-01-01 PROCEDURE — 65660000000 HC RM CCU STEPDOWN

## 2020-01-01 PROCEDURE — 94644 CONT INHLJ TX 1ST HOUR: CPT

## 2020-01-01 PROCEDURE — 94002 VENT MGMT INPAT INIT DAY: CPT

## 2020-01-01 PROCEDURE — 74011000302 HC RX REV CODE- 302: Performed by: INTERNAL MEDICINE

## 2020-01-01 PROCEDURE — 84100 ASSAY OF PHOSPHORUS: CPT

## 2020-01-01 PROCEDURE — 77030018798 HC PMP KT ENTRL FED COVD -A

## 2020-01-01 PROCEDURE — 87449 NOS EACH ORGANISM AG IA: CPT

## 2020-01-01 PROCEDURE — 87305 ASPERGILLUS AG IA: CPT

## 2020-01-01 PROCEDURE — C1751 CATH, INF, PER/CENT/MIDLINE: HCPCS

## 2020-01-01 PROCEDURE — 94762 N-INVAS EAR/PLS OXIMTRY CONT: CPT

## 2020-01-01 PROCEDURE — 80202 ASSAY OF VANCOMYCIN: CPT

## 2020-01-01 PROCEDURE — 71260 CT THORAX DX C+: CPT

## 2020-01-01 PROCEDURE — 83880 ASSAY OF NATRIURETIC PEPTIDE: CPT

## 2020-01-01 PROCEDURE — A9575 INJ GADOTERATE MEGLUMI 0.1ML: HCPCS | Performed by: INTERNAL MEDICINE

## 2020-01-01 PROCEDURE — 80048 BASIC METABOLIC PNL TOTAL CA: CPT

## 2020-01-01 PROCEDURE — 83735 ASSAY OF MAGNESIUM: CPT

## 2020-01-01 PROCEDURE — 94760 N-INVAS EAR/PLS OXIMETRY 1: CPT

## 2020-01-01 PROCEDURE — 86141 C-REACTIVE PROTEIN HS: CPT

## 2020-01-01 PROCEDURE — 74011000250 HC RX REV CODE- 250

## 2020-01-01 PROCEDURE — 77030012794 HC KT NSL CANN/HGH TRAN -A

## 2020-01-01 PROCEDURE — 93005 ELECTROCARDIOGRAM TRACING: CPT | Performed by: EMERGENCY MEDICINE

## 2020-01-01 PROCEDURE — 84484 ASSAY OF TROPONIN QUANT: CPT

## 2020-01-01 PROCEDURE — 400013 HH SOC

## 2020-01-01 PROCEDURE — 87633 RESP VIRUS 12-25 TARGETS: CPT

## 2020-01-01 PROCEDURE — 87106 FUNGI IDENTIFICATION YEAST: CPT

## 2020-01-01 PROCEDURE — 74011636637 HC RX REV CODE- 636/637: Performed by: INTERNAL MEDICINE

## 2020-01-01 PROCEDURE — 99239 HOSP IP/OBS DSCHRG MGMT >30: CPT | Performed by: INTERNAL MEDICINE

## 2020-01-01 PROCEDURE — 74018 RADEX ABDOMEN 1 VIEW: CPT

## 2020-01-01 PROCEDURE — 87086 URINE CULTURE/COLONY COUNT: CPT

## 2020-01-01 PROCEDURE — 02HV33Z INSERTION OF INFUSION DEVICE INTO SUPERIOR VENA CAVA, PERCUTANEOUS APPROACH: ICD-10-PCS | Performed by: ANESTHESIOLOGY

## 2020-01-01 PROCEDURE — 87040 BLOOD CULTURE FOR BACTERIA: CPT

## 2020-01-01 PROCEDURE — 77030019896 HC KT ARTERIAL LN TELE -B

## 2020-01-01 PROCEDURE — 85379 FIBRIN DEGRADATION QUANT: CPT

## 2020-01-01 PROCEDURE — 76450000000

## 2020-01-01 PROCEDURE — 70553 MRI BRAIN STEM W/O & W/DYE: CPT

## 2020-01-01 PROCEDURE — 88112 CYTOPATH CELL ENHANCE TECH: CPT

## 2020-01-01 PROCEDURE — 76705 ECHO EXAM OF ABDOMEN: CPT

## 2020-01-01 RX ORDER — DEXAMETHASONE SODIUM PHOSPHATE 4 MG/ML
4 INJECTION, SOLUTION INTRA-ARTICULAR; INTRALESIONAL; INTRAMUSCULAR; INTRAVENOUS; SOFT TISSUE EVERY 8 HOURS
Status: COMPLETED | OUTPATIENT
Start: 2020-01-01 | End: 2020-01-01

## 2020-01-01 RX ORDER — PROPOFOL 10 MG/ML
INJECTION, EMULSION INTRAVENOUS
Status: COMPLETED
Start: 2020-01-01 | End: 2020-01-01

## 2020-01-01 RX ORDER — POLYETHYLENE GLYCOL 3350 17 G/17G
17 POWDER, FOR SOLUTION ORAL 2 TIMES DAILY
Status: DISCONTINUED | OUTPATIENT
Start: 2020-01-01 | End: 2020-01-01 | Stop reason: HOSPADM

## 2020-01-01 RX ORDER — ENOXAPARIN SODIUM 100 MG/ML
40 INJECTION SUBCUTANEOUS EVERY 24 HOURS
Status: DISCONTINUED | OUTPATIENT
Start: 2020-01-01 | End: 2020-01-01 | Stop reason: HOSPADM

## 2020-01-01 RX ORDER — MIDODRINE HYDROCHLORIDE 5 MG/1
5 TABLET ORAL
Status: DISCONTINUED | OUTPATIENT
Start: 2020-01-01 | End: 2020-01-01

## 2020-01-01 RX ORDER — METOPROLOL TARTRATE 5 MG/5ML
5 INJECTION INTRAVENOUS
Status: DISCONTINUED | OUTPATIENT
Start: 2020-01-01 | End: 2020-01-01 | Stop reason: HOSPADM

## 2020-01-01 RX ORDER — MIDAZOLAM HYDROCHLORIDE 1 MG/ML
3 INJECTION, SOLUTION INTRAMUSCULAR; INTRAVENOUS ONCE
Status: COMPLETED | OUTPATIENT
Start: 2020-01-01 | End: 2020-01-01

## 2020-01-01 RX ORDER — ATOVAQUONE 750 MG/5ML
750 SUSPENSION ORAL 2 TIMES DAILY WITH MEALS
Status: DISCONTINUED | OUTPATIENT
Start: 2020-01-01 | End: 2020-01-01

## 2020-01-01 RX ORDER — LORAZEPAM 2 MG/ML
2 INJECTION INTRAMUSCULAR
Status: DISCONTINUED | OUTPATIENT
Start: 2020-01-01 | End: 2020-01-01 | Stop reason: HOSPADM

## 2020-01-01 RX ORDER — SUCCINYLCHOLINE CHLORIDE 20 MG/ML INJECTION SOLUTION
150 SOLUTION
Status: COMPLETED | OUTPATIENT
Start: 2020-01-01 | End: 2020-01-01

## 2020-01-01 RX ORDER — VANCOMYCIN/0.9 % SOD CHLORIDE 1.5G/250ML
1500 PLASTIC BAG, INJECTION (ML) INTRAVENOUS EVERY 12 HOURS
Status: DISCONTINUED | OUTPATIENT
Start: 2020-01-01 | End: 2020-01-01

## 2020-01-01 RX ORDER — DIPHENHYDRAMINE HCL 25 MG
50 CAPSULE ORAL ONCE
Status: COMPLETED | OUTPATIENT
Start: 2020-01-01 | End: 2020-01-01

## 2020-01-01 RX ORDER — FENTANYL CITRATE-0.9 % NACL/PF 25 MCG/ML
0-200 PLASTIC BAG, INJECTION (ML) INJECTION
Status: DISCONTINUED | OUTPATIENT
Start: 2020-01-01 | End: 2020-01-01

## 2020-01-01 RX ORDER — ETOMIDATE 2 MG/ML
INJECTION INTRAVENOUS
Status: COMPLETED
Start: 2020-01-01 | End: 2020-01-01

## 2020-01-01 RX ORDER — FENTANYL CITRATE 50 UG/ML
50 INJECTION, SOLUTION INTRAMUSCULAR; INTRAVENOUS ONCE
Status: COMPLETED | OUTPATIENT
Start: 2020-01-01 | End: 2020-01-01

## 2020-01-01 RX ORDER — SULFAMETHOXAZOLE AND TRIMETHOPRIM 200; 40 MG/5ML; MG/5ML
60 SUSPENSION ORAL EVERY 6 HOURS
Status: DISCONTINUED | OUTPATIENT
Start: 2020-01-01 | End: 2020-01-01 | Stop reason: HOSPADM

## 2020-01-01 RX ORDER — ALBUTEROL SULFATE 0.83 MG/ML
2.5 SOLUTION RESPIRATORY (INHALATION)
Status: DISCONTINUED | OUTPATIENT
Start: 2020-01-01 | End: 2020-01-01

## 2020-01-01 RX ORDER — PREDNISONE 50 MG/1
50 TABLET ORAL ONCE
Status: COMPLETED | OUTPATIENT
Start: 2020-01-01 | End: 2020-01-01

## 2020-01-01 RX ORDER — FUROSEMIDE 10 MG/ML
40 INJECTION INTRAMUSCULAR; INTRAVENOUS
Status: COMPLETED | OUTPATIENT
Start: 2020-01-01 | End: 2020-01-01

## 2020-01-01 RX ORDER — NOREPINEPHRINE BITARTRATE/D5W 4MG/250ML
.5-3 PLASTIC BAG, INJECTION (ML) INTRAVENOUS
Status: DISCONTINUED | OUTPATIENT
Start: 2020-01-01 | End: 2020-01-01 | Stop reason: HOSPADM

## 2020-01-01 RX ORDER — SULFAMETHOXAZOLE AND TRIMETHOPRIM 800; 160 MG/1; MG/1
1 TABLET ORAL
Status: DISCONTINUED | OUTPATIENT
Start: 2020-01-01 | End: 2020-01-01

## 2020-01-01 RX ORDER — METOPROLOL TARTRATE 5 MG/5ML
5 INJECTION INTRAVENOUS EVERY 4 HOURS
Status: DISCONTINUED | OUTPATIENT
Start: 2020-01-01 | End: 2020-01-01

## 2020-01-01 RX ORDER — NOREPINEPHRINE BITARTRATE/D5W 4MG/250ML
PLASTIC BAG, INJECTION (ML) INTRAVENOUS
Status: ACTIVE
Start: 2020-01-01 | End: 2020-01-01

## 2020-01-01 RX ORDER — AZITHROMYCIN 250 MG/1
500 TABLET, FILM COATED ORAL EVERY 24 HOURS
Status: COMPLETED | OUTPATIENT
Start: 2020-01-01 | End: 2020-01-01

## 2020-01-01 RX ORDER — POLYETHYLENE GLYCOL 3350 17 G/17G
17 POWDER, FOR SOLUTION ORAL 2 TIMES DAILY
Status: DISCONTINUED | OUTPATIENT
Start: 2020-01-01 | End: 2020-01-01

## 2020-01-01 RX ORDER — MIDAZOLAM IN 0.9 % SOD.CHLORID 1 MG/ML
0-10 PLASTIC BAG, INJECTION (ML) INTRAVENOUS
Status: DISCONTINUED | OUTPATIENT
Start: 2020-01-01 | End: 2020-01-01 | Stop reason: HOSPADM

## 2020-01-01 RX ORDER — IPRATROPIUM BROMIDE AND ALBUTEROL SULFATE 2.5; .5 MG/3ML; MG/3ML
3 SOLUTION RESPIRATORY (INHALATION) EVERY 6 HOURS
Status: DISCONTINUED | OUTPATIENT
Start: 2020-01-01 | End: 2020-01-01

## 2020-01-01 RX ORDER — LORAZEPAM 2 MG/ML
INJECTION INTRAMUSCULAR
Status: COMPLETED
Start: 2020-01-01 | End: 2020-01-01

## 2020-01-01 RX ORDER — MIDAZOLAM HYDROCHLORIDE 1 MG/ML
2 INJECTION, SOLUTION INTRAMUSCULAR; INTRAVENOUS ONCE
Status: COMPLETED | OUTPATIENT
Start: 2020-01-01 | End: 2020-01-01

## 2020-01-01 RX ORDER — METOPROLOL TARTRATE 5 MG/5ML
INJECTION INTRAVENOUS
Status: COMPLETED
Start: 2020-01-01 | End: 2020-01-01

## 2020-01-01 RX ORDER — SODIUM CHLORIDE 9 MG/ML
100 INJECTION, SOLUTION INTRAVENOUS CONTINUOUS
Status: DISCONTINUED | OUTPATIENT
Start: 2020-01-01 | End: 2020-01-01

## 2020-01-01 RX ORDER — LORAZEPAM 2 MG/ML
1 INJECTION INTRAMUSCULAR
Status: DISCONTINUED | OUTPATIENT
Start: 2020-01-01 | End: 2020-01-01

## 2020-01-01 RX ORDER — ACETAMINOPHEN 325 MG/1
650 TABLET ORAL
Status: DISCONTINUED | OUTPATIENT
Start: 2020-01-01 | End: 2020-01-01

## 2020-01-01 RX ORDER — IPRATROPIUM BROMIDE AND ALBUTEROL SULFATE 2.5; .5 MG/3ML; MG/3ML
3 SOLUTION RESPIRATORY (INHALATION)
Status: DISCONTINUED | OUTPATIENT
Start: 2020-01-01 | End: 2020-01-01

## 2020-01-01 RX ORDER — NOREPINEPHRINE BITARTRATE/D5W 4MG/250ML
.5-3 PLASTIC BAG, INJECTION (ML) INTRAVENOUS
Status: DISCONTINUED | OUTPATIENT
Start: 2020-01-01 | End: 2020-01-01 | Stop reason: SDUPTHER

## 2020-01-01 RX ORDER — GADOTERATE MEGLUMINE 376.9 MG/ML
20 INJECTION INTRAVENOUS
Status: COMPLETED | OUTPATIENT
Start: 2020-01-01 | End: 2020-01-01

## 2020-01-01 RX ORDER — GUAIFENESIN 100 MG/5ML
324 LIQUID (ML) ORAL
Status: COMPLETED | OUTPATIENT
Start: 2020-01-01 | End: 2020-01-01

## 2020-01-01 RX ORDER — HYDRALAZINE HYDROCHLORIDE 20 MG/ML
10 INJECTION INTRAMUSCULAR; INTRAVENOUS
Status: DISCONTINUED | OUTPATIENT
Start: 2020-01-01 | End: 2020-01-01 | Stop reason: HOSPADM

## 2020-01-01 RX ORDER — FENTANYL CITRATE 50 UG/ML
INJECTION, SOLUTION INTRAMUSCULAR; INTRAVENOUS
Status: COMPLETED
Start: 2020-01-01 | End: 2020-01-01

## 2020-01-01 RX ORDER — FUROSEMIDE 10 MG/ML
40 INJECTION INTRAMUSCULAR; INTRAVENOUS DAILY
Status: DISCONTINUED | OUTPATIENT
Start: 2020-01-01 | End: 2020-01-01 | Stop reason: HOSPADM

## 2020-01-01 RX ORDER — ENOXAPARIN SODIUM 100 MG/ML
40 INJECTION SUBCUTANEOUS EVERY 24 HOURS
Status: DISCONTINUED | OUTPATIENT
Start: 2020-01-01 | End: 2020-01-01

## 2020-01-01 RX ORDER — MIDAZOLAM HYDROCHLORIDE 1 MG/ML
INJECTION, SOLUTION INTRAMUSCULAR; INTRAVENOUS
Status: COMPLETED
Start: 2020-01-01 | End: 2020-01-01

## 2020-01-01 RX ORDER — ALBUMIN HUMAN 250 G/1000ML
25 SOLUTION INTRAVENOUS 2 TIMES DAILY
Status: COMPLETED | OUTPATIENT
Start: 2020-01-01 | End: 2020-01-01

## 2020-01-01 RX ORDER — LEVOTHYROXINE SODIUM 50 UG/1
50 TABLET ORAL
Status: DISCONTINUED | OUTPATIENT
Start: 2020-01-01 | End: 2020-01-01

## 2020-01-01 RX ORDER — LEVOTHYROXINE SODIUM 50 UG/1
50 TABLET ORAL
Status: DISCONTINUED | OUTPATIENT
Start: 2020-01-01 | End: 2020-01-01 | Stop reason: HOSPADM

## 2020-01-01 RX ORDER — IPRATROPIUM BROMIDE AND ALBUTEROL SULFATE 2.5; .5 MG/3ML; MG/3ML
3 SOLUTION RESPIRATORY (INHALATION)
Status: DISCONTINUED | OUTPATIENT
Start: 2020-01-01 | End: 2020-01-01 | Stop reason: HOSPADM

## 2020-01-01 RX ORDER — POTASSIUM CHLORIDE 20 MEQ/1
40 TABLET, EXTENDED RELEASE ORAL
Status: COMPLETED | OUTPATIENT
Start: 2020-01-01 | End: 2020-01-01

## 2020-01-01 RX ORDER — PROPOFOL 10 MG/ML
0-50 VIAL (ML) INTRAVENOUS
Status: DISCONTINUED | OUTPATIENT
Start: 2020-01-01 | End: 2020-01-01

## 2020-01-01 RX ORDER — ONDANSETRON 2 MG/ML
4 INJECTION INTRAMUSCULAR; INTRAVENOUS
Status: DISCONTINUED | OUTPATIENT
Start: 2020-01-01 | End: 2020-01-01 | Stop reason: HOSPADM

## 2020-01-01 RX ORDER — DEXAMETHASONE SODIUM PHOSPHATE 4 MG/ML
1 INJECTION, SOLUTION INTRA-ARTICULAR; INTRALESIONAL; INTRAMUSCULAR; INTRAVENOUS; SOFT TISSUE 2 TIMES DAILY
Status: DISCONTINUED | OUTPATIENT
Start: 2020-01-01 | End: 2020-01-01

## 2020-01-01 RX ORDER — ACETAMINOPHEN 325 MG/1
650 TABLET ORAL
Status: DISCONTINUED | OUTPATIENT
Start: 2020-01-01 | End: 2020-01-01 | Stop reason: HOSPADM

## 2020-01-01 RX ORDER — ETOMIDATE 2 MG/ML
16 INJECTION INTRAVENOUS ONCE
Status: COMPLETED | OUTPATIENT
Start: 2020-01-01 | End: 2020-01-01

## 2020-01-01 RX ORDER — PANTOPRAZOLE SODIUM 40 MG/1
40 TABLET, DELAYED RELEASE ORAL
Status: DISCONTINUED | OUTPATIENT
Start: 2020-01-01 | End: 2020-01-01

## 2020-01-01 RX ORDER — DEXAMETHASONE 4 MG/1
2 TABLET ORAL 2 TIMES DAILY
Status: DISCONTINUED | OUTPATIENT
Start: 2020-01-01 | End: 2020-01-01

## 2020-01-01 RX ORDER — FENTANYL CITRATE-0.9 % NACL/PF 25 MCG/ML
0-200 PLASTIC BAG, INJECTION (ML) INJECTION
Status: DISCONTINUED | OUTPATIENT
Start: 2020-01-01 | End: 2020-01-01 | Stop reason: HOSPADM

## 2020-01-01 RX ORDER — NOREPINEPHRINE BITARTRATE/D5W 4MG/250ML
.5-3 PLASTIC BAG, INJECTION (ML) INTRAVENOUS
Status: DISCONTINUED | OUTPATIENT
Start: 2020-01-01 | End: 2020-01-01

## 2020-01-01 RX ORDER — SULFAMETHOXAZOLE AND TRIMETHOPRIM 200; 40 MG/5ML; MG/5ML
160 SUSPENSION ORAL
Status: DISCONTINUED | OUTPATIENT
Start: 2020-01-01 | End: 2020-01-01

## 2020-01-01 RX ORDER — ALBUMIN HUMAN 250 G/1000ML
25 SOLUTION INTRAVENOUS 2 TIMES DAILY
Status: DISCONTINUED | OUTPATIENT
Start: 2020-01-01 | End: 2020-01-01 | Stop reason: SDUPTHER

## 2020-01-01 RX ORDER — ALBUTEROL SULFATE 90 UG/1
2 AEROSOL, METERED RESPIRATORY (INHALATION)
Status: DISCONTINUED | OUTPATIENT
Start: 2020-01-01 | End: 2020-01-01

## 2020-01-01 RX ORDER — SODIUM CHLORIDE 9 MG/ML
1.2-6.6 INJECTION, SOLUTION INTRAVENOUS
Status: DISCONTINUED | OUTPATIENT
Start: 2020-01-01 | End: 2020-01-01 | Stop reason: HOSPADM

## 2020-01-01 RX ORDER — SODIUM CHLORIDE 0.9 % (FLUSH) 0.9 %
10 SYRINGE (ML) INJECTION
Status: COMPLETED | OUTPATIENT
Start: 2020-01-01 | End: 2020-01-01

## 2020-01-01 RX ORDER — SODIUM CHLORIDE 9 MG/ML
8 INJECTION, SOLUTION INTRAVENOUS
Status: DISCONTINUED | OUTPATIENT
Start: 2020-01-01 | End: 2020-01-01 | Stop reason: SDUPTHER

## 2020-01-01 RX ORDER — SODIUM CHLORIDE 0.9 % (FLUSH) 0.9 %
5-40 SYRINGE (ML) INJECTION EVERY 8 HOURS
Status: DISCONTINUED | OUTPATIENT
Start: 2020-01-01 | End: 2020-01-01 | Stop reason: HOSPADM

## 2020-01-01 RX ORDER — SODIUM CHLORIDE 0.9 % (FLUSH) 0.9 %
5-40 SYRINGE (ML) INJECTION AS NEEDED
Status: DISCONTINUED | OUTPATIENT
Start: 2020-01-01 | End: 2020-01-01 | Stop reason: HOSPADM

## 2020-01-01 RX ORDER — METOPROLOL TARTRATE 5 MG/5ML
5 INJECTION INTRAVENOUS
Status: COMPLETED | OUTPATIENT
Start: 2020-01-01 | End: 2020-01-01

## 2020-01-01 RX ORDER — MIDAZOLAM HYDROCHLORIDE 1 MG/ML
2 INJECTION, SOLUTION INTRAMUSCULAR; INTRAVENOUS
Status: DISCONTINUED | OUTPATIENT
Start: 2020-01-01 | End: 2020-01-01 | Stop reason: HOSPADM

## 2020-01-01 RX ORDER — PROPOFOL 10 MG/ML
0-50 VIAL (ML) INTRAVENOUS
Status: DISCONTINUED | OUTPATIENT
Start: 2020-01-01 | End: 2020-01-01 | Stop reason: HOSPADM

## 2020-01-01 RX ORDER — DEXAMETHASONE SODIUM PHOSPHATE 100 MG/10ML
6 INJECTION INTRAMUSCULAR; INTRAVENOUS DAILY
Status: DISCONTINUED | OUTPATIENT
Start: 2020-01-01 | End: 2020-01-01 | Stop reason: HOSPADM

## 2020-01-01 RX ORDER — SUCCINYLCHOLINE CHLORIDE 20 MG/ML INJECTION SOLUTION
SOLUTION
Status: COMPLETED
Start: 2020-01-01 | End: 2020-01-01

## 2020-01-01 RX ORDER — METOPROLOL TARTRATE 5 MG/5ML
5 INJECTION INTRAVENOUS
Status: DISCONTINUED | OUTPATIENT
Start: 2020-01-01 | End: 2020-01-01

## 2020-01-01 RX ORDER — MIDAZOLAM IN 0.9 % SOD.CHLORID 1 MG/ML
0-10 PLASTIC BAG, INJECTION (ML) INTRAVENOUS
Status: DISCONTINUED | OUTPATIENT
Start: 2020-01-01 | End: 2020-01-01

## 2020-01-01 RX ORDER — AMOXICILLIN 250 MG
1 CAPSULE ORAL DAILY
Status: DISCONTINUED | OUTPATIENT
Start: 2020-01-01 | End: 2020-01-01

## 2020-01-01 RX ORDER — FUROSEMIDE 10 MG/ML
40 INJECTION INTRAMUSCULAR; INTRAVENOUS EVERY 12 HOURS
Status: DISCONTINUED | OUTPATIENT
Start: 2020-01-01 | End: 2020-01-01

## 2020-01-01 RX ADMIN — CEFTRIAXONE 2 G: 2 INJECTION, POWDER, FOR SOLUTION INTRAMUSCULAR; INTRAVENOUS at 04:06

## 2020-01-01 RX ADMIN — DEXTROSE MONOHYDRATE 4 MCG/MIN: 5 INJECTION, SOLUTION INTRAVENOUS at 02:43

## 2020-01-01 RX ADMIN — LEVOTHYROXINE SODIUM 50 MCG: 0.05 TABLET ORAL at 06:00

## 2020-01-01 RX ADMIN — Medication 10 ML: at 21:35

## 2020-01-01 RX ADMIN — DEXAMETHASONE SODIUM PHOSPHATE 1 MG: 4 INJECTION, SOLUTION INTRAMUSCULAR; INTRAVENOUS at 17:12

## 2020-01-01 RX ADMIN — POLYETHYLENE GLYCOL 3350 17 G: 17 POWDER, FOR SOLUTION ORAL at 08:47

## 2020-01-01 RX ADMIN — DIPHENHYDRAMINE HYDROCHLORIDE 50 MG: 25 CAPSULE ORAL at 00:41

## 2020-01-01 RX ADMIN — DEXMEDETOMIDINE 0.7 MCG/KG/HR: 100 INJECTION, SOLUTION, CONCENTRATE INTRAVENOUS at 20:58

## 2020-01-01 RX ADMIN — DEXMEDETOMIDINE 0.7 MCG/KG/HR: 100 INJECTION, SOLUTION, CONCENTRATE INTRAVENOUS at 18:23

## 2020-01-01 RX ADMIN — CEFEPIME 2 G: 2 INJECTION, POWDER, FOR SOLUTION INTRAVENOUS at 17:00

## 2020-01-01 RX ADMIN — WATER 50 NG/KG/MIN: 1 SOLUTION INTRAVENOUS at 10:07

## 2020-01-01 RX ADMIN — Medication 10 ML: at 22:00

## 2020-01-01 RX ADMIN — VANCOMYCIN HYDROCHLORIDE 1500 MG: 10 INJECTION, POWDER, LYOPHILIZED, FOR SOLUTION INTRAVENOUS at 04:00

## 2020-01-01 RX ADMIN — LANSOPRAZOLE 30 MG: KIT at 07:30

## 2020-01-01 RX ADMIN — MIDODRINE HYDROCHLORIDE 5 MG: 5 TABLET ORAL at 13:33

## 2020-01-01 RX ADMIN — LEVOTHYROXINE SODIUM 50 MCG: 0.05 TABLET ORAL at 05:05

## 2020-01-01 RX ADMIN — ALBUMIN (HUMAN) 25 G: 0.25 INJECTION, SOLUTION INTRAVENOUS at 09:07

## 2020-01-01 RX ADMIN — CEFTRIAXONE 2 G: 2 INJECTION, POWDER, FOR SOLUTION INTRAMUSCULAR; INTRAVENOUS at 05:27

## 2020-01-01 RX ADMIN — AZITHROMYCIN MONOHYDRATE 500 MG: 500 INJECTION, POWDER, LYOPHILIZED, FOR SOLUTION INTRAVENOUS at 04:06

## 2020-01-01 RX ADMIN — Medication 40 ML: at 22:00

## 2020-01-01 RX ADMIN — DEXAMETHASONE SODIUM PHOSPHATE 1 MG: 4 INJECTION, SOLUTION INTRAMUSCULAR; INTRAVENOUS at 17:23

## 2020-01-01 RX ADMIN — LEVOTHYROXINE SODIUM 50 MCG: 0.05 TABLET ORAL at 05:56

## 2020-01-01 RX ADMIN — SODIUM CHLORIDE 1.2 ML/HR: 900 INJECTION, SOLUTION INTRAVENOUS at 12:00

## 2020-01-01 RX ADMIN — Medication 100 MCG/HR: at 06:18

## 2020-01-01 RX ADMIN — FUROSEMIDE 40 MG: 10 INJECTION, SOLUTION INTRAMUSCULAR; INTRAVENOUS at 08:59

## 2020-01-01 RX ADMIN — SULFAMETHOXAZOLE AND TRIMETHOPRIM 60 ML: 200; 40 SUSPENSION ORAL at 11:52

## 2020-01-01 RX ADMIN — Medication 10 ML: at 05:06

## 2020-01-01 RX ADMIN — POLYETHYLENE GLYCOL 3350 17 G: 17 POWDER, FOR SOLUTION ORAL at 08:36

## 2020-01-01 RX ADMIN — DEXMEDETOMIDINE 0.3 MCG/KG/HR: 100 INJECTION, SOLUTION, CONCENTRATE INTRAVENOUS at 07:25

## 2020-01-01 RX ADMIN — IPRATROPIUM BROMIDE AND ALBUTEROL SULFATE 3 ML: .5; 3 SOLUTION RESPIRATORY (INHALATION) at 03:15

## 2020-01-01 RX ADMIN — ENOXAPARIN SODIUM 40 MG: 40 INJECTION SUBCUTANEOUS at 11:11

## 2020-01-01 RX ADMIN — WATER 50 NG/KG/MIN: 1 SOLUTION INTRAVENOUS at 02:05

## 2020-01-01 RX ADMIN — WATER 50 NG/KG/MIN: 1 SOLUTION INTRAVENOUS at 05:41

## 2020-01-01 RX ADMIN — SULFAMETHOXAZOLE AND TRIMETHOPRIM 60 ML: 200; 40 SUSPENSION ORAL at 17:07

## 2020-01-01 RX ADMIN — WATER 50 NG/KG/MIN: 1 SOLUTION INTRAVENOUS at 11:59

## 2020-01-01 RX ADMIN — WATER 8.77 NG/KG/MIN: 1 SOLUTION INTRAVENOUS at 22:11

## 2020-01-01 RX ADMIN — METOPROLOL TARTRATE 5 MG: 1 INJECTION, SOLUTION INTRAVENOUS at 22:05

## 2020-01-01 RX ADMIN — LANSOPRAZOLE 30 MG: KIT at 07:45

## 2020-01-01 RX ADMIN — VANCOMYCIN HYDROCHLORIDE 1500 MG: 10 INJECTION, POWDER, LYOPHILIZED, FOR SOLUTION INTRAVENOUS at 05:05

## 2020-01-01 RX ADMIN — FUROSEMIDE 40 MG: 10 INJECTION, SOLUTION INTRAMUSCULAR; INTRAVENOUS at 09:24

## 2020-01-01 RX ADMIN — DEXMEDETOMIDINE 0.6 MCG/KG/HR: 100 INJECTION, SOLUTION, CONCENTRATE INTRAVENOUS at 12:07

## 2020-01-01 RX ADMIN — SODIUM CHLORIDE 100 ML/HR: 900 INJECTION, SOLUTION INTRAVENOUS at 23:00

## 2020-01-01 RX ADMIN — SODIUM CHLORIDE 200 MCG/HR: 900 INJECTION, SOLUTION INTRAVENOUS at 10:21

## 2020-01-01 RX ADMIN — SULFAMETHOXAZOLE AND TRIMETHOPRIM 60 ML: 200; 40 SUSPENSION ORAL at 18:05

## 2020-01-01 RX ADMIN — ATOVAQUONE 750 MG: 750 SUSPENSION ORAL at 09:00

## 2020-01-01 RX ADMIN — DEXTROSE MONOHYDRATE 4 MCG/MIN: 5 INJECTION, SOLUTION INTRAVENOUS at 12:30

## 2020-01-01 RX ADMIN — MINERAL OIL AND WHITE PETROLATUM: 150; 830 OINTMENT OPHTHALMIC at 23:26

## 2020-01-01 RX ADMIN — SODIUM CHLORIDE 125 ML/HR: 900 INJECTION, SOLUTION INTRAVENOUS at 15:00

## 2020-01-01 RX ADMIN — MINERAL OIL AND WHITE PETROLATUM: 150; 830 OINTMENT OPHTHALMIC at 15:15

## 2020-01-01 RX ADMIN — Medication 10 ML: at 14:00

## 2020-01-01 RX ADMIN — SODIUM CHLORIDE 100 MG: 900 INJECTION, SOLUTION INTRAVENOUS at 14:20

## 2020-01-01 RX ADMIN — SODIUM CHLORIDE 250 ML: 900 INJECTION, SOLUTION INTRAVENOUS at 09:00

## 2020-01-01 RX ADMIN — VECURONIUM BROMIDE 0.6 MCG/KG/MIN: 1 INJECTION, POWDER, LYOPHILIZED, FOR SOLUTION INTRAVENOUS at 20:38

## 2020-01-01 RX ADMIN — CEFEPIME 2 G: 2 INJECTION, POWDER, FOR SOLUTION INTRAVENOUS at 16:05

## 2020-01-01 RX ADMIN — ATOVAQUONE 750 MG: 750 SUSPENSION ORAL at 08:00

## 2020-01-01 RX ADMIN — DEXAMETHASONE SODIUM PHOSPHATE 4 MG: 4 INJECTION, SOLUTION INTRAMUSCULAR; INTRAVENOUS at 22:53

## 2020-01-01 RX ADMIN — ATOVAQUONE 750 MG: 750 SUSPENSION ORAL at 17:00

## 2020-01-01 RX ADMIN — HYDROXYCHLOROQUINE SULFATE 400 MG: 200 TABLET, FILM COATED ORAL at 17:04

## 2020-01-01 RX ADMIN — Medication 125 MCG/HR: at 07:46

## 2020-01-01 RX ADMIN — VECURONIUM BROMIDE 0.5 MCG/KG/MIN: 1 INJECTION, POWDER, LYOPHILIZED, FOR SOLUTION INTRAVENOUS at 08:45

## 2020-01-01 RX ADMIN — WATER 50 NG/KG/MIN: 1 SOLUTION INTRAVENOUS at 18:28

## 2020-01-01 RX ADMIN — FENTANYL CITRATE 200 MCG/HR: 0.05 INJECTION, SOLUTION INTRAMUSCULAR; INTRAVENOUS at 01:30

## 2020-01-01 RX ADMIN — FENTANYL CITRATE 200 MCG/HR: 0.05 INJECTION, SOLUTION INTRAMUSCULAR; INTRAVENOUS at 23:52

## 2020-01-01 RX ADMIN — MIDAZOLAM 2 MG: 1 INJECTION INTRAMUSCULAR; INTRAVENOUS at 05:59

## 2020-01-01 RX ADMIN — PREDNISONE 50 MG: 50 TABLET ORAL at 18:42

## 2020-01-01 RX ADMIN — Medication 10 ML: at 05:54

## 2020-01-01 RX ADMIN — HYDROXYCHLOROQUINE SULFATE 200 MG: 200 TABLET, FILM COATED ORAL at 08:01

## 2020-01-01 RX ADMIN — LEVOTHYROXINE SODIUM 50 MCG: 0.05 TABLET ORAL at 05:04

## 2020-01-01 RX ADMIN — METOPROLOL TARTRATE 5 MG: 1 INJECTION, SOLUTION INTRAVENOUS at 05:58

## 2020-01-01 RX ADMIN — LEVOTHYROXINE SODIUM 50 MCG: 0.05 TABLET ORAL at 06:40

## 2020-01-01 RX ADMIN — CEFEPIME 2 G: 2 INJECTION, POWDER, FOR SOLUTION INTRAVENOUS at 00:01

## 2020-01-01 RX ADMIN — HYDROXYCHLOROQUINE SULFATE 200 MG: 200 TABLET, FILM COATED ORAL at 17:12

## 2020-01-01 RX ADMIN — SULFAMETHOXAZOLE AND TRIMETHOPRIM 700 MG: 80; 16 INJECTION, SOLUTION, CONCENTRATE INTRAVENOUS at 00:45

## 2020-01-01 RX ADMIN — MIDODRINE HYDROCHLORIDE 5 MG: 5 TABLET ORAL at 17:48

## 2020-01-01 RX ADMIN — VECURONIUM BROMIDE 0.6 MCG/KG/MIN: 1 INJECTION, POWDER, LYOPHILIZED, FOR SOLUTION INTRAVENOUS at 20:43

## 2020-01-01 RX ADMIN — DEXAMETHASONE SODIUM PHOSPHATE 1 MG: 4 INJECTION, SOLUTION INTRAMUSCULAR; INTRAVENOUS at 08:28

## 2020-01-01 RX ADMIN — WATER 50 NG/KG/MIN: 1 SOLUTION INTRAVENOUS at 05:00

## 2020-01-01 RX ADMIN — MIDODRINE HYDROCHLORIDE 5 MG: 5 TABLET ORAL at 08:26

## 2020-01-01 RX ADMIN — TUBERCULIN PURIFIED PROTEIN DERIVATIVE 5 UNITS: 5 INJECTION, SOLUTION INTRADERMAL at 22:15

## 2020-01-01 RX ADMIN — Medication 10 ML: at 13:13

## 2020-01-01 RX ADMIN — HYDROXYCHLOROQUINE SULFATE 200 MG: 200 TABLET, FILM COATED ORAL at 08:26

## 2020-01-01 RX ADMIN — PROPOFOL 40 MCG/KG/MIN: 10 INJECTION, EMULSION INTRAVENOUS at 13:30

## 2020-01-01 RX ADMIN — SENNOSIDES AND DOCUSATE SODIUM 1 TABLET: 8.6; 5 TABLET ORAL at 08:52

## 2020-01-01 RX ADMIN — POLYETHYLENE GLYCOL 3350 17 G: 17 POWDER, FOR SOLUTION ORAL at 09:45

## 2020-01-01 RX ADMIN — ALBUMIN (HUMAN) 25 G: 0.25 INJECTION, SOLUTION INTRAVENOUS at 10:05

## 2020-01-01 RX ADMIN — FUROSEMIDE 40 MG: 10 INJECTION, SOLUTION INTRAMUSCULAR; INTRAVENOUS at 11:23

## 2020-01-01 RX ADMIN — DOXYCYCLINE 100 MG: 100 INJECTION, POWDER, LYOPHILIZED, FOR SOLUTION INTRAVENOUS at 21:52

## 2020-01-01 RX ADMIN — MIDAZOLAM HYDROCHLORIDE 2 MG: 1 INJECTION, SOLUTION INTRAMUSCULAR; INTRAVENOUS at 13:30

## 2020-01-01 RX ADMIN — POLYETHYLENE GLYCOL 3350 17 G: 17 POWDER, FOR SOLUTION ORAL at 17:11

## 2020-01-01 RX ADMIN — MINERAL OIL AND WHITE PETROLATUM: 150; 830 OINTMENT OPHTHALMIC at 11:52

## 2020-01-01 RX ADMIN — MIDAZOLAM HYDROCHLORIDE 4 MG/HR: 5 INJECTION, SOLUTION INTRAMUSCULAR; INTRAVENOUS at 11:11

## 2020-01-01 RX ADMIN — DEXAMETHASONE SODIUM PHOSPHATE 4 MG: 4 INJECTION, SOLUTION INTRAMUSCULAR; INTRAVENOUS at 14:00

## 2020-01-01 RX ADMIN — VECURONIUM BROMIDE 0.4 MCG/KG/MIN: 1 INJECTION, POWDER, LYOPHILIZED, FOR SOLUTION INTRAVENOUS at 23:38

## 2020-01-01 RX ADMIN — WATER 50 NG/KG/MIN: 1 SOLUTION INTRAVENOUS at 22:13

## 2020-01-01 RX ADMIN — LANSOPRAZOLE 30 MG: KIT at 07:37

## 2020-01-01 RX ADMIN — Medication 10 ML: at 06:21

## 2020-01-01 RX ADMIN — DEXAMETHASONE 2 MG: 0.5 TABLET ORAL at 17:21

## 2020-01-01 RX ADMIN — LEVOTHYROXINE SODIUM 50 MCG: 0.05 TABLET ORAL at 06:09

## 2020-01-01 RX ADMIN — DEXAMETHASONE SODIUM PHOSPHATE 4 MG: 4 INJECTION, SOLUTION INTRAMUSCULAR; INTRAVENOUS at 05:03

## 2020-01-01 RX ADMIN — DIPHENHYDRAMINE HYDROCHLORIDE 50 MG: 25 CAPSULE ORAL at 13:07

## 2020-01-01 RX ADMIN — WATER 8.77 NG/KG/MIN: 1 SOLUTION INTRAVENOUS at 06:09

## 2020-01-01 RX ADMIN — DEXAMETHASONE SODIUM PHOSPHATE 4 MG: 4 INJECTION, SOLUTION INTRAMUSCULAR; INTRAVENOUS at 14:49

## 2020-01-01 RX ADMIN — DEXMEDETOMIDINE 0.7 MCG/KG/HR: 100 INJECTION, SOLUTION, CONCENTRATE INTRAVENOUS at 01:08

## 2020-01-01 RX ADMIN — WATER 20 NG/KG/MIN: 1 SOLUTION INTRAVENOUS at 05:55

## 2020-01-01 RX ADMIN — CEFEPIME 2 G: 2 INJECTION, POWDER, FOR SOLUTION INTRAVENOUS at 16:04

## 2020-01-01 RX ADMIN — DEXAMETHASONE SODIUM PHOSPHATE 1 MG: 4 INJECTION, SOLUTION INTRAMUSCULAR; INTRAVENOUS at 09:08

## 2020-01-01 RX ADMIN — DEXAMETHASONE SODIUM PHOSPHATE 1 MG: 4 INJECTION, SOLUTION INTRAMUSCULAR; INTRAVENOUS at 08:26

## 2020-01-01 RX ADMIN — METOPROLOL TARTRATE 5 MG: 5 INJECTION INTRAVENOUS at 00:40

## 2020-01-01 RX ADMIN — ENOXAPARIN SODIUM 40 MG: 40 INJECTION SUBCUTANEOUS at 09:54

## 2020-01-01 RX ADMIN — DEXAMETHASONE SODIUM PHOSPHATE 4 MG: 4 INJECTION, SOLUTION INTRAMUSCULAR; INTRAVENOUS at 05:05

## 2020-01-01 RX ADMIN — SUCCINYLCHOLINE CHLORIDE 20 MG/ML INJECTION SOLUTION 150 MG: SOLUTION at 13:30

## 2020-01-01 RX ADMIN — MIDAZOLAM 3 MG: 1 INJECTION INTRAMUSCULAR; INTRAVENOUS at 15:57

## 2020-01-01 RX ADMIN — SULFAMETHOXAZOLE AND TRIMETHOPRIM 60 ML: 200; 40 SUSPENSION ORAL at 23:41

## 2020-01-01 RX ADMIN — DOXYCYCLINE 100 MG: 100 INJECTION, POWDER, LYOPHILIZED, FOR SOLUTION INTRAVENOUS at 09:07

## 2020-01-01 RX ADMIN — SODIUM CHLORIDE 100 ML/HR: 900 INJECTION, SOLUTION INTRAVENOUS at 09:48

## 2020-01-01 RX ADMIN — METOPROLOL TARTRATE 5 MG: 5 INJECTION INTRAVENOUS at 18:58

## 2020-01-01 RX ADMIN — DOXYCYCLINE 100 MG: 100 INJECTION, POWDER, LYOPHILIZED, FOR SOLUTION INTRAVENOUS at 21:11

## 2020-01-01 RX ADMIN — CEFEPIME 2 G: 2 INJECTION, POWDER, FOR SOLUTION INTRAVENOUS at 15:16

## 2020-01-01 RX ADMIN — MIDAZOLAM 2 MG: 1 INJECTION INTRAMUSCULAR; INTRAVENOUS at 19:52

## 2020-01-01 RX ADMIN — Medication 10 ML: at 19:55

## 2020-01-01 RX ADMIN — Medication 100 MCG/HR: at 03:00

## 2020-01-01 RX ADMIN — MIDODRINE HYDROCHLORIDE 5 MG: 5 TABLET ORAL at 12:09

## 2020-01-01 RX ADMIN — CEFEPIME 2 G: 2 INJECTION, POWDER, FOR SOLUTION INTRAVENOUS at 23:01

## 2020-01-01 RX ADMIN — MIDODRINE HYDROCHLORIDE 5 MG: 5 TABLET ORAL at 11:19

## 2020-01-01 RX ADMIN — SODIUM CHLORIDE 100 ML/HR: 900 INJECTION, SOLUTION INTRAVENOUS at 05:57

## 2020-01-01 RX ADMIN — Medication 10 ML: at 21:48

## 2020-01-01 RX ADMIN — MIDODRINE HYDROCHLORIDE 5 MG: 5 TABLET ORAL at 17:12

## 2020-01-01 RX ADMIN — Medication 50 MCG/HR: at 04:56

## 2020-01-01 RX ADMIN — POLYETHYLENE GLYCOL 3350 17 G: 17 POWDER, FOR SOLUTION ORAL at 09:07

## 2020-01-01 RX ADMIN — DEXMEDETOMIDINE 0.7 MCG/KG/HR: 100 INJECTION, SOLUTION, CONCENTRATE INTRAVENOUS at 09:35

## 2020-01-01 RX ADMIN — METOPROLOL TARTRATE 5 MG: 5 INJECTION INTRAVENOUS at 08:38

## 2020-01-01 RX ADMIN — Medication 10 ML: at 06:00

## 2020-01-01 RX ADMIN — DEXAMETHASONE SODIUM PHOSPHATE 4 MG: 4 INJECTION, SOLUTION INTRAMUSCULAR; INTRAVENOUS at 05:20

## 2020-01-01 RX ADMIN — WATER 8.77 NG/KG/MIN: 1 SOLUTION INTRAVENOUS at 14:11

## 2020-01-01 RX ADMIN — Medication 10 ML: at 22:53

## 2020-01-01 RX ADMIN — Medication 10 ML: at 10:40

## 2020-01-01 RX ADMIN — MINERAL OIL AND WHITE PETROLATUM: 150; 830 OINTMENT OPHTHALMIC at 14:00

## 2020-01-01 RX ADMIN — AZITHROMYCIN 500 MG: 250 TABLET, FILM COATED ORAL at 06:40

## 2020-01-01 RX ADMIN — DEXMEDETOMIDINE 0.7 MCG/KG/HR: 100 INJECTION, SOLUTION, CONCENTRATE INTRAVENOUS at 06:33

## 2020-01-01 RX ADMIN — ETOMIDATE 16 MG: 2 INJECTION, SOLUTION INTRAVENOUS at 13:30

## 2020-01-01 RX ADMIN — SODIUM CHLORIDE 100 MG: 900 INJECTION, SOLUTION INTRAVENOUS at 14:49

## 2020-01-01 RX ADMIN — SULFAMETHOXAZOLE AND TRIMETHOPRIM 700 MG: 80; 16 INJECTION, SOLUTION, CONCENTRATE INTRAVENOUS at 17:11

## 2020-01-01 RX ADMIN — Medication 10 ML: at 05:57

## 2020-01-01 RX ADMIN — DEXMEDETOMIDINE 0.3 MCG/KG/HR: 100 INJECTION, SOLUTION, CONCENTRATE INTRAVENOUS at 17:22

## 2020-01-01 RX ADMIN — PREDNISONE 50 MG: 50 TABLET ORAL at 00:40

## 2020-01-01 RX ADMIN — SULFAMETHOXAZOLE AND TRIMETHOPRIM 700 MG: 80; 16 INJECTION, SOLUTION, CONCENTRATE INTRAVENOUS at 17:09

## 2020-01-01 RX ADMIN — SULFAMETHOXAZOLE AND TRIMETHOPRIM 60 ML: 200; 40 SUSPENSION ORAL at 11:10

## 2020-01-01 RX ADMIN — DEXAMETHASONE SODIUM PHOSPHATE 4 MG: 4 INJECTION, SOLUTION INTRAMUSCULAR; INTRAVENOUS at 05:04

## 2020-01-01 RX ADMIN — Medication 10 ML: at 05:11

## 2020-01-01 RX ADMIN — VANCOMYCIN HYDROCHLORIDE 1500 MG: 10 INJECTION, POWDER, LYOPHILIZED, FOR SOLUTION INTRAVENOUS at 14:30

## 2020-01-01 RX ADMIN — Medication 30 ML: at 06:00

## 2020-01-01 RX ADMIN — SULFAMETHOXAZOLE AND TRIMETHOPRIM 700 MG: 80; 16 INJECTION, SOLUTION, CONCENTRATE INTRAVENOUS at 01:58

## 2020-01-01 RX ADMIN — WATER 50 NG/KG/MIN: 1 SOLUTION INTRAVENOUS at 01:28

## 2020-01-01 RX ADMIN — SULFAMETHOXAZOLE AND TRIMETHOPRIM 700 MG: 80; 16 INJECTION, SOLUTION, CONCENTRATE INTRAVENOUS at 09:31

## 2020-01-01 RX ADMIN — DEXAMETHASONE 2 MG: 0.5 TABLET ORAL at 09:23

## 2020-01-01 RX ADMIN — SODIUM CHLORIDE 1000 ML: 900 INJECTION, SOLUTION INTRAVENOUS at 15:58

## 2020-01-01 RX ADMIN — HYDRALAZINE HYDROCHLORIDE 10 MG: 20 INJECTION INTRAMUSCULAR; INTRAVENOUS at 04:01

## 2020-01-01 RX ADMIN — DEXMEDETOMIDINE 0.4 MCG/KG/HR: 100 INJECTION, SOLUTION, CONCENTRATE INTRAVENOUS at 05:26

## 2020-01-01 RX ADMIN — IPRATROPIUM BROMIDE AND ALBUTEROL SULFATE 3 ML: .5; 3 SOLUTION RESPIRATORY (INHALATION) at 16:04

## 2020-01-01 RX ADMIN — GADOTERATE MEGLUMINE 20 ML: 376.9 INJECTION INTRAVENOUS at 10:40

## 2020-01-01 RX ADMIN — SODIUM CHLORIDE 5.3 ML/HR: 900 INJECTION, SOLUTION INTRAVENOUS at 05:55

## 2020-01-01 RX ADMIN — MIDAZOLAM 2 MG: 1 INJECTION INTRAMUSCULAR; INTRAVENOUS at 01:46

## 2020-01-01 RX ADMIN — SODIUM CHLORIDE 50 ML/HR: 900 INJECTION, SOLUTION INTRAVENOUS at 18:36

## 2020-01-01 RX ADMIN — MIDODRINE HYDROCHLORIDE 5 MG: 5 TABLET ORAL at 17:23

## 2020-01-01 RX ADMIN — AZITHROMYCIN 500 MG: 250 TABLET, FILM COATED ORAL at 05:56

## 2020-01-01 RX ADMIN — DEXMEDETOMIDINE 0.6 MCG/KG/HR: 100 INJECTION, SOLUTION, CONCENTRATE INTRAVENOUS at 05:11

## 2020-01-01 RX ADMIN — CEFTRIAXONE 2 G: 2 INJECTION, POWDER, FOR SOLUTION INTRAMUSCULAR; INTRAVENOUS at 04:08

## 2020-01-01 RX ADMIN — FUROSEMIDE 40 MG: 10 INJECTION, SOLUTION INTRAMUSCULAR; INTRAVENOUS at 21:41

## 2020-01-01 RX ADMIN — MIDODRINE HYDROCHLORIDE 5 MG: 5 TABLET ORAL at 12:43

## 2020-01-01 RX ADMIN — CEFTRIAXONE 2 G: 2 INJECTION, POWDER, FOR SOLUTION INTRAMUSCULAR; INTRAVENOUS at 04:12

## 2020-01-01 RX ADMIN — SULFAMETHOXAZOLE AND TRIMETHOPRIM 700 MG: 80; 16 INJECTION, SOLUTION, CONCENTRATE INTRAVENOUS at 19:47

## 2020-01-01 RX ADMIN — HYDROXYCHLOROQUINE SULFATE 200 MG: 200 TABLET, FILM COATED ORAL at 17:23

## 2020-01-01 RX ADMIN — MIDAZOLAM 2 MG: 1 INJECTION INTRAMUSCULAR; INTRAVENOUS at 02:59

## 2020-01-01 RX ADMIN — POTASSIUM BICARBONATE 40 MEQ: 782 TABLET, EFFERVESCENT ORAL at 11:31

## 2020-01-01 RX ADMIN — SODIUM CHLORIDE 100 MG: 900 INJECTION, SOLUTION INTRAVENOUS at 15:14

## 2020-01-01 RX ADMIN — DEXTROSE MONOHYDRATE 5 MCG/MIN: 5 INJECTION, SOLUTION INTRAVENOUS at 08:46

## 2020-01-01 RX ADMIN — DOXYCYCLINE 100 MG: 100 INJECTION, POWDER, LYOPHILIZED, FOR SOLUTION INTRAVENOUS at 20:05

## 2020-01-01 RX ADMIN — ETOMIDATE 16 MG: 2 INJECTION INTRAVENOUS at 13:30

## 2020-01-01 RX ADMIN — IOPAMIDOL 75 ML: 755 INJECTION, SOLUTION INTRAVENOUS at 02:34

## 2020-01-01 RX ADMIN — Medication 10 ML: at 05:37

## 2020-01-01 RX ADMIN — VANCOMYCIN HYDROCHLORIDE 1500 MG: 10 INJECTION, POWDER, LYOPHILIZED, FOR SOLUTION INTRAVENOUS at 16:04

## 2020-01-01 RX ADMIN — LEVOTHYROXINE SODIUM 50 MCG: 0.05 TABLET ORAL at 05:20

## 2020-01-01 RX ADMIN — PANTOPRAZOLE SODIUM 40 MG: 40 TABLET, DELAYED RELEASE ORAL at 06:00

## 2020-01-01 RX ADMIN — SODIUM CHLORIDE 100 MG: 900 INJECTION, SOLUTION INTRAVENOUS at 13:14

## 2020-01-01 RX ADMIN — IPRATROPIUM BROMIDE AND ALBUTEROL SULFATE 3 ML: .5; 3 SOLUTION RESPIRATORY (INHALATION) at 20:32

## 2020-01-01 RX ADMIN — WATER 40 NG/KG/MIN: 1 SOLUTION INTRAVENOUS at 12:35

## 2020-01-01 RX ADMIN — AZITHROMYCIN 500 MG: 250 TABLET, FILM COATED ORAL at 06:09

## 2020-01-01 RX ADMIN — DEXTROSE MONOHYDRATE 10 MCG/MIN: 5 INJECTION, SOLUTION INTRAVENOUS at 07:03

## 2020-01-01 RX ADMIN — FENTANYL CITRATE 50 MCG: 0.05 INJECTION, SOLUTION INTRAMUSCULAR; INTRAVENOUS at 21:25

## 2020-01-01 RX ADMIN — VECURONIUM BROMIDE 0.4 MCG/KG/MIN: 1 INJECTION, POWDER, LYOPHILIZED, FOR SOLUTION INTRAVENOUS at 23:51

## 2020-01-01 RX ADMIN — NITROGLYCERIN 1 INCH: 20 OINTMENT TOPICAL at 19:30

## 2020-01-01 RX ADMIN — DEXAMETHASONE SODIUM PHOSPHATE 4 MG: 4 INJECTION, SOLUTION INTRAMUSCULAR; INTRAVENOUS at 21:35

## 2020-01-01 RX ADMIN — Medication 10 ML: at 21:11

## 2020-01-01 RX ADMIN — WATER 50 NG/KG/MIN: 1 SOLUTION INTRAVENOUS at 13:37

## 2020-01-01 RX ADMIN — CEFTRIAXONE 2 G: 2 INJECTION, POWDER, FOR SOLUTION INTRAMUSCULAR; INTRAVENOUS at 03:30

## 2020-01-01 RX ADMIN — CEFEPIME 2 G: 2 INJECTION, POWDER, FOR SOLUTION INTRAVENOUS at 23:40

## 2020-01-01 RX ADMIN — DEXAMETHASONE SODIUM PHOSPHATE 4 MG: 4 INJECTION, SOLUTION INTRAMUSCULAR; INTRAVENOUS at 22:00

## 2020-01-01 RX ADMIN — Medication 100 MCG/HR: at 05:50

## 2020-01-01 RX ADMIN — LORAZEPAM 1 MG: 2 INJECTION INTRAMUSCULAR at 20:23

## 2020-01-01 RX ADMIN — LANSOPRAZOLE 30 MG: KIT at 08:31

## 2020-01-01 RX ADMIN — SODIUM CHLORIDE 100 ML/HR: 900 INJECTION, SOLUTION INTRAVENOUS at 22:05

## 2020-01-01 RX ADMIN — ENOXAPARIN SODIUM 40 MG: 40 INJECTION SUBCUTANEOUS at 11:31

## 2020-01-01 RX ADMIN — VECURONIUM BROMIDE 0.8 MCG/KG/MIN: 1 INJECTION, POWDER, LYOPHILIZED, FOR SOLUTION INTRAVENOUS at 20:40

## 2020-01-01 RX ADMIN — HYDROXYCHLOROQUINE SULFATE 200 MG: 200 TABLET, FILM COATED ORAL at 17:15

## 2020-01-01 RX ADMIN — ENOXAPARIN SODIUM 40 MG: 40 INJECTION SUBCUTANEOUS at 11:10

## 2020-01-01 RX ADMIN — FUROSEMIDE 40 MG: 10 INJECTION, SOLUTION INTRAMUSCULAR; INTRAVENOUS at 09:45

## 2020-01-01 RX ADMIN — HYDROXYCHLOROQUINE SULFATE 200 MG: 200 TABLET, FILM COATED ORAL at 08:28

## 2020-01-01 RX ADMIN — PROPOFOL 50 MCG/KG/MIN: 10 INJECTION, EMULSION INTRAVENOUS at 15:01

## 2020-01-01 RX ADMIN — FENTANYL CITRATE 50 MCG: 0.05 INJECTION, SOLUTION INTRAMUSCULAR; INTRAVENOUS at 13:30

## 2020-01-01 RX ADMIN — Medication 75 MCG/HR: at 13:20

## 2020-01-01 RX ADMIN — WATER 50 NG/KG/MIN: 1 SOLUTION INTRAVENOUS at 16:02

## 2020-01-01 RX ADMIN — LEVOTHYROXINE SODIUM 50 MCG: 0.05 TABLET ORAL at 06:21

## 2020-01-01 RX ADMIN — VECURONIUM BROMIDE 0.3 MCG/KG/MIN: 1 INJECTION, POWDER, LYOPHILIZED, FOR SOLUTION INTRAVENOUS at 03:24

## 2020-01-01 RX ADMIN — SODIUM CHLORIDE 250 ML: 900 INJECTION, SOLUTION INTRAVENOUS at 11:00

## 2020-01-01 RX ADMIN — DEXAMETHASONE SODIUM PHOSPHATE 1 MG: 4 INJECTION, SOLUTION INTRAMUSCULAR; INTRAVENOUS at 08:47

## 2020-01-01 RX ADMIN — POLYETHYLENE GLYCOL 3350 17 G: 17 POWDER, FOR SOLUTION ORAL at 17:23

## 2020-01-01 RX ADMIN — ASPIRIN 81 MG 324 MG: 81 TABLET ORAL at 19:30

## 2020-01-01 RX ADMIN — Medication 10 ML: at 15:00

## 2020-01-01 RX ADMIN — FENTANYL CITRATE 200 MCG/HR: 0.05 INJECTION, SOLUTION INTRAMUSCULAR; INTRAVENOUS at 13:55

## 2020-01-01 RX ADMIN — MIDAZOLAM HYDROCHLORIDE 2 MG/HR: 5 INJECTION, SOLUTION INTRAMUSCULAR; INTRAVENOUS at 02:59

## 2020-01-01 RX ADMIN — HYDROXYCHLOROQUINE SULFATE 200 MG: 200 TABLET, FILM COATED ORAL at 17:49

## 2020-01-01 RX ADMIN — Medication 10 ML: at 15:58

## 2020-01-01 RX ADMIN — MIDODRINE HYDROCHLORIDE 5 MG: 5 TABLET ORAL at 08:47

## 2020-01-01 RX ADMIN — IPRATROPIUM BROMIDE AND ALBUTEROL SULFATE 3 ML: .5; 3 SOLUTION RESPIRATORY (INHALATION) at 20:30

## 2020-01-01 RX ADMIN — WATER 50 NG/KG/MIN: 1 SOLUTION INTRAVENOUS at 19:55

## 2020-01-01 RX ADMIN — CEFEPIME 2 G: 2 INJECTION, POWDER, FOR SOLUTION INTRAVENOUS at 09:00

## 2020-01-01 RX ADMIN — SODIUM CHLORIDE 100 ML/HR: 900 INJECTION, SOLUTION INTRAVENOUS at 09:42

## 2020-01-01 RX ADMIN — MIDAZOLAM 2 MG: 1 INJECTION INTRAMUSCULAR; INTRAVENOUS at 13:30

## 2020-01-01 RX ADMIN — POLYETHYLENE GLYCOL 3350 17 G: 17 POWDER, FOR SOLUTION ORAL at 17:48

## 2020-01-01 RX ADMIN — SODIUM CHLORIDE 100 ML/HR: 900 INJECTION, SOLUTION INTRAVENOUS at 17:12

## 2020-01-01 RX ADMIN — SULFAMETHOXAZOLE AND TRIMETHOPRIM 60 ML: 200; 40 SUSPENSION ORAL at 00:36

## 2020-01-01 RX ADMIN — MINERAL OIL AND WHITE PETROLATUM: 150; 830 OINTMENT OPHTHALMIC at 02:58

## 2020-01-01 RX ADMIN — MIDAZOLAM 2 MG: 1 INJECTION INTRAMUSCULAR; INTRAVENOUS at 13:40

## 2020-01-01 RX ADMIN — IPRATROPIUM BROMIDE AND ALBUTEROL SULFATE 3 ML: .5; 3 SOLUTION RESPIRATORY (INHALATION) at 10:12

## 2020-01-01 RX ADMIN — METOPROLOL TARTRATE 5 MG: 5 INJECTION INTRAVENOUS at 13:30

## 2020-01-01 RX ADMIN — WATER 30 NG/KG/MIN: 1 SOLUTION INTRAVENOUS at 16:04

## 2020-01-01 RX ADMIN — DEXMEDETOMIDINE 0.7 MCG/KG/HR: 100 INJECTION, SOLUTION, CONCENTRATE INTRAVENOUS at 13:55

## 2020-01-01 RX ADMIN — WATER 50 NG/KG/MIN: 1 SOLUTION INTRAVENOUS at 15:11

## 2020-01-01 RX ADMIN — SODIUM CHLORIDE 250 ML: 900 INJECTION, SOLUTION INTRAVENOUS at 12:00

## 2020-01-01 RX ADMIN — CEFEPIME 2 G: 2 INJECTION, POWDER, FOR SOLUTION INTRAVENOUS at 23:25

## 2020-01-01 RX ADMIN — LEVOTHYROXINE SODIUM 50 MCG: 0.05 TABLET ORAL at 05:37

## 2020-01-01 RX ADMIN — PROPOFOL 15 MCG/KG/MIN: 10 INJECTION, EMULSION INTRAVENOUS at 19:56

## 2020-01-01 RX ADMIN — SULFAMETHOXAZOLE AND TRIMETHOPRIM 699.36 MG: 80; 16 INJECTION, SOLUTION, CONCENTRATE INTRAVENOUS at 09:34

## 2020-01-01 RX ADMIN — SODIUM CHLORIDE 200 MG: 900 INJECTION, SOLUTION INTRAVENOUS at 14:00

## 2020-01-01 RX ADMIN — SULFAMETHOXAZOLE AND TRIMETHOPRIM 60 ML: 200; 40 SUSPENSION ORAL at 05:37

## 2020-01-01 RX ADMIN — Medication 150 MG: at 13:30

## 2020-01-01 RX ADMIN — MIDAZOLAM 2 MG: 1 INJECTION INTRAMUSCULAR; INTRAVENOUS at 19:30

## 2020-01-01 RX ADMIN — Medication 10 ML: at 22:15

## 2020-01-01 RX ADMIN — Medication 200 MCG/HR: at 01:08

## 2020-01-01 RX ADMIN — MINERAL OIL AND WHITE PETROLATUM: 150; 830 OINTMENT OPHTHALMIC at 21:11

## 2020-01-01 RX ADMIN — FENTANYL CITRATE 200 MCG/HR: 0.05 INJECTION, SOLUTION INTRAMUSCULAR; INTRAVENOUS at 13:14

## 2020-01-01 RX ADMIN — LANSOPRAZOLE 30 MG: KIT at 06:34

## 2020-01-01 RX ADMIN — FUROSEMIDE 40 MG: 10 INJECTION, SOLUTION INTRAMUSCULAR; INTRAVENOUS at 08:52

## 2020-01-01 RX ADMIN — METOPROLOL TARTRATE 5 MG: 5 INJECTION INTRAVENOUS at 06:12

## 2020-01-01 RX ADMIN — LANSOPRAZOLE 30 MG: KIT at 08:00

## 2020-01-01 RX ADMIN — PROPOFOL 10 MCG/KG/MIN: 10 INJECTION, EMULSION INTRAVENOUS at 00:32

## 2020-01-01 RX ADMIN — PREDNISONE 50 MG: 20 TABLET ORAL at 13:07

## 2020-01-01 RX ADMIN — PROPOFOL 25 MCG/KG/MIN: 10 INJECTION, EMULSION INTRAVENOUS at 20:10

## 2020-01-01 RX ADMIN — CEFEPIME 2 G: 2 INJECTION, POWDER, FOR SOLUTION INTRAVENOUS at 07:34

## 2020-01-01 RX ADMIN — SENNOSIDES AND DOCUSATE SODIUM 1 TABLET: 8.6; 5 TABLET ORAL at 09:24

## 2020-01-01 RX ADMIN — ALBUMIN (HUMAN) 25 G: 0.25 INJECTION, SOLUTION INTRAVENOUS at 17:16

## 2020-01-01 RX ADMIN — SODIUM CHLORIDE 100 ML/HR: 900 INJECTION, SOLUTION INTRAVENOUS at 20:00

## 2020-01-01 RX ADMIN — POLYETHYLENE GLYCOL 3350 17 G: 17 POWDER, FOR SOLUTION ORAL at 18:00

## 2020-01-01 RX ADMIN — LANSOPRAZOLE 30 MG: KIT at 08:29

## 2020-01-01 RX ADMIN — SULFAMETHOXAZOLE AND TRIMETHOPRIM 1 TABLET: 800; 160 TABLET ORAL at 21:41

## 2020-01-01 RX ADMIN — MIDODRINE HYDROCHLORIDE 5 MG: 5 TABLET ORAL at 10:05

## 2020-01-01 RX ADMIN — WATER 10 NG/KG/MIN: 1 SOLUTION INTRAVENOUS at 14:26

## 2020-01-01 RX ADMIN — DEXAMETHASONE SODIUM PHOSPHATE 1 MG: 4 INJECTION, SOLUTION INTRAMUSCULAR; INTRAVENOUS at 17:15

## 2020-01-01 RX ADMIN — SODIUM CHLORIDE 2.5 ML/HR: 900 INJECTION, SOLUTION INTRAVENOUS at 12:34

## 2020-01-01 RX ADMIN — SODIUM CHLORIDE 100 MG: 900 INJECTION, SOLUTION INTRAVENOUS at 14:29

## 2020-01-01 RX ADMIN — DOXYCYCLINE 100 MG: 100 INJECTION, POWDER, LYOPHILIZED, FOR SOLUTION INTRAVENOUS at 09:31

## 2020-01-01 RX ADMIN — POLYETHYLENE GLYCOL 3350 17 G: 17 POWDER, FOR SOLUTION ORAL at 09:31

## 2020-01-01 RX ADMIN — WATER 50 NG/KG/MIN: 1 SOLUTION INTRAVENOUS at 23:00

## 2020-01-01 RX ADMIN — MINERAL OIL AND WHITE PETROLATUM: 150; 830 OINTMENT OPHTHALMIC at 23:40

## 2020-01-01 RX ADMIN — Medication 50 MCG/HR: at 14:56

## 2020-01-01 RX ADMIN — PROPOFOL 50 MCG/KG/MIN: 10 INJECTION, EMULSION INTRAVENOUS at 17:59

## 2020-01-01 RX ADMIN — Medication 30 ML: at 14:00

## 2020-01-01 RX ADMIN — Medication 10 ML: at 05:01

## 2020-01-01 RX ADMIN — DEXMEDETOMIDINE 0.7 MCG/KG/HR: 100 INJECTION, SOLUTION, CONCENTRATE INTRAVENOUS at 15:10

## 2020-01-01 RX ADMIN — HYDROXYCHLOROQUINE SULFATE 400 MG: 200 TABLET, FILM COATED ORAL at 09:08

## 2020-01-01 RX ADMIN — Medication 10 ML: at 15:15

## 2020-01-01 RX ADMIN — SODIUM CHLORIDE 100 ML: 900 INJECTION, SOLUTION INTRAVENOUS at 02:34

## 2020-01-01 RX ADMIN — VECURONIUM BROMIDE 10 MG: 1 INJECTION, POWDER, LYOPHILIZED, FOR SOLUTION INTRAVENOUS at 20:39

## 2020-01-01 RX ADMIN — POLYETHYLENE GLYCOL 3350 17 G: 17 POWDER, FOR SOLUTION ORAL at 17:34

## 2020-01-01 RX ADMIN — ENOXAPARIN SODIUM 40 MG: 40 INJECTION SUBCUTANEOUS at 10:45

## 2020-01-01 RX ADMIN — ENOXAPARIN SODIUM 40 MG: 40 INJECTION SUBCUTANEOUS at 09:24

## 2020-01-01 RX ADMIN — POTASSIUM CHLORIDE 40 MEQ: 20 TABLET, EXTENDED RELEASE ORAL at 13:07

## 2020-01-01 RX ADMIN — MIDAZOLAM 2 MG: 1 INJECTION INTRAMUSCULAR; INTRAVENOUS at 19:40

## 2020-01-01 RX ADMIN — ENOXAPARIN SODIUM 40 MG: 40 INJECTION SUBCUTANEOUS at 10:08

## 2020-01-01 RX ADMIN — WATER 50 NG/KG/MIN: 1 SOLUTION INTRAVENOUS at 19:00

## 2020-01-01 RX ADMIN — MIDODRINE HYDROCHLORIDE 5 MG: 5 TABLET ORAL at 12:53

## 2020-01-01 RX ADMIN — FUROSEMIDE 40 MG: 10 INJECTION, SOLUTION INTRAMUSCULAR; INTRAVENOUS at 19:30

## 2020-01-01 RX ADMIN — FENTANYL CITRATE 50 MCG: 50 INJECTION, SOLUTION INTRAMUSCULAR; INTRAVENOUS at 13:30

## 2020-01-01 RX ADMIN — Medication 10 ML: at 02:34

## 2020-01-01 RX ADMIN — DEXAMETHASONE SODIUM PHOSPHATE 4 MG: 4 INJECTION, SOLUTION INTRAMUSCULAR; INTRAVENOUS at 14:29

## 2020-01-01 RX ADMIN — VANCOMYCIN HYDROCHLORIDE 2500 MG: 10 INJECTION, POWDER, LYOPHILIZED, FOR SOLUTION INTRAVENOUS at 16:05

## 2020-01-01 RX ADMIN — DOXYCYCLINE 100 MG: 100 INJECTION, POWDER, LYOPHILIZED, FOR SOLUTION INTRAVENOUS at 20:23

## 2020-01-01 RX ADMIN — MIDODRINE HYDROCHLORIDE 5 MG: 5 TABLET ORAL at 08:29

## 2020-01-01 RX ADMIN — AZITHROMYCIN MONOHYDRATE 500 MG: 500 INJECTION, POWDER, LYOPHILIZED, FOR SOLUTION INTRAVENOUS at 04:41

## 2020-01-01 RX ADMIN — Medication 10 ML: at 21:00

## 2020-01-01 RX ADMIN — MINERAL OIL AND WHITE PETROLATUM: 150; 830 OINTMENT OPHTHALMIC at 07:37

## 2020-01-01 RX ADMIN — Medication 10 ML: at 14:54

## 2020-01-01 RX ADMIN — MINERAL OIL AND WHITE PETROLATUM: 150; 830 OINTMENT OPHTHALMIC at 19:55

## 2020-01-01 RX ADMIN — LANSOPRAZOLE 30 MG: KIT at 09:06

## 2020-01-01 RX ADMIN — SULFAMETHOXAZOLE AND TRIMETHOPRIM 60 ML: 200; 40 SUSPENSION ORAL at 05:09

## 2020-01-01 RX ADMIN — SODIUM CHLORIDE 100 MG: 900 INJECTION, SOLUTION INTRAVENOUS at 13:53

## 2020-01-01 RX ADMIN — MIDAZOLAM 2 MG: 1 INJECTION INTRAMUSCULAR; INTRAVENOUS at 15:36

## 2020-01-01 RX ADMIN — ALBUMIN (HUMAN) 25 G: 0.25 INJECTION, SOLUTION INTRAVENOUS at 20:00

## 2020-01-01 RX ADMIN — VANCOMYCIN HYDROCHLORIDE 1500 MG: 10 INJECTION, POWDER, LYOPHILIZED, FOR SOLUTION INTRAVENOUS at 03:27

## 2020-01-01 RX ADMIN — MIDODRINE HYDROCHLORIDE 5 MG: 5 TABLET ORAL at 17:14

## 2020-01-01 RX ADMIN — IPRATROPIUM BROMIDE AND ALBUTEROL SULFATE 3 ML: .5; 3 SOLUTION RESPIRATORY (INHALATION) at 02:58

## 2020-01-01 RX ADMIN — DOXYCYCLINE 100 MG: 100 INJECTION, POWDER, LYOPHILIZED, FOR SOLUTION INTRAVENOUS at 09:46

## 2020-01-01 RX ADMIN — LORAZEPAM 1 MG: 2 INJECTION INTRAMUSCULAR; INTRAVENOUS at 20:23

## 2020-01-01 RX ADMIN — Medication 10 ML: at 14:06

## 2020-01-01 RX ADMIN — DEXMEDETOMIDINE 0.7 MCG/KG/HR: 100 INJECTION, SOLUTION, CONCENTRATE INTRAVENOUS at 23:51

## 2020-01-01 RX ADMIN — POLYETHYLENE GLYCOL 3350 17 G: 17 POWDER, FOR SOLUTION ORAL at 17:07

## 2020-01-01 RX ADMIN — MIDAZOLAM 2 MG: 1 INJECTION INTRAMUSCULAR; INTRAVENOUS at 11:05

## 2020-01-01 RX ADMIN — PANTOPRAZOLE SODIUM 40 MG: 40 TABLET, DELAYED RELEASE ORAL at 05:37

## 2020-01-01 RX ADMIN — DEXTROSE MONOHYDRATE 2 MCG/MIN: 5 INJECTION, SOLUTION INTRAVENOUS at 23:40

## 2020-01-01 RX ADMIN — WATER 10 NG/KG/MIN: 1 SOLUTION INTRAVENOUS at 23:00

## 2020-01-01 RX ADMIN — LEVOTHYROXINE SODIUM 50 MCG: 0.05 TABLET ORAL at 05:03

## 2020-01-01 RX ADMIN — LEVOTHYROXINE SODIUM 50 MCG: 0.05 TABLET ORAL at 05:54

## 2020-01-01 RX ADMIN — MIDODRINE HYDROCHLORIDE 5 MG: 5 TABLET ORAL at 08:01

## 2020-01-01 RX ADMIN — SULFAMETHOXAZOLE AND TRIMETHOPRIM 700 MG: 80; 16 INJECTION, SOLUTION, CONCENTRATE INTRAVENOUS at 11:01

## 2020-01-01 RX ADMIN — SODIUM CHLORIDE 500 ML: 900 INJECTION, SOLUTION INTRAVENOUS at 17:24

## 2020-01-01 RX ADMIN — PROPOFOL 20 MCG/KG/MIN: 10 INJECTION, EMULSION INTRAVENOUS at 01:07

## 2020-01-01 RX ADMIN — SODIUM CHLORIDE 100 MG: 900 INJECTION, SOLUTION INTRAVENOUS at 14:10

## 2020-01-01 RX ADMIN — DEXAMETHASONE SODIUM PHOSPHATE 1 MG: 4 INJECTION, SOLUTION INTRAMUSCULAR; INTRAVENOUS at 17:48

## 2020-01-01 RX ADMIN — DEXAMETHASONE SODIUM PHOSPHATE 4 MG: 4 INJECTION, SOLUTION INTRAMUSCULAR; INTRAVENOUS at 15:15

## 2020-01-01 RX ADMIN — MIDAZOLAM HYDROCHLORIDE 2 MG/HR: 5 INJECTION, SOLUTION INTRAMUSCULAR; INTRAVENOUS at 00:08

## 2020-01-01 RX ADMIN — CEFEPIME 2 G: 2 INJECTION, POWDER, FOR SOLUTION INTRAVENOUS at 09:45

## 2020-01-01 RX ADMIN — WATER 10 NG/KG/MIN: 1 SOLUTION INTRAVENOUS at 06:27

## 2020-01-01 RX ADMIN — Medication 10 ML: at 05:04

## 2020-01-01 RX ADMIN — DEXAMETHASONE SODIUM PHOSPHATE 4 MG: 4 INJECTION, SOLUTION INTRAMUSCULAR; INTRAVENOUS at 21:11

## 2020-01-01 RX ADMIN — ENOXAPARIN SODIUM 40 MG: 40 INJECTION SUBCUTANEOUS at 11:19

## 2020-01-01 RX ADMIN — MIDODRINE HYDROCHLORIDE 5 MG: 5 TABLET ORAL at 17:11

## 2020-01-01 RX ADMIN — SODIUM CHLORIDE 100 MG: 900 INJECTION, SOLUTION INTRAVENOUS at 14:18

## 2020-01-01 RX ADMIN — PERFLUTREN 1 ML: 6.52 INJECTION, SUSPENSION INTRAVENOUS at 09:01

## 2020-01-01 RX ADMIN — DEXMEDETOMIDINE 0.7 MCG/KG/HR: 100 INJECTION, SOLUTION, CONCENTRATE INTRAVENOUS at 19:48

## 2020-03-02 NOTE — PROGRESS NOTES
Bedside shift change report given to Gloria Corok (oncoming nurse) by [facility], RN (offgoing nurse). Report included the following information SBAR, Kardex, ED Summary, Procedure Summary, Intake/Output, MAR, Recent Results and Cardiac Rhythm NSR.     Dual neuro assessment and [facility] performed Neurosurgery Progress Note    Subjective:  No acute events. Pain controlled with medication- denies pain currently.      Exam:  A&O x3. Fluent Speech.   BLE- 5/5, incision- c/d    BP  Min: 111/41  Max: 155/72  Pulse  Av.3  Min: 62  Max: 65  Resp  Av.7  Min: 16  Max: 18  Temp  Av.3 °C (97.3 °F)  Min: 36.2 °C (97.1 °F)  Max: 36.3 °C (97.4 °F)  SpO2  Av.3 %  Min: 91 %  Max: 94 %    No data recorded    Recent Labs     20  0601 20  0508   WBC 8.4 7.9 8.0   RBC 3.78* 3.72* 4.08*   HEMOGLOBIN 11.9* 11.4* 12.6   HEMATOCRIT 34.1* 33.9* 37.4   MCV 90.2 91.1 91.7   MCH 31.5 30.6 30.9   MCHC 34.9 33.6 33.7   RDW 44.6 44.6 45.1   PLATELETCT 214 224 235   MPV 9.2 9.2 9.5     Recent Labs     20  0601 20  0508   SODIUM 133* 131* 132*   POTASSIUM 3.6 4.1 4.6   CHLORIDE 97 95* 95*   CO2 29 29 29   GLUCOSE 166* 155* 142*   BUN 18 17 22   CREATININE 0.79 0.63 0.71   CALCIUM 9.0 9.1 9.5               Intake/Output       20 - 20 - 20 0659       Total  Total       Intake    P.O.  660  -- 660  --  -- --    P.O. 660 -- 660 -- -- --    Total Intake 660 -- 660 -- -- --       Output    Urine  --  -- --  --  -- --    Number of Times Voided 3 x -- 3 x -- -- --    Stool  --  -- --  --  -- --    Number of Times Stooled 2 x 1 x 3 x -- -- --    Total Output -- -- -- -- -- --       Net I/O     660 -- 660 -- -- --            Intake/Output Summary (Last 24 hours) at 3/2/2020 0757  Last data filed at 3/1/2020 1600  Gross per 24 hour   Intake 660 ml   Output --   Net 660 ml            • magnesium hydroxide  30 mL BID    And   • senna-docusate  2 Tab BID    And   • bisacodyl  10 mg QDAY PRN   • Fleet Enema  1 Enema Once PRN   • docusate sodium  100 mg BID   • polyethylene glycol/lytes  1 Packet DAILY   • acetaminophen  650 mg Q6HRS PRN   • labetalol  10 mg Q4HRS PRN   • ascorbic acid  1,000 mg DAILY   •  calcium carbonate  500 mg BID   • carvedilol  6.25 mg BID WITH MEALS   • tizanidine  2 mg TID   • vitamin D  1,000 Units Q EVENING   • oxyCODONE-acetaminophen  1-2 Tab Q6HRS PRN   • losartan  100 mg Q DAY   • hydroCHLOROthiazide  25 mg Q DAY       Assessment and Plan:  Hospital day #3  POD #na    Plan:   Stable neuro   PT/OT- likely needs rehab  Dispo per IM- clear from Nsx perspective    Discharge Instructions:  Ambulate as much as comfortable  Ok to shower, pat incision dry, leave open to air- no dressing   Over the counter stool softeners daily while on narcotics  No lifting greater than 15 pounds, no repetitive bending or twisting  Follow up at [facility] 2 weeks after surgery if home

## 2020-03-31 PROBLEM — R74.01 TRANSAMINITIS: Status: ACTIVE | Noted: 2020-01-01

## 2020-03-31 PROBLEM — J96.91 RESPIRATORY FAILURE WITH HYPOXIA (HCC): Status: ACTIVE | Noted: 2020-01-01

## 2020-03-31 PROBLEM — I50.9 CHF (CONGESTIVE HEART FAILURE) (HCC): Status: ACTIVE | Noted: 2020-01-01

## 2020-03-31 NOTE — ED PROVIDER NOTES
55-year-old with a history of glioblastoma multiform who is currently on chemotherapy presents with increasing shortness of breath over several days perhaps as long as a couple of weeks. He denies any chest pain. He was hypoxic upon EMS arrival.  He had sats of 65% improved on 4 L nasal cannula at 93% currently. The history is provided by a relative. Shortness of Breath This is a new problem. The problem occurs continuously. The current episode started more than 2 days ago. The problem has been gradually worsening. Associated symptoms include cough and leg swelling. Pertinent negatives include no fever, no coryza, no rhinorrhea, no sputum production, no chest pain and no leg pain. He has tried nothing for the symptoms. Past Medical History:  
Diagnosis Date  Asbestosis ([address] 6/22/2019 Last Assessment & Plan:   Was Diagnosed in November . Appears Asymptomatic Currently. Not Short of Breath Breathing Well.  Brain mass 6/21/2019 Last Assessment & Plan:  Is a neurosurgical consultation with Dr. Liang Saunders  is evaluated the patient. Patient is on IV Decadron every 6 hourly. Patient has evidence of a left-sided temporoparietal mass  I Understanded to the plan is for surgery On Wednesday Will change decadrone to 4 mg po qid  Pt will follow with Dr Liang Saunders outpatient. Past Surgical History:  
Procedure Laterality Date  HX CRANIOTOMY  06/26/2019  
 left Frontotemporal crani for GBM  
 HX HERNIA REPAIR Left  HX LUMBAR LAMINECTOMY Family History:  
Problem Relation Age of Onset  Lung Disease Father Social History Socioeconomic History  Marital status:  Spouse name: Not on file  Number of children: Not on file  Years of education: Not on file  Highest education level: Not on file Occupational History  Not on file Social Needs  Financial resource strain: Not on file  Food insecurity Worry: Not on file Inability: Not on file  Transportation needs Medical: Not on file Non-medical: Not on file Tobacco Use  Smoking status: Former Smoker  Smokeless tobacco: Never Used Substance and Sexual Activity  Alcohol use: Not Currently  Drug use: Not on file  Sexual activity: Not on file Lifestyle  Physical activity Days per week: Not on file Minutes per session: Not on file  Stress: Not on file Relationships  Social connections Talks on phone: Not on file Gets together: Not on file Attends Denominational service: Not on file Active member of club or organization: Not on file Attends meetings of clubs or organizations: Not on file Relationship status: Not on file  Intimate partner violence Fear of current or ex partner: Not on file Emotionally abused: Not on file Physically abused: Not on file Forced sexual activity: Not on file Other Topics Concern  Not on file Social History Narrative  Not on file ALLERGIES: Bee venom protein (honey bee) and Iodine and iodide containing products Review of Systems Constitutional: Negative for fever. HENT: Negative for rhinorrhea. Respiratory: Positive for cough and shortness of breath. Negative for sputum production. Cardiovascular: Positive for leg swelling. Negative for chest pain. Vitals:  
 03/31/20 1611 BP: 140/70 Pulse: (!) 101 Resp: 18 Temp: 98.3 °F (36.8 °C) SpO2: 92% Weight: 95.3 kg (210 lb) Height: 5' 8\" (1.727 m) Physical Exam  
 
MDM Procedures

## 2020-03-31 NOTE — ED TRIAGE NOTES
Patient arrived via EMS from private home. EMS was called due Dr. Carolina Avial wanted pateint to be evaluated for pneumonia per family reports bedside. On EMS arrival patient was 65% on RA. EMS placed 4LNC on patient and O2 sat increased to 93%. Hx of asbestosis. /90, HR 100bpm.  Patient arrived from EMS wearing hospital mask. Family reports patient fell last night without injuries. Hx glioblastoma stage 4 with developing brain injury from glioblastoma. Alert and oriented to self.

## 2020-04-01 NOTE — PROGRESS NOTES
Placed on [address] @ 50% due to requiring 15L HFNC with frequent desats. Capnostream 07 placed and monitoring.

## 2020-04-01 NOTE — PROGRESS NOTES
Hourly rounding completed on this shift. All needs met. Pt oxygen saturation has been fluctuating up and down. Pt went up to 15 L on NC and O2 sats stayed in the low 90s.so, respiratory placed pt on airvo and pt has been tolerating it well. O2 sats staying in the upper 90s . Pt is currently resting in bed. Will continue to monitor and give report to oncoming nurse.

## 2020-04-01 NOTE — PROGRESS NOTES
Hospitalist Progress Note Admit Date:  3/31/2020  3:38 PM  
Name:  Margarita Magallon Age:  62 y.o. 
:  1955 MRN:  [id number] PCP:  Melody Duff NP Treatment Team: Attending Provider: Skye Rivero DO; Care Manager: Terris Ormond, RN; Consulting Provider: Rod Qiu MD 
 
Subjective: HPI and or CC: 
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 No associated fevers/chills, chest pain, sore throat, nasal congestion, runny nose, abdominal pain, or nausea/vomiting. He has not had any sick contacts or recent travel, his wife says \"we've basically been quarantining ourselves for the past two weeks. \"  
 
: 
Patient on nonrebreather with saturations mid 90s. Afebrile, no leukocytosis. CT of chest pending and U/S of abdomen unremarkable. Diuresing well-650 ml last shift. Patient noted to pull oxygen off at times, replaced. Echocardiogram complete with EF 55-60%, mild tricuspid regurgitation. Oncology consult ordered. Oncology saw patient and per their note, Bactrim DS ordered Vwhmus-Jdvmokrls-Mzukyi while patient on  Temodar for PCP prophylaxis which is known to be associated with Temodar. I have resumed this. Objective:  
 
Patient Vitals for the past 24 hrs: 
 Temp Pulse Resp BP SpO2  
20 1200 99.3 °F (37.4 °C) (!) 113 20 103/70 91 % 20 0733 98.9 °F (37.2 °C) (!) 121 20 115/79 92 % 20 0453 98.2 °F (36.8 °C) 98 20 100/71 92 % 20 2205     92 % 20 2156 98.1 °F (36.7 °C) 100 20 108/73 (!) 89 % 20     91 % 20  100 20 126/76 91 % 20 1930  100  118/73   
20 1611 98.3 °F (36.8 °C) (!) 101 18 140/70 92 % Oxygen Therapy O2 Sat (%): 91 % (20 1200) Pulse via Oximetry: 100 beats per minute (03/31/20 2205) O2 Device: Hi flow nasal cannula (04/01/20 0733) O2 Flow Rate (L/min): 11 l/min (04/01/20 0733) Intake/Output Summary (Last 24 hours) at 4/1/2020 1227 Last data filed at 4/1/2020 0630 Gross per 24 hour Intake  Output 650 ml Net -650 ml REVIEW OF SYSTEMS: Comprehensive ROS performed and negative except as stated in HPI. Physical Examination: 
General:          Alert, cooperative, no distress, appears stated age. Hard of hearing. Wearing mask. Head:               Normocephalic, without obvious abnormality, atraumatic. Nose:               Nares normal. No drainage or sinus tenderness. Lungs:             Rales L>R. Not visibly tachypneic or dyspneic. On 4L NC. Heart:              Regular rate and rhythm,  no murmur, rub or gallop. Abdomen:        Soft, non-tender. Not distended. Bowel sounds normal.  
Extremities:     Nonpitting edema of LEs b/l  
Skin:                Texture, turgor normal. No rashes or lesions. Not Jaundiced Neurologic:      Alert and oriented x 3, no focal deficits Data Review: 
I have reviewed all labs, meds, telemetry events, and studies from the last 24 hours. Recent Results (from the past 24 hour(s)) CBC W/O DIFF Collection Time: 03/31/20  3:54 PM  
Result Value Ref Range WBC 7.7 4.3 - 11.1 K/uL  
 RBC 4.05 (L) 4.23 - 5.6 M/uL  
 HGB 13.4 (L) 13.6 - 17.2 g/dL HCT 40.1 (L) 41.1 - 50.3 % MCV 99.0 (H) 79.6 - 97.8 FL  
 MCH 33.1 (H) 26.1 - 32.9 PG  
 MCHC 33.4 31.4 - 35.0 g/dL  
 RDW 14.1 11.9 - 14.6 % PLATELET 072 747 - 316 K/uL MPV 9.4 9.4 - 12.3 FL ABSOLUTE NRBC 0.02 0.0 - 0.2 K/uL METABOLIC PANEL, COMPREHENSIVE Collection Time: 03/31/20  3:54 PM  
Result Value Ref Range Sodium 138 136 - 145 mmol/L Potassium 3.5 3.5 - 5.1 mmol/L Chloride 104 98 - 107 mmol/L  
 CO2 25 21 - 32 mmol/L Anion gap 9 7 - 16 mmol/L Glucose 92 65 - 100 mg/dL  BUN 15 8 - 23 MG/DL  
 Creatinine 1.26 0.8 - 1.5 MG/DL  
 GFR est AA >60 >60 ml/min/1.73m2 GFR est non-AA >60 >60 ml/min/1.73m2 Calcium 8.6 8.3 - 10.4 MG/DL Bilirubin, total 1.1 0.2 - 1.1 MG/DL  
 ALT (SGPT) 72 (H) 12 - 65 U/L  
 AST (SGOT) 69 (H) 15 - 37 U/L Alk. phosphatase 85 50 - 136 U/L Protein, total 7.0 6.3 - 8.2 g/dL Albumin 2.8 (L) 3.2 - 4.6 g/dL Globulin 4.2 (H) 2.3 - 3.5 g/dL A-G Ratio 0.7 (L) 1.2 - 3.5    
TROPONIN I Collection Time: 03/31/20  3:54 PM  
Result Value Ref Range Troponin-I, Qt. <0.02 (L) 0.02 - 0.05 NG/ML  
NT-PRO BNP Collection Time: 03/31/20  3:54 PM  
Result Value Ref Range NT pro- (H) 5 - 125 PG/ML  
EKG, 12 LEAD, INITIAL Collection Time: 03/31/20  4:22 PM  
Result Value Ref Range Ventricular Rate 99 BPM  
 Atrial Rate 99 BPM  
 P-R Interval 164 ms QRS Duration 84 ms Q-T Interval 358 ms QTC Calculation (Bezet) 459 ms Calculated P Axis 37 degrees Calculated R Axis 58 degrees Calculated T Axis 49 degrees Diagnosis    
  !! AGE AND GENDER SPECIFIC ECG ANALYSIS !! Normal sinus rhythm Low voltage QRS Cannot rule out Anterior infarct (cited on or before 31-MAR-2020) Abnormal ECG When compared with ECG of 31-MAR-2020 15:50, No significant change was found Confirmed by ST ARTHRU RENO MD (), FRANCA DUEÑAS ([id number]) on 4/1/2020 8:47:54 AM 
  
PROCALCITONIN Collection Time: 03/31/20  9:47 PM  
Result Value Ref Range Procalcitonin 0.21 ng/mL METABOLIC PANEL, COMPREHENSIVE Collection Time: 04/01/20  5:54 AM  
Result Value Ref Range Sodium 136 136 - 145 mmol/L Potassium 3.3 (L) 3.5 - 5.1 mmol/L Chloride 102 98 - 107 mmol/L  
 CO2 25 21 - 32 mmol/L Anion gap 9 7 - 16 mmol/L Glucose 106 (H) 65 - 100 mg/dL BUN 13 8 - 23 MG/DL Creatinine 1.09 0.8 - 1.5 MG/DL  
 GFR est AA >60 >60 ml/min/1.73m2 GFR est non-AA >60 >60 ml/min/1.73m2 Calcium 9.1 8.3 - 10.4 MG/DL  Bilirubin, total 1.6 (H) 0.2 - 1.1 MG/DL  
 ALT (SGPT) 61 12 - 65 U/L  
 AST (SGOT) 54 (H) 15 - 37 U/L Alk. phosphatase 94 50 - 136 U/L Protein, total 7.6 6.3 - 8.2 g/dL Albumin 2.7 (L) 3.2 - 4.6 g/dL Globulin 4.9 (H) 2.3 - 3.5 g/dL A-G Ratio 0.6 (L) 1.2 - 3.5    
CBC WITH AUTOMATED DIFF Collection Time: 04/01/20  5:54 AM  
Result Value Ref Range WBC 9.3 4.3 - 11.1 K/uL  
 RBC 4.21 (L) 4.23 - 5.6 M/uL  
 HGB 13.9 13.6 - 17.2 g/dL HCT 41.0 (L) 41.1 - 50.3 % MCV 97.4 79.6 - 97.8 FL  
 MCH 33.0 (H) 26.1 - 32.9 PG  
 MCHC 33.9 31.4 - 35.0 g/dL  
 RDW 13.8 11.9 - 14.6 % PLATELET 074 890 - 054 K/uL MPV 9.1 (L) 9.4 - 12.3 FL ABSOLUTE NRBC 0.00 0.0 - 0.2 K/uL  
 DF AUTOMATED NEUTROPHILS 83 (H) 43 - 78 % LYMPHOCYTES 13 13 - 44 % MONOCYTES 3 (L) 4.0 - 12.0 % EOSINOPHILS 0 (L) 0.5 - 7.8 % BASOPHILS 0 0.0 - 2.0 % IMMATURE GRANULOCYTES 1 0.0 - 5.0 %  
 ABS. NEUTROPHILS 7.7 1.7 - 8.2 K/UL  
 ABS. LYMPHOCYTES 1.2 0.5 - 4.6 K/UL  
 ABS. MONOCYTES 0.3 0.1 - 1.3 K/UL  
 ABS. EOSINOPHILS 0.0 0.0 - 0.8 K/UL  
 ABS. BASOPHILS 0.0 0.0 - 0.2 K/UL  
 ABS. IMM. GRANS. 0.1 0.0 - 0.5 K/UL BILIRUBIN, DIRECT Collection Time: 04/01/20  5:54 AM  
Result Value Ref Range Bilirubin, direct 0.4 (H) <0.4 MG/DL All Micro Results Procedure Component Value Units Date/Time RESPIRATORY VIRAL PANEL, PCR [[id number]] Collected:  03/31/20 2154 Order Status:  No result RESPIRATORY VIRAL PANEL, PCR [[id number]] Collected:  03/31/20 2147 Order Status:  Canceled Specimen:  Sputum CULTURE, BLOOD [[id number]] Order Status:  Canceled Specimen:  Blood CULTURE, BLOOD [[id number]] Order Status:  Canceled Specimen:  Blood Current Meds: 
Current Facility-Administered Medications Medication Dose Route Frequency  potassium chloride (K-DUR, KLOR-CON) SR tablet 40 mEq  40 mEq Oral NOW  sodium chloride (NS) flush 5-40 mL  5-40 mL IntraVENous Q8H  
  sodium chloride (NS) flush 5-40 mL  5-40 mL IntraVENous PRN  
 tuberculin injection 5 Units  5 Units IntraDERMal ONCE  
 ondansetron (ZOFRAN) injection 4 mg  4 mg IntraVENous Q4H PRN  
 senna-docusate (PERICOLACE) 8.6-50 mg per tablet 1 Tab  1 Tab Oral DAILY  . PHARMACY TO SUBSTITUTE PER PROTOCOL (Reordered from: bisoprolol-hydroCHLOROthiazide (ZIAC) 2.5-6.25 mg per tablet)    Per Protocol  dexAMETHasone (DECADRON) tablet 2 mg  2 mg Oral BID  furosemide (LASIX) injection 40 mg  40 mg IntraVENous Q12H  levothyroxine (SYNTHROID) tablet 50 mcg  50 mcg Oral ACB  pantoprazole (PROTONIX) tablet 40 mg  40 mg Oral ACB  enoxaparin (LOVENOX) injection 40 mg  40 mg SubCUTAneous Q24H Diet: DIET CARDIAC Other Studies (last 24 hours): Xr Chest Sngl V Result Date: 4/1/2020 EXAM: Chest x-ray. INDICATION: Dyspnea. COMPARISON: Yesterday's chest x-ray. TECHNIQUE: Frontal view chest x-ray. FINDINGS: There is unchanged left lung base infiltrate. The right lung remains clear. The cardiac size is stable. No pneumothorax or pleural effusion is seen. IMPRESSION: Unchanged left lung base infiltrate. Xr Chest [facility] Result Date: 3/31/2020 AP PORTABLE CHEST X-RAY 3/31/2020 4:56 PM HISTORY: sob COMPARISON: August 12, 2019 FINDINGS:  EKG leads are present. The lung volumes are diminished. Consolidation is present in the left lung base. There is pulmonary vascular congestion IMPRESSION: Low lung volumes with consolidation in the left lung base. Assessment and Plan:  
 
Hospital Problems as of 4/1/2020 Date Reviewed: 3/27/2020 Codes Class Noted - Resolved POA  
 CHF (congestive heart failure) ([facility] [address] ICD-10-CM: I50.9 ICD-9-CM: 428.0  3/31/2020 - Present Unknown * (Principal) Respiratory failure with hypoxia [facility]) ICD-10-CM: J96.91 
ICD-9-CM: 518.81  3/31/2020 - Present Unknown Transaminitis ICD-10-CM: R74.0 ICD-9-CM: 790.4  3/31/2020 - Present Unknown Hypertension (Chronic) ICD-10-CM: I10 
ICD-9-CM: 401.9  8/12/2019 - Present Yes GBM (glioblastoma multiforme) (HCC) ICD-10-CM: C71.9 ICD-9-CM: 191.9  6/26/2019 - Present Yes Asbestosis ([address] ICD-10-CM: U74 ICD-9-CM: 901  6/22/2019 - Present Yes Overview Signed 6/25/2019  7:59 AM by Marilou Myers, [address] [place] [address] Last Assessment & Plan:  
 Was Diagnosed in November Carmela Moreira Appears Asymptomatic Currently. Not Short of Breath Breathing Well. A/P:   
1. Acute hypoxic respiratory failure - check RPP, procalcitonin-0.21 
- CT chest with contrast to evaluate for PE 
- hold off on antibiotics for now 
- ordered TTE-EF 55-60%, mild tricuspid regurgitation. 
- given Lasix in ED, will continue with 40 mg IV BID-diuresing well. - strict I's/O's and daily weights 
- wean supplemental oxygen as tolerated 
  
2. Transaminitis 
- ?due to cardiomyopathy vs other 
- ordered abdominal ultrasound - trend LFTs daily 
-Check lipase level. 
  
3. HTN 
- continue home regimen 
  
4. Hypothyroidism 
- continue with home regimen 5. Glioblastoma multiforme: Oncology consult, Decadron oral ordered. **Oncology saw patient and per their note, Bactrim DS ordered Ucjbcz-Wwkaoqhib-Kvoqvo while patient on  Temodar for PCP prophylaxis which is known to be associated with Temodar. I have resumed this.   
 
Signed: 
Barbara Mcghee NP

## 2020-04-01 NOTE — H&P
HOSPITALIST H&P/CONSULT 
NAME:  Maria Eugenia Penny Age:  62 y.o. 
:   1955 MRN:   [id number] PCP: Oneyda Virgen NP Consulting MD: Treatment Team: Attending Provider: Dennie Hilt, DO; Primary Nurse: Odessa Calderon RN 
HPI:  
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 No associated fevers/chills, chest pain, sore throat, nasal congestion, runny nose, abdominal pain, or nausea/vomiting. He has not had any sick contacts or recent travel, his wife says \"we've basically been quarantining ourselves for the past two weeks. \"  
 
ED Course: patient placed on supplemental oxygen, was satting down to 60s on RA, CXR showing consolidation in left lung base. AST/ALT in 60s/70s. Troponin negative. Pro-BNP of 966. Complete ROS done and is as stated in HPI or otherwise negative Past Medical History:  
Diagnosis Date  Asbestosis ([facility] [address] 2019 Last Assessment & Plan:   Was Diagnosed in November . Appears Asymptomatic Currently. Not Short of Breath Breathing Well.  Brain mass 2019 Last Assessment & Plan:  Is a neurosurgical consultation with Dr. Katherine Dong  is evaluated the patient. Patient is on IV Decadron every 6 hourly. Patient has evidence of a left-sided temporoparietal mass  I Understanded to the plan is for surgery On Wednesday Will change decadrone to 4 mg po qid  Pt will follow with Dr Katherine Dong outpatient. Past Surgical History:  
Procedure Laterality Date  HX CRANIOTOMY  2019  
 left Frontotemporal crani for GBM  
 HX HERNIA REPAIR Left  HX LUMBAR LAMINECTOMY Prior to Admission Medications Prescriptions Last Dose Informant Patient Reported? Taking?  
bisoprolol-hydroCHLOROthiazide (ZIAC) 2.5-6.25 mg per tablet   Yes No  
Sig: Take 1 Tab by mouth. citalopram (CELEXA) 10 mg tablet   Yes No  
Sig: Take 10 mg by mouth. dexAMETHasone (DECADRON) 2 mg tablet   No No  
Sig: Take 1 Tab by mouth two (2) times a day. diphenhydrAMINE (BENADRYL) 25 mg capsule   Yes No  
Sig: Take 50 mg by mouth nightly. furosemide (LASIX) 20 mg tablet   No No  
Sig: Take 1 Tab by mouth daily as needed (swelling). For leg swelling. Only take in the morning. levothyroxine (SYNTHROID) 50 mcg tablet   No No  
Sig: Take 1 Tab by mouth Daily (before breakfast). Indications: hypothyroidism  
pantoprazole (PROTONIX) 40 mg tablet   No No  
Sig: Take 1 Tab by mouth Daily (before breakfast). prochlorperazine (COMPAZINE) 10 mg tablet   Yes No  
Sig: TAKE 1 TABLET BY MOUTH EVERY 6 HOURS AS NEEDED FOR NAUSEA FOR UP TO 7 DAYS  
temozolomide (TEMODAR) 140 mg capsule   No No  
Sig: Take 1 capsule daily, Monday through Friday only. Take with a 250 mg capsule. temozolomide (TEMODAR) 250 mg capsule   No No  
Sig: Take one capsule daily Monday through Friday only. Take with a 140 mg capsule. trimethoprim-sulfamethoxazole (BACTRIM DS) 160-800 mg per tablet   No No  
Sig: Take 1 Tab by mouth BID  & Fri. Take while on temodar Facility-Administered Medications: None Allergies Allergen Reactions  Bee Venom Protein (Honey Bee) Swelling  Iodine And Iodide Containing Products Unknown (comments) Contrast dye Social History Tobacco Use  Smoking status: Former Smoker  Smokeless tobacco: Never Used Substance Use Topics  Alcohol use: Not Currently Family History Problem Relation Age of Onset  Lung Disease Father Objective:  
 
Visit Vitals /76 Pulse 100 Temp 98.3 °F (36.8 °C) Resp 20 Ht 5' 8\" (1.727 m) Wt 95.3 kg (210 lb) SpO2 91% BMI 31.93 kg/m² Temp (24hrs), Av.3 °F (36.8 °C), Min:98.3 °F (36.8 °C), Max:98.3 °F (36.8 °C) Oxygen Therapy O2 Sat (%): 91 % (20) Pulse via Oximetry: 101 beats per minute (03/31/20 2001) O2 Device: Nasal cannula (03/31/20 0936) O2 Flow Rate (L/min): 6 l/min (03/31/20 9574) Physical Exam: 
General:    Alert, cooperative, no distress, appears stated age. Hard of hearing. Wearing mask. Head:   Normocephalic, without obvious abnormality, atraumatic. Nose:  Nares normal. No drainage or sinus tenderness. Lungs:   Rales L>R. Not visibly tachypneic or dyspneic. On 4L NC. Heart:   Regular rate and rhythm,  no murmur, rub or gallop. Abdomen:   Soft, non-tender. Not distended. Bowel sounds normal.  
Extremities: Nonpitting edema of LEs b/l  
Skin:     Texture, turgor normal. No rashes or lesions. Not Jaundiced Neurologic: Alert and oriented x 3, no focal deficits Data Review:  
Recent Results (from the past 24 hour(s)) CBC W/O DIFF Collection Time: 03/31/20  3:54 PM  
Result Value Ref Range WBC 7.7 4.3 - 11.1 K/uL  
 RBC 4.05 (L) 4.23 - 5.6 M/uL  
 HGB 13.4 (L) 13.6 - 17.2 g/dL HCT 40.1 (L) 41.1 - 50.3 % MCV 99.0 (H) 79.6 - 97.8 FL  
 MCH 33.1 (H) 26.1 - 32.9 PG  
 MCHC 33.4 31.4 - 35.0 g/dL  
 RDW 14.1 11.9 - 14.6 % PLATELET 813 014 - 939 K/uL MPV 9.4 9.4 - 12.3 FL ABSOLUTE NRBC 0.02 0.0 - 0.2 K/uL METABOLIC PANEL, COMPREHENSIVE Collection Time: 03/31/20  3:54 PM  
Result Value Ref Range Sodium 138 136 - 145 mmol/L Potassium 3.5 3.5 - 5.1 mmol/L Chloride 104 98 - 107 mmol/L  
 CO2 25 21 - 32 mmol/L Anion gap 9 7 - 16 mmol/L Glucose 92 65 - 100 mg/dL BUN 15 8 - 23 MG/DL Creatinine 1.26 0.8 - 1.5 MG/DL  
 GFR est AA >60 >60 ml/min/1.73m2 GFR est non-AA >60 >60 ml/min/1.73m2 Calcium 8.6 8.3 - 10.4 MG/DL Bilirubin, total 1.1 0.2 - 1.1 MG/DL  
 ALT (SGPT) 72 (H) 12 - 65 U/L  
 AST (SGOT) 69 (H) 15 - 37 U/L Alk. phosphatase 85 50 - 136 U/L Protein, total 7.0 6.3 - 8.2 g/dL Albumin 2.8 (L) 3.2 - 4.6 g/dL Globulin 4.2 (H) 2.3 - 3.5 g/dL A-G Ratio 0.7 (L) 1.2 - 3.5 TROPONIN I Collection Time: 03/31/20  3:54 PM  
Result Value Ref Range Troponin-I, Qt. <0.02 (L) 0.02 - 0.05 NG/ML  
NT-PRO BNP Collection Time: 03/31/20  3:54 PM  
Result Value Ref Range NT pro- (H) 5 - 125 PG/ML  
EKG, 12 LEAD, INITIAL Collection Time: 03/31/20  4:22 PM  
Result Value Ref Range Ventricular Rate 99 BPM  
 Atrial Rate 99 BPM  
 P-R Interval 164 ms QRS Duration 84 ms Q-T Interval 358 ms QTC Calculation (Bezet) 459 ms Calculated P Axis 37 degrees Calculated R Axis 58 degrees Calculated T Axis 49 degrees Diagnosis    
  !! AGE AND GENDER SPECIFIC ECG ANALYSIS !! Normal sinus rhythm Low voltage QRS Cannot rule out Anterior infarct (cited on or before 31-MAR-2020) Abnormal ECG When compared with ECG of 31-MAR-2020 15:50, No significant change was found Imaging Rowan Soares Assessment and Plan: Active Hospital Problems Diagnosis Date Noted  CHF (congestive heart failure) ([facility] [address] 03/31/2020  Respiratory failure with hypoxia ([facility] [address] 03/31/2020  Transaminitis 03/31/2020  Hypertension 08/12/2019  GBM (glioblastoma multiforme) ([facility] [address] 06/26/2019  Asbestosis ([facility] [address] 06/22/2019 Last Assessment & Plan:  
 Was Diagnosed in November Petra Jha Appears Asymptomatic Currently. Not Short of Breath Breathing Well. PLAN # Acute hypoxic respiratory failure - check RPP, procalcitonin 
- CT chest with contrast to evaluate for PE 
- hold off on antibiotics for now 
- ordered TTE 
- given Lasix in ED, will continue with 40 mg IV BID 
- strict I's/O's and daily weights 
- wean supplemental oxygen as tolerated # Transaminitis 
- ?due to cardiomyopathy vs other 
- ordered abdominal ultrasound - trend LFTs daily # HTN 
- continue home regimen # Hypothyroidism 
- continue with home regimen F/E/N: no fluids, replete electrolytes as needed, cardiac diet Ppx: Lovenox for VTE Code Status: FULL CODE 
 
 Disposition: Admit to Inpatient with plan as above. Discussed with patient and wife (Jackie) at bedside. Patient's wife would like updates daily since she cannot visit in the hospital at the moment (contact info in demographics). All questions answered. Signed By: Yazmin White DO March 31, 2020

## 2020-04-01 NOTE — CONSULTS
[facility] Inpatient Hematology / Oncology Consult Note Reason for Consult:  CHF (congestive heart failure) ([facility] [address] [I50.9] Referring Physician:  Demian Marie DO History of Present Illness: 
Mr. Laila Oseguera is a 62 y.o. male admitted on 3/31/2020 with a primary diagnosis of The primary encounter diagnosis was New onset of congestive heart failure ([facility] [address] A diagnosis of Hypoxia was also pertinent to this visit. Victorina Garay Mr Laila Oseguera has a PMH of HTN and asbestosis. He is known to Dr Sam Galindo. He is currently being treated for glioblastoma multiforme. He initially presented to Dr Alfonso Bangura in July, 2019 after 2-3 week history of expressive aphasia. CT and MRI showed left fronto-temporal contrast enhancing tumor with mass effect and edema. On June 26 he underwent a craniotomy and resection that confirmed glioblastoma multiforme. He was placed on daily Temodar and underwent radiation therapy followed by monthly pulse Temodar and optune device. He recently saw Dr Sam Galindo in follow up and Temodar was continued, 5 days on followed by 23 days off. He was also placed on Lasix 20mg daily for BLE swelling. He will undergo MRI in approximately one month. Last MRI 1/2020 showed no evidence of disease progression with subtle improvement of enhancement along margins of operative cavity and subtle improvement of perilesional mass effect. Mr Laila Oseguera presented to the ED on 3/31 for increasing shortness of breath over the last several days. He was initially hypoxic on EMS arrival, with sats at 65% and improved to 93% on 4L NC. He also endorsed cough and leg swelling. While in the ED a CXR was done and showed unchanged left lung base infiltrate. A CT chest (PE protocol) has been ordered and he is currently being premedicated due to allergy. On admission, his proBNP was 966. A TTE was done and showed LVEF of 55-60%. On admission, ALT 72 and AST 69. A RUQ US was unremarkable.  He continues on Lasix and he is currently on a NRB mask with reported sats in the mid 90s. We have been consulted for recommendations on our patient. Review of Systems: 
 
Limited due to expressive aphasia 
 
+ cough, dyspnea Allergies Allergen Reactions  Bee Venom Protein (Honey Bee) Swelling  Compazine [Prochlorperazine] Other (comments) Caused delirium, confusion, uncontrollable shakes  Iodine And Iodide Containing Products Unknown (comments) Contrast dye Past Medical History:  
Diagnosis Date  Asbestosis ([address] 6/22/2019 Last Assessment & Plan:   Was Diagnosed in November . Appears Asymptomatic Currently. Not Short of Breath Breathing Well.  Brain mass 6/21/2019 Last Assessment & Plan:  Is a neurosurgical consultation with Dr. Liang Saunders  is evaluated the patient. Patient is on IV Decadron every 6 hourly. Patient has evidence of a left-sided temporoparietal mass  I Understanded to the plan is for surgery On Wednesday Will change decadrone to 4 mg po qid  Pt will follow with Dr Liang Saunders outpatient. Past Surgical History:  
Procedure Laterality Date  HX CRANIOTOMY  06/26/2019  
 left Frontotemporal crani for GBM  
 HX HERNIA REPAIR Left  HX LUMBAR LAMINECTOMY Family History Problem Relation Age of Onset  Lung Disease Father Social History Socioeconomic History  Marital status:  Spouse name: Not on file  Number of children: Not on file  Years of education: Not on file  Highest education level: Not on file Occupational History  Not on file Social Needs  Financial resource strain: Not on file  Food insecurity Worry: Not on file Inability: Not on file  Transportation needs Medical: Not on file Non-medical: Not on file Tobacco Use  Smoking status: Former Smoker  Smokeless tobacco: Never Used Substance and Sexual Activity  Alcohol use: Not Currently  Drug use: Not on file  Sexual activity: Not on file Lifestyle  Physical activity Days per week: Not on file Minutes per session: Not on file  Stress: Not on file Relationships  Social connections Talks on phone: Not on file Gets together: Not on file Attends Baptism service: Not on file Active member of club or organization: Not on file Attends meetings of clubs or organizations: Not on file Relationship status: Not on file  Intimate partner violence Fear of current or ex partner: Not on file Emotionally abused: Not on file Physically abused: Not on file Forced sexual activity: Not on file Other Topics Concern  Not on file Social History Narrative  Not on file Current Facility-Administered Medications Medication Dose Route Frequency Provider Last Rate Last Dose  predniSONE (DELTASONE) tablet 50 mg  50 mg Oral ONCE Elbert Yang DO      
 [START ON 4/2/2020] predniSONE (DELTASONE) tablet 50 mg  50 mg Oral ONCE Elbert Yang DO      
 sodium chloride (NS) flush 5-40 mL  5-40 mL IntraVENous Q8H Elbert Yang DO   10 mL at 04/01/20 1313  sodium chloride (NS) flush 5-40 mL  5-40 mL IntraVENous PRN Elbert Yang DO      
 tuberculin injection 5 Units  5 Units IntraDERMal ONCE Elbert Yang DO   5 Units at 03/31/20 2215  ondansetron (ZOFRAN) injection 4 mg  4 mg IntraVENous Q4H PRN Elbert Yang DO      
 senna-docusate (PERICOLACE) 8.6-50 mg per tablet 1 Tab  1 Tab Oral DAILY Ciesco, [address], DO   1 Tab at 04/01/20 9205  . PHARMACY TO SUBSTITUTE PER PROTOCOL (Reordered from: bisoprolol-hydroCHLOROthiazide [facility]) 2.5-6.25 mg per tablet)    Per Protocol Ciomaira, [address], DO      
 dexAMETHasone (DECADRON) tablet 2 mg  2 mg Oral BID Elbert Yang DO   2 mg at 04/01/20 4116  furosemide (LASIX) injection 40 mg  40 mg IntraVENous Q12H Elbert Yang DO   40 mg at 04/01/20 1576  levothyroxine (SYNTHROID) tablet 50 mcg  50 mcg Oral ACB Hodan Ly DO   50 mcg at 20 0600  pantoprazole (PROTONIX) tablet 40 mg  40 mg Oral ACB Ciesco, Elbert, DO   40 mg at 20 0600  enoxaparin (LOVENOX) injection 40 mg  40 mg SubCUTAneous Q24H Ciesco Elbert, DO   40 mg at 20 3848 OBJECTIVE: 
Patient Vitals for the past 8 hrs: 
 BP Temp Pulse Resp SpO2  
20 1441     90 % 20 1425     90 % 20 1200 103/70 99.3 °F (37.4 °C) (!) 113 20 91 % 20 0733 115/79 98.9 °F (37.2 °C) (!) 121 20 92 % Temp (24hrs), Av.6 °F (37 °C), Min:98.1 °F (36.7 °C), Max:99.3 °F (37.4 °C) 
 
 07 -  1900 In: -  
Out: 405 [VBFT:915] Physical Exam: 
Constitutional: Well developed, well nourished male in no acute distress, sitting comfortably in the hospital bed. HEENT: Normocephalic and atraumatic. Oropharynx is clear, mucous membranes are moist. Extraocular muscles are intact. Sclerae anicteric. Neck supple without JVD. No thyromegaly present. Skin Warm and dry. No bruising and no rash noted. No erythema. No pallor. Respiratory +NRB mask in place. Lungs are clear to auscultation bilaterally without wheezes, rales or rhonchi, normal air exchange without accessory muscle use. CVS Normal rate, regular rhythm and normal S1 and S2. No murmurs, gallops, or rubs. Abdomen Soft, nontender and nondistended, normoactive bowel sounds. No palpable mass. No hepatosplenomegaly. Neuro Alert. Profound expressive aphasia limits exam. Grossly nonfocal with no obvious sensory or motor deficits. MSK Normal range of motion in general.  No edema and no tenderness. Psych Appropriate mood and affect. Labs:   
Recent Results (from the past 24 hour(s)) CBC W/O DIFF Collection Time: 20  3:54 PM  
Result Value Ref Range WBC 7.7 4.3 - 11.1 K/uL  
 RBC 4.05 (L) 4.23 - 5.6 M/uL  
 HGB 13.4 (L) 13.6 - 17.2 g/dL HCT 40.1 (L) 41.1 - 50.3 %  MCV 99.0 (H) 79.6 - 97.8 FL  
 MCH 33.1 (H) 26.1 - 32.9 PG  
 MCHC 33.4 31.4 - 35.0 g/dL  
 RDW 14.1 11.9 - 14.6 % PLATELET 508 121 - 311 K/uL MPV 9.4 9.4 - 12.3 FL ABSOLUTE NRBC 0.02 0.0 - 0.2 K/uL METABOLIC PANEL, COMPREHENSIVE Collection Time: 03/31/20  3:54 PM  
Result Value Ref Range Sodium 138 136 - 145 mmol/L Potassium 3.5 3.5 - 5.1 mmol/L Chloride 104 98 - 107 mmol/L  
 CO2 25 21 - 32 mmol/L Anion gap 9 7 - 16 mmol/L Glucose 92 65 - 100 mg/dL BUN 15 8 - 23 MG/DL Creatinine 1.26 0.8 - 1.5 MG/DL  
 GFR est AA >60 >60 ml/min/1.73m2 GFR est non-AA >60 >60 ml/min/1.73m2 Calcium 8.6 8.3 - 10.4 MG/DL Bilirubin, total 1.1 0.2 - 1.1 MG/DL  
 ALT (SGPT) 72 (H) 12 - 65 U/L  
 AST (SGOT) 69 (H) 15 - 37 U/L Alk. phosphatase 85 50 - 136 U/L Protein, total 7.0 6.3 - 8.2 g/dL Albumin 2.8 (L) 3.2 - 4.6 g/dL Globulin 4.2 (H) 2.3 - 3.5 g/dL A-G Ratio 0.7 (L) 1.2 - 3.5    
TROPONIN I Collection Time: 03/31/20  3:54 PM  
Result Value Ref Range Troponin-I, Qt. <0.02 (L) 0.02 - 0.05 NG/ML  
NT-PRO BNP Collection Time: 03/31/20  3:54 PM  
Result Value Ref Range NT pro- (H) 5 - 125 PG/ML  
EKG, 12 LEAD, INITIAL Collection Time: 03/31/20  4:22 PM  
Result Value Ref Range Ventricular Rate 99 BPM  
 Atrial Rate 99 BPM  
 P-R Interval 164 ms QRS Duration 84 ms Q-T Interval 358 ms QTC Calculation (Bezet) 459 ms Calculated P Axis 37 degrees Calculated R Axis 58 degrees Calculated T Axis 49 degrees Diagnosis    
  !! AGE AND GENDER SPECIFIC ECG ANALYSIS !! Normal sinus rhythm Low voltage QRS Cannot rule out Anterior infarct (cited on or before 31-MAR-2020) Abnormal ECG When compared with ECG of 31-MAR-2020 15:50, No significant change was found Confirmed by ST ARTHUR RENO MD (), FRANCA DUEÑAS ([id number]) on 4/1/2020 8:47:54 AM 
  
PROCALCITONIN Collection Time: 03/31/20  9:47 PM  
Result Value Ref Range Procalcitonin 0.21 ng/mL METABOLIC PANEL, COMPREHENSIVE  
 Collection Time: 04/01/20  5:54 AM  
Result Value Ref Range Sodium 136 136 - 145 mmol/L Potassium 3.3 (L) 3.5 - 5.1 mmol/L Chloride 102 98 - 107 mmol/L  
 CO2 25 21 - 32 mmol/L Anion gap 9 7 - 16 mmol/L Glucose 106 (H) 65 - 100 mg/dL BUN 13 8 - 23 MG/DL Creatinine 1.09 0.8 - 1.5 MG/DL  
 GFR est AA >60 >60 ml/min/1.73m2 GFR est non-AA >60 >60 ml/min/1.73m2 Calcium 9.1 8.3 - 10.4 MG/DL Bilirubin, total 1.6 (H) 0.2 - 1.1 MG/DL  
 ALT (SGPT) 61 12 - 65 U/L  
 AST (SGOT) 54 (H) 15 - 37 U/L Alk. phosphatase 94 50 - 136 U/L Protein, total 7.6 6.3 - 8.2 g/dL Albumin 2.7 (L) 3.2 - 4.6 g/dL Globulin 4.9 (H) 2.3 - 3.5 g/dL A-G Ratio 0.6 (L) 1.2 - 3.5    
CBC WITH AUTOMATED DIFF Collection Time: 04/01/20  5:54 AM  
Result Value Ref Range WBC 9.3 4.3 - 11.1 K/uL  
 RBC 4.21 (L) 4.23 - 5.6 M/uL  
 HGB 13.9 13.6 - 17.2 g/dL HCT 41.0 (L) 41.1 - 50.3 % MCV 97.4 79.6 - 97.8 FL  
 MCH 33.0 (H) 26.1 - 32.9 PG  
 MCHC 33.9 31.4 - 35.0 g/dL  
 RDW 13.8 11.9 - 14.6 % PLATELET 897 690 - 814 K/uL MPV 9.1 (L) 9.4 - 12.3 FL ABSOLUTE NRBC 0.00 0.0 - 0.2 K/uL  
 DF AUTOMATED NEUTROPHILS 83 (H) 43 - 78 % LYMPHOCYTES 13 13 - 44 % MONOCYTES 3 (L) 4.0 - 12.0 % EOSINOPHILS 0 (L) 0.5 - 7.8 % BASOPHILS 0 0.0 - 2.0 % IMMATURE GRANULOCYTES 1 0.0 - 5.0 %  
 ABS. NEUTROPHILS 7.7 1.7 - 8.2 K/UL  
 ABS. LYMPHOCYTES 1.2 0.5 - 4.6 K/UL  
 ABS. MONOCYTES 0.3 0.1 - 1.3 K/UL  
 ABS. EOSINOPHILS 0.0 0.0 - 0.8 K/UL  
 ABS. BASOPHILS 0.0 0.0 - 0.2 K/UL  
 ABS. IMM. GRANS. 0.1 0.0 - 0.5 K/UL BILIRUBIN, DIRECT Collection Time: 04/01/20  5:54 AM  
Result Value Ref Range Bilirubin, direct 0.4 (H) <0.4 MG/DL  
LIPASE Collection Time: 04/01/20  5:54 AM  
Result Value Ref Range Lipase 128 73 - 393 U/L Imaging: 
[unfilled] ASSESSMENT: 
Problem List  Date Reviewed: 3/27/2020 Codes Class Noted CHF (congestive heart failure) ([facility]) ICD-10-CM: I50.9 ICD-9-CM: 428.0  3/31/2020 * (Principal) Respiratory failure with hypoxia [facility]) ICD-10-CM: J96.91 
ICD-9-CM: 518.81  3/31/2020 Transaminitis ICD-10-CM: R74.0 ICD-9-CM: 790.4  3/31/2020 Fever ICD-10-CM: R50.9 ICD-9-CM: 780.60  8/12/2019 Nausea and vomiting ICD-10-CM: R11.2 ICD-9-CM: 787.01  8/12/2019 Hypertension (Chronic) ICD-10-CM: I10 
ICD-9-CM: 401.9  8/12/2019 Depression (Chronic) ICD-10-CM: F32.9 ICD-9-CM: 093  8/12/2019 Postoperative CSF leak ICD-10-CM: S97.79 
ICD-9-CM: 997.09  7/22/2019 Hyponatremia ICD-10-CM: E87.1 ICD-9-CM: 276.1  7/18/2019 GBM (glioblastoma multiforme) (H[facility]) ICD-10-CM: C71.9 ICD-9-CM: 191.9  6/26/2019 Asbestosis ([address] ICD-10-CM: J44 ICD-9-CM: 756  6/22/2019 Overview Signed 6/25/2019  7:59 AM by Monica Guardado[address] [place] [address] Ave Last Assessment & Plan:  
 Was Diagnosed in November Chris Carlos Appears Asymptomatic Currently. Not Short of Breath Breathing Well. RECOMMENDATIONS: 
 
Glioblastoma multiforme - s/p craniotomy 
- on Temodar, 5 days on 23 days off 
- continue Bactrim for PJP prophylaxis 
- continue dexamethasone 1mg BID 
- MRI scheduled for 4/24 Dyspnea, hypoxia, LE edema 
- Echo normal, CXR 
- CT chest PE protocol in progress 
- Continue lasix Lab studies and imaging studies were personally reviewed. Pertinent old records were reviewed. Thank you for allowing us to participate in the care of Mr. Maryana Juarez. We will remain available for questions or concerns. Please do not hesitate to call. ISIDRO Cheema [facility] [address] [facility] [address] Office : [phone] Fax : [phone] I personally saw, exammed and counselled the patient, and discussed with NP, agree with above history/assessment/plan. 62 y.o.male GBM s/p resection and adjuvant chemorad now on adjuvant Temodar, admitted for severe hypoxia, CXR showed some left lung basilar consolidation but no fever or sputum. He is on bactrim for PCP prophylaxis, which is known to be associated with Temodar. rec rule out PE, empirical rx for COPD/PNA/overload, will follow as needed. Zhou Chiang M.D. [address] Office : [phone] Fax : [phone]

## 2020-04-01 NOTE — PROGRESS NOTES
Care Management Interventions PCP Verified by CM: Ksenia Engel, NP) Mode of Transport at Discharge: Self Transition of Care Consult (CM Consult): Discharge Planning Discharge Durable Medical Equipment: No 
Physical Therapy Consult: No 
Occupational Therapy Consult: No 
Speech Therapy Consult: No 
Current Support Network: Own Home, Lives with Spouse Confirm Follow Up Transport: Family Discharge Location Discharge Placement: Home CM met with patient in room. Patient confused and unable to answer questions. CM reached out to patient wife Gusatvo Marker [phone] who verified demographic information to be correct. Jackie stated she and patient live in a one story home that has 3 steps to enter the residence. Jackie stated patient does not use any DME at this time. Jackie stated patient has been confused since his brain surgery in June 2019. Jackie stated patient requires some assistance with bathing but is independent with dressing and feeding self. Jackie stated patient does not drive and she takes him to his appointments when needed. Jackie stated patient has never used HH or been to STR. Jackie stated patient was just recently approved for disability but does not have Medicaid at this time. JOVAN left a note they reached out and left a message for Jackie. CM encouraged to reach back out to [facility] to get assistance with applying for Medicaid. CM will continue to follow patient during hospitalization for discharge planning and needs. PT/OT has not been consulted at this time. Please consult or notify CM for any new needs.

## 2020-04-01 NOTE — PROGRESS NOTES
03/31/20 2200 Dual Skin Pressure Injury Assessment Dual Skin Pressure Injury Assessment WDL Second Care Provider (Based on [address] [place] [address]) Epifanio Ardon Skin Integumentary Skin Integumentary (WDL) X Skin Color Red 
(Blanchable redness on sacrum) Skin Condition/Temp Petechiae 
(On abdomen) Turgor Epidermis thin w/ loss of subcut tissue Patient has an area of blanchable redness on his sacrum, petechiae on his abdomen, & generalized thin/fragile epidermis. Besides what is previously noted, patient's skin is clean, dry, & intact.

## 2020-04-01 NOTE — PROGRESS NOTES
Hourly rounds completed this shift. Patient rested quietly throughout night. Respiratory therapy consulted for respiratory needs of patient. All needs met at this time. Will continue to monitor & give report to oncoming RN.

## 2020-04-01 NOTE — PROGRESS NOTES
CM followed up with patient wife Jackie regarding DECO. Jackie stated she has yet to hear from someone. CM stated there was documentation they left a message for her at [phone], \[facility]\"" stated she has had no message. CM reached out to [facility] left a message requesting a return call to Jackie as she stated she would wait by the phone for their call. CM will follow up.

## 2020-04-02 PROBLEM — R29.818 SUSPECTED SLEEP APNEA: Status: ACTIVE | Noted: 2020-01-01

## 2020-04-02 PROBLEM — E66.01 MORBID OBESITY (HCC): Status: ACTIVE | Noted: 2020-01-01

## 2020-04-02 PROBLEM — Z20.822 SUSPECTED COVID-19 VIRUS INFECTION: Status: ACTIVE | Noted: 2020-01-01

## 2020-04-02 PROBLEM — J61 ASBESTOSIS (HCC): Chronic | Status: ACTIVE | Noted: 2019-01-01

## 2020-04-02 PROBLEM — J96.01 ACUTE HYPOXEMIC RESPIRATORY FAILURE (HCC): Status: ACTIVE | Noted: 2020-01-01

## 2020-04-02 PROBLEM — C71.9 GBM (GLIOBLASTOMA MULTIFORME) (HCC): Chronic | Status: ACTIVE | Noted: 2019-01-01

## 2020-04-02 NOTE — PROGRESS NOTES
I also called his wife and gave her update on what is going on. She was appreciative of care given.  
 
Gregory Kimbrough MD

## 2020-04-02 NOTE — PROCEDURES
Emergent Intubation Performed by: Yasmin Flanagan CRNA Authorized by: Jose Adhikari MD  
 
Emergent Intubation: Location:  ICU Date/Time:  4/2/2020 1:25 PM 
Indications:  Impending respiratory failure Spontaneous Ventilation: present Level of Consciousness: awake Preoxygenated: Yes Airway Documentation: Airway:  ETT - Cuffed Technique:  Rapid sequence Advanced Technique:  Glide scope Insertion Site:  Oral 
Blade Type:  Royer Blade Size:  4 ETT size (mm):  8.0 ETT Line Juno:  Teeth Placement verified by: EtCO2 and BBS Attempts:  1 Difficult airway: not with glidescope use, would anticipate a difficult airway otherwise.

## 2020-04-02 NOTE — PROGRESS NOTES
Entered patients room to find SaO2 in 70's on 10L HFNC. Pt not in obvious distress, mildly tachypnic and labored with talking. Nonrebreather placed to bring O2 saturations up to 90's but pt not able to maintain without NRB. Respiratory requested to bedside. Dr Keyanna Andino notified - stat ABG ordered.

## 2020-04-02 NOTE — PROGRESS NOTES
offered support and prayer with family (wife and two sons). Family stated over and over again how wonderful the Pt is.  encouraged them to continue to pray and attempt to rest.  
 
Family awaits an update, but knows it may be a while. Please consult Spiritual Care as needed. Manny Juárez Minium.

## 2020-04-02 NOTE — PROGRESS NOTES
TRANSFER - IN REPORT: 
 
Verbal report received from [facility], [address] on [address]  being received from 6th floor for change in patient condition(Respiratory Status) Report consisted of patients Situation, Background, Assessment and  
Recommendations(SBAR). Information from the following report(s) SBAR was reviewed with the receiving nurse. Opportunity for questions and clarification was provided. Assessment completed upon patients arrival to unit and care assumed.

## 2020-04-02 NOTE — CDMP QUERY
Pt admitted with Respiratory failure with hypoxia and has CHF documented. Please further specify type and acuity of CHF in the medical record. ? Acute on Chronic Systolic CHF ? Acute on Chronic Diastolic CHF ? Acute on Chronic Systolic and Diastolic CHF ? Acute Systolic CHF ? Acute Diastolic CHF ? Acute Systolic and Diastolic CHF ? Chronic Systolic CHF ? Chronic Diastolic CHF ? Chronic Systolic and Diastolic CHF No CHF noted ? Other, please specify ? Clinically unable to determine The medical record reflects the following: 
  Risk Factors: 62 male, Hx HTN, Clinical Indicators: Pro , EF 55-60% with mild tricuspid regurgitation, CT chest -Patchy diffuse bilateral lung edema or pneumonia Treatment: IV lasix, supplemental oxygen at 4L n/c, Daily weights, strict I&O's, Thanks, Andrew Barfield RN Compliant Documentation Management Program 
[phone]

## 2020-04-02 NOTE — CONSULTS
CONSULT NOTE Florinda Funes 4/2/2020 Date of Admission:  3/31/2020 The patient's chart is reviewed and the patient is discussed with the staff. Subjective:  
 
   Mr Maryana Juarez is a 62year old male with a history of HTN and asbestosis as well as Glioblastoma multiforme that was diagnosed in July of last year. He is followed by Dr Carol Lopez. He has undergone craniotomy with resection/XRT and he is now on Temodar. Last MRI 1/2020 showed no evidence of disease progression with subtle improvement of enhancement along margins of operative cavity and subtle improvement of perilesional mass effect. He is on decadron and prophylactic Bactrim. He had a virtual visit with Dr Carol Lopez on 3/27. He reported leg edema so was started on lasix 20 mg per day. He denied any fever or known exposures. He and his wife had been careful at home and reported \"self quarantining\".     
  Mr Maryana Juarez presented to the ED on 3/31 with increasing shortness of breath. cough over the past several days and persistent leg edema. He was initially hypoxic on EMS arrival, with sats at 65% and improved to 93% on 4L NC. He also endorsed cough and ongoing leg swelling. While in the ED a CXR was done and showed an unchanged left lung base infiltrate. He was started on Rocephin and Zithromax and blood cultures done (still pending). A CT chest (PE protocol) was done which shows bilateral infiltrates. His proBNP was 966. A TTE was done and showed LVEF of 55-60% with a normal RV and RVSP 35 to 40 mmHg. He has received IV lasix and the fluid balance is now negative 2.2 liters. His oxygen needs have increased however and his is now on 10 liters. ABG -> 7.45/32/54/23. Covid 19 study is pending (ordered today). In room is awake and mildly confused. Reports is ok but not sure how he is breathing. Neck is large but reports has not been tested for ADRIANA.  Does note. He is on 15 LPM and was urinating and saturation in 70's to 80's. I placed non-rebreath and then up to 96% and comfortable. Took off and saturation still in 90's. Not in pain. Review of Systems: -- limited since confused 
-Fever 
-Headaches 
-Chest pain +Dyspnea,- wheezing,- cough 
-Abdominal pain,- constipation 
-Leg swelling All other organ systems grossly normal. 
 
 
Patient Active Problem List  
Diagnosis Code  Asbestosis ([facility] J61  
 GBM (glioblastoma multiforme) ([facility] C71.9  Hyponatremia E87.1  Postoperative CSF leak G97.82  Fever R50.9  Nausea and vomiting R11.2  Hypertension I10  
 Depression F32.9  Acute hypoxemic respiratory failure (HCC) J96.01  
 Transaminitis R74.0  Suspected Covid-19 Virus Infection R68.89  
 Suspected sleep apnea R29.818  Morbid obesity ([facility] E66.01 Prior to Admission Medications Prescriptions Last Dose Informant Patient Reported? Taking?  
bisoprolol-hydroCHLOROthiazide [facility]) 2.5-6.25 mg per tablet 3/31/2020 at Unknown time  Yes Yes Sig: Take 1 Tab by mouth.  
citalopram (CELEXA) 10 mg tablet Not Taking at Unknown time  Yes No  
Sig: Take 10 mg by mouth. dexAMETHasone (DECADRON) 2 mg tablet 3/31/2020 at Unknown time  No Yes Sig: Take 1 Tab by mouth two (2) times a day. diphenhydrAMINE (BENADRYL) 25 mg capsule 3/31/2020 at Unknown time  Yes Yes Sig: Take 50 mg by mouth nightly. furosemide (LASIX) 20 mg tablet 3/25/2020 at Unknown time  No Yes Sig: Take 1 Tab by mouth daily as needed (swelling). For leg swelling. Only take in the morning. levothyroxine (SYNTHROID) 50 mcg tablet 3/31/2020 at Unknown time  No Yes Sig: Take 1 Tab by mouth Daily (before breakfast). Indications: hypothyroidism  
pantoprazole (PROTONIX) 40 mg tablet Not Taking at Unknown time  No No  
Sig: Take 1 Tab by mouth Daily (before breakfast).   
prochlorperazine (COMPAZINE) 10 mg tablet Not Taking at Unknown time  Yes No  
 Sig: Had reaction to compazine, delirious, shaking, confusion,  
temozolomide (TEMODAR) 140 mg capsule 3/1/2020 at Unknown time  No Yes Sig: Take 1 capsule daily, Monday through Friday only. Take with a 250 mg capsule. temozolomide (TEMODAR) 250 mg capsule 3/1/2020 at Unknown time  No Yes Sig: Take one capsule daily Monday through Friday only. Take with a 140 mg capsule. trimethoprim-sulfamethoxazole (BACTRIM DS) 160-800 mg per tablet 3/1/2020 at Unknown time  No Yes Sig: Take 1 Tab by mouth BID Mon Wed & Fri. Take while on temodar Facility-Administered Medications: None Past Medical History:  
Diagnosis Date  Asbestosis ([address] 6/22/2019 Last Assessment & Plan:   Was Diagnosed in November . Appears Asymptomatic Currently. Not Short of Breath Breathing Well.  Brain mass 6/21/2019 Last Assessment & Plan:  Is a neurosurgical consultation with Dr. Arden Vivar  is evaluated the patient. Patient is on IV Decadron every 6 hourly. Patient has evidence of a left-sided temporoparietal mass  I Understanded to the plan is for surgery On Wednesday Will change decadrone to 4 mg po qid  Pt will follow with Dr Arden Vivar outpatient. Past Surgical History:  
Procedure Laterality Date  HX CRANIOTOMY  06/26/2019  
 left Frontotemporal crani for GBM  
 HX HERNIA REPAIR Left  HX LUMBAR LAMINECTOMY Social History Socioeconomic History  Marital status:  Spouse name: Not on file  Number of children: Not on file  Years of education: Not on file  Highest education level: Not on file Occupational History  Not on file Social Needs  Financial resource strain: Not on file  Food insecurity Worry: Not on file Inability: Not on file  Transportation needs Medical: Not on file Non-medical: Not on file Tobacco Use  Smoking status: Former Smoker  Smokeless tobacco: Never Used Substance and Sexual Activity  Alcohol use: Not Currently  Drug use: Not on file  Sexual activity: Not on file Lifestyle  Physical activity Days per week: Not on file Minutes per session: Not on file  Stress: Not on file Relationships  Social connections Talks on phone: Not on file Gets together: Not on file Attends Taoist service: Not on file Active member of club or organization: Not on file Attends meetings of clubs or organizations: Not on file Relationship status: Not on file  Intimate partner violence Fear of current or ex partner: Not on file Emotionally abused: Not on file Physically abused: Not on file Forced sexual activity: Not on file Other Topics Concern  Not on file Social History Narrative  Not on file Family History Problem Relation Age of Onset  Lung Disease Father Allergies Allergen Reactions  Bee Venom Protein (Honey Bee) Swelling  Compazine [Prochlorperazine] Other (comments) Caused delirium, confusion, uncontrollable shakes  Iodine And Iodide Containing Products Unknown (comments) Contrast dye Current Facility-Administered Medications Medication Dose Route Frequency  cefTRIAXone (ROCEPHIN) 2 g in 0.9% sodium chloride (MBP/ADV) 50 mL  2 g IntraVENous Q24H  
 azithromycin (ZITHROMAX) 500 mg in 0.9% sodium chloride (MBP/ADV) 250 mL  500 mg IntraVENous Q24H  
 acetaminophen (TYLENOL) tablet 650 mg  650 mg Oral Q4H PRN  
 enoxaparin (LOVENOX) injection 40 mg  40 mg SubCUTAneous Q24H  potassium bicarb-citric acid (EFFER-K) tablet 40 mEq  40 mEq Oral NOW  influenza vaccine 2019-20 (6 mos+)(PF) (FLUARIX/FLULAVAL/FLUZONE QUAD) injection 0.5 mL  0.5 mL IntraMUSCular PRIOR TO DISCHARGE  trimethoprim-sulfamethoxazole (BACTRIM DS, SEPTRA DS) 160-800 mg per tablet 1 Tab  1 Tab Oral Q MON, WED & FRI  sodium chloride (NS) flush 5-40 mL  5-40 mL IntraVENous Q8H  
 sodium chloride (NS) flush 5-40 mL  5-40 mL IntraVENous PRN  
  ondansetron (ZOFRAN) injection 4 mg  4 mg IntraVENous Q4H PRN  
 senna-docusate (PERICOLACE) 8.6-50 mg per tablet 1 Tab  1 Tab Oral DAILY  furosemide (LASIX) injection 40 mg  40 mg IntraVENous Q12H  levothyroxine (SYNTHROID) tablet 50 mcg  50 mcg Oral ACB  pantoprazole (PROTONIX) tablet 40 mg  40 mg Oral ACB Objective:  
 
Vitals:  
 04/02/20 0859 04/02/20 4071 04/02/20 4664 04/02/20 1128 BP: 140/85 131/80  108/69 Pulse: 90 99  (!) 108 Resp: (!) 32 22  20 Temp: 97.5 °F (36.4 °C) 97.8 °F (36.6 °C)  98.1 °F (36.7 °C) SpO2: 90% 96% 96% 90% Weight:      
Height: PHYSICAL EXAM  
 
Constitutional:  the patient is well developed and in no acute distress EENMT:  Sclera clear, pupils equal, oral mucosa moist, very large neck with small mouth -- modified matias stage IV. On 15 LPM 
Respiratory: CTA b/l. No wheezing or crackles Cardiovascular:  RRR without M,G,R 
Gastrointestinal: soft and non-tender; with positive bowel sounds. Musculoskeletal: warm without cyanosis. There is no lower extremity edema. Skin:  no jaundice or rashes, no wounds Neurologic: no gross neuro deficits Psychiatric:  alert and responding but slow to respond. Mildly confused. CXR:  Pending. Echo:   Left ventricle: Systolic function was normal. Ejection fraction was 
estimated in the range of 55 % to 60 %. There were no regional wall motion 
abnormalities. There was mild concentric hypertrophy.  
-  Tricuspid valve: There was mild regurgitation.  
-  Aorta, systemic arteries: The root exhibited normal size. CT chest: -- diffuse infiltrates/parynchemal changes. Recent Labs 04/02/20 
0197 04/01/20 
0554 03/31/20 
1554 WBC 7.2 9.3 7.7 HGB 12.8* 13.9 13.4* HCT 36.6* 41.0* 40.1*  
 320 312 Recent Labs 04/02/20 
2819 04/01/20 
0554 03/31/20 
1554  136 138  
K 3.4* 3.3* 3.5  102 104 * 106* 92  
CO2 25 25 25 BUN 11 13 15  
 CREA 1.01 1.09 1.26  
CA 8.9 9.1 8.6  
TROIQ  --   --  <0.02* ALB 2.5* 2.7* 2.8* TBILI 0.7 1.6* 1.1 ALT 51 61 72* SGOT 35 54* 69* Recent Labs 04/02/20 
1005 PHI 7.459* PCO2I 32.6*  
PO2I 54* HCO3I 23.1 No results for input(s): LCAD, LAC in the last 72 hours. Assessment:  (Medical Decision Making) Hospital Problems  Date Reviewed: 3/27/2020 Codes Class Noted POA Suspected Covid-19 Virus Infection ICD-10-CM: R68.89  4/2/2020 Yes Suspected sleep apnea ICD-10-CM: R29.818 ICD-9-CM: 781.99  4/2/2020 Unknown Morbid obesity ([facility] ICD-10-CM: E66.01 
ICD-9-CM: 278.01  4/2/2020 Unknown * (Principal) Acute hypoxemic respiratory failure ([facility] ICD-10-CM: J96.01 
ICD-9-CM: 518.81  3/31/2020 Unknown Transaminitis ICD-10-CM: R74.0 ICD-9-CM: 790.4  3/31/2020 Yes Hypertension (Chronic) ICD-10-CM: I10 
ICD-9-CM: 401.9  8/12/2019 Yes GBM (glioblastoma multiforme) (HCC) (Chronic) ICD-10-CM: C71.9 ICD-9-CM: 191.9  6/26/2019 Yes Asbestosis ([facility] (Chronic) ICD-10-CM: F47 ICD-9-CM: 417  6/22/2019 Yes Overview Signed 6/25/2019  7:59 AM by Raine Blackburn, [address] [place] [address] Ave Last Assessment & Plan:  
 Was Diagnosed in November [place] Appears Asymptomatic Currently. Not Short of Breath Breathing Well. Patient with HTN and asbestosis, Glioblastoma multiforme diagnosed in July '19  S/p craniotomy with resection/XRT on chemo came in with SOB/cough/edema. Needed oxygen and cxr with left sided infiltrates on ABX. Since then oxygen needs worse at 15 LPM today. CT with diffuse infiltrates and evaluating for COVID as well. Plan:  (Medical Decision Making) --agree with ICU transfer since high oxygen needs. Monitor if needs to go back to non-rebreather or staying on 15 LPM. High risk to end up on Vent.  
--add MDI 
--continue rocephin/azithro and bactrim that he is using for prophylaxis. QTC is 460 so hold Plaquenil. --f/u COVID test results --may need BRONCH in the future since diagnosis ranges from infection to mets. Medication Temodar does not appear to have any lung side effects per epocrates 
--CT noted but do not have older one to compare it to.  
--at risk for ADRIANA and will need outpatient PSG - Split Night study in the future. --f/u with oncology More than 50% of the time documented was spent in face-to-face contact with the patient and in the care of the patient on the floor/unit where the patient is located. Thank you very much for this referral.  We appreciate the opportunity to participate in this patient's care. Will follow along with above stated plan.  
 
Elena Daley MD

## 2020-04-02 NOTE — ANESTHESIA PROCEDURE NOTES
Emergent Intubation Performed by: Abiodun Curran CRNA Authorized by: Tammy Tate MD  
 
Emergent Intubation:  
Patient location: covid overflow unit Date/Time:  4/2/2020 1:25 PM 
Indications:  Impending respiratory failure Spontaneous Ventilation: present Level of Consciousness: unresponsive (medically sedated by anesthesia ) Preoxygenated: Yes Airway Documentation: Airway:  ETT - Cuffed Technique:  Direct laryngoscopy Advanced Technique:  Glide scope Blade Size:  4 ETT Line Juno:  Lips ETT Insertion depth (cm):  23 Placement verified by: auscultation, EtCO2, BBS and fiber optic Attempts:  1 Difficult airway: No   
Elected glidescope, would have been difficult without. Dr. Kaitlyn Doshi administered induction drugs see chart.

## 2020-04-02 NOTE — PROGRESS NOTES
Was informed from house supervisor and nurse on 8th floor that pt. Is unable to be transferred to the 8th floor d/t Airvo causing virus to aerosol, Dr. Ashlyn Vivar made aware, awaiting further orders.

## 2020-04-02 NOTE — PROGRESS NOTES
Called by OR RN to respond to call for intubation help in the Cardiac Cath Prep/reecovery. Spoke with Dr. Paxton Marcum about Mr. Leopold Georgis and his desire to intubation him for ongoing respiratory support. Located appropriate PPE for myself and Claude Ballesteros CRNA, reviewed PMH including past cardiac testing, and clinical picture. I asked for all other staff to remain outside the room as we induced and intubated the patient with the glidescope using an RSI method. After pre-oxygenating the patient for multiple minutes SpO2 elevated to 97% on a reliable pleth. Induction and relaxation was achieved using Fentanyl, midazolam, etomidate, Succinyl choline, and ephedrine. Atraumatic intubation with an 8.0 ETT was achieved without bag valve mask use, ETT was immediately connected to the ventilator with appropriate filter. VTe was achieved with bilateral chest rise. Patient remained hemodynamically stable throughout and sat rin was 85%. See intubation note and CVC note for further details of these procedures. PPE was then doffed under supervision, cleaned or disposed of as directed by ICU RN staff. Consulted by Dr. Paxton Marcum for procedural support and management of the induction/intubation in the ICU, spent 65 minutes in the ICU planning, preparing, performing, and decontamination devoted to this task.

## 2020-04-02 NOTE — PROGRESS NOTES
TRANSFER - OUT REPORT: 
 
Verbal report given to Olga Caicedo RN on Kim Rosa  being transferred to [address] [place] [address] CCU overflow for change in patient condition(increased oxygen needs) Report consisted of patients Situation, Background, Assessment and  
Recommendations(SBAR). Information from the following report(s) SBAR, Kardex and MAR was reviewed with the receiving nurse. Opportunity for questions and clarification was provided. Patient transported with: 
 O2 @ NRB liters Registered Nurse

## 2020-04-02 NOTE — PROGRESS NOTES
Patient was intubated with a number 8.0 ET Tube. Tube placement verified by auscultation, by CXR and ETCO2 monitor. ET Tube is secured at the 23 cm theron at the lip and on the bilateral side. Patient was intubated by CRNA on the 1 attempt. Breath sounds are diminished. Patient is Negative for subcutaneous air and chest excursion is symmetric. Trachea is midline. Patient is also Negative for cyanosis and is Negative for pitting edema. Patient placed on ventilator on documented settings. All alarms are set and audible. Resuscitation bag is at the head of the bed. Ventilator Settings Mode FIO2 Rate Tidal Volume Pressure PEEP I:E Ratio [facility] 100   16   500      8 Peak airway pressure:    
Minute ventilation:

## 2020-04-02 NOTE — PROGRESS NOTES
Received pt from 8th floor to 1103. Head to toe assessment completed on pt this PM. Neuro: Pt is alert and oriented to person, place - disoriented to time and situation. PERRLA 4 mm and brisk to react. Able to follow commands and GARCIA purposefully. Resp: 15 L NRB. Pt is tachypneic - O2 sats 85s - 90%. Coarse upper / diminished lower. Planning to intubate. Cardiac: ST  - slight HTN. GI: Obese with active bowel sounds. NPO pending intubation. : Has not yet voided. Skin: Skin tears on both hands. Bruising to BUEs. Rash near scrotum - dry and flaky.

## 2020-04-02 NOTE — PROGRESS NOTES
Nutrition: 
Consult for tube feeding management (Pulmonary) Assessment: 
Food/Nutrition Patient History: Patient admitted with five day history of shortness of breath and swelling in legs. He has a PMH of HTN, asbestosis, Glioblastoma. ANTONINA was ordered and 55-60% tricuspid regurgitation. Patient was started on Lasix. CT chest showed bilateral edema/infiltrates, transferred to 8th floor for isolation. Transferred to ICU today for increased O2 needs and high risk for vent. Patient has now been intubated. Called and spoke with Graham Naranjo. She states feeding tube is being placed currently. Abdomen: Active bowel sounds, unknown date of last BM Lab Results Component Value Date/Time Sodium 136 04/02/2020 06:51 AM  
 Potassium 3.4 (L) 04/02/2020 06:51 AM  
 Chloride 100 04/02/2020 06:51 AM  
 CO2 25 04/02/2020 06:51 AM  
 Anion gap 11 04/02/2020 06:51 AM  
 Glucose 146 (H) 04/02/2020 06:51 AM  
 BUN 11 04/02/2020 06:51 AM  
 Creatinine 1.01 04/02/2020 06:51 AM  
 Calcium 8.9 04/02/2020 06:51 AM  
 Albumin 2.5 (L) 04/02/2020 06:51 AM  
 
Diet: No diet orders on file Pertinent Medications: Levophed, NS at 125 ml/hr, Diprivan (currently at 28.6 ml/hr) Enteral nutrition access: NGT pending Anthropometrics:Height: 5' 8\" (172.7 cm),  Weight: 95.3 kg (210 lb), Weight Source: Other (comment), Body mass index is 31.93 kg/m². BMI class of obese. Per oncology office weight, weight has been stable: 
12/19/2019 95.9 kg 
1/23/2020 92.7 kg  
2/25/2020 93.9 kg Macronutrient Needs:  
Estimated energy requirements:  0192-7297 kcal /day (15-20 kcal/kg) (95.3 kg Listed body weight) Estimated protein requirements:  140-175 grams protein/day (2-2.5 grams/kg) (70 kg Ideal body weight) CHO limit per day: 262 grams CHO/day (55% calories) Estimated fluid/day: 1.4-1.9 liters/day (~1ml/kcal/day) Intake/Comparative Standards: Patient is NPO which does not meet EEN or EPN Nutrition Diagnosis: Inadequate oral intake related to impaired respiratory function as evidenced by NPO, on vent. Intervention: 
Meals and snacks: NPO Enteral Nutrition: Once feeding tube placement confirmed, initiate trophic feedings of Vital AF at 10 ml/hour, do not advance. Water flush 25/hour. Tube feeding regimen will provide 288 kcal (23% estimated calorie needs), 18 grams protein (13% estimated protein needs), 27 grams CHO/day (does not exceed maximum CHO/day) and 795ml free fluid (57%). Diprivan at current rate providing ~700 kcal. 
Goal tube feeding is Vital AF @ 55 ml/hr with 25 ml/hr free water flush. Goal feeding to provide 1584 kcal (100% estimated needs), 99 g pro (71% estimated protein needs), 145 g CHO (does not exceed max CHO), 1670 ml free water (100% estimated fluid needs). At goal will need protein modulator to meet protein needs, recommend Prosource TF x 4 per day. Vitamin and Mineral Supplement Therapy: 
Nutrition support orders for electrolyte management replacement are activated and placed on the MAR. Will add Mg and Phos to am labs Coordination of Nutrition Care: Spoke with Madalyn Ramirez Discharge Nutrition Plan: Too soon to determine. [address] [place] [address], [address] [facility] Addendum: 
Spoke with primary RN. NGT has been placed. Awaiting KUB for confirmation. Provided 3 L of Vital AF to RN in [facility].

## 2020-04-02 NOTE — PROGRESS NOTES
04/02/20 8371 Dual Skin Pressure Injury Assessment Dual Skin Pressure Injury Assessment WDL Second Care Provider (Based on [address] [place] [address]) Jing Kent RN Skin Integumentary Skin Integumentary (WDL) X Skin Color Ecchymosis (comment); Red  
Skin Condition/Temp Petechiae 
(Abd area) Skin Integrity Intact Turgor Epidermis thin w/ loss of subcut tissue Hair Growth Sparce Varicosities Absent

## 2020-04-02 NOTE — PROGRESS NOTES
TRANSFER - OUT REPORT: 
 
Verbal report given to Jocelyne(name) on Dania Ortez  being transferred to 8th floor(unit) for routine progression of care Report consisted of patients Situation, Background, Assessment and  
Recommendations(SBAR). Information from the following report(s) SBAR was reviewed with the receiving nurse. Lines:  
Peripheral IV Right Antecubital (Active) Site Assessment Clean, dry, & intact 4/1/2020 11:26 PM  
Phlebitis Assessment 0 4/1/2020 11:26 PM  
Infiltration Assessment 0 4/1/2020 11:26 PM  
Dressing Status Clean, dry, & intact 4/1/2020 11:26 PM  
Dressing Type Tape;Transparent 4/1/2020 11:26 PM  
Hub Color/Line Status Pink;Flushed 4/1/2020 11:26 PM  
Alcohol Cap Used No 4/1/2020 11:26 PM  
  
 
Opportunity for questions and clarification was provided.    
 
Patient transported with: 
 O2 @ 10 liters NC

## 2020-04-02 NOTE — PROGRESS NOTES
Offered emotional support to patient's family and continued support as family spoke with physician and medical staff. Steve Zepeda MDiv Board Certified [employer]

## 2020-04-02 NOTE — PROGRESS NOTES
TRANSFER - IN REPORT: 
 
Verbal report received from Keren Byrne RN (name) on Vangie Rosenbaum  being received from [phone] (unit) for change in patient condition((possible intubation, resp distress) ) Report consisted of patients Situation, Background, Assessment and  
Recommendations(SBAR). Information from the following report(s) SBAR, Kardex, ED Summary, Intake/Output, MAR, Recent Results, Alarm Parameters , Pre Procedure Checklist and Quality Measures was reviewed with the receiving nurse. Opportunity for questions and clarification was provided. Assessment completed upon patients arrival to unit and care assumed.

## 2020-04-02 NOTE — PROGRESS NOTES
Shift assessment complete. A&OX1- to self. Lungs diminished in the bases with crackles present on auscultation. Respirations present. Even and unlabored. No s/s of distress. Zero c/o pain at this time. Call light within reach. Encouraged patient to call for assistance. Patient verbalizes understanding. See Doc Flowsheet for assessment details. Patient resting in bed. Bed alarm in progress. Will continue to monitor.

## 2020-04-02 NOTE — PROGRESS NOTES
Patient resting in bed in NAD. Breathing unlabored on 10L HFNC, CPOx monitored SaO2 96%. Crackles heard in bilateral lung bases. Pt oriented to self only and frequently asking \"when do I get to leave? \" and \"when can my wife come? \" Pt reoriented and educated on policy and plan of care. Droplet plus precautions continued with use of appropriate PPE.

## 2020-04-02 NOTE — PROGRESS NOTES
Pt intubated by anesthesiologist at 1325. Propofol and Fentanyl gtt for sedation. Levo gtt for hypotension. Plan to place Dumont, restraints and NGT. Dr. Sammy Islas at bedside to place radial ART line.

## 2020-04-02 NOTE — PROCEDURES
Indication: 
 
Time out done and correct patient identified and correct incision/insertion site identified. Everyone Agrees For procedure sterile techniques used including: Sterile gown, gloves, cap, mask, drapes and chlorhexidine to the insertions site/location. Wrist Region Sterilized with Chlorhexedine. Patient given lidocaine 1% for local anesthesia. Using sterile Technique right arterial radial line placed without complication. Note resultant arterial waveform. Patient tolerated procedure well. Line sutured in place. Joseph's Test done prior and noted normal. Estimated Blood loss: 2 ml Hal He MD 
 
Electronically signed.

## 2020-04-02 NOTE — PROCEDURES
Central Line Placement Start time: 4/2/2020 1:40 PM 
End time: 4/2/2020 1:59 PM 
Performed by: Hermes Mata MD 
Authorized by: Hermes Mata MD  
 
Indications: vascular access, central pressure monitoring and need for vasopressors Preanesthetic Checklist: patient identified and risks and benefits discussed Timeout Time: 13:40 Pre-procedure: All elements of maximal sterile barrier technique followed? Yes   
2% Chlorhexidine for cutaneous antisepsis, Hand hygiene performed prior to catheter insertion and Ultrasound guidance Sterile Ultrasound Technique followed?: Yes Ultrasound Image Stored? Image stored Procedure:  
Prep:  Chlorhexidine Location: internal jugular Orientation:  Right Patient position:  Trendelenburg Catheter type:  Quad lumen Catheter size:  9 Fr Catheter length:  20 cm Number of attempts:  1 Successful placement: Yes Assessment:  
Post-procedure:  Catheter secured Assessment:  Blood return through all ports, free fluid flow, guidewire removal verified and placement verified by x-ray Insertion:  Uncomplicated Patient tolerance:  Patient tolerated the procedure well with no immediate complications Performed at the Request of Dr. Litzy Trujillo

## 2020-04-02 NOTE — PROGRESS NOTES
EKG reviewed and QTC < 500 - will start hydroxychloroquine sulfate - 400 mg BID X 2 doses, then 200 mg BID X 8 doses. Will monitor EKG daily while on drug therapy. COVID 19 test pending.   
 
Zoie Turner NP

## 2020-04-02 NOTE — PROGRESS NOTES
Problem: Heart Failure: Day 1 Goal: Off Pathway (Use only if patient is Off Pathway) Outcome: Progressing Towards Goal 
Goal: Activity/Safety Outcome: Progressing Towards Goal 
Goal: Consults, if ordered Outcome: Progressing Towards Goal 
Goal: Diagnostic Test/Procedures Outcome: Progressing Towards Goal 
Goal: Nutrition/Diet Outcome: Progressing Towards Goal 
Goal: Discharge Planning Outcome: Progressing Towards Goal 
Goal: Medications Outcome: Progressing Towards Goal 
Goal: Respiratory Outcome: Progressing Towards Goal 
Goal: Treatments/Interventions/Procedures Outcome: Progressing Towards Goal 
Goal: Psychosocial 
Outcome: Progressing Towards Goal 
Goal: *Oxygen saturation within defined limits Outcome: Progressing Towards Goal 
Goal: *Hemodynamically stable Outcome: Progressing Towards Goal 
Goal: *Optimal pain control at patient's stated goal 
Outcome: Progressing Towards Goal 
Goal: *Anxiety reduced or absent Outcome: Progressing Towards Goal

## 2020-04-02 NOTE — PROGRESS NOTES
Patient down to chest pain area now and not improving with saturation from 91-94%. High risk to decompensate and had anesthesia help intubate patient and they placed in central line. I placed on arterial line. NGT pending. On levophed now and IVF. Started diprivan. Fentanyl drip on the way. Will get ABG shortly. Noted having secretions from ETT and mouth. Will send for Viral panel, cytology, Aspergillus, PJP, etc. 
 
Condition is critical. 
Time spent with care not counting procedures is 40 minutes Lora Silva MD

## 2020-04-02 NOTE — PROGRESS NOTES
Night shift note: 
 
CT chest with contrast negative for PE, shows bilateral edema/infiltrates. Will start azithromycin and Rocephin empirically for CAP coverage and will screen for COVID (mild transaminitis but patient has been afebrile). RPP ordered. Blood cultures ordered. Isolation orders added, transfer patient to8th floor for COVID isolation. Ordered repeat procalcitonin.

## 2020-04-02 NOTE — PROGRESS NOTES
SaO2 88-90% on 15L HFNC while pt resting quietly in bed. Any movement or exertion whatsoever by patient results in desaturation to 70's. Pt confused, does not follow commands well, gets anxious easily. Pt currently remains on NRB to maintain SaO2 greater than 90%.

## 2020-04-02 NOTE — PROGRESS NOTES
Hospitalist Note Admit Date:  3/31/2020  3:38 PM  
Name:  Richar Godfrey Age:  62 y.o. 
:  1955 MRN:  [id number] PCP:  Obi Catherine NP Treatment Team: Attending Provider: Marcus Alvarado MD; Consulting Provider: Mariangel Ovalle MD; Care Manager: Michelle Taveras; Utilization Review: Micheal Huffman RN 
 
HPI/Subjective:  
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 No associated fevers/chills, chest pain, sore throat, nasal congestion, runny nose, abdominal pain, or nausea/vomiting. no sick contacts or recent travel, his wife says \"we've basically been quarantining ourselves for the past two weeks. \"  was admitted to oncology bed, started on IV lasix. but early morning  he had worsening resp distress, CT showed bilateral infiltrates vs edema. Had some mild transaminitis with normal liver US. COVID test was ordered and pt was sent to the 8th floor COVID isolation. 4/ -  hypoxic on ABG despite optiflow, ordered NRB. He is not dyspneic currently but was tachypneic earlier. He denies cough, fevers, chills, rigors, pain, HA, URI symptoms No other complaints Objective:  
 
Patient Vitals for the past 24 hrs: 
 Temp Pulse Resp BP SpO2  
20 0832     96 % 20 0738 97.8 °F (36.6 °C) 99 22 131/80 96 % 20 0525 97.5 °F (36.4 °C) 90 (!) 32 140/85 90 % 20 0122     98 % 20 2357 97.9 °F (36.6 °C) 99 20 122/71 97 % 20 2049     95 % 20 1944 98 °F (36.7 °C) (!) 102 20 110/79 91 % 20 1552 98.4 °F (36.9 °C) (!) 106 20 110/74 99 % 20 1441     90 % 20 1425     90 % 20 1200 99.3 °F (37.4 °C) (!) 113 20 103/70 91 % Oxygen Therapy O2 Sat (%): 96 % (20 0832) Pulse via Oximetry: 98 beats per minute (04/02/20 0832) O2 Device: Hi flow nasal cannula (04/02/20 0832) O2 Flow Rate (L/min): 10 l/min (04/02/20 0832) O2 Temperature: 89.6 °F (32 °C) (04/02/20 0122) FIO2 (%): 50 % (04/02/20 0122) Estimated body mass index is 31.93 kg/m² as calculated from the following: 
  Height as of this encounter: 5' 8\" (1.727 m). Weight as of this encounter: 95.3 kg (210 lb). Intake/Output Summary (Last 24 hours) at 4/2/2020 1033 Last data filed at 4/2/2020 8866 Gross per 24 hour Intake 160 ml Output 2125 ml Net -1965 ml *Note that automatically entered I/Os may not be accurate; dependent on patient compliance with collection and accurate  by techs. General:    Well nourished. Alert. CV:   RRR. No murmur, rub, or gallop. Lungs:   Crackles/coarse bilateral.  No dyspnea. desats when sitting up Extremities: Warm and dry. No cyanosis or edema. Skin:     No rashes or jaundice. Neuro:  No gross focal deficits Data Review: 
I have reviewed all labs, meds, and studies from the last 24 hours: 
 
Recent Results (from the past 24 hour(s)) METABOLIC PANEL, COMPREHENSIVE Collection Time: 04/02/20  6:51 AM  
Result Value Ref Range Sodium 136 136 - 145 mmol/L Potassium 3.4 (L) 3.5 - 5.1 mmol/L Chloride 100 98 - 107 mmol/L  
 CO2 25 21 - 32 mmol/L Anion gap 11 7 - 16 mmol/L Glucose 146 (H) 65 - 100 mg/dL BUN 11 8 - 23 MG/DL Creatinine 1.01 0.8 - 1.5 MG/DL  
 GFR est AA >60 >60 ml/min/1.73m2 GFR est non-AA >60 >60 ml/min/1.73m2 Calcium 8.9 8.3 - 10.4 MG/DL Bilirubin, total 0.7 0.2 - 1.1 MG/DL  
 ALT (SGPT) 51 12 - 65 U/L  
 AST (SGOT) 35 15 - 37 U/L Alk. phosphatase 122 50 - 136 U/L Protein, total 7.6 6.3 - 8.2 g/dL Albumin 2.5 (L) 3.2 - 4.6 g/dL Globulin 5.1 (H) 2.3 - 3.5 g/dL A-G Ratio 0.5 (L) 1.2 - 3.5    
CBC WITH AUTOMATED DIFF Collection Time: 04/02/20  6:51 AM  
Result Value Ref Range WBC 7.2 4.3 - 11.1 K/uL  
 RBC 3.80 (L) 4.23 - 5.6 M/uL  
 HGB 12.8 (L) 13.6 - 17.2 g/dL HCT 36.6 (L) 41.1 - 50.3 % MCV 96.3 79.6 - 97.8 FL  
 MCH 33.7 (H) 26.1 - 32.9 PG  
 MCHC 35.0 31.4 - 35.0 g/dL  
 RDW 13.6 11.9 - 14.6 % PLATELET 315 760 - 825 K/uL MPV 9.6 9.4 - 12.3 FL ABSOLUTE NRBC 0.00 0.0 - 0.2 K/uL  
 DF AUTOMATED NEUTROPHILS 89 (H) 43 - 78 % LYMPHOCYTES 6 (L) 13 - 44 % MONOCYTES 3 (L) 4.0 - 12.0 % EOSINOPHILS 0 (L) 0.5 - 7.8 % BASOPHILS 0 0.0 - 2.0 % IMMATURE GRANULOCYTES 2 0.0 - 5.0 %  
 ABS. NEUTROPHILS 6.4 1.7 - 8.2 K/UL  
 ABS. LYMPHOCYTES 0.5 0.5 - 4.6 K/UL  
 ABS. MONOCYTES 0.2 0.1 - 1.3 K/UL  
 ABS. EOSINOPHILS 0.0 0.0 - 0.8 K/UL  
 ABS. BASOPHILS 0.0 0.0 - 0.2 K/UL  
 ABS. IMM. GRANS. 0.1 0.0 - 0.5 K/UL BILIRUBIN, DIRECT Collection Time: 04/02/20  6:51 AM  
Result Value Ref Range Bilirubin, direct 0.2 <0.4 MG/DL PROCALCITONIN Collection Time: 04/02/20  6:51 AM  
Result Value Ref Range Procalcitonin 0.14 ng/mL POC G3 Collection Time: 04/02/20 10:05 AM  
Result Value Ref Range Device: NASAL CANNULA pH (POC) 7.459 (H) 7.35 - 7.45    
 pCO2 (POC) 32.6 (L) 35 - 45 MMHG  
 pO2 (POC) 54 (L) 75 - 100 MMHG  
 HCO3 (POC) 23.1 22 - 26 MMOL/L  
 sO2 (POC) 90 (L) 95 - 98 % Base excess (POC) 0 mmol/L Allens test (POC) YES Site LEFT RADIAL Specimen type (POC) ARTERIAL Performed by Inder   
 CO2, POC 24 MMOL/L Flow rate (POC) 11.000 L/min Critical value read back 00:01 Respiratory comment: PhysicianNotified COLLECT TIME 1,002 All Micro Results Procedure Component Value Units Date/Time EMERGENT DISEASE PANEL [[id number]] Collected:  04/02/20 7648 Order Status:  Completed Updated:  04/02/20 1018 CULTURE, BLOOD [[id number]] Collected:  04/02/20 5265 Order Status:  Completed Specimen:  Blood Updated:  04/02/20 3834 CULTURE, BLOOD [[id number]] Collected:  04/02/20 4223 Order Status:  Completed Specimen:  Blood Updated:  04/02/20 5145 RESPIRATORY VIRAL PANEL, PCR [[id number]] Order Status:  Sent Specimen:  Sputum RESPIRATORY VIRAL PANEL, PCR [[id number]] Collected:  03/31/20 2154 Order Status:  Canceled RESPIRATORY VIRAL PANEL, PCR [[id number]] Collected:  03/31/20 2147 Order Status:  Canceled Specimen:  Sputum CULTURE, BLOOD [[id number]] Order Status:  Canceled Specimen:  Blood CULTURE, BLOOD [[id number]] Order Status:  Canceled Specimen:  Blood Current Meds: 
Current Facility-Administered Medications Medication Dose Route Frequency  cefTRIAXone (ROCEPHIN) 2 g in 0.9% sodium chloride (MBP/ADV) 50 mL  2 g IntraVENous Q24H  
 azithromycin (ZITHROMAX) 500 mg in 0.9% sodium chloride (MBP/ADV) 250 mL  500 mg IntraVENous Q24H  
 albuterol (PROVENTIL VENTOLIN) nebulizer solution 2.5 mg  2.5 mg Nebulization Q4H PRN  
 influenza vaccine 2019-20 (6 mos+)(PF) (FLUARIX/FLULAVAL/FLUZONE QUAD) injection 0.5 mL  0.5 mL IntraMUSCular PRIOR TO DISCHARGE  trimethoprim-sulfamethoxazole (BACTRIM DS, SEPTRA DS) 160-800 mg per tablet 1 Tab  1 Tab Oral Q MON, WED & FRI  sodium chloride (NS) flush 5-40 mL  5-40 mL IntraVENous Q8H  
 sodium chloride (NS) flush 5-40 mL  5-40 mL IntraVENous PRN  
 ondansetron (ZOFRAN) injection 4 mg  4 mg IntraVENous Q4H PRN  
 senna-docusate (PERICOLACE) 8.6-50 mg per tablet 1 Tab  1 Tab Oral DAILY  . PHARMACY TO SUBSTITUTE PER PROTOCOL (Reordered from: bisoprolol-hydroCHLOROthiazide (ZIAC) 2.5-6.25 mg per tablet)    Per Protocol  furosemide (LASIX) injection 40 mg  40 mg IntraVENous Q12H  levothyroxine (SYNTHROID) tablet 50 mcg  50 mcg Oral ACB  pantoprazole (PROTONIX) tablet 40 mg  40 mg Oral ACB Other Studies: 
Results for orders placed or performed during the hospital encounter of 03/31/20  
2D ECHO COMPLETE ADULT (TTE) W OR WO YESENIA [facility] [address] [place] [address] Dr Garcia, [address] [facility] 
[phone] Transthoracic Echocardiogram 
2D, M-mode, Doppler, and Color Doppler Patient: Keeley Duarte 
MR #: [id number] : [id number] Age: 62 years Gender: Male Study date: 2020 Account #: [de-identified] Height: 68 in 
Weight: 209.7 lb 
BSA: 2.09 mï¾² Status:Routine Location: [facility] BP: 100/ 71 Allergies: BEE VENOM PROTEIN (HONEY BEE), PROCHLORPERAZINE, IODINE AND IODIDE CONTAINING PRODUCTS Sonographer:  Claudene Phy Group:  [facility] Referring Physician:  Shelly Otto MD 
Reading Physician:  Simone Santizo. MD Adán [facility] [place] INDICATIONS: Shortness of breath, Chemotherapy *Off axis images due to poor patient compliance. * PROCEDURE: This was a routine study. A transthoracic echocardiogram was 
performed. The study included complete 2D imaging, M-mode, complete spectral 
Doppler, and color Doppler. Intravenous contrast (Definity) was administered. Intravenous contrast (Definity, 1 ml) was administered. This was a  
technically 
difficult study. LEFT VENTRICLE: Size was normal. Systolic function was normal. Ejection 
fraction was estimated in the range of 55 % to 60 %. There were no regional 
wall motion abnormalities. There was mild concentric hypertrophy. Unable to 
assess left ventricular diastolic function RIGHT VENTRICLE: The size was normal. Systolic function was normal. Estimated 
peak pressure was in the range of 35-40 mmHg. LEFT ATRIUM: Size was normal. 
 
RIGHT ATRIUM: Size was normal. 
 
SYSTEMIC VEINS: IVC: The inferior vena cava was normal in size and course. AORTIC VALVE: The valve was structurally normal, tri-commissural. There was  
no 
evidence for stenosis. There was no insufficiency. MITRAL VALVE: Valve structure was normal. There was no evidence for stenosis. There was no regurgitation. TRICUSPID VALVE: The valve structure was normal. There was no evidence for stenosis. There was mild regurgitation. PULMONIC VALVE: The valve structure was normal. There was no evidence for 
stenosis. There was trivial regurgitation. PERICARDIUM: There was no pericardial effusion. AORTA: The root exhibited normal size. SUMMARY: 
 
-  Left ventricle: Systolic function was normal. Ejection fraction was 
estimated in the range of 55 % to 60 %. There were no regional wall motion 
abnormalities. There was mild concentric hypertrophy. -  Tricuspid valve: There was mild regurgitation. 
 
-  Aorta, systemic arteries: The root exhibited normal size. SYSTEM MEASUREMENT TABLES 
 
2D mode AoR Diam (2D): 3.1 cm 
LA Dimension (2D): 3.6 cm IVS/LVPW (2D): 1 IVSd (2D): 1.3 cm LVIDd (2D): 4.7 cm 
LVIDs (2D): 3.5 cm 
LVOT Area (2D): 3.1 cm2 LVPWd (2D): 1.3 cm RVIDd (2D): 4 cm Unspecified Scan Mode Peak Grad; Mean; Antegrade Flow: 10 mm[Hg] Vmax; Antegrade Flow: 158 cm/s LVOT Diam: 2 cm Peak Grad; Mean; Antegrade Flow: 10 mm[Hg] Vmax; Antegrade Flow: 162 cm/s Peak Grad; Mean; Antegrade Flow: 37 mm[Hg] Vmax; Antegrade Flow: 303 cm/s Prepared and signed by 
 
Simone Santizo. MD Adán [facility] [place] Signed 01-Apr-2020 10:40:46 Ct Chest W Cont Result Date: 4/2/2020 EXAM: CT Chest with IV contrast - PE protocol. INDICATION: Hypoxia. COMPARISON: None. TECHNIQUE: Axial CT images of the chest were obtained after the intravenous injection of 100 mL Isovue 370 CT contrast. Radiation dose reduction techniques were used for this study. Our CT scanners use one or all of the following: Automated exposure control, adjustment of the mA and/or kV according to patient size, iterative reconstruction. FINDINGS: - Pleura/pericardium: Within normal limits. - Lungs: Patchy groundglass and airspace edema or infiltrates are noted throughout both lungs. There are also calcified right lower lobe granulomas. - Jannie/Mediastinum: Within normal limits.  - Tracheobronchial tree: Within normal limits. - Aorta/pulmonary arteries: Within normal limits. - Heart: Within normal limits. - Coronary arteries: No coronary artery calcifications. - Chest wall: Within normal limits. - Spine/bones: No acute process. - Additional comments: None. IMPRESSION: 1. No evidence of pulmonary embolism. 2. Patchy diffuse bilateral lung edema or pneumonia. COVID is not excluded. [address] [facility] Result Date: 4/1/2020 Right upper quadrant abdominal ultrasound. CLINICAL INDICATION: Transaminitis PROCEDURE: Realtime grayscale and color Doppler evaluation of the right upper abdominal quadrant. COMPARISON: No prior FINDINGS: The liver is normal in size and echogenicity without focal lesion. The gallbladder is normal with smooth thin walls. No gallstones identified. A negative sonographic David Meals sign is reported. The common bile duct measures five mm. The imaged portion the pancreas is unremarkable. The right kidney is normal in size measuring 10.8 cm. There is no hydronephrosis. Echogenicity is normal. The aorta and IVC are patent and unremarkable. IMPRESSION:  Unremarkable right upper quadrant abdominal ultrasound. SARS-CoV-2 LAB Results [facility] Test: No results found for: COV2NT  
[facility] Test: No results found for: [facility] Test: No results found for: RSLT As of: 10:33 AM on 4/2/2020 Assessment and Plan:  
 
Hospital Problems as of 4/2/2020 Date Reviewed: 3/27/2020 Codes Class Noted - Resolved POA Suspected Covid-19 Virus Infection ICD-10-CM: R68.89  4/2/2020 - Present Yes * (Principal) Respiratory failure with hypoxia [facility]) ICD-10-CM: J96.91 
ICD-9-CM: 518.81  3/31/2020 - Present Yes Transaminitis ICD-10-CM: R74.0 ICD-9-CM: 790.4  3/31/2020 - Present Yes Hypertension (Chronic) ICD-10-CM: I10 
ICD-9-CM: 401.9  8/12/2019 - Present Yes GBM (glioblastoma multiforme) (HCC) (Chronic) ICD-10-CM: C71.9 ICD-9-CM: 191.9  6/26/2019 - Present Yes Asbestosis ([facility] [place] [address] (Chronic) ICD-10-CM: I67 ICD-9-CM: 660  6/22/2019 - Present Yes Overview Signed 6/25/2019  7:59 AM by Quang Lopezr, [address] Last Assessment & Plan:  
 Was Diagnosed in November Stacey Alvarez Appears Asymptomatic Currently. Not Short of Breath Breathing Well. Plan: 
Resp Failure. ?Multifocal PNA · Worsening. ABG results reviewed · High risk of needing intubation per Pulm. Transfer to ICU · BNP not that elevated. Already on lasix 40mg BID. Had leg edema per earlier notes but none now · CXR 
· use NRB preferentially over optiflow · Add on CRP · Trend LFTs but is improved · DC steroids · DC nebulizers · COVID pending · Consulted pulmonary; discussed with Dr Jeremiah Amos who is seeing. Defer to them. Appreciate the help. ?chemo related · On rocephin/azithro currently · On bactrim PCP ppx · Blood cx pending · Suspect he may have ADRIANA/need CPAP based on habitus; was told we were trying to avoid this in possible COVID pts however · Plaquenil could be considered but QTc borderline, will need EKG monitoring Critical care time spent 40 minutes Diet:  DIET CARDIAC 
DVT ppx:  lovenox Signed: 
Sonja Hagen MD

## 2020-04-02 NOTE — PROGRESS NOTES
Bedside, Verbal and Written shift change report given to Abdulkadir Mariano RN (oncoming nurse) by Lola Laboy RN (offgoing nurse). Report included the following information SBAR, Kardex, ED Summary, Intake/Output, MAR, Recent Results, Cardiac Rhythm NSR/Stach and Alarm Parameters .

## 2020-04-03 PROBLEM — R65.21 SEPTIC SHOCK (HCC): Status: ACTIVE | Noted: 2020-01-01

## 2020-04-03 PROBLEM — A41.9 SEPTIC SHOCK (HCC): Status: ACTIVE | Noted: 2020-01-01

## 2020-04-03 NOTE — PROGRESS NOTES
Ventilator check complete; patient has a #8. 0 ET tube secured at the 23 at the teeth. Patient is sedated. Patient is not able to follow commands. Breath sounds are coarse. Trachea is midline, Negative for subcutaneous air, and chest excursion is symmetric. Patient is also Negative for cyanosis and is Negative for pitting edema. All alarms are set and audible. Resuscitation bag is at the head of the bed. Ventilator Settings Mode FIO2 Rate Tidal Volume Pressure PEEP I:E Ratio PRVC  60%   16 500 ml     8 cm H20 Peak airway pressure: 21 cm H2O Minute ventilation: 8.7 l/min Tiago Kent, RT

## 2020-04-03 NOTE — CONSULTS
Infectious Disease Consult Today's Date: 4/3/2020 Admit Date: 3/31/2020 Impression: · Acute respiratory failure with rapid deterioration. Patient on chemo and steroids for treatment glioblastoma. Has been on Bactrim for PCP prophylaxis. Covid 19 test pending; sent 4/2/20. Currently intubated. No fever. Normal WBC/no neutropenia or thrombocytopenia. Plan: ·  Agree with empiric COVID coverage with Plaquenil and Azithromycin; started 4/4/20. · He is at risk for PCP but has been taking prophylaxis so for now would continue prophylaxis. Consider putting on Atovaquone which is treatment for mild disease and prophylaxis. PCR is pending. If negative can return to Bactrim or change to every day dosing Atovaquone, 1500 mg daily. · I would add in empiric fungal coverage with Eraxis: will not interact with Plaquenil or Azithromycin. · With regard coverage for bacterial pneumonia: continue current coverage. No past history of MRSA and no past cx here or in Care Everywhere for MRSA or other MDR or worrisome bugs. Have discussed with Critical Care: he is not worsening as of now so recommend continue current bacterial coverage with CTX and Azithromycin. IF he is worsening tomorrow or later, then would up coverage to Vanc and Cefepime. Anti-infectives:  
· Azithromycin (4/2 - 
· Ceftriaxone (4/2 - 
· Plaquenil (4/2 - Bactrim 3x week Subjective:  
Date of Consultation:  April 3, 2020 Referring Physician: Booker Frost, Pulmonary Critical Care 
 
ID has been asked for further testing and antibiotic recommendations. Patient is a 62 y.o. 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 He has been taking decadron and prophylactic Bactrim.   He presented to the ED 3/31/2020 with five day history of shortness of breath and lower extremity swelling. He had deliberately been staying at home with his wife. His O2 saturation was 60% on room air, improved with supplemental O2, and CXR with low lung volumes and pulmonary vascular congestion. CT chest showed patchy diffuse bilateral lung edema or pneumonia. His respiratory status has worsening over the past few days. Covid 19 testing was sent, and he was started on hydrocholoroquine. He was deemed high risk for decompensation and was intubated yesterday. PJP pending, blood cultures pending. He is afebrile. He is actually awake and alert, can nod yes/no. Denies pain. No diarrhea. Discussed with Critical Care;  Possibly weaning pressors; remains on high Fi02. Awake and alert. Patient Active Problem List  
Diagnosis Code  Asbestosis ([facility] J61  
 GBM (glioblastoma multiforme) ([facility] C71.9  Hyponatremia E87.1  Postoperative CSF leak G97.82  Fever R50.9  Nausea and vomiting R11.2  Hypertension I10  
 Depression F32.9  Acute hypoxemic respiratory failure (HCC) J96.01  
 Transaminitis R74.0  Suspected Covid-19 Virus Infection R68.89  
 Suspected sleep apnea R29.818  Morbid obesity (HCC) E66.01  
 Septic shock (HCC) A41.9, R65.21 Past Medical History:  
Diagnosis Date  Asbestosis ([facility] 6/22/2019 Last Assessment & Plan:   Was Diagnosed in November . Appears Asymptomatic Currently. Not Short of Breath Breathing Well.  Brain mass 6/21/2019 Last Assessment & Plan:  Is a neurosurgical consultation with Dr. Lucy Arreola  is evaluated the patient. Patient is on IV Decadron every 6 hourly. Patient has evidence of a left-sided temporoparietal mass  I Understanded to the plan is for surgery On Wednesday Will change decadrone to 4 mg po qid  Pt will follow with Dr Lucy Arreola outpatient. Family History Problem Relation Age of Onset  Lung Disease Father Social History Tobacco Use  Smoking status: Former Smoker  Smokeless tobacco: Never Used Substance Use Topics  Alcohol use: Not Currently Past Surgical History:  
Procedure Laterality Date  HX CRANIOTOMY  06/26/2019  
 left Frontotemporal crani for GBM  
 HX HERNIA REPAIR Left  HX LUMBAR LAMINECTOMY Prior to Admission medications Medication Sig Start Date End Date Taking? Authorizing Provider  
furosemide (LASIX) 20 mg tablet Take 1 Tab by mouth daily as needed (swelling). For leg swelling. Only take in the morning. 3/27/20  Yes Jerson Cao MD  
dexAMETHasone (DECADRON) 2 mg tablet Take 1 Tab by mouth two (2) times a day. 2/25/20  Yes Cookie Ventura NP  
trimethoprim-sulfamethoxazole (BACTRIM DS) 160-800 mg per tablet Take 1 Tab by mouth BID Mon Wed & Fri. Take while on temodar 10/23/19  Yes Jerson Cao MD  
levothyroxine (SYNTHROID) 50 mcg tablet Take 1 Tab by mouth Daily (before breakfast). Indications: hypothyroidism 9/26/19  Yes Jerson Cao MD  
[facility]) 250 mg capsule Take one capsule daily Monday through Friday only. Take with a 140 mg capsule. Patient taking differently: Take  by mouth. Take one capsule daily Monday through Friday only. Take with a 140 mg capsule. 9/19/19  Yes Jerson Cao MD  
[facility]) 140 mg capsule Take 1 capsule daily, Monday through Friday only. Take with a 250 mg capsule. Patient taking differently: Take  by mouth. Take 1 capsule daily, Monday through Friday only. Take with a 250 mg capsule. 9/19/19  Yes Jerson Cao MD  
bisoprolol-hydroCHLOROthiazide [facility]) 2.5-6.25 mg per tablet Take 1 Tab by mouth daily. 8/1/19 7/31/20 Yes Provider, Historical  
diphenhydrAMINE (BENADRYL) 25 mg capsule Take 50 mg by mouth nightly. Yes Provider, Historical  
pantoprazole (PROTONIX) 40 mg tablet Take 1 Tab by mouth Daily (before breakfast). 11/20/19   Jerson Cao MD  
citalopram (CELEXA) 10 mg tablet Take 10 mg by mouth daily. 8/1/19 7/31/20  Provider, Historical  
 
 
Allergies Allergen Reactions  Bee Venom Protein (Honey Bee) Swelling  Compazine [Prochlorperazine] Other (comments) Caused delirium, confusion, uncontrollable shakes  Iodine And Iodide Containing Products Unknown (comments) Contrast dye Review of Systems:  Cannot obtain; intubated Objective:  
 
Visit Vitals BP (!) 85/61 Pulse 75 Temp 96.5 °F (35.8 °C) Resp 19 Ht 5' 8\" (1.727 m) Wt 96.3 kg (212 lb 4.9 oz) SpO2 94% BMI 32.28 kg/m² Temp (24hrs), Av.8 °F (36 °C), Min:95.1 °F (35.1 °C), Max:98.1 °F (36.7 °C) Lines:  Central Venous Catheter:    
 
Physical Exam:   
General:  Alert, cooperative, well noursished/obese well developed, appears stated age; nods yes/no Eyes:  Sclera anicteric. Pupils equally round and reactive to light; pale conjunctiva; no splinter hemorrhages Mouth/Throat: Intubated, no lip lesions Neck: Supple and symmetric but large/thick Lungs:   Diminished BS; brooke at bases, no wheezed, no rales, fair movement CV:  Regular rate and rhythm,no murmur, click, rub or gallop, 1+ LE edema Abdomen:   Soft, non-tender. bowel sounds quiet. non-distended, normal Genitals; parks; red scars on both upper mons pubis; looks old Extremities: No cyanosis Skin: Skin color, texture, turgor normal. Petechial changes on abdomen and somewhat larger ecchymotic areas on upper arms/neck Lymph nodes: Cervical and supraclavicular normal  
Musculoskeletal: No swelling or deformity Lines/Devices:  Intact, no erythema, drainage or tenderness Psych: Alert/awake, normal mood affect given the setting Data Review: CBC: 
Recent Labs 20 
0206 20 
9633 20 
6372 WBC 7.8 7.2 9.3 GRANS 87* 89* 83* MONOS 5 3* 3* EOS 0* 0* 0* ANEU 6.8 6.4 7.7 ABL 0.5 0.5 1.2 HGB 10.7* 12.8* 13.9 HCT 32.1* 36.6* 41.0*  
 314 320 BMP: 
Recent Labs 20 
0207 20 
9243 20 
1693 CREA 0.97 1.01 1.09 BUN 13 11 13  136 136  
K 3.7 3.4* 3.3*  
 100 102 CO2 28 25 25 AGAP 7 11 9 * 146* 106* LFTS: 
Recent Labs 04/03/20 
0207 04/02/20 
3435 04/01/20 
9948 TBILI 0.4 0.7 1.6* ALT 37 51 61 SGOT 24 35 54* AP 70 122 94  
TP 6.1* 7.6 7.6 ALB 2.1* 2.5* 2.7* Microbiology:  
 
All Micro Results Procedure Component Value Units Date/Time CULTURE, URINE [[id number]] Collected:  04/03/20 0221 Order Status:  Completed Specimen:  Urine from [place] Specimen Updated:  04/03/20 0725 Special Requests: NO SPECIAL REQUESTS Culture result:    
  NO GROWTH AFTER SHORT PERIOD OF INCUBATION. FURTHER RESULTS TO FOLLOW AFTER OVERNIGHT INCUBATION. CULTURE, BLOOD [[id number]] Collected:  04/02/20 9971 Order Status:  Completed Specimen:  Blood Updated:  04/03/20 6588 Special Requests: --     
  RIGHT 
HAND Culture result: NO GROWTH AFTER 22 HOURS     
 CULTURE, BLOOD [[id number]] Collected:  04/02/20 8690 Order Status:  Completed Specimen:  Blood Updated:  04/03/20 7281 Special Requests: --     
  LEFT Antecubital 
  
  Culture result: NO GROWTH AFTER 22 HOURS     
 CULTURE, RESPIRATORY/SPUTUM/BRONCH Sydell Deja STAIN [[id number]] Order Status:  Sent Specimen:  Sputum from Endotracheal aspirate P.JIROVECI BY PCR [[id number]] Order Status:  Sent EMERGENT DISEASE PANEL [[id number]] Collected:  04/02/20 3188 Order Status:  Completed Updated:  04/02/20 1018 RESPIRATORY VIRAL PANEL, PCR [[id number]] Order Status:  Sent Specimen:  Sputum RESPIRATORY VIRAL PANEL, PCR [[id number]] Collected:  03/31/20 2154 Order Status:  Canceled RESPIRATORY VIRAL PANEL, PCR [[id number]] Collected:  03/31/20 2147 Order Status:  Canceled Specimen:  Sputum CULTURE, BLOOD [[id number]] Order Status:  Canceled Specimen:  Blood CULTURE, BLOOD [[id number]] Order Status:  Canceled Specimen:  Blood Imaging: Samantha Reading 4/03/2020 04:11 [phone] Exam Information Status Exam Begun  Exam Ended Final [99] 4/03/2020 04:00 4/03/2020 04:03 Study Result EXAM: Chest x-ray. 
  
INDICATION: Dyspnea. 
  
COMPARISON: Yesterday's chest x-ray. 
  
TECHNIQUE: Frontal view chest x-ray. 
  
FINDINGS: There is been no significant change in bilateral lung edema or 
infiltrates, worse in the left lung base. The cardiac size is stable. No 
pneumothorax or pleural effusion is seen. The endotracheal tube, enteric tube 
and right jugular central line remain in place, and appear to be in good 
position. 
  
IMPRESSION IMPRESSION: Unchanged Signed By: Suzanne Mesa NP April 3, 2020

## 2020-04-03 NOTE — PROGRESS NOTES
Bedside and Verbal shift change report given to Nellie Maciel (oncoming nurse) by Paulino Gordillo (offgoing nurse). Report included the following information SBAR, Kardex, ED Summary, Procedure Summary, Intake/Output, MAR and Recent Results.

## 2020-04-03 NOTE — PROGRESS NOTES
Ventilator check complete; patient has a #8. 0 ET tube secured at the 23 at the lip. Patient is sedated. Patient is not able to follow commands. Breath sounds are coarse. Trachea is midline, Negative for subcutaneous air, and chest excursion is symmetric. Patient is also Negative for cyanosis and is Positive for pitting edema. All alarms are set and audible. Resuscitation bag is at the head of the bed.   
  
Ventilator Settings Mode FIO2 Rate Tidal Volume Pressure PEEP I:E Ratio PRVC  80 % 16   500 ml     8 cm H20      
  
Peak airway pressure: 27cm H2O Minute ventilation: 9.6 l/min PEEP increased to 10 per Dr. Mike Lacy, and FIO2 increased to 80% Smiley Howard, RRT

## 2020-04-03 NOTE — PROGRESS NOTES
Per Dr Catherine Jaffe (oncology): 
 
None of the oncology-related meds are emergent at this time. Temodar can be held if he is due for the medication. Otherwise, it can be deferred at this point given his clinical situation and restarted in the future if necessary. His dexamethasone is for symptomatic relief and not necessary given his current neurological status. Bactrim is prophylactic for PJP and unnecessary unless he is positive for PJP; PCR appears to be pending. Cleophus Severance, NP [facility] Hematology/Oncology

## 2020-04-03 NOTE — PROGRESS NOTES
Nutrition follow-up: 
Consult for tube feeding management (Pulmonary) 
  
Assessment: 
Food/Nutrition Patient History: Patient admitted with five day history of shortness of breath and swelling in legs. He has a PMH of HTN, asbestosis, Glioblastoma. ANTONINA was ordered and 55-60% tricuspid regurgitation. Patient was started on Lasix. CT chest showed bilateral edema/infiltrates, transferred to 8th floor for isolation. Transferred to ICU (cath lab) 4/2 for increased O2 needs and high risk for vent. Patient has now been intubated. Called and spoke with primary RN. She states that patient has been tolerating TF thus far. She states that still no BM that she knows of. She states that she has had 0 residuals and patient has hypoactive bowel sounds. Abdomen: Active bowel sounds, unknown date of last BM No edema noted. Diet: No diet orders on file Pertinent Labs: WNL and stable except slightly elevated Glucose at 137 mg/dL Pertinent Medications: Levophed being weaned, NS decreased to 100 ml/hr, Diprivan stopped Enteral nutrition access: NGT Anthropometrics:Height: 5' 8\" (172.7 cm),  Weight: 95.3 kg (210 lb), Weight Source: Other (comment), Body mass index is 31.93 kg/m². BMI class of obese. Macronutrient Needs:  
Estimated energy requirements:  2995-7513 kcal /day (15-20 kcal/kg) (95.3 kg Listed body weight) Estimated protein requirements:  140-175 grams protein/day (2-2.5 grams/kg) (70 kg Ideal body weight) CHO limit per day: 262 grams CHO/day (55% calories) Estimated fluid/day: 1.4-1.9 liters/day (~1ml/kcal/day) Intake/Comparative Standards: Patient is NPO which does not meet EEN or EPN 
  
Intervention: 
Meals and snacks: NPO Enteral Nutrition: Advance feedings of Vital AF to 25 ml/hour now. Then advance by 10 ml/hr every 8 hours as tolerated to goal rate of 55 ml/hr. Water flush 25/hour.  Goal feeding to provide 1584 kcal (100% estimated needs), 99 g pro (71% estimated protein needs), 145 g CHO (does not exceed max CHO), 1670 ml free water (100% estimated fluid needs). Patient will also need Prosource TF x 4 per day - will wait until tolerance of TF established to start protein modulator. Likely will need to administer 2 pouches at at time to limit interactions while patient on isolation. IVF: TF + Free water flush + IVF  = 100 ml/hr Vitamin and Mineral Supplement Therapy: 
Nutrition support orders for electrolyte management replacement are activated and placed on the MAR. Will add Mg and Phos to am labs Coordination of Nutrition Care: Spoke with YORDY Chaudhry - discussed possible need for bowel regimen. She will discuss with Dr. Natalia Kim. Discharge Nutrition Plan: Too soon to determine. [address] [facility] [address] [place] [address], [id number], [address] [facility]

## 2020-04-03 NOTE — PROGRESS NOTES
ICU Pulmonary Daily Progress NOTE Clarisa Plane 4/3/2020 Mr Kwan Crandall is a 62year old male with a history of HTN and asbestosis as well as Glioblastoma multiforme that was diagnosed in July of last year. He is followed by Dr Brock Cardenas. He has undergone craniotomy with resection/XRT and he is now on Temodar. Last MRI 1/2020 showed no evidence of disease progression with subtle improvement of enhancement along margins of operative cavity and subtle improvement of perilesional mass effect. He is on decadron and prophylactic Bactrim. He had a virtual visit with Dr Brock Cardenas on 3/27. He reported leg edema so was started on lasix 20 mg per day. He denied any fever or known exposures. He and his wife had been careful at home and reported \"self quarantining\".     
  Mr Kwan Crandall presented to the ED on 3/31 with increasing shortness of breath. cough over the past several days and persistent leg edema. He was initially hypoxic on EMS arrival, with sats at 65% and improved to 93% on 4L NC. He also endorsed cough and ongoing leg swelling. While in the ED a CXR was done and showed an unchanged left lung base infiltrate. He was started on Rocephin and Zithromax and blood cultures done (still pending). A CT chest (PE protocol) was done which shows bilateral infiltrates. His proBNP was 966. A TTE was done and showed LVEF of 55-60% with a normal RV and RVSP 35 to 40 mmHg. He has received IV lasix and the fluid balance is now negative 2.2 liters. His oxygen needs have increased however and his is now on 10 liters. ABG -> 7.45/32/54/23. Covid 19 study is pending (ordered today). In room is awake and mildly confused. Reports is ok but not sure how he is breathing. Neck is large but reports has not been tested for ADRIANA. Does note. He is on 15 LPM and was urinating and saturation in 70's to 80's. I placed non-rebreath and then up to 96% and comfortable.  Took off and saturation still in 90's. Not in pain. Date of Admission:  3/31/2020 The patient's chart is reviewed and the patient is discussed with the staff. Subjective:  
 
Since initial consult patient intubated on pressors. Today FIO2 down to 70%. Patient is responding on Vent. On Fentanyl and Versed. Saturation about 88-89%. Levo down to 3 Review of Systems: -- limited on Vent. No dyspnea No pain. Patient Active Problem List  
Diagnosis Code  Asbestosis ([facility] J61  
 GBM (glioblastoma multiforme) ([facility] C71.9  Hyponatremia E87.1  Postoperative CSF leak G97.82  Fever R50.9  Nausea and vomiting R11.2  Hypertension I10  
 Depression F32.9  Acute hypoxemic respiratory failure (HCC) J96.01  
 Transaminitis R74.0  Suspected Covid-19 Virus Infection R68.89  
 Suspected sleep apnea R29.818  Morbid obesity ([facility]) E66.01  
 Septic shock ([facility]) A41.9, R65.21 Prior to Admission Medications Prescriptions Last Dose Informant Patient Reported? Taking?  
bisoprolol-hydroCHLOROthiazide [facility]) 2.5-6.25 mg per tablet 3/31/2020 at Unknown time  Yes Yes Sig: Take 1 Tab by mouth.  
citalopram (CELEXA) 10 mg tablet Not Taking at Unknown time  Yes No  
Sig: Take 10 mg by mouth. dexAMETHasone (DECADRON) 2 mg tablet 3/31/2020 at Unknown time  No Yes Sig: Take 1 Tab by mouth two (2) times a day. diphenhydrAMINE (BENADRYL) 25 mg capsule 3/31/2020 at Unknown time  Yes Yes Sig: Take 50 mg by mouth nightly. furosemide (LASIX) 20 mg tablet 3/25/2020 at Unknown time  No Yes Sig: Take 1 Tab by mouth daily as needed (swelling). For leg swelling. Only take in the morning. levothyroxine (SYNTHROID) 50 mcg tablet 3/31/2020 at Unknown time  No Yes Sig: Take 1 Tab by mouth Daily (before breakfast). Indications: hypothyroidism  
pantoprazole (PROTONIX) 40 mg tablet Not Taking at Unknown time  No No  
Sig: Take 1 Tab by mouth Daily (before breakfast). prochlorperazine (COMPAZINE) 10 mg tablet Not Taking at Unknown time  Yes No  
Sig: Had reaction to compazine, delirious, shaking, confusion,  
temozolomide (TEMODAR) 140 mg capsule 3/1/2020 at Unknown time  No Yes Sig: Take 1 capsule daily, Monday through Friday only. Take with a 250 mg capsule. temozolomide (TEMODAR) 250 mg capsule 3/1/2020 at Unknown time  No Yes Sig: Take one capsule daily Monday through Friday only. Take with a 140 mg capsule. trimethoprim-sulfamethoxazole (BACTRIM DS) 160-800 mg per tablet 3/1/2020 at Unknown time  No Yes Sig: Take 1 Tab by mouth BID Mon Wed & Fri. Take while on temodar Facility-Administered Medications: None Past Medical History:  
Diagnosis Date  Asbestosis ([address] 6/22/2019 Last Assessment & Plan:   Was Diagnosed in November . Appears Asymptomatic Currently. Not Short of Breath Breathing Well.  Brain mass 6/21/2019 Last Assessment & Plan:  Is a neurosurgical consultation with Dr. Adam Montano  is evaluated the patient. Patient is on IV Decadron every 6 hourly. Patient has evidence of a left-sided temporoparietal mass  I Understanded to the plan is for surgery On Wednesday Will change decadrone to 4 mg po qid  Pt will follow with Dr Adam Montano outpatient. Past Surgical History:  
Procedure Laterality Date  HX CRANIOTOMY  06/26/2019  
 left Frontotemporal crani for GBM  
 HX HERNIA REPAIR Left  HX LUMBAR LAMINECTOMY Social History Socioeconomic History  Marital status:  Spouse name: Not on file  Number of children: Not on file  Years of education: Not on file  Highest education level: Not on file Occupational History  Not on file Social Needs  Financial resource strain: Not on file  Food insecurity Worry: Not on file Inability: Not on file  Transportation needs Medical: Not on file Non-medical: Not on file Tobacco Use  Smoking status: Former Smoker  Smokeless tobacco: Never Used Substance and Sexual Activity  Alcohol use: Not Currently  Drug use: Not on file  Sexual activity: Not on file Lifestyle  Physical activity Days per week: Not on file Minutes per session: Not on file  Stress: Not on file Relationships  Social connections Talks on phone: Not on file Gets together: Not on file Attends Church service: Not on file Active member of club or organization: Not on file Attends meetings of clubs or organizations: Not on file Relationship status: Not on file  Intimate partner violence Fear of current or ex partner: Not on file Emotionally abused: Not on file Physically abused: Not on file Forced sexual activity: Not on file Other Topics Concern  Not on file Social History Narrative  Not on file Family History Problem Relation Age of Onset  Lung Disease Father Allergies Allergen Reactions  Bee Venom Protein (Honey Bee) Swelling  Compazine [Prochlorperazine] Other (comments) Caused delirium, confusion, uncontrollable shakes  Iodine And Iodide Containing Products Unknown (comments) Contrast dye Current Facility-Administered Medications Medication Dose Route Frequency  albumin human 25% (BUMINATE) solution 25 g  25 g IntraVENous BID  midodrine (PROAMITINE) tablet 5 mg  5 mg Oral TID WITH MEALS  dexamethasone (DECADRON) 4 mg/mL injection 1 mg  1 mg IntraVENous BID  cefTRIAXone (ROCEPHIN) 2 g in 0.9% sodium chloride (MBP/ADV) 50 mL  2 g IntraVENous Q24H  
 azithromycin (ZITHROMAX) 500 mg in 0.9% sodium chloride (MBP/ADV) 250 mL  500 mg IntraVENous Q24H  
 enoxaparin (LOVENOX) injection 40 mg  40 mg SubCUTAneous Q24H  
 levothyroxine (SYNTHROID) tablet 50 mcg  50 mcg Per G Tube ACB  sulfamethoxazole-trimethoprim (BACTRIM;SEPTRA) 200-40 mg/5 mL oral suspension 160 mg  160 mg Per NG tube Q MON, WED & FRI  
  acetaminophen (TYLENOL) tablet 650 mg  650 mg Per NG tube Q4H PRN  
 hydroxychloroquine (PLAQUENIL) 25 mg/ml oral suspension 400 mg  400 mg Per NG tube BID WITH MEALS  
 hydroxychloroquine (PLAQUENIL) 25 mg/ml oral suspension 200 mg  200 mg Oral BID WITH MEALS  lansoprazole (PREVACID) 3 mg/mL oral suspension 30 mg  30 mg Per NG tube ACB  fentaNYL in normal saline (pf) 25 mcg/mL infusion  0-200 mcg/hr IntraVENous TITRATE  propofol (DIPRIVAN) 10 mg/mL infusion  0-50 mcg/kg/min IntraVENous TITRATE  
 0.9% sodium chloride infusion  100 mL/hr IntraVENous CONTINUOUS  
 dexmedeTOMidine (PRECEDEX) 400 mcg in 0.9% sodium chloride 100 mL infusion  0.2-0.7 mcg/kg/hr IntraVENous TITRATE  midazolam (VERSED) injection 2 mg  2 mg IntraVENous Q2H PRN  
 NUTRITIONAL SUPPORT ELECTROLYTE PRN ORDERS   Does Not Apply PRN  
 albuterol-ipratropium (DUO-NEB) 2.5 MG-0.5 MG/3 ML  3 mL Nebulization Q6H  
 NOREPINephrine (LEVOPHED) 4 mg in 5% dextrose 250 mL infusion  0.5-30 mcg/min IntraVENous TITRATE  influenza vaccine 2019-20 (6 mos+)(PF) (FLUARIX/FLULAVAL/FLUZONE QUAD) injection 0.5 mL  0.5 mL IntraMUSCular PRIOR TO DISCHARGE  sodium chloride (NS) flush 5-40 mL  5-40 mL IntraVENous Q8H  
 sodium chloride (NS) flush 5-40 mL  5-40 mL IntraVENous PRN  
 ondansetron (ZOFRAN) injection 4 mg  4 mg IntraVENous Q4H PRN Objective:  
 
Vitals:  
 04/03/20 0407 04/03/20 8074 04/03/20 4480 04/03/20 2786 BP: 99/66 95/70 93/66 (!) 85/61 Pulse: 78 79 78 66 Resp:      
Temp:      
SpO2: 90% (!) 88% 91% 97% Weight:      
Height:      
 
Ventilator Settings Mode FIO2 Rate Tidal Volume Pressure PEEP PRVC  60 %(decreased post ABG)    500 ml     8 cm H20 Peak airway pressure: 21 cm H2O Minute ventilation: 8.7 l/min PHYSICAL EXAM  
 
Constitutional:  the patient is well developed and in no acute distress EENMT:  Sclera clear, pupils equal, oral mucosa moist, very large neck on Vent. Respiratory: CTA b/l. No wheezing or crackles Cardiovascular:  RRR without M,G,R 
Gastrointestinal: soft and non-tender; with positive bowel sounds. Musculoskeletal: warm without cyanosis. There is no lower extremity edema. Skin:  no jaundice or rashes, no wounds Neurologic: no gross neuro deficits Psychiatric:  alert and responding to questions on vent. CXR:  ett in place with b/l infiltrates Crystal Kim Echo:   Left ventricle: Systolic function was normal. Ejection fraction was 
estimated in the range of 55 % to 60 %. There were no regional wall motion 
abnormalities. There was mild concentric hypertrophy.  
-  Tricuspid valve: There was mild regurgitation.  
-  Aorta, systemic arteries: The root exhibited normal size. CT chest: -- diffuse infiltrates/parynchemal changes. Recent Labs 04/03/20 
0206 04/02/20 
6659 04/01/20 
0554 03/31/20 
1554 WBC 7.8 7.2 9.3 7.7 HGB 10.7* 12.8* 13.9 13.4* HCT 32.1* 36.6* 41.0* 40.1*  314 320 312 Recent Labs 04/03/20 
0207 04/02/20 
1254 04/01/20 
0554 03/31/20 
1554  136 136 138  
K 3.7 3.4* 3.3* 3.5  100 102 104 * 146* 106* 92  
CO2 28 25 25 25 BUN 13 11 13 15 CREA 0.97 1.01 1.09 1.26  
MG 2.3  --   --   --   
PHOS 2.9  --   --   --   
CA 8.5 8.9 9.1 8.6  
TROIQ  --   --   --  <0.02* ALB 2.1* 2.5* 2.7* 2.8* TBILI 0.4 0.7 1.6* 1.1 ALT 37 51 61 72* SGOT 24 35 54* 69* Recent Labs 04/03/20 
0313 04/02/20 1515 04/02/20 
1005 PHI 7.379 7.333* 7.459* PCO2I 44.8 46.2* 32.6*  
PO2I 162* 193* 54* HCO3I 26.4* 24.5 23.1 No results for input(s): LCAD, LAC in the last 72 hours. Assessment:  (Medical Decision Making) Hospital Problems  Date Reviewed: 3/27/2020 Codes Class Noted POA Septic shock (HCC) ICD-10-CM: A41.9, R65.21 ICD-9-CM: 038.9, 785.52, 995.92  4/3/2020 Unknown Suspected Covid-19 Virus Infection ICD-10-CM: R68.89  4/2/2020 Yes Suspected sleep apnea ICD-10-CM: R29.818 ICD-9-CM: 781.99  4/2/2020 Unknown Morbid obesity ([facility] ICD-10-CM: E66.01 
ICD-9-CM: 278.01  4/2/2020 Unknown * (Principal) Acute hypoxemic respiratory failure ([facility] ICD-10-CM: J96.01 
ICD-9-CM: 518.81  3/31/2020 Unknown Transaminitis ICD-10-CM: R74.0 ICD-9-CM: 790.4  3/31/2020 Yes Hypertension (Chronic) ICD-10-CM: I10 
ICD-9-CM: 401.9  8/12/2019 Yes GBM (glioblastoma multiforme) (HCC) (Chronic) ICD-10-CM: C71.9 ICD-9-CM: 191.9  6/26/2019 Yes Asbestosis ([facility] (Chronic) ICD-10-CM: V35 ICD-9-CM: 138  6/22/2019 Yes Overview Signed 6/25/2019  7:59 AM by Esteban Chao, [address] [place] [address] Last Assessment & Plan:  
 Was Diagnosed in November [place] Appears Asymptomatic Currently. Not Short of Breath Breathing Well. Patient with HTN and asbestosis, Glioblastoma multiforme diagnosed in July '19  S/p craniotomy with resection/XRT on chemo came in with SOB/cough/edema. Needed oxygen and  CT with diffuse infiltrates and evaluating for COVID as well. In ICU on Vent now and pressors Plan:  (Medical Decision Making) --continue vent support and increased to PEEP of 10 and oxygenation now up to 92% 
--continue oxygen and on 70% now 
--on nebs 
--taper pressors to keep MAP > 65. Almost off donna. Will add ProAmatine. Also few doses of albumin 
--check CVP given decreased urine ouptut 
--taper sedatives. On Fentanyl, precedex currently. Add versed prn. Mild sedation holiday today. Currently RASS -2 
--hg lower but not bleeding, f/u and likely dilutional  
--continue abx -- rocephin/azithro/plquenil D2. B/C x 2 negative. Told to send sputum samples for cultures/cytology/PJP. procal is 0.14. WBC is almost 8 with 87% neutrophils. Will get ID help for any additional testing needs. Noted was on Bactrim for PJP prophylaxis --f/u COVID test results 
--may need BRONCH in the future since diagnosis ranges from infection to mets.  Hold since possible COVID 
--f/u with oncology on chemo On Bactrim for PJP prophylaxis. Also on dexamthasone 1 mg BID per them. --at risk for ADRIANA and will need outpatient PSG - Split Night study in the future. --continue remaining treatment. Spoke to jamar Vergara Se and he will inform his mother today. Condition is critical 
Time spent 50 minutes More than 50% of the time documented was spent in face-to-face contact with the patient and in the care of the patient on the floor/unit where the patient is located.  
 
  
 
Gregory Kimbrough MD

## 2020-04-03 NOTE — PROGRESS NOTES
Bedside, Verbal and Written shift change report given to Omar Thorne RN (oncoming nurse) by Poncho Mariee RN (offgoing nurse). Report included the following information SBAR, Kardex, ED Summary, Intake/Output, MAR, Recent Results, Cardiac Rhythm NSR and Alarm Parameters . Dual gtt verification completed with shift change.

## 2020-04-03 NOTE — PROGRESS NOTES
Dr. Reina Dunlap notified of UA resulting in 2+ bacteria and UOP averaging less than 30ml/hr. Orders received to send urine culture and draw AM labs early.

## 2020-04-03 NOTE — PROGRESS NOTES
Ventilator check complete; patient has a #8. 0 ET tube secured at the 23 at the lip. Patient is sedated. Patient is not able to follow commands. Breath sounds are coarse. Trachea is midline, Negative for subcutaneous air, and chest excursion is symmetric. Patient is also Negative for cyanosis and is Positive for pitting edema. All alarms are set and audible. Resuscitation bag is at the head of the bed. Ventilator Settings Mode FIO2 Rate Tidal Volume Pressure PEEP I:E Ratio PRVC  70 %    500 ml     8 cm H20 Peak airway pressure: 30 cm H2O Minute ventilation: 8.3 l/min ABG: No results for input(s): PH, PCO2, PO2, HCO3 in the last 72 hours.  
 
 
Myranda Chua, RT

## 2020-04-04 NOTE — PROGRESS NOTES
ICU Pulmonary Daily Progress NOTE Pop Fried 4/4/2020 Mr Daniel Grier is a 62year old male with a history of HTN and asbestosis as well as Glioblastoma multiforme that was diagnosed in July of last year. He is followed by Dr Justin Ward. He has undergone craniotomy with resection/XRT and he is now on Temodar. Last MRI 1/2020 showed no evidence of disease progression with subtle improvement of enhancement along margins of operative cavity and subtle improvement of perilesional mass effect. He is on decadron and prophylactic Bactrim. He had a virtual visit with Dr Justin Ward on 3/27. He reported leg edema so was started on lasix 20 mg per day. He denied any fever or known exposures. He and his wife had been careful at home and reported \"self quarantining\".     
  Mr Daniel Grier presented to the ED on 3/31 with increasing shortness of breath. cough over the past several days and persistent leg edema. He was initially hypoxic on EMS arrival, with sats at 65% and improved to 93% on 4L NC. He also endorsed cough and ongoing leg swelling. While in the ED a CXR was done and showed an unchanged left lung base infiltrate. He was started on Rocephin and Zithromax and blood cultures done (still pending). A CT chest (PE protocol) was done which shows bilateral infiltrates. His proBNP was 966. A TTE was done and showed LVEF of 55-60% with a normal RV and RVSP 35 to 40 mmHg. He has received IV lasix and the fluid balance is now negative 2.2 liters. His oxygen needs have increased however and his is now on 10 liters. ABG -> 7.45/32/54/23. Covid 19 study is pending (ordered today). In room is awake and mildly confused. Reports is ok but not sure how he is breathing. Neck is large but reports has not been tested for ADRIANA. Does note. He is on 15 LPM and was urinating and saturation in 70's to 80's. I placed non-rebreath and then up to 96% and comfortable.  Took off and saturation still in 90's. Not in pain. Date of Admission:  3/31/2020 The patient's chart is reviewed and the patient is discussed with the staff. Subjective:  
 
Since initial consult patient intubated on pressors paralyzed and on flolan. Today FIO2 up to 100%. . On Fentanyl and Versed. Saturation about 65-99%. Review of Systems: -- limited on Vent. Patient Active Problem List  
Diagnosis Code  Asbestosis ([facility] J61  
 GBM (glioblastoma multiforme) ([facility] [facility] C71.9  Hyponatremia E87.1  Postoperative CSF leak G97.82  Fever R50.9  Nausea and vomiting R11.2  Hypertension I10  
 Depression F32.9  Acute hypoxemic respiratory failure (HCC) J96.01  
 Transaminitis R74.0  Suspected Covid-19 Virus Infection R68.89  
 Suspected sleep apnea R29.818  Morbid obesity ([facility]) E66.01  
 Septic shock ([facility]) A41.9, R65.21 Prior to Admission Medications Prescriptions Last Dose Informant Patient Reported? Taking?  
bisoprolol-hydroCHLOROthiazide [facility]) 2.5-6.25 mg per tablet 3/31/2020 at Unknown time  Yes Yes Sig: Take 1 Tab by mouth.  
citalopram (CELEXA) 10 mg tablet Not Taking at Unknown time  Yes No  
Sig: Take 10 mg by mouth. dexAMETHasone (DECADRON) 2 mg tablet 3/31/2020 at Unknown time  No Yes Sig: Take 1 Tab by mouth two (2) times a day. diphenhydrAMINE (BENADRYL) 25 mg capsule 3/31/2020 at Unknown time  Yes Yes Sig: Take 50 mg by mouth nightly. furosemide (LASIX) 20 mg tablet 3/25/2020 at Unknown time  No Yes Sig: Take 1 Tab by mouth daily as needed (swelling). For leg swelling. Only take in the morning. levothyroxine (SYNTHROID) 50 mcg tablet 3/31/2020 at Unknown time  No Yes Sig: Take 1 Tab by mouth Daily (before breakfast). Indications: hypothyroidism  
pantoprazole (PROTONIX) 40 mg tablet Not Taking at Unknown time  No No  
Sig: Take 1 Tab by mouth Daily (before breakfast).   
prochlorperazine (COMPAZINE) 10 mg tablet Not Taking at Unknown time  Yes No  
Sig: Had reaction to compazine, delirious, shaking, confusion,  
temozolomide (TEMODAR) 140 mg capsule 3/1/2020 at Unknown time  No Yes Sig: Take 1 capsule daily, Monday through Friday only. Take with a 250 mg capsule. temozolomide (TEMODAR) 250 mg capsule 3/1/2020 at Unknown time  No Yes Sig: Take one capsule daily Monday through Friday only. Take with a 140 mg capsule. trimethoprim-sulfamethoxazole (BACTRIM DS) 160-800 mg per tablet 3/1/2020 at Unknown time  No Yes Sig: Take 1 Tab by mouth BID Mon Wed & Fri. Take while on temodar Facility-Administered Medications: None Past Medical History:  
Diagnosis Date  Asbestosis ([address] 6/22/2019 Last Assessment & Plan:   Was Diagnosed in November . Appears Asymptomatic Currently. Not Short of Breath Breathing Well.  Brain mass 6/21/2019 Last Assessment & Plan:  Is a neurosurgical consultation with Dr. Boris Case  is evaluated the patient. Patient is on IV Decadron every 6 hourly. Patient has evidence of a left-sided temporoparietal mass  I Understanded to the plan is for surgery On Wednesday Will change decadrone to 4 mg po qid  Pt will follow with Dr Boris Case outpatient. Past Surgical History:  
Procedure Laterality Date  HX CRANIOTOMY  06/26/2019  
 left Frontotemporal crani for GBM  
 HX HERNIA REPAIR Left  HX LUMBAR LAMINECTOMY Social History Socioeconomic History  Marital status:  Spouse name: Not on file  Number of children: Not on file  Years of education: Not on file  Highest education level: Not on file Occupational History  Not on file Social Needs  Financial resource strain: Not on file  Food insecurity Worry: Not on file Inability: Not on file  Transportation needs Medical: Not on file Non-medical: Not on file Tobacco Use  Smoking status: Former Smoker  Smokeless tobacco: Never Used Substance and Sexual Activity  Alcohol use: Not Currently  Drug use: Not on file  Sexual activity: Not on file Lifestyle  Physical activity Days per week: Not on file Minutes per session: Not on file  Stress: Not on file Relationships  Social connections Talks on phone: Not on file Gets together: Not on file Attends Muslim service: Not on file Active member of club or organization: Not on file Attends meetings of clubs or organizations: Not on file Relationship status: Not on file  Intimate partner violence Fear of current or ex partner: Not on file Emotionally abused: Not on file Physically abused: Not on file Forced sexual activity: Not on file Other Topics Concern  Not on file Social History Narrative  Not on file Family History Problem Relation Age of Onset  Lung Disease Father Allergies Allergen Reactions  Bee Venom Protein (Honey Bee) Swelling  Compazine [Prochlorperazine] Other (comments) Caused delirium, confusion, uncontrollable shakes  Iodine And Iodide Containing Products Unknown (comments) Contrast dye Current Facility-Administered Medications Medication Dose Route Frequency  epoprostenol (FLOLAN) 30,000 ng/mL in diluent for epoprostenol (gly) 50 mL solution  10-50 ng/kg/min (Ideal) Nebulization TITRATE  
 0.9% sodium chloride for Flolan infusion  1.2-6.6 mL/hr Nebulization TITRATE  vecuronium (NORCURON) 50 mg in 0.9% sodium chloride 100 mL infusion  0-1.2 mcg/kg/min IntraVENous TITRATE  vecuronium (NORCURON) 10 mg in sterile water (preservative free) 10 mL injection  10 mg IntraVENous ONCE  propofol (DIPRIVAN) 10 mg/mL infusion  0-50 mcg/kg/min IntraVENous TITRATE  fentaNYL in normal saline (pf) 25 mcg/mL infusion  0-200 mcg/hr IntraVENous TITRATE  midodrine (PROAMITINE) tablet 5 mg  5 mg Oral TID WITH MEALS  dexamethasone (DECADRON) 4 mg/mL injection 1 mg  1 mg IntraVENous BID  
  azithromycin (ZITHROMAX) tablet 500 mg  500 mg Oral Q24H  
 anidulafungin (ERAXIS) 100 mg in 0.9% sodium chloride 130 mL IVPB  100 mg IntraVENous Q24H  
 atovaquone (MEPRON) 750 mg/5 mL oral suspension 750 mg  750 mg Per G Tube BID WITH MEALS  
 albumin human 25% (BUMINATE) solution 25 g  25 g IntraVENous BID  cefTRIAXone (ROCEPHIN) 2 g in 0.9% sodium chloride (MBP/ADV) 50 mL  2 g IntraVENous Q24H  
 enoxaparin (LOVENOX) injection 40 mg  40 mg SubCUTAneous Q24H  
 levothyroxine (SYNTHROID) tablet 50 mcg  50 mcg Per G Tube ACB  acetaminophen (TYLENOL) tablet 650 mg  650 mg Per NG tube Q4H PRN  
 hydroxychloroquine (PLAQUENIL) 25 mg/ml oral suspension 200 mg  200 mg Oral BID WITH MEALS  lansoprazole (PREVACID) 3 mg/mL oral suspension 30 mg  30 mg Per NG tube ACB  
 0.9% sodium chloride infusion  100 mL/hr IntraVENous CONTINUOUS  
 dexmedeTOMidine (PRECEDEX) 400 mcg in 0.9% sodium chloride 100 mL infusion  0.2-0.7 mcg/kg/hr IntraVENous TITRATE  midazolam (VERSED) injection 2 mg  2 mg IntraVENous Q2H PRN  
 NUTRITIONAL SUPPORT ELECTROLYTE PRN ORDERS   Does Not Apply PRN  
 albuterol-ipratropium (DUO-NEB) 2.5 MG-0.5 MG/3 ML  3 mL Nebulization Q6H  
 NOREPINephrine (LEVOPHED) 4 mg in 5% dextrose 250 mL infusion  0.5-30 mcg/min IntraVENous TITRATE  influenza vaccine 2019-20 (6 mos+)(PF) (FLUARIX/FLULAVAL/FLUZONE QUAD) injection 0.5 mL  0.5 mL IntraMUSCular PRIOR TO DISCHARGE  sodium chloride (NS) flush 5-40 mL  5-40 mL IntraVENous Q8H  
 sodium chloride (NS) flush 5-40 mL  5-40 mL IntraVENous PRN  
 ondansetron (ZOFRAN) injection 4 mg  4 mg IntraVENous Q4H PRN Objective:  
 
Vitals:  
 04/04/20 0515 04/04/20 0530 04/04/20 0545 04/04/20 0600 BP:      
Pulse: 91 90 100 (!) 116 Resp:      
Temp:      
SpO2: 94% 99% 96% 95% Weight:      
Height:      
 
Ventilator Settings Mode FIO2 Rate Tidal Volume Pressure PEEP  
 PRVC  100 %    400 ml(Per Dr. Oscar Mondragon)     14 cm H20(Per Dr Oscar Mondragon) Peak airway pressure: 18 cm H2O Minute ventilation: 10.5 l/min PHYSICAL EXAM  
 
Constitutional:  the patient is well developed and in no acute distress EENMT:  Sclera clear, pupils equal, oral mucosa moist, very large neck on Vent. Respiratory: CTA b/l. No wheezing or crackles Cardiovascular:  RRR without M,G,R 
Gastrointestinal: soft and non-tender; with positive bowel sounds. Musculoskeletal: warm without cyanosis. There is no lower extremity edema. Skin:  no jaundice or rashes, no wounds Neurologic: no gross neuro deficits Psychiatric:  alert and responding to questions on vent. CXR:  ett in place with b/l infiltrates Echo:   Left ventricle: Systolic function was normal. Ejection fraction was 
estimated in the range of 55 % to 60 %. There were no regional wall motion 
abnormalities. There was mild concentric hypertrophy.  
-  Tricuspid valve: There was mild regurgitation.  
-  Aorta, systemic arteries: The root exhibited normal size. CT chest: -- diffuse infiltrates/parynchemal changes. Recent Labs 04/04/20 
0246 04/03/20 
0206 04/02/20 
3498 WBC 4.9 7.8 7.2 HGB 8.9* 10.7* 12.8* HCT 27.2* 32.1* 36.6*  
 295 314 Recent Labs 04/04/20 
0246 04/03/20 
0207 04/02/20 
0990  139 136  
K 3.9 3.7 3.4*  
* 104 100 * 137* 146* CO2 26 28 25 BUN 9 13 11 CREA 0.64* 0.97 1.01  
MG  --  2.3  --   
PHOS  --  2.9  --   
CA 8.7 8.5 8.9 ALB 2.6* 2.1* 2.5* TBILI 0.5 0.4 0.7 ALT 24 37 51 SGOT 16 24 35 Recent Labs 04/04/20 
1580 04/04/20 
2835 04/04/20 
7243 PHI 7.368 7.343* 7.338* PCO2I 47.0* 46.0* 47.7* PO2I 147* 98 112* HCO3I 27.0* 25.0 25.6 No results for input(s): LCAD, LAC in the last 72 hours. Assessment:  (Medical Decision Making) Hospital Problems  Date Reviewed: 3/27/2020 Codes Class Noted POA Septic shock (HCC) ICD-10-CM: A41.9, R65.21 ICD-9-CM: 038.9, 785.52, 995.92  4/3/2020 Unknown Suspected Covid-19 Virus Infection ICD-10-CM: R68.89  4/2/2020 Yes Suspected sleep apnea ICD-10-CM: R29.818 ICD-9-CM: 781.99  4/2/2020 Unknown Morbid obesity ([facility] ICD-10-CM: E66.01 
ICD-9-CM: 278.01  4/2/2020 Unknown * (Principal) Acute hypoxemic respiratory failure ([facility] [address] ICD-10-CM: J96.01 
ICD-9-CM: 518.81  3/31/2020 Unknown Transaminitis ICD-10-CM: R74.0 ICD-9-CM: 790.4  3/31/2020 Yes Hypertension (Chronic) ICD-10-CM: I10 
ICD-9-CM: 401.9  8/12/2019 Yes GBM (glioblastoma multiforme) (HCC) (Chronic) ICD-10-CM: C71.9 ICD-9-CM: 191.9  6/26/2019 Yes Asbestosis ([facility] [address] (Chronic) ICD-10-CM: J58 ICD-9-CM: 580  6/22/2019 Yes Overview Signed 6/25/2019  7:59 AM by Reina Easton [address] [place], [place] [id number] Last Assessment & Plan:  
 Was Diagnosed in November [place]  Appears Asymptomatic Currently. Not Short of Breath Breathing Well. Patient with HTN and asbestosis, Glioblastoma multiforme diagnosed in July '19  S/p craniotomy with resection/XRT on chemo came in with SOB/cough/edema. Needed oxygen and  CT with diffuse infiltrates and evaluating for COVID as well. In ICU on Vent now and pressors Plan:  (Medical Decision Making) --continue vent support now on flolan- hold paralysis PaO2 147 without it, titrate FiO2 to keep sats >92% and PaO2 60mmHg and above --on nebs 
--taper pressors to keep MAP > 65. Continue ProAmatine. Also few doses of albumin 
--CVP 8,  given decreased urine ouptut give 250cc saline boluses q 20 min till CVP persistently at 12-15 
-- On Fentanyl, precedex currently. Add versed prn. Mild sedation holiday today. Currently RASS -2 
--hg lower but not bleeding, f/u and likely dilutional  
--continue abx -- rocephin/azithro/plquenil D3 and anidualfungin. B/C x 2 negative. Told to send sputum samples for cultures/cytology/PJP.  procal is 0.14. WBC is almost 8 with 87% neutrophils. Will get ID help for any additional testing needs. Noted was on Bactrim for PJP prophylaxis --f/u COVID test results 
--may need BRONCH in the future since diagnosis ranges from infection to mets. Hold since possible COVID 
--f/u with oncology on chemo On Bactrim for PJP prophylaxis. Also on dexamthasone 1 mg BID per them. --at risk for ADRIANA and will need outpatient PSG - Split Night study in the future. --continue remaining treatment. Condition is critical 
Time spent 40 minutes More than 50% of the time documented was spent in face-to-face contact with the patient and in the care of the patient on the floor/unit where the patient is located.  
 
  
 
Alyssa Zuluaga MD

## 2020-04-04 NOTE — PROGRESS NOTES
04/04/20 1018 Patient Observations Pulse (Heart Rate) (!) 107 Resp Rate 20  
O2 Sat (%) 92 % Airway - Endotracheal Tube 04/02/20 Oral  
Placement Date/Time: 04/02/20 (c) 1325   Number of Attempts: 1  Location: Oral  Placement Verified: BBS;EtCO2  Airway Types: Endotracheal, cuffed  Airway Tube Size: 8 mm  Technique: Rapid Sequence  Advanced Airway Technique: [place] Scope  Blade Type: M... Insertion Depth (cm) 24 cm Line Juno Lips Side Secured Device Cuff Pressure 30 cmH20 Respiratory Respiratory (WDL) X Patient on Vent Yes - If patient is on vent, add Doc Flowsheet Ventilator (). Chest/Tracheal Assessment Chest expansion, symmetrical  
Breath Sounds Bilateral Diminished Cough Non-productive Vent Settings FIO2 (%) 80 % CMV Rate Set 20 Vt Set (ml) 450 ml PEEP/VENT (cm H2O) 14 cm H20 Insp Time (sec) 1.16 sec Flow Trigger 2 Ventilator Measurements Resp Rate Observed 20 Vt Exhaled (Machine Breath) (ml) 460 ml Ve Observed (l/min) 9.2 l/min PIP Observed (cm H2O) 22 cm H2O Plateau Pressure (cm H2O) 19 cm H2O  
MAP (cm H2O) 17 Auto PEEP Observed (cm H2O) 0 cm H2O Safety & Alarms Pressure Max 50 cm H2O Pressure Min 2 cm H2O Ve Min 2 Ve Max 22 RR Min 5  
RR Max 50 Ambu Bag Yes Ambu Mask Yes Vent Method/Mode Ventilation Method Conventional  
Ventilator Mode PRVC

## 2020-04-04 NOTE — PROGRESS NOTES
Increaed PEEP to 12. Patient was desating on 100% FiO2 and PEEP of 10. SAT is currently between 91-94%

## 2020-04-04 NOTE — PROGRESS NOTES
Now on 100 % fio2,  , pulling large tiday volumes on vent,  Adding flolan and will try paralytics to see if we can improve oxygenation 
cxr  Bilateral infiltrates -worse

## 2020-04-04 NOTE — PROGRESS NOTES
Dr. Stephie Avelar at bedside. To turn off propofol. Use precedex and fentanyl drips for goal RASS of -1/-2.

## 2020-04-04 NOTE — PROGRESS NOTES
Bedside report received from [facility]. Pt not responsive to any stimulus at this time. Afebrile. Sinus Tach on monitor. MAP 65. O2 96%. On Vent- FiO2 80%. Art line zeroed. Site clean, dry, and intact. Dual sign off on Fentanyl and Flolan and dual skin assessment performed. Propfol at 50 mcg/min Fentanyl at 150 mcg/hr Precedex at 0.3 mcg/hr Levophed at 6 mcg/min Flolan at 20 ng/min

## 2020-04-04 NOTE — PROGRESS NOTES
Ventilator check complete; patient has a #8. 0 ET tube secured at the 24 at the lip. Patient is sedated. Patient is not able to follow commands. Breath sounds are clear and diminished. Trachea is midline, Negative for subcutaneous air, and chest excursion is symmetric. Patient is also Negative for cyanosis and is Negative for pitting edema. All alarms are set and audible. Resuscitation bag is at the head of the bed. Ventilator Settings Mode FIO2 Rate Tidal Volume Pressure PEEP I:E Ratio PRVC  70 %    500 ml     8 cm H20  1:2.2 Peak airway pressure: 17 cm H2O Minute ventilation: 10 l/min ABG: No results for input(s): PH, PCO2, PO2, HCO3 in the last 72 hours.  
 
 
Teo Rojas, RT

## 2020-04-04 NOTE — PROGRESS NOTES
Dr. Haile [place] at bedside. Aware of AM ABG results. To hold off on paralytics at this time. Goal PO2 60, O2 sat 92%. Repeat ABG 0900. To give multiple 250 cc NS boluses until CVP sustaining 12-15.

## 2020-04-04 NOTE — PROGRESS NOTES
Nutrition F/U: 
TF Management for [employer] [facility]. The patient's oxygenation worsened and the plan was to paralyze the patient. Staff reports good GI tolerance of TF up to that point and it was being advanced to its goal rate when it was put on hold. The plan to paralyze the patient was abandoned. The staff was encouraged to resume the TF and advance to the goal rate as tolerated. Lazaro Schaffer. [facility] 
[phone]

## 2020-04-05 NOTE — PROGRESS NOTES
ICU Pulmonary Daily Progress NOTE Eluterio Orn 4/5/2020 Mr Lalo Simpson is a 62year old male with a history of HTN and asbestosis as well as Glioblastoma multiforme that was diagnosed in July of last year. He is followed by Dr Efrain Rosales. He has undergone craniotomy with resection/XRT and he is now on Temodar. Last MRI 1/2020 showed no evidence of disease progression with subtle improvement of enhancement along margins of operative cavity and subtle improvement of perilesional mass effect. He is on decadron and prophylactic Bactrim. He had a virtual visit with Dr Efrain Rosales on 3/27. He reported leg edema so was started on lasix 20 mg per day. He denied any fever or known exposures. He and his wife had been careful at home and reported \"self quarantining\".     
  Mr Lalo Simpson presented to the ED on 3/31 with increasing shortness of breath. cough over the past several days and persistent leg edema. He was initially hypoxic on EMS arrival, with sats at 65% and improved to 93% on 4L NC. He also endorsed cough and ongoing leg swelling. While in the ED a CXR was done and showed an unchanged left lung base infiltrate. He was started on Rocephin and Zithromax and blood cultures done (still pending). A CT chest (PE protocol) was done which shows bilateral infiltrates. His proBNP was 966. A TTE was done and showed LVEF of 55-60% with a normal RV and RVSP 35 to 40 mmHg. He has received IV lasix and the fluid balance is now negative 2.2 liters. His oxygen needs have increased however and his is now on 10 liters. ABG -> 7.45/32/54/23. Covid 19 study is pending (ordered today). In room is awake and mildly confused. Reports is ok but not sure how he is breathing. Neck is large but reports has not been tested for ADRIANA. Does note. He is on 15 LPM and was urinating and saturation in 70's to 80's. I placed non-rebreath and then up to 96% and comfortable.  Took off and saturation still in 90's. Not in pain. Date of Admission:  3/31/2020 The patient's chart is reviewed and the patient is discussed with the staff. Subjective:  
 
Since initial consult patient intubated on pressors not paralyzed and on flolan. Today FIO2 up to 100%. . On Fentanyl and Versed. Saturation about 65-99%. Review of Systems: -- limited on Vent. Patient Active Problem List  
Diagnosis Code  Asbestosis ([facility] J61  
 GBM (glioblastoma multiforme) ([facility] C71.9  Hyponatremia E87.1  Postoperative CSF leak G97.82  Fever R50.9  Nausea and vomiting R11.2  Hypertension I10  
 Depression F32.9  Acute hypoxemic respiratory failure (HCC) J96.01  
 Transaminitis R74.0  Suspected Covid-19 Virus Infection R68.89  
 Suspected sleep apnea R29.818  Morbid obesity ([facility]) E66.01  
 Septic shock ([facility]) A41.9, R65.21 Prior to Admission Medications Prescriptions Last Dose Informant Patient Reported? Taking?  
bisoprolol-hydroCHLOROthiazide [facility]) 2.5-6.25 mg per tablet 3/31/2020 at Unknown time  Yes Yes Sig: Take 1 Tab by mouth.  
citalopram (CELEXA) 10 mg tablet Not Taking at Unknown time  Yes No  
Sig: Take 10 mg by mouth. dexAMETHasone (DECADRON) 2 mg tablet 3/31/2020 at Unknown time  No Yes Sig: Take 1 Tab by mouth two (2) times a day. diphenhydrAMINE (BENADRYL) 25 mg capsule 3/31/2020 at Unknown time  Yes Yes Sig: Take 50 mg by mouth nightly. furosemide (LASIX) 20 mg tablet 3/25/2020 at Unknown time  No Yes Sig: Take 1 Tab by mouth daily as needed (swelling). For leg swelling. Only take in the morning. levothyroxine (SYNTHROID) 50 mcg tablet 3/31/2020 at Unknown time  No Yes Sig: Take 1 Tab by mouth Daily (before breakfast). Indications: hypothyroidism  
pantoprazole (PROTONIX) 40 mg tablet Not Taking at Unknown time  No No  
Sig: Take 1 Tab by mouth Daily (before breakfast). prochlorperazine (COMPAZINE) 10 mg tablet Not Taking at Unknown time  Yes No  
Sig: Had reaction to compazine, delirious, shaking, confusion,  
temozolomide (TEMODAR) 140 mg capsule 3/1/2020 at Unknown time  No Yes Sig: Take 1 capsule daily, Monday through Friday only. Take with a 250 mg capsule. temozolomide (TEMODAR) 250 mg capsule 3/1/2020 at Unknown time  No Yes Sig: Take one capsule daily Monday through Friday only. Take with a 140 mg capsule. trimethoprim-sulfamethoxazole (BACTRIM DS) 160-800 mg per tablet 3/1/2020 at Unknown time  No Yes Sig: Take 1 Tab by mouth BID Mon Wed & Fri. Take while on temodar Facility-Administered Medications: None Past Medical History:  
Diagnosis Date  Asbestosis ([facility] [address] 6/22/2019 Last Assessment & Plan:   Was Diagnosed in November . Appears Asymptomatic Currently. Not Short of Breath Breathing Well.  Brain mass 6/21/2019 Last Assessment & Plan:  Is a neurosurgical consultation with Dr. Nancy Lyn  is evaluated the patient. Patient is on IV Decadron every 6 hourly. Patient has evidence of a left-sided temporoparietal mass  I Understanded to the plan is for surgery On Wednesday Will change decadrone to 4 mg po qid  Pt will follow with Dr Nancy Lyn outpatient. Past Surgical History:  
Procedure Laterality Date  HX CRANIOTOMY  06/26/2019  
 left Frontotemporal crani for GBM  
 HX HERNIA REPAIR Left  HX LUMBAR LAMINECTOMY Social History Socioeconomic History  Marital status:  Spouse name: Not on file  Number of children: Not on file  Years of education: Not on file  Highest education level: Not on file Occupational History  Not on file Social Needs  Financial resource strain: Not on file  Food insecurity Worry: Not on file Inability: Not on file  Transportation needs Medical: Not on file Non-medical: Not on file Tobacco Use  Smoking status: Former Smoker  Smokeless tobacco: Never Used Substance and Sexual Activity  Alcohol use: Not Currently  Drug use: Not on file  Sexual activity: Not on file Lifestyle  Physical activity Days per week: Not on file Minutes per session: Not on file  Stress: Not on file Relationships  Social connections Talks on phone: Not on file Gets together: Not on file Attends Evangelical service: Not on file Active member of club or organization: Not on file Attends meetings of clubs or organizations: Not on file Relationship status: Not on file  Intimate partner violence Fear of current or ex partner: Not on file Emotionally abused: Not on file Physically abused: Not on file Forced sexual activity: Not on file Other Topics Concern  Not on file Social History Narrative  Not on file Family History Problem Relation Age of Onset  Lung Disease Father Allergies Allergen Reactions  Bee Venom Protein (Honey Bee) Swelling  Compazine [Prochlorperazine] Other (comments) Caused delirium, confusion, uncontrollable shakes  Iodine And Iodide Containing Products Unknown (comments) Contrast dye Current Facility-Administered Medications Medication Dose Route Frequency  epoprostenol (FLOLAN) 30,000 ng/mL in diluent for epoprostenol (gly) 50 mL solution  10-50 ng/kg/min (Ideal) Nebulization TITRATE  
 0.9% sodium chloride for Flolan infusion  1.2-6.6 mL/hr Nebulization TITRATE  vecuronium (NORCURON) 50 mg in 0.9% sodium chloride 100 mL infusion  0-1.2 mcg/kg/min IntraVENous TITRATE  albuterol-ipratropium (DUO-NEB) 2.5 MG-0.5 MG/3 ML  3 mL Nebulization Q6H PRN  
 fentaNYL in normal saline (pf) 25 mcg/mL infusion  0-200 mcg/hr IntraVENous TITRATE  propofol (DIPRIVAN) 10 mg/mL infusion  0-50 mcg/kg/min IntraVENous TITRATE  hydroxychloroquine (PLAQUENIL) 25 mg/ml oral suspension 200 mg  200 mg Per NG tube BID WITH MEALS  
  midodrine (PROAMITINE) tablet 5 mg  5 mg Per NG tube TID WITH MEALS  dexamethasone (DECADRON) 4 mg/mL injection 1 mg  1 mg IntraVENous BID  
 azithromycin (ZITHROMAX) tablet 500 mg  500 mg Oral Q24H  
 anidulafungin (ERAXIS) 100 mg in 0.9% sodium chloride 130 mL IVPB  100 mg IntraVENous Q24H  
 atovaquone (MEPRON) 750 mg/5 mL oral suspension 750 mg  750 mg Per G Tube BID WITH MEALS  cefTRIAXone (ROCEPHIN) 2 g in 0.9% sodium chloride (MBP/ADV) 50 mL  2 g IntraVENous Q24H  
 enoxaparin (LOVENOX) injection 40 mg  40 mg SubCUTAneous Q24H  
 levothyroxine (SYNTHROID) tablet 50 mcg  50 mcg Per G Tube ACB  acetaminophen (TYLENOL) tablet 650 mg  650 mg Per NG tube Q4H PRN  
 lansoprazole (PREVACID) 3 mg/mL oral suspension 30 mg  30 mg Per NG tube ACB  
 0.9% sodium chloride infusion  100 mL/hr IntraVENous CONTINUOUS  
 dexmedeTOMidine (PRECEDEX) 400 mcg in 0.9% sodium chloride 100 mL infusion  0.2-0.7 mcg/kg/hr IntraVENous TITRATE  midazolam (VERSED) injection 2 mg  2 mg IntraVENous Q2H PRN  
 NUTRITIONAL SUPPORT ELECTROLYTE PRN ORDERS   Does Not Apply PRN  
 NOREPINephrine (LEVOPHED) 4 mg in 5% dextrose 250 mL infusion  0.5-30 mcg/min IntraVENous TITRATE  influenza vaccine 2019-20 (6 mos+)(PF) (FLUARIX/FLULAVAL/FLUZONE QUAD) injection 0.5 mL  0.5 mL IntraMUSCular PRIOR TO DISCHARGE  sodium chloride (NS) flush 5-40 mL  5-40 mL IntraVENous Q8H  
 sodium chloride (NS) flush 5-40 mL  5-40 mL IntraVENous PRN  
 ondansetron (ZOFRAN) injection 4 mg  4 mg IntraVENous Q4H PRN Objective:  
 
Vitals:  
 04/05/20 3360 04/05/20 9164 04/05/20 8653 04/05/20 5513 BP: 122/83 126/86 96/66 135/86 Pulse: 94 (!) 101 87 (!) 102 Resp:      
Temp:      
SpO2: 94% 92% 93% 97% Weight:      
Height:      
 
Ventilator Settings Mode FIO2 Rate Tidal Volume Pressure PEEP Pressure control  70 %(post ABG)    450 ml     14 cm H20 Peak airway pressure: 32 cm H2O Minute ventilation: 11.8 l/min PHYSICAL EXAM  
 
Constitutional:  the patient is well developed and in no acute distress EENMT:  Sclera clear, pupils equal, oral mucosa moist, very large neck on Vent. Respiratory: CTA b/l. No wheezing or crackles Cardiovascular:  RRR without M,G,R 
Gastrointestinal: soft and non-tender; with positive bowel sounds. Musculoskeletal: warm without cyanosis. There is no lower extremity edema. Skin:  no jaundice or rashes, no wounds Neurologic: no gross neuro deficits Psychiatric:  alert and responding to questions on vent. CXR:  ett in place with b/l infiltrates Echo:   Left ventricle: Systolic function was normal. Ejection fraction was 
estimated in the range of 55 % to 60 %. There were no regional wall motion 
abnormalities. There was mild concentric hypertrophy.  
-  Tricuspid valve: There was mild regurgitation.  
-  Aorta, systemic arteries: The root exhibited normal size. CT chest: -- diffuse infiltrates/parynchemal changes. Recent Labs 04/05/20 
1365 04/04/20 
0246 04/03/20 
0206 WBC 5.5 4.9 7.8 HGB 8.9* 8.9* 10.7* HCT 28.0* 27.2* 32.1*  
 219 295 Recent Labs 04/05/20 
2951 04/04/20 
0246 04/03/20 
2097  141 139  
K 3.9 3.9 3.7 * 108* 104 * 104* 137* CO2 28 26 28 BUN 9 9 13 CREA 0.56* 0.64* 0.97  
MG  --   --  2.3 PHOS  --   --  2.9 CA 9.0 8.7 8.5 ALB 2.8*  2.8* 2.6* 2.1* TBILI 0.7  0.6 0.5 0.4 ALT 28  28 24 37 SGOT 30  33 16 24 Recent Labs 04/05/20 
9747 04/04/20 
1261 04/04/20 
4487 PHI 7.318* 7.304* 7.368 PCO2I 51.6* 47.9* 47.0*  
PO2I 112* 70* 147* HCO3I 26.4* 23.8 27.0* No results for input(s): LCAD, LAC in the last 72 hours. Assessment:  (Medical Decision Making) Hospital Problems  Date Reviewed: 3/27/2020 Codes Class Noted POA Septic shock (HCC) ICD-10-CM: A41.9, R65.21 ICD-9-CM: 038.9, 785.52, 995.92  4/3/2020 Unknown Suspected Covid-19 Virus Infection ICD-10-CM: R68.89  4/2/2020 Yes Suspected sleep apnea ICD-10-CM: R29.818 ICD-9-CM: 781.99  4/2/2020 Unknown Morbid obesity ([facility] ICD-10-CM: E66.01 
ICD-9-CM: 278.01  4/2/2020 Unknown * (Principal) Acute hypoxemic respiratory failure ([facility] ICD-10-CM: J96.01 
ICD-9-CM: 518.81  3/31/2020 Unknown Transaminitis ICD-10-CM: R74.0 ICD-9-CM: 790.4  3/31/2020 Yes Hypertension (Chronic) ICD-10-CM: I10 
ICD-9-CM: 401.9  8/12/2019 Yes GBM (glioblastoma multiforme) (HCC) (Chronic) ICD-10-CM: C71.9 ICD-9-CM: 191.9  6/26/2019 Yes Asbestosis ([facility] (Chronic) ICD-10-CM: K50 ICD-9-CM: 311  6/22/2019 Yes Overview Signed 6/25/2019  7:59 AM by Tatyana Vidales, [address] [place] [address] Ave Last Assessment & Plan:  
 Was Diagnosed in November Duc Kang Appears Asymptomatic Currently. Not Short of Breath Breathing Well. Patient with HTN and asbestosis, Glioblastoma multiforme diagnosed in July '19  S/p craniotomy with resection/XRT on chemo came in with SOB/cough/edema. Needed oxygen and  CT with diffuse infiltrates and evaluating for COVID as well. In ICU on Vent now and pressors Plan:  (Medical Decision Making) --continue vent support now on flolan- continue to hold paralysis PaO2 112 without it, titrate FiO2 to keep sats >92% and PaO2 60mmHg and above --on nebs 
--taper pressors to keep MAP > 65. Continue ProAmatine. Also few doses of albumin 
--CVP 15- off pessors-continue maintenance saline,  
-- On Fentanyl, precedex currently. Add versed prn. Mild sedation holiday today. Currently RASS -2 
--hb stable lower-not bleeding, f/u   
--continue abx -- rocephin/azithro/plquenil D4 and anidualfungin. B/C x 2 NGTD. Told to send sputum samples for cultures/cytology/PJP. procal is 0.14. WBC is almost 8 with 87% neutrophils. appreciate ID input. Noted was on Bactrim for PJP prophylaxis- may need atovaquone if positive 
--f/u COVID test results --may need BRONCH in the future since diagnosis ranges from infection to mets. Hold since possible COVID 
--f/u with oncology on chemo On Bactrim for PJP prophylaxis. Also on dexamthasone 1 mg BID per them. --at risk for ADRIANA and will need outpatient PSG - Split Night study in the future. --continue remaining treatment. Condition is critical 
Time spent 40 minutes More than 50% of the time documented was spent in face-to-face contact with the patient and in the care of the patient on the floor/unit where the patient is located.  
 
  
 
Louis Bustamante MD

## 2020-04-05 NOTE — PROGRESS NOTES
Problem: Falls - Risk of 
Goal: *Absence of Falls Description: Document Clydene Bone Fall Risk and appropriate interventions in the flowsheet. Outcome: Progressing Towards Goal 
Note: Fall Risk Interventions: 
Mobility Interventions: Bed/chair exit alarm, Patient to call before getting OOB Mentation Interventions: Bed/chair exit alarm, Eyeglasses and hearing aids, Familiar objects from home, Increase mobility, More frequent rounding, Room close to nurse's station Medication Interventions: Bed/chair exit alarm, Patient to call before getting OOB Elimination Interventions: Bed/chair exit alarm, Call light in reach, Patient to call for help with toileting needs History of Falls Interventions: Bed/chair exit alarm, Room close to nurse's station, Investigate reason for fall Problem: Patient Education: Go to Patient Education Activity Goal: Patient/Family Education Outcome: Progressing Towards Goal 
  
Problem: Pressure Injury - Risk of 
Goal: *Prevention of pressure injury Description: Document Avery Scale and appropriate interventions in the flowsheet. Outcome: Progressing Towards Goal 
Note: Pressure Injury Interventions: 
Sensory Interventions: Assess changes in LOC Moisture Interventions: Absorbent underpads, Apply protective barrier, creams and emollients Activity Interventions: Increase time out of bed, Pressure redistribution bed/mattress(bed type), PT/OT evaluation Mobility Interventions: Float heels, Pressure redistribution bed/mattress (bed type), HOB 30 degrees or less Nutrition Interventions: Document food/fluid/supplement intake, Discuss nutritional consult with provider, Offer support with meals,snacks and hydration Friction and Shear Interventions: Apply protective barrier, creams and emollients, HOB 30 degrees or less, Foam dressings/transparent film/skin sealants, Lift sheet Problem: Patient Education: Go to Patient Education Activity Goal: Patient/Family Education Outcome: Progressing Towards Goal

## 2020-04-05 NOTE — PROGRESS NOTES
Patient becoming extremely agitated and nodding to the fact that he is having trouble breathing. O2 saturation dropping to low 80s. O2 breath given and ETT suctioned with scant clear to pink tinged sputum. Pt unable to maintain O2 saturation above 90% on new setting of 65% FiO2. Sara Jean RT notified and to adjust vent settings.

## 2020-04-05 NOTE — PROGRESS NOTES
Ventilator check complete; patient has a #8. 0 ET tube secured at the 23 at the teeth. Patient is sedated. Patient is not able to follow commands. Breath sounds are coarse. Trachea is midline, Negative for subcutaneous air, and chest excursion is symmetric. Patient is also Negative for cyanosis and is Negative for pitting edema. All alarms are set and audible. Resuscitation bag is at the head of the bed.   
  
Ventilator Settings Mode FIO2 Rate Tidal Volume Pressure Control PEEP I:E Ratio PC 60%   20   12  14 cm H20      
  
Peak airway pressure: 32 cm H2O Minute ventilation: 8.7 l/min

## 2020-04-05 NOTE — PROGRESS NOTES
Ventilator check complete; patient has a #8. 0 ET tube secured at the 24 at the lip. Patient is sedated. Patient is not able to follow commands. Breath sounds are coarse and rhonchi. Trachea is midline, Negative for subcutaneous air, and chest excursion is symmetric. Patient is also Negative for cyanosis and is Positive for pitting edema. All alarms are set and audible. Resuscitation bag is at the head of the bed. Ventilator Settings Mode FIO2 Rate Tidal Volume Pressure PEEP I:E Ratio Pressure control  100 %(aggitated and desating )    450 ml     14 cm H20  1:2.0 Peak airway pressure: 34 cm H2O Minute ventilation: 19.7 l/min ABG: No results for input(s): PH, PCO2, PO2, HCO3 in the last 72 hours.  
 
 
Irasema Chowdary, RT

## 2020-04-06 NOTE — PROGRESS NOTES
Bedside shift change report given to Redd Villalobos RN and Dewey RN (oncoming nurse) by Araceli Garcia RN (offgoing nurse). Report included the following information SBAR, Kardex, ED Summary, Intake/Output, MAR, Recent Results, Med Rec Status, Cardiac Rhythm NSR and Alarm Parameters .

## 2020-04-06 NOTE — PROGRESS NOTES
Dorota Soto Admission Date: 3/31/2020 Daily Progress Note: 4/6/2020 The patient's chart is reviewed and the patient is discussed with the staff. Mr Linda Stanford is a 62year old male with a history of HTN and asbestosis as well as Glioblastoma multiforme that was diagnosed in July of last year. He is followed by Dr Jose Pereira. He has undergone craniotomy with resection/XRT and he is now on Temodar. Last MRI 1/2020 showed no evidence of disease progression with subtle improvement of enhancement along margins of operative cavity and subtle improvement of perilesional mass effect. He is on decadron and prophylactic Bactrim. He had a virtual visit with Dr Jose Pereira on 3/27. He reported leg edema so was started on lasix 20 mg per day. He denied any fever or known exposures. He and his wife had been careful at home and reported \"self quarantining\".     
  Mr Linda Stanford presented to the ED on 3/31 with increasing shortness of breath. cough over the past several days and persistent leg edema. He was initially hypoxic on EMS arrival, with sats at 65% and improved to 93% on 4L NC. He also endorsed cough and ongoing leg swelling. While in the ED a CXR was done and showed an unchanged left lung base infiltrate. He was started on Rocephin and Zithromax and blood cultures done (still pending). A CT chest (PE protocol) was done which shows bilateral infiltrates. His proBNP was 966. A TTE was done and showed LVEF of 55-60% with a normal RV and RVSP 35 to 40 mmHg. He has received IV lasix and the fluid balance is now negative 2.2 liters. His oxygen needs have increased however and his is now on 10 liters. ABG -> 7.45/32/54/23. Covid 19 study is pending (ordered today).  
  
In room is awake and mildly confused. Reports is ok but not sure how he is breathing. Neck is large but reports has not been tested for ADRIANA.  Does note. He is on 15 LPM and was urinating and saturation in 70's to 80's. I placed non-rebreath and then up to 96% and comfortable. Took off and saturation still in 90's. Not in pain. 
  
Date of Admission:  3/31/2020 Subjective:  
 
Patient remains intubated. Not currently on pressors. Sedated with fentanyl and precedex. On FiO2 100% and inhaled flolan. PEEP 16.  
 
Current Facility-Administered Medications Medication Dose Route Frequency  polyethylene glycol (MIRALAX) packet 17 g  17 g Oral BID  epoprostenol (FLOLAN) 30,000 ng/mL in diluent for epoprostenol (gly) 50 mL solution  10-50 ng/kg/min (Ideal) Nebulization TITRATE  
 0.9% sodium chloride for Flolan infusion  1.2-6.6 mL/hr Nebulization TITRATE  vecuronium (NORCURON) 50 mg in 0.9% sodium chloride 100 mL infusion  0-1.2 mcg/kg/min IntraVENous TITRATE  albuterol-ipratropium (DUO-NEB) 2.5 MG-0.5 MG/3 ML  3 mL Nebulization Q6H PRN  
 fentaNYL in normal saline (pf) 25 mcg/mL infusion  0-200 mcg/hr IntraVENous TITRATE  propofol (DIPRIVAN) 10 mg/mL infusion  0-50 mcg/kg/min IntraVENous TITRATE  hydroxychloroquine (PLAQUENIL) 25 mg/ml oral suspension 200 mg  200 mg Per NG tube BID WITH MEALS  midodrine (PROAMITINE) tablet 5 mg  5 mg Per NG tube TID WITH MEALS  dexamethasone (DECADRON) 4 mg/mL injection 1 mg  1 mg IntraVENous BID  
 anidulafungin (ERAXIS) 100 mg in 0.9% sodium chloride 130 mL IVPB  100 mg IntraVENous Q24H  
 atovaquone (MEPRON) 750 mg/5 mL oral suspension 750 mg  750 mg Per G Tube BID WITH MEALS  enoxaparin (LOVENOX) injection 40 mg  40 mg SubCUTAneous Q24H  
 levothyroxine (SYNTHROID) tablet 50 mcg  50 mcg Per G Tube ACB  acetaminophen (TYLENOL) tablet 650 mg  650 mg Per NG tube Q4H PRN  
 lansoprazole (PREVACID) 3 mg/mL oral suspension 30 mg  30 mg Per NG tube ACB  
 0.9% sodium chloride infusion  100 mL/hr IntraVENous CONTINUOUS  
 dexmedeTOMidine (PRECEDEX) 400 mcg in 0.9% sodium chloride 100 mL infusion  0.2-0.7 mcg/kg/hr IntraVENous TITRATE  midazolam (VERSED) injection 2 mg  2 mg IntraVENous Q2H PRN  
 NUTRITIONAL SUPPORT ELECTROLYTE PRN ORDERS   Does Not Apply PRN  
 NOREPINephrine (LEVOPHED) 4 mg in 5% dextrose 250 mL infusion  0.5-30 mcg/min IntraVENous TITRATE  influenza vaccine 2019-20 (6 mos+)(PF) (FLUARIX/FLULAVAL/FLUZONE QUAD) injection 0.5 mL  0.5 mL IntraMUSCular PRIOR TO DISCHARGE  sodium chloride (NS) flush 5-40 mL  5-40 mL IntraVENous Q8H  
 sodium chloride (NS) flush 5-40 mL  5-40 mL IntraVENous PRN  
 ondansetron (ZOFRAN) injection 4 mg  4 mg IntraVENous Q4H PRN Review of Systems Unobtainable due to patient status. Objective:  
 
Vitals:  
 04/06/20 1329 04/06/20 1355 04/06/20 1358 04/06/20 1429 BP: 129/82  104/66 94/69 Pulse: (!) 110 95 95 89 Resp:      
Temp:      
SpO2: (!) 89% 90% 92% 94% Weight:      
Height:      
 
Intake and Output:  
04/04 1901 - 04/06 0700 In: 8557.9 [I.V.:6699.9] Out: 3070 [FZFYK:1806] 04/06 0701 - 04/06 1900 In: -  
Out: 888 [QWWMK:578] Physical Exam:  
Constitution:  the patient is well developed and in no acute distress EENMT:  Sclera clear, pupils equal, oral mucosa moist 
Respiratory: Intubated. B inspiratory crackles. Cardiovascular:  RRR without M,G,R 
Gastrointestinal: soft and non-tender; with positive bowel sounds. Musculoskeletal: warm without cyanosis. There is no lower leg edema. Skin:  no jaundice or rashes, no wounds Neurologic: sedated Psychiatric:  sedated CHEST XRAY:  
4/6/20 1. ET tube tip is in satisfactory position. Stable bilateral airspace disease. 
  
2. No significant change compared to this exam. 
 
 
LAB No lab exists for component: [place] Recent Labs 04/06/20 
0400 04/05/20 
0350 04/04/20 
0246 WBC 5.8 5.5 4.9 HGB 8.4* 8.9* 8.9* HCT 25.8* 28.0* 27.2*  
 212 219 Recent Labs 04/06/20 
0400 04/05/20 
0350 04/04/20 
0246  145 141  
K 3.8 3.9 3.9 * 112* 108* CO2 29 28 26 * 126* 104* BUN 10 9 9 CREA 0.53* 0.56* 0.64* CA 8.6 9.0 8.7 ALB 2.2* 2.8*  2.8* 2.6* SGOT 34 30  33 16 ABG:   
Lab Results Component Value Date/Time PHI 7.371 04/06/2020 04:17 AM  
 PCO2I 46.8 (H) 04/06/2020 04:17 AM  
 PO2I 103 (H) 04/06/2020 04:17 AM  
 HCO3I 27.1 (H) 04/06/2020 04:17 AM  
 FIO2I 80 04/06/2020 04:17 AM  
 
 
Assessment:  (Medical Decision Making) Hospital Problems  Date Reviewed: 3/27/2020 Codes Class Noted POA Septic shock (HCC) ICD-10-CM: A41.9, R65.21 ICD-9-CM: 038.9, 785.52, 995.92  4/3/2020 Unknown Suspected Covid-19 Virus Infection ICD-10-CM: R68.89  4/2/2020 Yes Suspected sleep apnea ICD-10-CM: R29.818 ICD-9-CM: 781.99  4/2/2020 Unknown Morbid obesity ([address] ICD-10-CM: E66.01 
ICD-9-CM: 278.01  4/2/2020 Unknown * (Principal) Acute hypoxemic respiratory failure ([address] ICD-10-CM: J96.01 
ICD-9-CM: 518.81  3/31/2020 Unknown Transaminitis ICD-10-CM: R74.0 ICD-9-CM: 790.4  3/31/2020 Yes Hypertension (Chronic) ICD-10-CM: I10 
ICD-9-CM: 401.9  8/12/2019 Yes GBM (glioblastoma multiforme) (HCC) (Chronic) ICD-10-CM: C71.9 ICD-9-CM: 191.9  6/26/2019 Yes Asbestosis ([address] (Chronic) ICD-10-CM: H99 ICD-9-CM: 737  6/22/2019 Yes Overview Signed 6/25/2019  7:59 AM by Raine Blackburn [address] [place] [address] Ave Last Assessment & Plan:  
 Was Diagnosed in November [place] Appears Asymptomatic Currently. Not Short of Breath Breathing Well. Patient with severe hypoxic respiratory failure, covid vs other viral pneumonias or PJP on the ddx. FiO2 still at 100% despite PEEP 16 and flolan, but flolan only at 10 and has not been attempted to be titrated higher than that. Plan:  (Medical Decision Making)  
-increase flolan, if get to 50 and still needing > 80% FiO2 will then start paralytics. -cont high peep and low Vt vent strategy. -hydroxychloroquine and azithro (completed) for covid coverage. F/u testing.  
-being covered for possible PJP with atovaquone and dexa 
-prevacid.  
-tolerating tube feedings. Critically ill patient with complicated medical issues and high risk of further decompensation. Total critical care time spent thus far (exclusive of procedures): 35 minutes Roderick Cantrell MD

## 2020-04-06 NOTE — PROGRESS NOTES
Infectious Disease Progress Note Today's Date: 2020 Admit Date: 3/31/2020 Impression: · Acute respiratory failure with rapid deterioration. Patient on chemo and steroids for treatment glioblastoma. Has been on Bactrim for PCP prophylaxis. PJP PCR pending. Galactomannan negative. Covid 19 test still pending; sent 20. Remains intubated: Now on Flolan and high Fi02 but down slightly from high 100% Fi02 over weekend. Pressors off this AM. Still no fever but on steroids. No transaminitis. Normal WBC/no neutropenia or thrombocytopenia. Plan:  
· Continue Plaquenil and Azithromycin; started 20:course completes 20.  on 3/31. · Continue Atovaquone for PCP treatment/prophylaxis pending PCR result. If negative can return to Bactrim or change to every day dosing Atovaquone, 1500 mg daily. · Continue Eraxis for now: will not interact with Plaquenil or Azithromycin. · Has finished 5 days of CTX today and will finish Azithro tomorrow. No past of MRSA or MDR GNR. MRSA/MSSA swab nares negative on 4/3. Overall suspicion for th is being bacterial process are low; suspect this is COVID 19.  
 
· Anti-infectives:  
· Azithromycin ( - 
· Ceftriaxone ( -) · Plaquenil ( - 
· Bactrim 3x week · Atovaquone - 
· Eraxis - Subjective:  
Now on Flolan; Levophed being weaned and now off; dicordance between A line and BP cuff. Remains awake and seems alert but somewhat less clear. [address] Objective:  
 
Visit Vitals BP (!) 85/62 Pulse 92 Temp 98.4 °F (36.9 °C) Resp 27 Ht 5' 8\" (1.727 m) Wt 101 kg (222 lb 10.6 oz) SpO2 91% BMI 33.86 kg/m² Temp (24hrs), Av.2 °F (36.8 °C), Min:97.6 °F (36.4 °C), Max:98.6 °F (37 °C) Lines:  R Arterial Line:  R IJ, parks, OG, ET Physical Exam: Exam:   
 General: NC/AT, awake, somewhat less interactive/being sedated, looks stated age HEENT: PERRL, non-icteric sclera, no lip lesions Neck: supple, symmetric, no masses or lymphadenopathy Cardiac:Nl S1/S2, no murmurs/rubs/gallops/clicks, 9-1+ LE edema Pulmonary:slightly diminished, no rales/wheezes, better air movement than Friday Abdomen: soft, NT, non-distended, BS quiet Gentital:external genitalia normal [place] : yes Skin:no rashes, ecchymosis stable. MSK:no enlarged or swollen joints in limbs or sternoclavicular area Extremities: no cords/clots/cyanosis, pulses palpable and symmetric Psychiatric: mood and affect appropriate to situation Physical exam is performed in entirety daily by myself and reviewed when copied from colleague. Changes are made as reflected on daily exam. If no changes are made this reflects that exam is performed and unchanged. Data Review: CBC:  
Recent Labs 04/06/20 0400 04/05/20 0350 04/04/20 
0246 WBC 5.8 5.5 4.9  
RBC 2.52* 2.76* 2.72* HGB 8.4* 8.9* 8.9* HCT 25.8* 28.0* 27.2*  
 212 219 GRANS  --  88* 90* LYMPH  --  7* 6*  
EOS  --  0* 0*  
 
CMP:  
Recent Labs 04/06/20 0400 04/05/20 0350 04/04/20 
0246 * 126* 104*  145 141  
K 3.8 3.9 3.9 * 112* 108* CO2 29 28 26 BUN 10 9 9 CREA 0.53* 0.56* 0.64* CA 8.6 9.0 8.7 AGAP 5* 5* 7  
AP 72 67  70 61  
TP 5.8* 6.4  6.4 5.9* ALB 2.2* 2.8*  2.8* 2.6*  
GLOB 3.6* 3.6*  3.6* 3.3 AGRAT 0.6* 0.8*  0.8* 0.8* Liver Enzymes:  
Recent Labs 04/06/20 0400 TP 5.8* ALB 2.2* AP 72 SGOT 34 ABGs: No results for input(s): PH, PCO2, PO2, HCO3 in the last 72 hours. Inflammation studies: No results found for: SR, ESRA, CRP Microbiology:  
 
All Micro Results Procedure Component Value Units Date/Time CULTURE, BLOOD [[id number]] Collected:  04/02/20 4587 Order Status:  Completed Specimen:  Blood Updated:  04/06/20 6684 Special Requests: --     
  LEFT Antecubital 
  
  Culture result: NO GROWTH 4 DAYS     
 CULTURE, BLOOD [[id number]] Collected:  04/02/20 5249 Order Status:  Completed Specimen:  Blood Updated:  04/06/20 2149 Special Requests: --     
  RIGHT 
HAND Culture result: NO GROWTH 4 DAYS     
 CULTURE, RESPIRATORY/SPUTUM/BRONCH Cele Bull STAIN [[id number]] Collected:  04/03/20 0206 Order Status:  Completed Specimen:  Sputum from Endotracheal aspirate Updated:  04/06/20 8756 Special Requests: NO SPECIAL REQUESTS     
  GRAM STAIN 10 TO 30 WBCS/OIF  
   0 TO 1 EPITHELIAL CELLS/OIF  
   FEW YEAST 3+ MUCUS PRESENT Culture result:    
  LIGHT YEAST IDENTIFICATION TO FOLLOW SCANT NORMAL RESPIRATORY CLARA  
     
 CULTURE, URINE [[id number]] Collected:  04/03/20 0221 Order Status:  Completed Specimen:  Urine from [place] Specimen Updated:  04/05/20 3694 Special Requests: NO SPECIAL REQUESTS Culture result: NO GROWTH 2 DAYS     
 MSSA/MRSA SC BY PCR, NASAL SWAB [[id number]] Collected:  04/03/20 1345 Order Status:  Completed Specimen:  Nasal swab Updated:  04/04/20 1746 Special Requests: NO SPECIAL REQUESTS Culture result:    
  SA target not detected. A MRSA NEGATIVE, SA NEGATIVE test result does not preclude MRSA or SA nasal colonization. P.JIROVECI BY PCR [[id number]] Collected:  04/03/20 0206 Order Status:  Completed Updated:  04/03/20 1614 RESPIRATORY VIRAL PANEL, PCR [[id number]] Collected:  04/02/20 1025 Order Status:  Canceled Specimen:  Sputum EMERGENT DISEASE PANEL [[id number]] Collected:  04/02/20 1662 Order Status:  Completed Updated:  04/02/20 1018 RESPIRATORY VIRAL PANEL, PCR [[id number]] Collected:  03/31/20 2154 Order Status:  Canceled RESPIRATORY VIRAL PANEL, PCR [[id number]] Collected:  03/31/20 2147 Order Status:  Canceled Specimen:  Sputum CULTURE, BLOOD [[id number]] Order Status:  Canceled Specimen:  Blood CULTURE, BLOOD [[id number]] Order Status:  Canceled Specimen:  Blood Imaging: Signed By: dEna Zaldivar MD   
 April 6, 2020

## 2020-04-06 NOTE — PROGRESS NOTES
Ventilator check complete; patient has a #8. 0 ET tube secured at the 24 at the lip. Patient is not able to follow commands. Breath sounds are coarse. Trachea is midline, Negative for subcutaneous air, and chest excursion is symmetric. Patient is also Negative for cyanosis and is Negative for pitting edema. All alarms are set and audible. Resuscitation bag is at the head of the bed. Ventilator Settings Mode FIO2 Rate Tidal Volume Pressure PEEP I:E Ratio Pressure control  80 %   20    12 14 cm H20  1:2   
 
Peak airway pressure: 31 cm H2O Minute ventilation: 13 l/min ABG: No results for input(s): PH, PCO2, PO2, HCO3 in the last 72 hours.  
 
 
Holly Shirley, RT

## 2020-04-06 NOTE — PROGRESS NOTES
Nutrition follow-up: 
Consult for tube feeding management (Pulmonary) 
  
Assessment: 
Food/Nutrition Patient History: Patient admitted with five day history of shortness of breath and swelling in legs. He has a PMH of HTN, asbestosis, Glioblastoma. ANTONINA was ordered and 55-60% tricuspid regurgitation. Patient was started on Lasix. CT chest showed bilateral edema/infiltrates, transferred to 8th floor for isolation. Transferred to ICU (cath lab) 4/2 for increased O2 needs and high risk for vent. Patient has now been intubated. Due to worsening oxygenation plan was to paralyze, but was aborted. Patient started back on pressors today. Called and spoke with RN. Patient continues to tolerate TF. She states no  BM that she is aware of. Abdomen: hypoactive bowel sounds, unknown date of last BM No edema noted. Diet: No diet orders on file   
Pertinent Labs: WNL and stable except slightly elevated Glucose at 150 mg/dL (tube feeding is low CHO - likely related to infection and steroids) Pertinent Medications: Levophed started back this am,  ml/hr, Decadron Enteral nutrition access: NGT Anthropometrics:Height: 5' 8\" (172.7 cm),  Weight: 95.3 kg (210 lb), Weight Source: Other (comment), Body mass index is 31.93 kg/m². BMI class of obese. Revised Macronutrient UXCJI: (89.6 KI Listed body weight) Estimated energy requirements:  2733-4003 kcal /day (15-20 kcal/kg) Estimated protein requirements:  114-191 grams protein/day (1.2-2 grams/kg) CHO limit per day: 262 grams CHO/day (55% calories) Estimated fluid/day: 1.4-1.9 liters/day (~1ml/kcal/day) Intake/Comparative Standards: Patient is NPO which does not meet EEN or EPN 
  
Intervention: 
Meals and snacks: NPO Enteral Nutrition: Advance feedings of Vital AF at goal rate of 55 ml/hr. Water flush 25/hour.  Goal feeding to provide 1584 kcal (100% estimated needs), 99 g pro (71% estimated protein needs), 145 g CHO (does not exceed max CHO), 1670 ml free water (100% estimated fluid needs). Add Prosource TF x 2 per day. Give consecutively to reduce exposure. 30 ml water flush, 2 packets Prosource, 30 ml water flush. This will increase daily protein intake to 121 g/d (100% estimated protein needs) and kcal intake to 1664 kcal (100% estimated energy needs). IVF: TF + Free water flush + IVF  = 100 ml/hr Vitamin and Mineral Supplement Therapy: 
Nutrition support orders for electrolyte management replacement are activated and placed on the MAR.  
Coordination of Nutrition Care: Discussed with Dewey RN. [employer] message to Dr. Emily Becker about bowel regimen - Miralax added today. Discharge Nutrition Plan: Too soon to determine. This assessment was completed remotely from within the hospital. 
 
[address], [address], [address] [facility] [place]

## 2020-04-07 NOTE — PROGRESS NOTES
Pt has had several episodes of elevated HR and BP while on paralytic. Dr. Kevin Elmore made aware. Versed drip ordered, will wean Precedex as pt tolerates.

## 2020-04-07 NOTE — PROGRESS NOTES
[facility] Admission Date: 3/31/2020 Daily Progress Note: 4/7/2020 The patient's chart is reviewed and the patient is discussed with the staff. Mr Michael Becerra is a 62year old male with a history of HTN and asbestosis as well as Glioblastoma multiforme that was diagnosed in July of last year. He is followed by Dr Dimitri Garay. He has undergone craniotomy with resection/XRT and he is now on Temodar. Last MRI 1/2020 showed no evidence of disease progression with subtle improvement of enhancement along margins of operative cavity and subtle improvement of perilesional mass effect. He is on decadron and prophylactic Bactrim. He had a virtual visit with Dr Dimitri Garay on 3/27. He reported leg edema so was started on lasix 20 mg per day. He denied any fever or known exposures. He and his wife had been careful at home and reported \"self quarantining\".     
  Mr Michael Becerra presented to the ED on 3/31 with increasing shortness of breath. cough over the past several days and persistent leg edema. He was initially hypoxic on EMS arrival, with sats at 65% and improved to 93% on 4L NC. He also endorsed cough and ongoing leg swelling. While in the ED a CXR was done and showed an unchanged left lung base infiltrate. He was started on Rocephin and Zithromax and blood cultures done (still pending). A CT chest (PE protocol) was done which shows bilateral infiltrates. His proBNP was 966. A TTE was done and showed LVEF of 55-60% with a normal RV and RVSP 35 to 40 mmHg. He has received IV lasix and the fluid balance is now negative 2.2 liters. His oxygen needs have increased however and his is now on 10 liters. ABG -> 7.45/32/54/23. Covid 19 study is pending (ordered today).  
  
In room is awake and mildly confused. Reports is ok but not sure how he is breathing. Neck is large but reports has not been tested for ADRIANA.  Does note. He is on 15 LPM and was urinating and saturation in 70's to 80's. I placed non-rebreath and then up to 96% and comfortable. Took off and saturation still in 90's. Not in pain. 
  
Date of Admission:  3/31/2020 Subjective:  
 
Patient remains intubated. Back on pressors, currently levo at 5mcg. BP has been labile. Sedated with fentanyl and precedex. On FiO2 75% and inhaled flolan. PEEP 16. One COVID test back as negative but results in question based on the lab that ran it. Paralytics started last night. Current Facility-Administered Medications Medication Dose Route Frequency  trimethoprim-sulfamethoxazole (BACTRIM) 699.36 mg in dextrose 5% 500 mL  20 mg/kg/day IntraVENous Q8H  
 polyethylene glycol (MIRALAX) packet 17 g  17 g Oral BID  dexmedeTOMidine (PRECEDEX) 400 mcg in 0.9% sodium chloride 100 mL infusion  0.2-0.7 mcg/kg/hr IntraVENous TITRATE  propofol (DIPRIVAN) 10 mg/mL infusion  0-50 mcg/kg/min IntraVENous TITRATE  fentaNYL in normal saline (pf) 25 mcg/mL infusion  0-200 mcg/hr IntraVENous TITRATE  epoprostenol (FLOLAN) 30,000 ng/mL in diluent for epoprostenol (gly) 50 mL solution  10-50 ng/kg/min (Ideal) Nebulization TITRATE  
 0.9% sodium chloride for Flolan infusion  1.2-6.6 mL/hr Nebulization TITRATE  vecuronium (NORCURON) 50 mg in 0.9% sodium chloride 100 mL infusion  0-1.2 mcg/kg/min IntraVENous TITRATE  albuterol-ipratropium (DUO-NEB) 2.5 MG-0.5 MG/3 ML  3 mL Nebulization Q6H PRN  
 midodrine (PROAMITINE) tablet 5 mg  5 mg Per NG tube TID WITH MEALS  dexamethasone (DECADRON) 4 mg/mL injection 1 mg  1 mg IntraVENous BID  
 anidulafungin (ERAXIS) 100 mg in 0.9% sodium chloride 130 mL IVPB  100 mg IntraVENous Q24H  
 enoxaparin (LOVENOX) injection 40 mg  40 mg SubCUTAneous Q24H  
 levothyroxine (SYNTHROID) tablet 50 mcg  50 mcg Per G Tube ACB  acetaminophen (TYLENOL) tablet 650 mg  650 mg Per NG tube Q4H PRN  
  lansoprazole (PREVACID) 3 mg/mL oral suspension 30 mg  30 mg Per NG tube ACB  
 0.9% sodium chloride infusion  100 mL/hr IntraVENous CONTINUOUS  
 midazolam (VERSED) injection 2 mg  2 mg IntraVENous Q2H PRN  
 NUTRITIONAL SUPPORT ELECTROLYTE PRN ORDERS   Does Not Apply PRN  
 NOREPINephrine (LEVOPHED) 4 mg in 5% dextrose 250 mL infusion  0.5-30 mcg/min IntraVENous TITRATE  influenza vaccine 2019-20 (6 mos+)(PF) (FLUARIX/FLULAVAL/FLUZONE QUAD) injection 0.5 mL  0.5 mL IntraMUSCular PRIOR TO DISCHARGE  sodium chloride (NS) flush 5-40 mL  5-40 mL IntraVENous Q8H  
 sodium chloride (NS) flush 5-40 mL  5-40 mL IntraVENous PRN  
 ondansetron (ZOFRAN) injection 4 mg  4 mg IntraVENous Q4H PRN Review of Systems Unobtainable due to patient status. Objective:  
 
Vitals:  
 04/07/20 0684 04/07/20 8449 04/07/20 5193 04/07/20 7823 BP: 93/67 106/75 115/81 (!) 154/102 Pulse: 91 86 84 (!) 101 Resp:      
Temp:      
SpO2: 94% 94% 95% 97% Weight:      
Height:      
 
Intake and Output:  
04/05 1901 - 04/07 0700 In: 8490.6 [I.V.:5931.6] Out: 2375 [Urine:2375] 04/07 0701 - 04/07 1900 In: -  
Out: 50 [Urine:50] Physical Exam:  
Constitution:  the patient is well developed and in no acute distress EENMT:  Sclera clear, pupils equal, oral mucosa moist 
Respiratory: Intubated. B inspiratory crackles. Cardiovascular:  RRR without M,G,R 
Gastrointestinal: soft and non-tender; with positive bowel sounds. Musculoskeletal: warm without cyanosis. There is no lower leg edema. Skin:  no jaundice or rashes, no wounds Neurologic: sedated Psychiatric:  sedated CHEST XRAY:  
4/7/20 1. Bilateral airspace disease. ET tube tip is in satisfactory position. 2. No significant change compared to prior exam. 
 
 
LAB No lab exists for component: [place] Recent Labs 04/07/20 
0307 04/06/20 
0400 04/05/20 
0350 WBC 6.7 5.8 5.5 HGB 9.2* 8.4* 8.9* HCT 28.8* 25.8* 28.0*  
 224 212 Recent Labs 04/07/20 
0306 04/06/20 
0400 04/05/20 
0350  145 145  
K 3.7 3.8 3.9 * 111* 112* CO2 27 29 28 * 150* 126* BUN 11 10 9 CREA 0.46* 0.53* 0.56* CA 8.4 8.6 9.0 ALB 2.0* 2.2* 2.8*  2.8* SGOT 63* 34 30  33 ABG:   
Lab Results Component Value Date/Time PHI 7.380 04/07/2020 02:47 AM  
 PCO2I 47.9 (H) 04/07/2020 02:47 AM  
 PO2I 76 04/07/2020 02:47 AM  
 HCO3I 28.4 (H) 04/07/2020 02:47 AM  
 FIO2I 80 04/07/2020 02:47 AM  
 
 
Assessment:  (Medical Decision Making) Hospital Problems  Date Reviewed: 3/27/2020 Codes Class Noted POA Septic shock (HCC) ICD-10-CM: A41.9, R65.21 ICD-9-CM: 038.9, 785.52, 995.92  4/3/2020 Unknown Suspected Covid-19 Virus Infection ICD-10-CM: R68.89  4/2/2020 Yes Suspected sleep apnea ICD-10-CM: R29.818 ICD-9-CM: 781.99  4/2/2020 Unknown Morbid obesity ([facility] ICD-10-CM: E66.01 
ICD-9-CM: 278.01  4/2/2020 Unknown * (Principal) Acute hypoxemic respiratory failure ([facility].) ICD-10-CM: J96.01 
ICD-9-CM: 518.81  3/31/2020 Unknown Transaminitis ICD-10-CM: R74.0 ICD-9-CM: 790.4  3/31/2020 Yes Hypertension (Chronic) ICD-10-CM: I10 
ICD-9-CM: 401.9  8/12/2019 Yes GBM (glioblastoma multiforme) (HCC) (Chronic) ICD-10-CM: C71.9 ICD-9-CM: 191.9  6/26/2019 Yes Asbestosis ([facility] (Chronic) ICD-10-CM: H61 ICD-9-CM: 959  6/22/2019 Yes Overview Signed 6/25/2019  7:59 AM by Scout Nuñez, [address] [place] [address] Vjroseann Last Assessment & Plan:  
 Was Diagnosed in November [facility] Appears Asymptomatic Currently. Not Short of Breath Breathing Well. Patient with severe hypoxic respiratory failure, covid vs other viral pneumonias or PJP on the ddx. FiO2 down from 100 to 75%. PEEP 16 and flolan at  
 
Plan:  (Medical Decision Making)  
-flolan currently at 50ng/kg/min. PEEP 16. Started on paralytics. -cont high peep and low Vt vent strategy. -hydroxychloroquine and azithro (completed) for covid coverage. First test negative but from questionable lab. Repeat test sent today. If second test is negative will likely bronch to send PJP. -ID increasing his PJP coverage.  
-send endotracheal aspirate for respiratory viral PCR. -prevacid.  
-tolerating tube feedings. Critically ill patient with complicated medical issues and high risk of further decompensation. Total critical care time spent thus far (exclusive of procedures): 38 minutes Bart Fulton MD

## 2020-04-07 NOTE — PROGRESS NOTES
Attempted to place pt in prone position. SPO2 decreased during turning process. Pt did not recover O2 saturation for 2 minutes period of observation in prone position. Returned pt to supine, HOB elevated to 45 degrees. SPO2 remains low. Dr. Kevin Elmore at bedside, aware of pt condition. Will continue to monitor.

## 2020-04-07 NOTE — PROGRESS NOTES
Bedside, Verbal and Written shift change report given to Guillermo Harrison (oncoming nurse) by Marilou Lai (offgoing nurse). Report included the following information SBAR, Kardex, Intake/Output, MAR, Recent Results and Cardiac Rhythm Sinus Rhythm to Sinus Tach. Pt currently on 70% FiO2. Fentanyl at 200mcg/hr, Precedex at 0.7. Pt currently resting quietly without noted distress. Vitals currently WNL.

## 2020-04-07 NOTE — PROGRESS NOTES
Nutrition follow-up: 
Consult for tube feeding management (Pulmonary) 
  
Assessment: 
Food/Nutrition Patient History: Patient admitted with five day history of shortness of breath and swelling in legs. He has a PMH of HTN, asbestosis, Glioblastoma. ANTONINA was ordered and 55-60% tricuspid regurgitation. Patient was started on Lasix. CT chest showed bilateral edema/infiltrates, transferred to 8th floor for isolation. Transferred to [facility] lab) 4/2 for increased O2 needs and high risk for vent. Patient remains intubated. Patient started back on pressors 4/6 and increased over last 24 hrs. Patient is now also paralyzed. Called an spoke with RN due to isolation and to preserve PPE. She state that patient continues to tolerate TF with minimal residuals. She confirms Vital at 55 ml/hr and free water at 25 ml/hr. She confirms IVF of NS at 100 ml/hr. Still no BM that she is aware of. She was also unaware of Prosource TF to be given. Abdomen: active bowel sounds, unknown date of last BM  
Edema: 2+ general and BUE, 1+ BLE Diet: No diet orders on file   
Pertinent Labs: WNL and stable except slightly elevated Glucose at 137 mg/dL (tube feeding is low CHO - likely related to infection and steroids) Pertinent Medications: Levophed started back yesterday - now down to 5 mcg, NS 100 ml/hr, Decadron, Diprovan, Miralax BID Enteral nutrition access: NGT Anthropometrics:Height: 5' 8\" (172.7 cm),  Weight: 95.3 kg (210 lb), Weight Source: Other (comment), Body mass index is 31.93 kg/m². BMI class of obese. Revised Macronutrient OIYYL: (70.2 WK Listed body weight) Estimated energy requirements:  0652-3257 kcal /day (15-20 kcal/kg) Estimated protein requirements:  114-191 grams protein/day (1.2-2 grams/kg) CHO limit per day: 262 grams CHO/day (55% calories) Estimated fluid/day: 1.4-1.9 liters/day (~1ml/kcal/day) Intake/Comparative Standards: Patient is NPO which does not meet EEN or EPN 
  
Intervention: Meals and snacks: NPO Enteral Nutrition: Continue feedings of Vital AF at goal rate of 55 ml/hr. Water flush 25/hour. Goal feeding to provide 1584 kcal (100% estimated needs), 99 g pro (71% estimated protein needs), 145 g CHO (does not exceed max CHO), 1670 ml free water (100% estimated fluid needs). Continue Prosource TF x 2 per day. Give consecutively to reduce exposure. 50 ml water flush, 2 packets Prosource, 50 ml water flush. This will increase daily protein intake to 121 g/d (100% estimated protein needs) and kcal intake to 1664 kcal (100% estimated energy needs). Current Diprovan providing ~227 kcal. 
Will update order to specify time for protein modulator. IVF: MD to address IVF Vitamin and Mineral Supplement Therapy: 
Nutrition support orders for electrolyte management replacement are activated and placed on the MAR.  
Coordination of Nutrition Care: Discussed with Hallie Sahu RN. [employer] message to Dr. Cristy Frey about pressors and IVF. Discharge Nutrition Plan: Too soon to determine. 
  
This assessment was completed remotely from within the hospital. 
[address], [address], [address] [facility]

## 2020-04-07 NOTE — ROUTINE PROCESS
Ventilator check complete; patient has a #8. 0 ET tube secured at the 24 at the lip. Breath sounds are coarse. Trachea is midline, Negative for subcutaneous air, and chest excursion is symmetric. Patient is also Negative for cyanosis and is Negative for pitting edema. All alarms are set and audible. Resuscitation bag is at the head of the bed. Ventilator Settings Mode FIO2 Rate Tidal Volume Pressure PEEP I:E Ratio PRVC  75 %(weaned)    410 ml     16 cm H20  1:1.2 Peak airway pressure: 35 cm H2O Minute ventilation: 10 l/min ABG: No results for input(s): PH, PCO2, PO2, HCO3 in the last 72 hours.  
 
 
Cortez Galindo, RT

## 2020-04-07 NOTE — PROGRESS NOTES
Infectious Disease Progress Note Today's Date: 4/7/2020 Admit Date: 3/31/2020 Impression: · Acute respiratory failure with rapid deterioration. He is still requiring escalating support. Patient on chemo and steroids for treatment glioblastoma prior to admission. Has been on Bactrim for PCP prophylaxis. PJP PCR pending. Galactomannan negative. Sputum culture with normal respiratory failure and Candida. Covid 19 from 4/2 negative. Remains intubated: Now on Flolan and high Fi02 as well as paralytics and inhaled epoprostenol. Still no fever but on steroids. Plan:  
· Continue Plaquenil and Azithromycin; started 4/4/20:course completes today.  on 3/31. · Agree with retesting for COVID 19: clinical suspicion is high for this. Have discussed with Critical Care and Site Pharmacy about how we access other treatment options. · Change Atovaquone to IV Bactrim 20 mg/kg/d for PCP treatment. · He is on Decadron dose but not close to 60 mg prednisone per discussion with pharmacy. Increasing dose for 5 days to 4 mg IV q 8 hours then will drop down. Not known diabetic. · Continue Eraxis for now: will not interact with Plaquenil or Azithromycin. · Overall suspicion for bacterial process is low. MRSA/MSSA swab nares negative on 4/3. Overall suspicion for th is being bacterial process are low; suspect this is COVID 19.  
 
· Anti-infectives:  
 
· Bactrim 3x week · Atovaquone 4/2-4/7 · Ceftriaxone (4/2 -4/6) · Azithromycin (4/2 - 
· Plaquenil (4/2 - 
· Eraxis 4/2- 
· IV Bactrim 4/7- Subjective:  
Now on paralytic and Flolan and Epoprostenol inhaled ; remains on pressors. Sedated. Remains afebrile. WBC and PLT normal.  AST and ALT up today. Discussed with Critical Care. Has reached out to see if can get Remdesivir. Does not look promising. Objective:  
 
Visit Vitals BP (!) 163/104 Pulse (!) 120 Temp 98.4 °F (36.9 °C) Resp 24 Ht 5' 8\" (1.727 m) Wt 104.9 kg (231 lb 4.2 oz) SpO2 96% BMI 35.16 kg/m² Temp (24hrs), Av °F (36.7 °C), Min:97.4 °F (36.3 °C), Max:98.5 °F (36.9 °C) Lines:  R Arterial Line:  R IJ, parks, OG, ET Physical Exam: Exam:   
 General: NC/AT, sedated,  looks stated age HEENT: PERRL, non-icteric sclera, no lip lesions Neck: supple, symmetric, no masses or lymphadenopathy Cardiac:Nl S1/S2, no murmurs/rubs/gallops/clicks, 2+ LE edema Pulmonary:slightly diminished, no rales/wheezes,  Fair air movement Abdomen: soft, NT, non-distended, BS quiet Gentital:external genitalia normal Parks : yes Skin:no rashes, ecchymosis seems stable. MSK:no enlarged or swollen joints in limbs or sternoclavicular area Extremities: no cords/clots/cyanosis, pulses palpable and symmetric Psychiatric: mood and affect appropriate to situation Physical exam is performed in entirety daily by myself and reviewed when copied from colleague. Changes are made as reflected on daily exam. If no changes are made this reflects that exam is performed and unchanged. Data Review: CBC:  
Recent Labs 20 
03020 
0400 20 
0350 WBC 6.7 5.8 5.5  
RBC 2.84* 2.52* 2.76* HGB 9.2* 8.4* 8.9* HCT 28.8* 25.8* 28.0*  
 224 212 GRANS 86*  --  88* LYMPH 7*  --  7* EOS 0*  --  0* CMP:  
Recent Labs 20 
0306 20 
0400 20 
0350 * 150* 126*  145 145  
K 3.7 3.8 3.9 * 111* 112* CO2 27 29 28 BUN 11 10 9 CREA 0.46* 0.53* 0.56* CA 8.4 8.6 9.0 AGAP 8 5* 5*  72 67  70  
TP 5.8* 5.8* 6.4  6.4 ALB 2.0* 2.2* 2.8*  2.8*  
GLOB 3.8* 3.6* 3.6*  3.6* AGRAT 0.5* 0.6* 0.8*  0.8* Liver Enzymes:  
Recent Labs 20 
3471 TP 5.8* ALB 2.0*  
 SGOT 63* ABGs: No results for input(s): PH, PCO2, PO2, HCO3 in the last 72 hours. Inflammation studies: No results found for: SR, ESRA, CRP Microbiology:  
 
All Micro Results Procedure Component Value Units Date/Time SARS-COV-2 [[id number]] Collected:  04/02/20 0955 Order Status:  Completed Specimen:  Nasopharyngeal Updated:  04/07/20 5383 COVID-19 NOT DETECTED Comment: Testing Performed by: 
[address] [place] [address] Rusty Garciakendellharesh Cunningham RESULTS SCANNED IN [facility] 
  
  
 CULTURE, RESPIRATORY/SPUTUM/BRONCH Yusuf Andrew STAIN [[id number]]  (Abnormal) Collected:  04/03/20 0206 Order Status:  Completed Specimen:  Sputum from Endotracheal aspirate Updated:  04/07/20 8427 Special Requests: NO SPECIAL REQUESTS     
  GRAM STAIN 10 TO 30 WBCS/OIF  
   0 TO 1 EPITHELIAL CELLS/OIF  
   FEW YEAST 3+ MUCUS PRESENT Culture result: LIGHT CORKY ALBICANS     
      
  SCANT NORMAL RESPIRATORY CLARA  
     
 CULTURE, BLOOD [[id number]] Collected:  04/02/20 6522 Order Status:  Completed Specimen:  Blood Updated:  04/06/20 2140 Special Requests: --     
  LEFT Antecubital 
  
  Culture result: NO GROWTH 4 DAYS     
 CULTURE, BLOOD [[id number]] Collected:  04/02/20 3003 Order Status:  Completed Specimen:  Blood Updated:  04/06/20 4275 Special Requests: --     
  RIGHT 
HAND Culture result: NO GROWTH 4 DAYS     
 CULTURE, URINE [[id number]] Collected:  04/03/20 0221 Order Status:  Completed Specimen:  Urine from [place] Specimen Updated:  04/05/20 6304 Special Requests: NO SPECIAL REQUESTS Culture result: NO GROWTH 2 DAYS     
 MSSA/MRSA SC BY PCR, NASAL SWAB [[id number]] Collected:  04/03/20 1345 Order Status:  Completed Specimen:  Nasal swab Updated:  04/04/20 1746 Special Requests: NO SPECIAL REQUESTS Culture result:    
  SA target not detected. A MRSA NEGATIVE, SA NEGATIVE test result does not preclude MRSA or SA nasal colonization. P.JIROVECI BY PCR [[id number]] Collected:  04/03/20 0206 Order Status:  Completed Updated:  04/03/20 1614 RESPIRATORY VIRAL PANEL, PCR [[id number]] Collected:  04/02/20 1025 Order Status:  Canceled Specimen:  Sputum EMERGENT DISEASE PANEL [[id number]] Collected:  04/02/20 4165 Order Status:  Canceled RESPIRATORY VIRAL PANEL, PCR [[id number]] Collected:  03/31/20 2154 Order Status:  Canceled RESPIRATORY VIRAL PANEL, PCR [[id number]] Collected:  03/31/20 2147 Order Status:  Canceled Specimen:  Sputum CULTURE, BLOOD [[id number]] Order Status:  Canceled Specimen:  Blood CULTURE, BLOOD [[id number]] Order Status:  Canceled Specimen:  Blood Imaging:  
 
 
 
 
 
 
 
 
 
 
Signed By: Deepa Faustin MD   
 April 7, 2020

## 2020-04-07 NOTE — PROGRESS NOTES
Patient requiring substantial amounts of O2, Son called and updated. Patient will be paralyzed with Vec,.  TOF, BIZ to be placed. MD informed of status, orders received. Monitoring.

## 2020-04-07 NOTE — PROGRESS NOTES
Patient has been to unstable to turn throughout shift. Unable to maintain oxygenation when attempted to lay flat. Front side of patient bathed only due to instability.

## 2020-04-07 NOTE — PROGRESS NOTES
Ventilator check complete; patient has a #8. 0 ET tube secured at the 24 at the lip. Patient is sedated. Patient is not able to follow commands. Breath sounds are coarse. Trachea is midline, Negative for subcutaneous air, and chest excursion is symmetric. Patient is also Negative for cyanosis and is Negative for pitting edema. All alarms are set and audible. Resuscitation bag is at the head of the bed. Ventilator Settings Mode FIO2 Rate Tidal Volume Pressure PEEP I:E Ratio PRVC  100 %   20 
 410 ml     16 cm H20  1:1.2 Peak airway pressure: 20 cm H2O Minute ventilation: 15 l/min Cristela Arreguin, RT

## 2020-04-07 NOTE — PROGRESS NOTES
Updated pt spouse of pt condition - FiO2 100%, SPO2 88-90%. Spouse verbalized understanding of pt condition. Spouse wishes to be contacted if pt further declines for possibility of sitting with him. Pt spouse wishes for pt to be DNR. Dr. Capri Hernandez notified.

## 2020-04-08 PROBLEM — D69.2 PURPURA (HCC): Status: ACTIVE | Noted: 2020-01-01

## 2020-04-08 PROBLEM — J80 ARDS (ADULT RESPIRATORY DISTRESS SYNDROME) (HCC): Status: ACTIVE | Noted: 2020-01-01

## 2020-04-08 NOTE — PROGRESS NOTES
Ventilator check complete; patient has a #8. 0 ET tube secured at the 24 at the lip. Breath sounds are mainly CTAB after sxn. Trachea is midline, Negative for subcutaneous air, and chest excursion is symmetrical. Patient is also Negative for cyanosis and is Negative for pitting edema. All alarms are set and audible. Resuscitation bag and mask is at the head of the bed.   
  
Ventilator Settings Mode FIO2 Rate Tidal Volume Pressure PEEP I:E Ratio PRVC  100 %   24 410 ml     16cm H20  1:1.2   
  
Peak airway pressure: 35 cm H2O Minute ventilation: 10 l/min

## 2020-04-08 NOTE — PROGRESS NOTES
Nutrition follow-up: 
Consult for tube feeding management (Pulmonary) 
  
Assessment: 
Food/Nutrition Patient History: Patient admitted with five day history of shortness of breath and swelling in legs. He has a PMH of HTN, asbestosis, Glioblastoma. ANTONINA was ordered and 55-60% tricuspid regurgitation. Patient was started on Lasix. CT chest showed bilateral edema/infiltrates, transferred to 8th floor for isolation. Transferred to [address]) 4/2 for increased O2 needs and high risk for vent. Patient remains intubated. Patient failed trial of prone yesterday. Palliative care has been consulted and patient is now DNR, wife is considering comfort care. Called and spoke with RN due to isolation to reduce exposure and to preserve PPE. She states that patient has been tolerating TF with minimal residuals, however has still not had a BM. She states that edema looks a little worse today. When asking to confirm water flush as it is documented as 100 ml/hr. She states she will assess, instructed that order is 25 ml/hr and to reduce to 25 ml/hr if it is not. Abdomen: active bowel sounds, unknown date of last BM  
Edema: 2+ general, BUE, and BLE Diet: No diet orders on file   
Pertinent Labs: WNL and stable except slightly elevated Glucose at 199 mg/dL (tube feeding is low CHO - likely related to infection and steroids) Pertinent Isac Byers stopped, Miralax BID, IVF d/c yesterday Enteral nutrition access: NGT Anthropometrics:Height: 5' 8\" (172.7 cm),  Weight: 95.3 kg (210 lb), Weight Source: Other (comment), Body mass index is 31.93 kg/m². BMI class of obese. Revised Macronutrient LPVZQ: (78.8 RN Listed body weight) Estimated energy requirements:  5781-1040 kcal /day (15-20 kcal/kg) Estimated protein requirements:  114-191 grams protein/day (1.2-2 grams/kg) CHO limit per day: 262 grams CHO/day (55% calories) Estimated fluid/day: 1.4-1.9 liters/day (~1ml/kcal/day) Intake/Comparative Standards: Patient is NPO which does not meet EEN or EPN 
  
Intervention: 
Meals and snacks: NPO Enteral Nutrition: Continue feedings of Vital AF at goal rate of 55 ml/hr with 25 ml/hr water flush + 2 pouches Prosource TF x 1 per day with 50 ml water flush before and after. Goal feeding to provide 1664 kcal (100% estimated needs), 121 g pro (100% estimated protein needs), 145 g CHO (does not exceed max CHO), 1670 ml free water (100% estimated fluid needs). Vitamin and Mineral Supplement Therapy: 
Nutrition support orders for electrolyte management replacement are activated and placed on the MAR.  
Will d/c nursing misc order of TF + Free water + IVF + 100 ml/hr Coordination of Nutrition Care: Discussed with Dewey RN. Discharge Nutrition Plan: Too soon to determine. 
  
This assessment was completed remotely from within the hospital. 
 
[address] [facility], [address], [address]

## 2020-04-08 NOTE — PROGRESS NOTES
50 mcg of fentanyl ordered PRN once for patient's continued hypertension. Propofol attempted to decrease heart rate and use for sedation. Will increase fentanyl/versed gtts and stop propofol infusion

## 2020-04-08 NOTE — PROGRESS NOTES
Ventilator check complete; patient has a #8. 0 ET tube secured at the 24 at the lip. Patient is sedated. Patient is not able to follow commands. Breath sounds are diminished. Trachea is midline, Negative for subcutaneous air, and chest excursion is symmetric. Patient is also Negative for cyanosis and is Negative for pitting edema. All alarms are set and audible. Resuscitation bag is at the head of the bed. Ventilator Settings Mode FIO2 Rate Tidal Volume Pressure PEEP I:E Ratio PRVC  100 %    410 ml     16 cm H20  1:1.2 Peak airway pressure: 35 cm H2O Minute ventilation: 8.9 l/min ABG: No results for input(s): PH, PCO2, PO2, HCO3 in the last 72 hours.  
 
 
Matt Ireland, RT

## 2020-04-08 NOTE — PROGRESS NOTES
5 mg metoprolol ordered IV for continued tachycardia. Levophed maxed out. Add donna gtt if needed for additional pressor support.

## 2020-04-08 NOTE — PROGRESS NOTES
Blaine Rodriguez Admission Date: 3/31/2020 Daily Progress Note: 4/8/2020 The patient's chart is reviewed and the patient is discussed with the staff. Mr Mary Ann Shefifeld is a 62year old male with a history of HTN and asbestosis as well as Glioblastoma multiforme that was diagnosed in July of last year. He is followed by Dr Aneesh Jacob. He has undergone craniotomy with resection/XRT and he is now on Temodar. Last MRI 1/2020 showed no evidence of disease progression with subtle improvement of enhancement along margins of operative cavity and subtle improvement of perilesional mass effect. He is on decadron and prophylactic Bactrim. He had a virtual visit with Dr Aneesh Jacob on 3/27. He reported leg edema so was started on lasix 20 mg per day. He denied any fever or known exposures. He and his wife had been careful at home and reported \"self quarantining\".     
  Mr Mary Ann Sheffield presented to the ED on 3/31 with increasing shortness of breath. cough over the past several days and persistent leg edema. He was initially hypoxic on EMS arrival, with sats at 65% and improved to 93% on 4L NC. He also endorsed cough and ongoing leg swelling. While in the ED a CXR was done and showed an unchanged left lung base infiltrate. He was started on Rocephin and Zithromax and blood cultures done (still pending). A CT chest (PE protocol) was done which shows bilateral infiltrates. His proBNP was 966. A TTE was done and showed LVEF of 55-60% with a normal RV and RVSP 35 to 40 mmHg. He has received IV lasix and the fluid balance is now negative 2.2 liters. His oxygen needs have increased however and his is now on 10 liters. ABG -> 7.45/32/54/23. Covid 19 study is pending (ordered today).  
  
In room is awake and mildly confused. Reports is ok but not sure how he is breathing. Neck is large but reports has not been tested for ADRIANA.  Does note. He is on 15 LPM and was urinating and saturation in 70's to 80's. I placed non-rebreath and then up to 96% and comfortable. Took off and saturation still in 90's. Not in pain. 
  
Date of Admission:  3/31/2020 Intubated: 4/2 Required 100% FiO2. Flolan and paralytics added. Failed proning 4/7 with severe hypoxia. Subjective:  
 
Patient was proned yesterday but quickly desaturated to the 40's. No inerruption in his ETT or circuit was obvious. Quickly placed supine again. Repeat CXR performed which was stable. Was already on 100% FiO2 and inhaled flolan. Sats very slowly recovered to low 90's. Wife was informed of his poor performance. Currently DNR. This morning still on 100% FiO2 and sats in low 90's. No pressors. Nursing note purpura over trunk. Current Facility-Administered Medications Medication Dose Route Frequency  metoprolol (LOPRESSOR) injection 5 mg  5 mg IntraVENous Q4H PRN  
 dexamethasone (DECADRON) 4 mg/mL injection 4 mg  4 mg IntraVENous Q8H  
 midazolam in normal saline (VERSED) 1 mg/mL infusion  0-10 mg/hr IntraVENous TITRATE  trimethoprim-sulfamethoxazole (BACTRIM) 700 mg in dextrose 5% 500 mL  700 mg IntraVENous Q8H  
 polyethylene glycol (MIRALAX) packet 17 g  17 g Oral BID  dexmedeTOMidine (PRECEDEX) 400 mcg in 0.9% sodium chloride 100 mL infusion  0.2-0.7 mcg/kg/hr IntraVENous TITRATE  propofol (DIPRIVAN) 10 mg/mL infusion  0-50 mcg/kg/min IntraVENous TITRATE  fentaNYL in normal saline (pf) 25 mcg/mL infusion  0-200 mcg/hr IntraVENous TITRATE  epoprostenol (FLOLAN) 30,000 ng/mL in diluent for epoprostenol (gly) 50 mL solution  10-50 ng/kg/min (Ideal) Nebulization TITRATE  
 0.9% sodium chloride for Flolan infusion  1.2-6.6 mL/hr Nebulization TITRATE  vecuronium (NORCURON) 50 mg in 0.9% sodium chloride 100 mL infusion  0-1.2 mcg/kg/min IntraVENous TITRATE  albuterol-ipratropium (DUO-NEB) 2.5 MG-0.5 MG/3 ML  3 mL Nebulization Q6H PRN  
  midodrine (PROAMITINE) tablet 5 mg  5 mg Per NG tube TID WITH MEALS  
 anidulafungin (ERAXIS) 100 mg in 0.9% sodium chloride 130 mL IVPB  100 mg IntraVENous Q24H  
 enoxaparin (LOVENOX) injection 40 mg  40 mg SubCUTAneous Q24H  
 levothyroxine (SYNTHROID) tablet 50 mcg  50 mcg Per G Tube ACB  acetaminophen (TYLENOL) tablet 650 mg  650 mg Per NG tube Q4H PRN  
 lansoprazole (PREVACID) 3 mg/mL oral suspension 30 mg  30 mg Per NG tube ACB  midazolam (VERSED) injection 2 mg  2 mg IntraVENous Q2H PRN  
 NUTRITIONAL SUPPORT ELECTROLYTE PRN ORDERS   Does Not Apply PRN  
 NOREPINephrine (LEVOPHED) 4 mg in 5% dextrose 250 mL infusion  0.5-30 mcg/min IntraVENous TITRATE  influenza vaccine 2019-20 (6 mos+)(PF) (FLUARIX/FLULAVAL/FLUZONE QUAD) injection 0.5 mL  0.5 mL IntraMUSCular PRIOR TO DISCHARGE  sodium chloride (NS) flush 5-40 mL  5-40 mL IntraVENous Q8H  
 sodium chloride (NS) flush 5-40 mL  5-40 mL IntraVENous PRN  
 ondansetron (ZOFRAN) injection 4 mg  4 mg IntraVENous Q4H PRN Review of Systems Unobtainable due to patient status. Objective:  
 
Vitals:  
 04/08/20 9114 04/08/20 5456 04/08/20 4041 04/08/20 6866 BP: 173/89 (!) 160/111 Pulse: (!) 118 (!) 124 (!) 116 (!) 134 Resp:   26 Temp:      
SpO2:  93% 92% Weight:      
Height:      
 
Intake and Output:  
04/06 1901 - 04/08 0700 In: 7477.3 [I.V.:5457.3] Out: 2300 [WXQFJ:2050] 04/08 0701 - 04/08 1900 In: -  
Out: 052 [UXPWF:214] Physical Exam:  
Constitution:  the patient is well developed and in no acute distress EENMT:  Sclera clear, pupils equal, oral mucosa moist 
Respiratory: Intubated. B inspiratory crackles. Cardiovascular:  RRR without M,G,R 
Gastrointestinal: soft and non-tender; with positive bowel sounds. Musculoskeletal: warm without cyanosis. There is no lower leg edema. Skin:  Purpura over trunk, most concentrated near right shoulder. Neurologic: sedated Psychiatric:  sedated CHEST XRAY:  
4/7/20 1. Bilateral airspace disease. ET tube tip is in satisfactory position. 2. No significant change compared to prior exam. 
 
 
LAB No lab exists for component: [place] Recent Labs 04/07/20 
0307 04/06/20 
0400 WBC 6.7 5.8 HGB 9.2* 8.4* HCT 28.8* 25.8*  
 224 Recent Labs 04/08/20 
0329 04/07/20 
0306 04/06/20 
0400  144 145  
K 4.3 3.7 3.8  109* 111* CO2 29 27 29 * 137* 150* BUN 12 11 10 CREA 0.74* 0.46* 0.53* CA 9.0 8.4 8.6 ALB 2.0* 2.0* 2.2*  
SGOT 56* 63* 34 ABG:   
Lab Results Component Value Date/Time PHI 7.317 (L) 04/08/2020 03:50 AM  
 PCO2I 56.4 (H) 04/08/2020 03:50 AM  
 PO2I 77 04/08/2020 03:50 AM  
 HCO3I 28.8 (H) 04/08/2020 03:50 AM  
 FIO2I 100 04/08/2020 03:50 AM  
 
 
Assessment:  (Medical Decision Making) Hospital Problems  Date Reviewed: 3/27/2020 Codes Class Noted POA Purpura ([facility] ICD-10-CM: J06.8 ICD-9-CM: 287.2  4/8/2020 Unknown ARDS (adult respiratory distress syndrome) ([facility] ICD-10-CM: K18 
ICD-9-CM: 518.52  4/8/2020 Unknown Septic shock (HCC) ICD-10-CM: A41.9, R65.21 ICD-9-CM: 038.9, 785.52, 995.92  4/3/2020 Unknown Suspected Covid-19 Virus Infection ICD-10-CM: R68.89  4/2/2020 Yes Suspected sleep apnea ICD-10-CM: R29.818 ICD-9-CM: 781.99  4/2/2020 Unknown Morbid obesity ([facility] ICD-10-CM: E66.01 
ICD-9-CM: 278.01  4/2/2020 Unknown * (Principal) Acute hypoxemic respiratory failure ([facility] ICD-10-CM: J96.01 
ICD-9-CM: 518.81  3/31/2020 Unknown Transaminitis ICD-10-CM: R74.0 ICD-9-CM: 790.4  3/31/2020 Yes Hypertension (Chronic) ICD-10-CM: I10 
ICD-9-CM: 401.9  8/12/2019 Yes GBM (glioblastoma multiforme) (HCC) (Chronic) ICD-10-CM: C71.9 ICD-9-CM: 191.9  6/26/2019 Yes Asbestosis ([facility] (Chronic) ICD-10-CM: U85 ICD-9-CM: 662  6/22/2019 Yes Overview Signed 6/25/2019  7:59 AM by Isabell Corbin, [address] [place] [address] Last Assessment & Plan:  
 Was Diagnosed in November Hanna Allred Appears Asymptomatic Currently. Not Short of Breath Breathing Well. Patient with severe hypoxic respiratory failure, covid vs other viral pneumonias or PJP on the ddx. FiO2 back up to 100%. .Paralyzed. PEEP 16 and flolan at 50. Failed proning with severe drop in sats. Plan:  (Medical Decision Making)  
-cont flolan currently at 50ng/kg/min. PEEP 16. paralytics. -palliative to discuss with wife. Pt currently dnr. May need to discuss comfort measures if fails to improve. -w/u new purpura with DIC labs, HIT panel. May need heme/onc input if unrevealing and ongoing aggressive goals.  
-cont high peep and low Vt vent strategy. -hydroxychloroquine and azithro (completed) for covid coverage. First test negative but from questionable lab. Repeat test sent today. If second test is negative will likely bronch to send PJP. -ID following. Bactrim for PJP coverage. Eraxis. -send endotracheal aspirate for respiratory viral PCR when able.  
-prevacid.  
-tolerating tube feedings. -vent day 6 Critically ill patient with complicated medical issues and high risk of further decompensation. Total critical care time spent thus far (exclusive of procedures): 35 minutes Gunjan Kern MD

## 2020-04-08 NOTE — PROGRESS NOTES
Hypothermic on assessment. Jaime hugger and warm blankets applied. Will monitor temp every hour until normothermia.

## 2020-04-08 NOTE — PROGRESS NOTES
Pharmacokinetic Consult to Pharmacist 
 
Cris Masters is a 62 y.o. male being treated for sepsis of unknown origin with vancomycin. Height: 5' 8\" (172.7 cm)  Weight: 104.9 kg (231 lb 4.2 oz) Lab Results Component Value Date/Time BUN 12 04/08/2020 03:29 AM  
 Creatinine 0.74 (L) 04/08/2020 03:29 AM  
 WBC 6.7 04/07/2020 03:07 AM  
 Procalcitonin 0.14 04/02/2020 06:51 AM  
 Lactic Acid (POC) 0.80 08/12/2019 07:12 PM  
  
Estimated Creatinine Clearance: 120 mL/min (A) (based on SCr of 0.74 mg/dL (L)). Day 1 of vancomycin. Goal trough is 15-20. Vancomycin dose initiated at 2500 mg X 1, then 1500 mg Q12H. Plan trough prior to the 4th dose. Will continue to follow patient and order levels when clinically indicated. Thank you, 
Steffi Cranker, PharmD, BCPS Clinical Pharmacist 
[phone]

## 2020-04-08 NOTE — PROGRESS NOTES
Infectious Disease Progress Note Today's Date: 4/8/2020 Admit Date: 3/31/2020 Impression: · Acute respiratory failure with rapid deterioration. He is still requiring escalating support. Patient on chemo and steroids for treatment glioblastoma prior to admission. Has been on Bactrim for PCP prophylaxis. PJP PCR 4/3 still pending. Galactomannan negative. Sputum culture with normal respiratory failure and Candida. Covid 19 from 4/2 negative. Repeated 4/7. Remains intubated: Now on Flolan and high Fi02 as well as paralytics and inhaled epoprostenol. Still no fever but on steroids. Plan: · Completed course of  Plaquenil and Azithromycin 4/7. · Retesting for COVID 19: clinical suspicion is high for this. · Continue  IV Bactrim 20 mg/kg/d for PCP treatment and steroids. · Continue current Decadron dose for 5 days to 4 mg IV q 8 hours then will drop down. Not known diabetic. · Continue Eraxis for now: will not interact with Plaquenil or Azithromycin. · MRSA/MSSA swab nares negative on 4/3. Overall suspicion for this being bacterial process are low; suspect this is COVID 19.  
· Increasing petechia on exam: DIC work up has been ordered. Discussed with Critical Care. Coags not out. Will expand and restart ABX coverage with Vanc and Cefepime. Check atypical's and RPP pending. Add IV Doxy for atypical coverage. Is some interaction with Vec so may need reduce that dose as needed. · Anti-infectives:  
 
· Bactrim 3x week · Atovaquone 4/2-4/7 · Ceftriaxone (4/2 -4/6) · Azithromycin (4/2 -4/6) · Plaquenil (4/2 -4/4) · Eraxis 4/2- 
· IV Bactrim 4/7- Subjective:  
Remains intubated in ICU. Remains on sedation and paralytic but off pressors. 100%Fi02 16 PEEP. Has become consistently tachycardic. RR in mid 20's. Afebrile. Trial proning yesterday but desaturated. Has been made DNR. WBC normal; some drop in PLT noted. Objective:  
 
Visit Vitals BP (!) 160/111 Pulse (!) 116 Temp 97.5 °F (36.4 °C) Resp 26 Ht 5' 8\" (1.727 m) Wt 104.9 kg (231 lb 4.2 oz) SpO2 92% BMI 35.16 kg/m² Temp (24hrs), Av.3 °F (35.7 °C), Min:92.1 °F (33.4 °C), Max:98.1 °F (36.7 °C) Lines:  R Arterial Line:  R IJ, parks, OG, ET Physical Exam: Exam:   
 General: NC/AT, sedated,  looks stated age HEENT: PERRL, non-icteric sclera, no lip lesions Neck: supple, symmetric Cardiac:Nl S1/S2, no murmurs/rubs/gallops/clicks, 2+ LE edema Pulmonary:shallow breaths, clear, no rales/wheezes,  Fair air movement Abdomen: soft, NT, full/firm, not clearly tender, BS quiet Gentital:external genitalia normal Parks : yes Skin:no rashes, ecchymosis stable, increasing petechia on chest 
 MSK:no enlarged or swollen joints in limbs or sternoclavicular area Extremities: no cords/clots/cyanosis, pulses palpable and symmetric Psychiatric: mood and affect appropriate to situation Physical exam is performed in entirety daily by myself and reviewed when copied from colleague. Changes are made as reflected on daily exam. If no changes are made this reflects that exam is performed and unchanged. Data Review: CBC:  
Recent Labs 20 
03020 
0400 WBC 6.7 5.8  
RBC 2.84* 2.52* HGB 9.2* 8.4* HCT 28.8* 25.8*  
 224 GRANS 86*  --   
LYMPH 7*  --   
EOS 0*  --   
 
CMP:  
Recent Labs 20 
0329 20 
0306 20 
0400 * 137* 150*  144 145  
K 4.3 3.7 3.8  109* 111* CO2 29 27 29 BUN 12 11 10 CREA 0.74* 0.46* 0.53* CA 9.0 8.4 8.6 AGAP 8 8 5* * 104 72 TP 6.4 5.8* 5.8* ALB 2.0* 2.0* 2.2*  
GLOB 4.4* 3.8* 3.6* AGRAT 0.5* 0.5* 0.6* Liver Enzymes:  
Recent Labs 20 
7892 TP 6.4 ALB 2.0*  
* SGOT 56* ABGs: No results for input(s): PH, PCO2, PO2, HCO3 in the last 72 hours. Inflammation studies: No results found for: SR, ESRA, CRP Microbiology:  
 
All Micro Results Procedure Component Value Units Date/Time CULTURE, BLOOD [[id number]] Collected:  04/02/20 4954 Order Status:  Completed Specimen:  Blood Updated:  04/07/20 1238 Special Requests: --     
  RIGHT 
HAND Culture result: NO GROWTH 5 DAYS     
 CULTURE, BLOOD [[id number]] Collected:  04/02/20 7956 Order Status:  Completed Specimen:  Blood Updated:  04/07/20 1238 Special Requests: --     
  LEFT Antecubital 
  
  Culture result: NO GROWTH 5 DAYS     
 RESPIRATORY VIRAL PANEL, PCR [[id number]] Order Status:  Sent Specimen:  Sputum EMERGENT DISEASE PANEL [[id number]] Collected:  04/07/20 6154 Order Status:  Completed Updated:  04/07/20 1025 SARS-COV-2 [[id number]] Collected:  04/02/20 0955 Order Status:  Completed Specimen:  Nasopharyngeal Updated:  04/07/20 4239 COVID-19 NOT DETECTED Comment: Testing Performed by: 
[address] [place] [address] [place] [address] RESULTS SCANNED IN [facility] 
  
  
 CULTURE, RESPIRATORY/SPUTUM/BRONCH Edwyna Nayeli STAIN [[id number]]  (Abnormal) Collected:  04/03/20 0206 Order Status:  Completed Specimen:  Sputum from Endotracheal aspirate Updated:  04/07/20 6067 Special Requests: NO SPECIAL REQUESTS     
  GRAM STAIN 10 TO 30 WBCS/OIF  
   0 TO 1 EPITHELIAL CELLS/OIF  
   FEW YEAST 3+ MUCUS PRESENT Culture result: LIGHT CORKY ALBICANS     
      
  SCANT NORMAL RESPIRATORY CLARA  
     
 CULTURE, URINE [[id number]] Collected:  04/03/20 0221 Order Status:  Completed Specimen:  Urine from [place] Specimen Updated:  04/05/20 8387 Special Requests: NO SPECIAL REQUESTS Culture result: NO GROWTH 2 DAYS     
 MSSA/MRSA SC BY PCR, NASAL SWAB [[id number]] Collected:  04/03/20 1345 Order Status:  Completed Specimen:  Nasal swab Updated:  04/04/20 1746 Special Requests: NO SPECIAL REQUESTS Culture result: SA target not detected. A MRSA NEGATIVE, SA NEGATIVE test result does not preclude MRSA or SA nasal colonization. P.JIROVECI BY PCR [[id number]] Collected:  04/03/20 0206 Order Status:  Completed Updated:  04/03/20 1614 RESPIRATORY VIRAL PANEL, PCR [[id number]] Collected:  04/02/20 1025 Order Status:  Canceled Specimen:  Sputum EMERGENT DISEASE PANEL [[id number]] Collected:  04/02/20 8911 Order Status:  Canceled RESPIRATORY VIRAL PANEL, PCR [[id number]] Collected:  03/31/20 2154 Order Status:  Canceled RESPIRATORY VIRAL PANEL, PCR [[id number]] Collected:  03/31/20 2147 Order Status:  Canceled Specimen:  Sputum CULTURE, BLOOD [[id number]] Order Status:  Canceled Specimen:  Blood CULTURE, BLOOD [[id number]] Order Status:  Canceled Specimen:  Blood Imaging:  
 
 
 
 
 
 
 
 
 
 
 
 
 
 
 
 
 
Signed By: Ryne Brown MD   
 April 8, 2020

## 2020-04-08 NOTE — PROGRESS NOTES
Upon turning, patient's O2sat dropped to 78% and holding. Patient suctioned and lavaged with minimal improvement. Rt at bedside, remains on 100% fio2, PEEP increased to 20 from 16. O2sat now 82-85%. Dr. Peter Whiteside and  [facility] notified, advised family to come in at this time. Wife, [facility], updated with patient status, awaiting on arrival of family.

## 2020-04-08 NOTE — CONSULTS
Palliative Care Patient: Peter Mcfadden MRN: [id number]  SSN: [id number] YOB: 1955  Age: 62 y.o. Sex: male Date of Request: 4/8/2020 Date of Consult:  4/8/2020 Reason for Consult:  family support and education and medical decision making Requesting Physician: Dr. Swati Van Assessment/Plan:  
 
Principal Diagnosis: Dyspnea  R06.00 Additional Diagnoses: · Acute Respiratory Failure, Unspecified  J96.00 
· Frailty  R54 · Sepsis, Unspecified Organism:  A41.9 · Counseling, Encounter for Medical Advice  Z71.9 
· Encounter for Palliative Care  Z51.5 Palliative Performance Scale (PPS): 
 sedated Medical Decision Making:  
Reviewed and summarized labs and imaging. Pt paralyzed and sedated. On 100% O2 with poor oxygen sat. Reached out to pt wife via phone. Updated on current condition and concerns for continued decline. Explained poor prognosis per her request.  Wife is very tearful and states she is not ready to let him go. She asked if we would call if he starts to go downhill. I explained that pt is currently on maximum vent support and further pt decline would likely lead to his death. Wife expressed her understanding and appreciation of ongoing care. Pt is dnr. Wife not ready to stop but understands severity of condition Will continue to follow and discuss goals/update family. SOAP score: 8 Will discuss findings with members of the interdisciplinary team.   
 
Thank you for this referral.    
 
 
Subjective:  
 
History obtained from:  Family and Chart Chief Complaint: acute respiratory failure History of Present Illness:  62year old male with a history of HTN and asbestosis as well as Glioblastoma multiforme that was diagnosed in July of last year. [facility] is followed by Dr Hines Dye has undergone craniotomy with resection/XRT and he is now on Temodar.   
Last MRI 1/2020 showed no evidence of disease progression with subtle improvement of enhancement along margins of operative cavity and subtle improvement of perilesional mass effect. He is on decadron and prophylactic Bactrim. Bret Jon had a virtual visit with Dr Elijah Goel on 3/27. He reported leg edema so was started on lasix 20 mg per day. He denied any fever or known exposures. He and his wife had been careful at home and reported \"self quarantining\".     
  Mr Hremes Bello presented to the ED on 3/31 with increasing shortness of breath. cough over the past several days and persistent leg edema.  He was initially hypoxic on EMS arrival, with sats at 65% and improved to 93% on 4L NC. He also endorsed cough and ongoing leg swelling.    
   While in the ED a CXR was done and showed an unchanged left lung base infiltrate. He was started on Rocephin and Zithromax and blood cultures done (still pending).  A CT chest (PE protocol) was done which shows bilateral infiltrates. His proBNP was 966. A TTE was done and showed LVEF of 55-60% with a normal RV and RVSP 35 to 40 mmHg.   
   He has received IV lasix and the fluid balance is now negative 2.2 liters. His oxygen needs have increased however and his is now on 10 liters.  ABG -> 7.45/32/54/23. Covid 19 study is pending Remains in critical condition on vent Advance Directive: No      
Code Status:  DNR Health Care Power of : pt wife would be decision maker Past Medical History:  
Diagnosis Date  Asbestosis ([facility] [address] 6/22/2019 Last Assessment & Plan:   Was Diagnosed in November . Appears Asymptomatic Currently. Not Short of Breath Breathing Well.  Brain mass 6/21/2019 Last Assessment & Plan:  Is a neurosurgical consultation with Dr. Adithya Chu  is evaluated the patient. Patient is on IV Decadron every 6 hourly. Patient has evidence of a left-sided temporoparietal mass  I Understanded to the plan is for surgery On Wednesday Will change decadrone to 4 mg po qid  Pt will follow with Dr Adithya Chu outpatient. Past Surgical History:  
Procedure Laterality Date  HX CRANIOTOMY  06/26/2019  
 left Frontotemporal crani for GBM  
 HX HERNIA REPAIR Left  HX LUMBAR LAMINECTOMY Family History Problem Relation Age of Onset  Lung Disease Father Social History Tobacco Use  Smoking status: Former Smoker  Smokeless tobacco: Never Used Substance Use Topics  Alcohol use: Not Currently Prior to Admission medications Medication Sig Start Date End Date Taking? Authorizing Provider  
furosemide (LASIX) 20 mg tablet Take 1 Tab by mouth daily as needed (swelling). For leg swelling. Only take in the morning. 3/27/20  Yes Divine Braga MD  
dexAMETHasone (DECADRON) 2 mg tablet Take 1 Tab by mouth two (2) times a day. 2/25/20  Yes Cookie Ventura NP  
trimethoprim-sulfamethoxazole (BACTRIM DS) 160-800 mg per tablet Take 1 Tab by mouth BID Mon Wed & Fri. Take while on temodar 10/23/19  Yes Divine Braga MD  
levothyroxine (SYNTHROID) 50 mcg tablet Take 1 Tab by mouth Daily (before breakfast). Indications: hypothyroidism 9/26/19  Yes Divine Braga MD  
[facility]) 250 mg capsule Take one capsule daily Monday through Friday only. Take with a 140 mg capsule. Patient taking differently: Take  by mouth. Take one capsule daily Monday through Friday only. Take with a 140 mg capsule. 9/19/19  Yes Divine Braga MD  
[facility]) 140 mg capsule Take 1 capsule daily, Monday through Friday only. Take with a 250 mg capsule. Patient taking differently: Take  by mouth. Take 1 capsule daily, Monday through Friday only. Take with a 250 mg capsule. 9/19/19  Yes Divine Braga MD  
bisoprolol-hydroCHLOROthiazide [facility]) 2.5-6.25 mg per tablet Take 1 Tab by mouth daily. 8/1/19 7/31/20 Yes Provider, Historical  
diphenhydrAMINE (BENADRYL) 25 mg capsule Take 50 mg by mouth nightly.    Yes Provider, Historical  
pantoprazole (PROTONIX) 40 mg tablet Take 1 Tab by mouth Daily (before breakfast). 11/20/19   Rochelle Cordero MD  
citalopram (CELEXA) 10 mg tablet Take 10 mg by mouth daily. 8/1/19 7/31/20  Provider, Historical  
 
 
Allergies Allergen Reactions  Bee Venom Protein (Honey Bee) Swelling  Compazine [Prochlorperazine] Other (comments) Caused delirium, confusion, uncontrollable shakes  Iodine And Iodide Containing Products Unknown (comments) Contrast dye Review of systems unable to obtain due to mentation. Objective:  
 
Visit Vitals BP (!) 160/111 Pulse (!) 129 Temp 97.5 °F (36.4 °C) Resp 24 Ht 5' 8\" (1.727 m) Wt 231 lb 4.2 oz (104.9 kg) SpO2 (!) 89% BMI 35.16 kg/m² Physical Exam: 
 
General:  Sedated. Eyes:  Conjunctivae/corneas clear Nose: Nares normal. Septum midline. Neck: Supple, symmetrical, large Lungs:   Coarse bilateral bs Heart:  Regular rate and rhythm, no murmur Abdomen:   Soft, non-tender, non-distended. Positive bowel sounds Extremities: Normal, atraumatic, no cyanosis or edema Skin: Skin color, texture, turgor normal. No rash or lesions. Neurologic: paralyzed Psych: sedated Assessment:  
 
Hospital Problems  Date Reviewed: 3/27/2020 Codes Class Noted POA Purpura ([facility] ICD-10-CM: E21.9 ICD-9-CM: 287.2  4/8/2020 Unknown ARDS (adult respiratory distress syndrome) ([facility] ICD-10-CM: J31 
ICD-9-CM: 518.52  4/8/2020 Unknown Septic shock (HCC) ICD-10-CM: A41.9, R65.21 ICD-9-CM: 038.9, 785.52, 995.92  4/3/2020 Unknown Suspected Covid-19 Virus Infection ICD-10-CM: R68.89  4/2/2020 Yes Suspected sleep apnea ICD-10-CM: R29.818 ICD-9-CM: 781.99  4/2/2020 Unknown Morbid obesity ([facility] ICD-10-CM: E66.01 
ICD-9-CM: 278.01  4/2/2020 Unknown * (Principal) Acute hypoxemic respiratory failure ([facility] [address] ICD-10-CM: J96.01 
ICD-9-CM: 518.81  3/31/2020 Unknown Transaminitis ICD-10-CM: R74.0 ICD-9-CM: 790.4  3/31/2020 Yes Hypertension (Chronic) ICD-10-CM: I10 
ICD-9-CM: 401.9  8/12/2019 Yes GBM (glioblastoma multiforme) (HCC) (Chronic) ICD-10-CM: C71.9 ICD-9-CM: 191.9  6/26/2019 Yes Asbestosis ([address] (Chronic) ICD-10-CM: Z59 ICD-9-CM: 417  6/22/2019 Yes Overview Signed 6/25/2019  7:59 AM by Jing Esparza [address] [place] [address] Last Assessment & Plan:  
 Was Diagnosed in November Jacyfroilan Jha Appears Asymptomatic Currently. Not Short of Breath Breathing Well. Signed By: Debbie Kenny MD   
 April 8, 2020

## 2020-04-08 NOTE — PROGRESS NOTES
Dr. Richard Rome called regarding hypothermia, tachycardia, and hypertension. Will increase sedation. Patient has PRN midazolam ordered.

## 2020-04-08 NOTE — PROGRESS NOTES
Bedside shift change report given to Dewey RN and Teodoro Newman RN (oncoming nurse) by Jose Wallace RN (offgoing nurse). Report included the following information SBAR, Kardex, Intake/Output, MAR, Recent Results, Cardiac Rhythm Sinus tach  and Alarm Parameters .

## 2020-04-08 NOTE — PROGRESS NOTES
/108-patient has been off pressors for several hours.  spontaneously. No obvious distress. Patient had a similar episode earlier. The second dose of 5mg metoprolol given IV.

## 2020-04-09 NOTE — PROGRESS NOTES
Ventilator check complete; patient has a #8. 0 ET tube secured at the 24 at the lip. Patient is sedated. Patient is not able to follow commands. Breath sounds are crackles. Trachea is midline, Negative for subcutaneous air, and chest excursion is symmetric. Patient is also Negative for cyanosis and is Negative for pitting edema. All alarms are set and audible. Resuscitation bag is at the head of the bed. Ventilator Settings Mode FIO2 Rate Tidal Volume Pressure PEEP I:E Ratio PRVC  100 %   26 500 ml     18 cm H20  1:1   
 
Peak airway pressure: 46 cm H2O Minute ventilation: 12 l/min [address], RT

## 2020-04-09 NOTE — PROGRESS NOTES
Patient has 4/4 twitches on TOF. Pt is currently on 100% FiO2 with vent compliance. Notified Dr. Jazmyn Blair, instructed to increase fentanyl gtt to from 100mcg/hr to 125mcg/hr. Will continue to monitor pt.

## 2020-04-09 NOTE — PROGRESS NOTES
Bedside and Verbal shift change report given to Savannah Boland and Dewey RN (oncoming nurse) by Sander Mills (offgoing nurse). Report included the following information SBAR, Intake/Output, MAR, Recent Results, Cardiac Rhythm SR/ST and Alarm Parameters . Fentanyl and versed gtt dual verified

## 2020-04-09 NOTE — PROGRESS NOTES
Infectious Disease Progress Note Today's Date: 4/9/2020 Admit Date: 3/31/2020 Impression: · Acute respiratory failure with rapid deterioration. He is still requiring escalating support. Patient on chemo and steroids for treatment glioblastoma prior to admission. Has been on Bactrim for PCP prophylaxis. PJP PCR 4/3 was not run by [employer]; quantity insufficient. ET sputum cytology at that time was negative. Galactomannan negative. Sputum culture with normal respiratory failure and Candida. Remains intubated: Remains on Flolan and high Fi02 as well as paralytics. No fever/on steroids. · Glioblastoma multiforme on chem and steroids as outpatient; on Bactrim prophylaxis as outpatient. Plan: · Completed course of  Plaquenil and Azithromycin 4/7. COVID negative x 2 tests. · Continue IV Bactrim 20 mg/kg/d and steroids for PCP treatment. · Continue current Decadron dose for 5 days to 4 mg IV q 8 hours then will drop down. Not known diabetic. Day 3 of 5; then drop to 6 mg Decadron IV daily. Start some BS testing; if higher 20's may need coverage. · Consider trying get deeper respiratory sample for PCP: on therapy 2 days now so may still have a yield. · Follow Respiratory panel PCR and atypical serologies. · Continue Vanc/cefepime and doxycyline started 4/8 for progressive decline in light negative COVID test and immunocompromised state. · Continue Eraxis for now · MRSA/MSSA swab nares negative on 4/3. · Anti-infectives:  
 
· Bactrim 3x week · Atovaquone 4/2-4/7 · Ceftriaxone (4/2 -4/6) · Azithromycin (4/2 -4/6) · Plaquenil (4/2 -4/4) · Eraxis 4/2- 
· IV Bactrim 4/7- · Cefepime 4/8- · Vanc 4/8- 
· Doxy 4/8- Subjective:  
Remains intubated in ICU. Remains on sedation and paralytic but off pressors. 100%Fi02 16 PEEP. Has become consistently tachycardic. RR in mid 20's. Afebrile. Trial proning yesterday but desaturated. Has been made DNR.  WBC normal; some drop in PLT noted. Objective:  
 
Visit Vitals /74 Pulse (!) 120 Temp 98.3 °F (36.8 °C) Resp 24 Ht 5' 8\" (1.727 m) Wt 104.9 kg (231 lb 4.2 oz) SpO2 92% BMI 35.16 kg/m² Temp (24hrs), Av.8 °F (36.6 °C), Min:97 °F (36.1 °C), Max:98.3 °F (36.8 °C) Lines:  R Arterial Line:  R IJ, parks, OG, ET Physical Exam: Exam:   
 General: NC/AT, sedated,  looks stated age HEENT: PERRL, non-icteric sclera, no lip lesions, conjunctiva pink Neck: supple, symmetric Cardiac:Nl S1/S2, no murmurs/rubs/gallops/clicks, 2+ LE edema Pulmonary:deeper breaths, clear, no rales/wheezes,  Fair air movement Abdomen: soft, NT, firm, not clearly tender, BS quiet Gentital:scrotal edema Parks : yes Skin:no rashes, ecchymosis stable, increasing petechia on chest/about same today MSK:no enlarged or swollen joints in limbs or sternoclavicular area Extremities: no cords/clots/cyanosis, pulses palpable and symmetric Psychiatric: mood and affect appropriate to situation Physical exam is performed in entirety daily by myself and reviewed when copied from colleague. Changes are made as reflected on daily exam. If no changes are made this reflects that exam is performed and unchanged. Data Review: CBC:  
Recent Labs 20 
4189 WBC 6.7  
RBC 2.84* HGB 9.2* HCT 28.8*  
 GRANS 86* LYMPH 7*  
EOS 0*  
 
CMP:  
Recent Labs 20 
1887 20 
2371 20 
7284 * 199* 137*  139 144  
K 4.2 4.3 3.7 CL 99 102 109* CO2 29 29 27 BUN 17 12 11 CREA 0.65* 0.74* 0.46* CA 8.7 9.0 8.4 AGAP 8 8 8 * 145* 104  
TP 6.1* 6.4 5.8* ALB 1.9* 2.0* 2.0*  
GLOB 4.2* 4.4* 3.8* AGRAT 0.5* 0.5* 0.5* Liver Enzymes:  
Recent Labs 20 
6488 TP 6.1* ALB 1.9* * SGOT 64* ABGs: No results for input(s): PH, PCO2, PO2, HCO3 in the last 72 hours.  
Inflammation studies: No results found for: SR, ESRA, CRP  
 
 Microbiology:  
 
All Micro Results Procedure Component Value Units Date/Time LEGIONELLA PNEUMOPHILA AG, URINE [[id number]] Collected:  04/08/20 1652 Order Status:  Completed Specimen:  Urine Updated:  04/08/20 1705 RESPIRATORY VIRAL PANEL, PCR [[id number]] Collected:  04/08/20 1545 Order Status:  Completed Specimen:  Sputum Updated:  04/08/20 1658 CULTURE, BLOOD [[id number]] Collected:  04/02/20 2533 Order Status:  Completed Specimen:  Blood Updated:  04/07/20 1238 Special Requests: --     
  RIGHT 
HAND Culture result: NO GROWTH 5 DAYS     
 CULTURE, BLOOD [[id number]] Collected:  04/02/20 1723 Order Status:  Completed Specimen:  Blood Updated:  04/07/20 1238 Special Requests: --     
  LEFT Antecubital 
  
  Culture result: NO GROWTH 5 DAYS     
 EMERGENT DISEASE PANEL [[id number]] Collected:  04/07/20 0948 Order Status:  Completed Updated:  04/07/20 1025 SARS-COV-2 [[id number]] Collected:  04/02/20 0955 Order Status:  Completed Specimen:  Nasopharyngeal Updated:  04/07/20 0256 COVID-19 NOT DETECTED Comment: Testing Performed by: 
[address] [place] [address] Leny Garciaharesh 70 RESULTS SCANNED IN [facility] 
  
  
 CULTURE, RESPIRATORY/SPUTUM/BRONCH Lavada Gambles STAIN [[id number]]  (Abnormal) Collected:  04/03/20 0206 Order Status:  Completed Specimen:  Sputum from Endotracheal aspirate Updated:  04/07/20 6317 Special Requests: NO SPECIAL REQUESTS     
  GRAM STAIN 10 TO 30 WBCS/OIF  
   0 TO 1 EPITHELIAL CELLS/OIF  
   FEW YEAST 3+ MUCUS PRESENT Culture result: LIGHT CORKY ALBICANS     
      
  SCANT NORMAL RESPIRATORY CLARA  
     
 CULTURE, URINE [[id number]] Collected:  04/03/20 0221 Order Status:  Completed Specimen:  Urine from [place] Specimen Updated:  04/05/20 5351 Special Requests: NO SPECIAL REQUESTS Culture result: NO GROWTH 2 DAYS MSSA/MRSA SC BY PCR, NASAL SWAB [[id number]] Collected:  04/03/20 1345 Order Status:  Completed Specimen:  Nasal swab Updated:  04/04/20 1746 Special Requests: NO SPECIAL REQUESTS Culture result:    
  SA target not detected. A MRSA NEGATIVE, SA NEGATIVE test result does not preclude MRSA or SA nasal colonization. P.JIROVECI BY PCR [[id number]] Collected:  04/03/20 0206 Order Status:  Canceled RESPIRATORY VIRAL PANEL, PCR [[id number]] Collected:  04/02/20 1025 Order Status:  Canceled Specimen:  Sputum EMERGENT DISEASE PANEL [[id number]] Collected:  04/02/20 8259 Order Status:  Canceled RESPIRATORY VIRAL PANEL, PCR [[id number]] Collected:  03/31/20 2154 Order Status:  Canceled RESPIRATORY VIRAL PANEL, PCR [[id number]] Collected:  03/31/20 2147 Order Status:  Canceled Specimen:  Sputum CULTURE, BLOOD [[id number]] Order Status:  Canceled Specimen:  Blood CULTURE, BLOOD [[id number]] Order Status:  Canceled Specimen:  Blood Imaging:  
 
 
 
 
 
 
 
 
 
 
 
 
 
 
 
 
 
Signed By: Mely Ramesh MD   
 April 9, 2020

## 2020-04-09 NOTE — PROGRESS NOTES
Palliative Care Progress Note Patient: Caroline Fu MRN: [id number]  SSN: [id number] YOB: 1955  Age: 62 y.o. Sex: male Assessment/Plan: Chief Complaint/Interval History: pt remains on 100% O2 with sats in low 90s. desat overnight leading to wife being called in. Pt stabilized somewhat afterward. Remains in critical condition Principal Diagnosis: · Altered Mental Status R41.82 Additional Diagnoses: · Acute Respiratory Failure, Unspecified  J96.00 · Debility, Unspecified  R53.81 
· Frailty  R54 · Counseling, Encounter for Medical Advice  Z71.9 
· Encounter for Palliative Care  Z51.5 Palliative Performance Scale (PPS) 
 paralyzed and sedated. Medical Decision Making:  
Reviewed and summarized labs and imaging. Reached out and spoke with son. Updated on current condition and concerns including development of purpura. Family expressed understanding of critical condition. Pt is dnr but they do not wish to stop care currently. Will continue to follow and assist.  Poor prognosis Will discuss findings with members of the interdisciplinary team.   
 
More than 50% of this 25 minute visit was spent counseling and coordination of care as outlined above. Subjective:  
 
Review of Systems unable to obtain due to menation Objective:  
 
Visit Vitals BP (!) 137/91 Pulse (!) 124 Temp 98.3 °F (36.8 °C) Resp 24 Ht 5' 8\" (1.727 m) Wt 231 lb 4.2 oz (104.9 kg) SpO2 92% BMI 35.16 kg/m² Physical Exam: 
 
General:  No acute distress. Eyes:  Conjunctivae/corneas clear Nose: Nares normal. Septum midline. Neck: Supple, symmetrical, trachea midline, no JVD Lungs:   Crackles bilateral  
Heart:  Regular rate and rhythm, no murmur Abdomen:   Soft, non-tender, non-distended Extremities: Normal, atraumatic, no cyanosis or edema Skin: Purpura over abdomen Neurologic: paralyzed Psych: sedated Signed By: Viktoriya Johns MD   
 April 9, 2020

## 2020-04-09 NOTE — PROGRESS NOTES
Bedside shift change report given to Dewey RN and Redd Villalobos RN (oncoming nurse) by [facility] LAURA WATERMAN RN (offgoing nurse). Report included the following information SBAR, Kardex, Intake/Output, MAR, Recent Results, Cardiac Rhythm Sinus Tach and Alarm Parameters .

## 2020-04-09 NOTE — PROGRESS NOTES
Ventilator check complete; patient has a #8. 0 ET tube secured at the 24 at the lip. Patient is sedated and chemically paralyzed. Patient is not able to follow commands. Breath sounds are coarse. Trachea is midline, Negative for subcutaneous air, and chest excursion is symmetric. Patient is also Negative for cyanosis and is Negative for pitting edema. All alarms are set and audible. Resuscitation bag is at the head of the bed. Ventilator Settings Mode FIO2 Rate Tidal Volume Pressure PEEP I:E Ratio PRVC  100 %   26 500 ml     20 cm H20  1:1.1 Peak airway pressure: 45 cm H2O Minute ventilation: 11.7 l/min ABG: No results for input(s): PH, PCO2, PO2, HCO3 in the last 72 hours.  
 
 
Rubieajimy Bell, RT

## 2020-04-09 NOTE — PROGRESS NOTES
Pts HR maintaining in 130s. PRN lopressor 5mg IV given. MD [place] at bedside. Orders received to add hydralazine 10mg IV q6 PRN for SBP >170.

## 2020-04-09 NOTE — PROGRESS NOTES
[facility] Admission Date: 3/31/2020 Daily Progress Note: 4/9/2020 The patient's chart is reviewed and the patient is discussed with the staff. Mr Rupinder Ramires is a 62year old male with a history of HTN and asbestosis as well as Glioblastoma multiforme that was diagnosed in July of last year. He is followed by Dr Geoffrey Graham. He has undergone craniotomy with resection/XRT and he is now on Temodar. Last MRI 1/2020 showed no evidence of disease progression with subtle improvement of enhancement along margins of operative cavity and subtle improvement of perilesional mass effect. He is on decadron and prophylactic Bactrim. He had a virtual visit with Dr Geoffrey Graham on 3/27. He reported leg edema so was started on lasix 20 mg per day. He denied any fever or known exposures. He and his wife had been careful at home and reported \"self quarantining\".     
  Mr Rupinder Ramires presented to the ED on 3/31 with increasing shortness of breath. cough over the past several days and persistent leg edema. He was initially hypoxic on EMS arrival, with sats at 65% and improved to 93% on 4L NC. He also endorsed cough and ongoing leg swelling. While in the ED a CXR was done and showed an unchanged left lung base infiltrate. He was started on Rocephin and Zithromax and blood cultures done (still pending). A CT chest (PE protocol) was done which shows bilateral infiltrates. His proBNP was 966. A TTE was done and showed LVEF of 55-60% with a normal RV and RVSP 35 to 40 mmHg. He has received IV lasix and the fluid balance is now negative 2.2 liters. His oxygen needs have increased however and his is now on 10 liters. ABG -> 7.45/32/54/23. Covid 19 study is pending (ordered today).  
  
In room is awake and mildly confused. Reports is ok but not sure how he is breathing. Neck is large but reports has not been tested for ADRIANA.  Does note. He is on 15 LPM and was urinating and saturation in 70's to 80's. I placed non-rebreath and then up to 96% and comfortable. Took off and saturation still in 90's. Not in pain. 
  
Date of Admission:  3/31/2020 Intubated: 4/2 Required 100% FiO2. Flolan and paralytics added. Failed proning 4/7 with severe hypoxia. Subjective:  
 
Patient remains on FiO2 100% and high PEEP. Paralyzed and flolan 50. Did not tolerate proning. Suddenly desaturated into the low 80's yesterday with mild turning. Wife brought to bedside. However he recovered and in low 90's now. No improvement overnight. Ongoing purpura concentrates most over right upper chest.  
 
Current Facility-Administered Medications Medication Dose Route Frequency  metoprolol (LOPRESSOR) injection 5 mg  5 mg IntraVENous Q6H PRN  
 hydrALAZINE (APRESOLINE) 20 mg/mL injection 10 mg  10 mg IntraVENous Q6H PRN  
 cefepime (MAXIPIME) 2 g in 0.9% sodium chloride (MBP/ADV) 100 mL  2 g IntraVENous Q8H  
 vancomycin (VANCOCIN) 1500 mg in  ml infusion  1,500 mg IntraVENous Q12H  
 doxycycline (VIBRAMYCIN) 100 mg in 0.9% sodium chloride (MBP/ADV) 100 mL  100 mg IntraVENous Q12H  
 vecuronium (NORCURON) 100 mg/100 ml SWFI infusion  0-1.2 mcg/kg/min IntraVENous TITRATE  dexamethasone (DECADRON) 4 mg/mL injection 4 mg  4 mg IntraVENous Q8H  
 midazolam in normal saline (VERSED) 1 mg/mL infusion  0-10 mg/hr IntraVENous TITRATE  trimethoprim-sulfamethoxazole (BACTRIM) 700 mg in dextrose 5% 500 mL  700 mg IntraVENous Q8H  
 polyethylene glycol (MIRALAX) packet 17 g  17 g Oral BID  dexmedeTOMidine (PRECEDEX) 400 mcg in 0.9% sodium chloride 100 mL infusion  0.2-0.7 mcg/kg/hr IntraVENous TITRATE  propofol (DIPRIVAN) 10 mg/mL infusion  0-50 mcg/kg/min IntraVENous TITRATE  fentaNYL in normal saline (pf) 25 mcg/mL infusion  0-200 mcg/hr IntraVENous TITRATE  epoprostenol (FLOLAN) 30,000 ng/mL in diluent for epoprostenol (gly) 50 mL solution  10-50 ng/kg/min (Ideal) Nebulization TITRATE  
 0.9% sodium chloride for Flolan infusion  1.2-6.6 mL/hr Nebulization TITRATE  albuterol-ipratropium (DUO-NEB) 2.5 MG-0.5 MG/3 ML  3 mL Nebulization Q6H PRN  
 anidulafungin (ERAXIS) 100 mg in 0.9% sodium chloride 130 mL IVPB  100 mg IntraVENous Q24H  
 enoxaparin (LOVENOX) injection 40 mg  40 mg SubCUTAneous Q24H  
 levothyroxine (SYNTHROID) tablet 50 mcg  50 mcg Per G Tube ACB  acetaminophen (TYLENOL) tablet 650 mg  650 mg Per NG tube Q4H PRN  
 lansoprazole (PREVACID) 3 mg/mL oral suspension 30 mg  30 mg Per NG tube ACB  midazolam (VERSED) injection 2 mg  2 mg IntraVENous Q2H PRN  
 NUTRITIONAL SUPPORT ELECTROLYTE PRN ORDERS   Does Not Apply PRN  
 NOREPINephrine (LEVOPHED) 4 mg in 5% dextrose 250 mL infusion  0.5-30 mcg/min IntraVENous TITRATE  influenza vaccine 2019-20 (6 mos+)(PF) (FLUARIX/FLULAVAL/FLUZONE QUAD) injection 0.5 mL  0.5 mL IntraMUSCular PRIOR TO DISCHARGE  sodium chloride (NS) flush 5-40 mL  5-40 mL IntraVENous Q8H  
 sodium chloride (NS) flush 5-40 mL  5-40 mL IntraVENous PRN  
 ondansetron (ZOFRAN) injection 4 mg  4 mg IntraVENous Q4H PRN Review of Systems Unobtainable due to patient status. Objective:  
 
Vitals:  
 04/09/20 8131 04/09/20 0700 04/09/20 0720 04/09/20 2431 BP: 148/87 148/87 130/74 Pulse: (!) 120 (!) 122 (!) 122 (!) 120 Resp:  24  24 Temp:  98.3 °F (36.8 °C) SpO2: 92% 92%  92% Weight:      
Height:      
 
Intake and Output:  
04/07 1901 - 04/09 0700 In: 7587.9 [I.V.:5202.9] Out: 4800 [Urine:4800] No intake/output data recorded. Physical Exam:  
Constitution:  the patient is well developed and in no acute distress EENMT:  Sclera clear, pupils equal, oral mucosa moist 
Respiratory: Intubated. B inspiratory crackles. Cardiovascular:  RRR without M,G,R 
Gastrointestinal: soft and non-tender; with positive bowel sounds. Musculoskeletal: warm without cyanosis. There is 1+ lower leg edema. Skin:  Purpura over trunk, most concentrated near right shoulder. Neurologic: sedated Psychiatric:  sedated CHEST XRAY:  
4/7/20 1. Bilateral airspace disease. ET tube tip is in satisfactory position. 2. No significant change compared to prior exam. 
 
 
LAB No lab exists for component: [place] Recent Labs 04/09/20 
1879 04/08/20 
1116 04/07/20 
8829 WBC 10.4  --  6.7 HGB 9.5*  --  9.2* HCT 29.9*  --  28.8*  
  --  193 INR  --  1.1  --   
 
Recent Labs 04/09/20 
4245 04/08/20 
1973 04/07/20 
6227  139 144  
K 4.2 4.3 3.7 CL 99 102 109* CO2 29 29 27 * 199* 137* BUN 17 12 11 CREA 0.65* 0.74* 0.46* CA 8.7 9.0 8.4 ALB 1.9* 2.0* 2.0*  
SGOT 64* 56* 63* ABG:   
Lab Results Component Value Date/Time PHI 7.351 04/09/2020 03:44 AM  
 PCO2I 57.2 (H) 04/09/2020 03:44 AM  
 PO2I 93 04/09/2020 03:44 AM  
 HCO3I 31.6 (H) 04/09/2020 03:44 AM  
 FIO2I 100 04/09/2020 03:44 AM  
 
 
Assessment:  (Medical Decision Making) Hospital Problems  Date Reviewed: 3/27/2020 Codes Class Noted POA Purpura ([facility] ICD-10-CM: T59.2 ICD-9-CM: 287.2  4/8/2020 Unknown ARDS (adult respiratory distress syndrome) ([facility] ICD-10-CM: I11 
ICD-9-CM: 518.52  4/8/2020 Unknown Septic shock (HCC) ICD-10-CM: A41.9, R65.21 ICD-9-CM: 038.9, 785.52, 995.92  4/3/2020 Unknown Suspected Covid-19 Virus Infection ICD-10-CM: R68.89  4/2/2020 Yes Suspected sleep apnea ICD-10-CM: R29.818 ICD-9-CM: 781.99  4/2/2020 Unknown Morbid obesity ([facility] ICD-10-CM: E66.01 
ICD-9-CM: 278.01  4/2/2020 Unknown * (Principal) Acute hypoxemic respiratory failure ([facility] ICD-10-CM: J96.01 
ICD-9-CM: 518.81  3/31/2020 Unknown Transaminitis ICD-10-CM: R74.0 ICD-9-CM: 790.4  3/31/2020 Yes Hypertension (Chronic) ICD-10-CM: I10 
ICD-9-CM: 401.9  8/12/2019 Yes GBM (glioblastoma multiforme) (HCC) (Chronic) ICD-10-CM: C71.9 ICD-9-CM: 191.9  6/26/2019 Yes Asbestosis ([address] (Chronic) ICD-10-CM: U56 ICD-9-CM: 693  6/22/2019 Yes Overview Signed 6/25/2019  7:59 AM by Vicenta Raza, [address] [facility] Last Assessment & Plan:  
 Was Diagnosed in November Ludwin Schroeder Appears Asymptomatic Currently. Not Short of Breath Breathing Well. Patient with severe hypoxic respiratory failure, covid vs other viral pneumonias or PJP on the ddx. FiO2 back up to 100%. .Paralyzed. PEEP 16 and flolan at 50. Failed proning with severe drop in sats. Plan:  (Medical Decision Making)  
-cont flolan currently at 50ng/kg/min. PEEP 16. paralytics. -ongoing palliative discussions with wife. So far no improvement. -f/u heme input regarding his purpura. Mixed results on DIC labs. -high peak pressures on vent. Decrease Vt and increase RR.  
-hydroxychloroquine and azithro (completed) for covid coverage. First test negative but from questionable lab. Repeat test sent today. If second test is negative will likely bronch to send PJP. -ID following. Bactrim for PJP coverage. Eraxis. Added vanc and cefepime 4/8 for bacteremia coverage. -f/u all cultures 
-prevacid.  
-tolerating tube feedings. -vent day 7 (4/2) Critically ill patient with complicated medical issues and high risk of further decompensation. Total critical care time spent thus far (exclusive of procedures): 37 minutes Chris Handy MD

## 2020-04-09 NOTE — PROGRESS NOTES
Due to high suspicion of COVID in this patient and attempts to limit exposure, chart reviewed and case discussed with Dr Willam Rene by Dr Ricco Ruiz. This patient was initially seen by our group in consult for medication recommendations for glioblastoma s/p XRT, craniotomy/resection and on Temodar. We were reconsulted today due to elevated d-dimer after physical exam revealed purpura on thorax. No evidence of DIC given normal platelet count, elevated fibrinogen, and INR/PT/PTT unremarkable. Elevated d-dimer likely related to ongoing illness. Patient on Lovenox prophylactically, recommend continuing. Given recent CT without evidence of PE would recommend BLE dopplers. Continue aggressive care per critical care recommendations. ISIDRO Jose [facility] [id number] [place] [address] Office : [phone] Fax : [phone]

## 2020-04-09 NOTE — PROGRESS NOTES
Bedside and Verbal shift change report given to [employer] (oncoming nurse) by Sebastian Gonsalez RN (offgoing nurse). Report included the following information SBAR, Kardex, ED Summary, Intake/Output, MAR, Recent Results, Cardiac Rhythm ST and Alarm Parameters . Fentanyl, norcuron and versed gtt dual verified

## 2020-04-09 NOTE — PROGRESS NOTES
Phone call received that second covid test back and negative. His initial presentation was with increased LE edema. Creatinine normal. TTE unimpressive. On broad abx coverage. Awaiting respiratory viral PCR. Will start on 40mg IV lasix daily, attempt to diurese as much as he will tolerate.   
 
Mohit Sotelo MD

## 2020-04-10 NOTE — PROGRESS NOTES
Bedside, Verbal and Written shift change report given to [facility] OPHELIA  (oncoming nurse) by Pop Rosa RN  (offgoing nurse). Report included the following information SBAR, Kardex, Intake/Output, MAR, Recent Results, Cardiac Rhythm NSR/sinus tach and Alarm Parameters . Dual verified gtts.

## 2020-04-10 NOTE — PROGRESS NOTES
Ventilator check complete; patient has a #8. 0 ET tube secured at the 24 at the lip. Patient is sedated. Patient is not able to follow commands. Breath sounds are diminished. Trachea is midline, Negative for subcutaneous air, and chest excursion is symmetric. Patient is also Negative for cyanosis and is Positive for pitting edema. All alarms are set and audible. Resuscitation bag is at the head of the bed. Ventilator Settings Mode FIO2 Rate Tidal Volume Pressure PEEP I:E Ratio PRVC  100 % 32 400 ml     18 cm H20  1:1.1 Peak airway pressure: 37 cm H2O Minute ventilation: 12.9 l/min [place], RT

## 2020-04-10 NOTE — PROGRESS NOTES
Patient O2 consistently saturation 88-90%. Dr. Raz Santos updated on patient status. No new orders received.

## 2020-04-10 NOTE — PROGRESS NOTES
Ventilator check complete; patient has a #8. 0 ET tube secured at the 24 at the lip. Patient is not able to follow commands. Breath sounds are coarse. Trachea is midline, Negative for subcutaneous air, and chest excursion is symmetric. Patient is also Negative for cyanosis and is Negative for pitting edema. All alarms are set and audible. Resuscitation bag is at the head of the bed. Ventilator Settings Mode FIO2 Rate Tidal Volume Pressure PEEP I:E Ratio PRVC  100 %   32 400 ml     18 cm H20  1:1.1 Peak airway pressure: 39 cm H2O Minute ventilation: 12.5 l/min ABG: No results for input(s): PH, PCO2, PO2, HCO3 in the last 72 hours.  
 
 
Shonda Shirley, RT

## 2020-04-10 NOTE — PROGRESS NOTES
Ventilator check complete; patient has a #8. 0 ET tube secured at the 24 at the lip. Patient is sedated. Patient is not able to follow commands. Breath sounds are diminished. Trachea is midline, Negative for subcutaneous air, and chest excursion is symmetric. Patient is also Negative for cyanosis and is Positive for pitting edema. All alarms are set and audible. Resuscitation bag is at the head of the bed. Ventilator Settings Mode FIO2 Rate Tidal Volume Pressure PEEP I:E Ratio PRVC  100 %   28 400 ml     18 cm H20  1:1.1 Peak airway pressure: 38 cm H2O Minute ventilation: 11.2 l/min ABG: No results for input(s): PH, PCO2, PO2, HCO3 in the last 72 hours. Gloria Willis

## 2020-04-10 NOTE — PROGRESS NOTES
Infectious Disease Progress Note Today's Date: 4/10/2020 Admit Date: 3/31/2020 Impression: · Acute respiratory failure with rapid deterioration. He is still requiring escalating support. Patient on chemo and steroids for treatment glioblastoma prior to admission. Has been on Bactrim for PCP prophylaxis. PJP PCR 4/3 was not run by [employer]; quantity insufficient. Resent 4/9 and trying get a silver stain done. Galactomannan negative. Sputum culture with normal respiratory failure and Candida. Legionella antigen pending. Respiratory viral panel sent 4/8: pending. Remains intubated: Remains on Flolan and high Fi02 as well as paralytics. No fever/on steroids. · Glioblastoma multiforme on chem and steroids as outpatient; on Bactrim prophylaxis as outpatient. Plan: · Completed course of  Plaquenil and Azithromycin 4/7. COVID negative x 2 tests. · Continue IV Bactrim 20 mg/kg/d and steroids for PCP treatment. K and renal function stable. · Continue current Decadron dose for 5 days to 4 mg IV q 8 hours then will drop down. Not known diabetic. Day 4 of 5; then drop to 6 mg Decadron IV daily. I have put in orders for weekend for this. · Follow Respiratory panel PCR and atypical serologies and repeat PCP testing. · Continue Vanc/cefepime/micafungiin and doxycyline for broad bacterial and fungal coverage in addition PCP coverage. Little more to offer. Palliative discussions ongoing. · MRSA/MSSA swab nares negative on 4/3. · Anti-infectives:  
 
· Bactrim 3x week · Atovaquone 4/2-4/7 · Ceftriaxone (4/2 -4/6) · Azithromycin (4/2 -4/6) · Plaquenil (4/2 -4/4) · Eraxis 4/2- 
· IV Bactrim 4/7- · Cefepime 4/8- · Vanc 4/8- 
· Doxy 4/8- Subjective:  
Remains intubated in ICU; moved from [facility]. Remains on sedation and paralytic but off pressors. 100% Fi02 18 PEEP. Has become consistently tachycardic. RR setting on vent increased this AM. Afebrile. Not doing well per discussions with critical care. Objective:  
 
Visit Vitals /70 Pulse (!) 112 Temp 98.6 °F (37 °C) Resp (!) 32 Ht 5' 8\" (1.727 m) Wt 104.9 kg (231 lb 4.2 oz) SpO2 (!) 89% BMI 35.16 kg/m² Temp (24hrs), Av.1 °F (36.7 °C), Min:97.5 °F (36.4 °C), Max:98.6 °F (37 °C) Lines:  R Arterial Line:  R IJ, parks, OG, ET Physical Exam: Exam:   
 General: NC/AT, sedated,  looks stated age HEENT: PERRL, non-icteric sclera, no lip lesions, conjunctiva pink Neck: supple, symmetric Cardiac:Nl S1/S2, no murmurs/rubs/gallops/clicks, 2+ LE edema Pulmonary:deeper breaths, clear, no rales/wheezes,  Fair air movement Abdomen: soft, NT, firm, not clearly tender, BS quiet Gentital:scrotal edema Parks : yes Skin:no rashes, ecchymosis seems  stable, petechia large but  Same distribution today MSK:no enlarged or swollen joints in limbs or sternoclavicular area Extremities: no cords/clots/cyanosis, pulses palpable and symmetric Psychiatric: mood and affect appropriate to situation Physical exam is performed in entirety daily by myself and reviewed when copied from colleague. Changes are made as reflected on daily exam. If no changes are made this reflects that exam is performed and unchanged. Data Review: CBC:  
Recent Labs 04/10/20 
0319 20 
1619 WBC 10.8 10.4 RBC 2.74* 2.92* HGB 8.9* 9.5* HCT 28.8* 29.9*  
 206 CMP:  
Recent Labs 04/10/20 
9183 20 
5778 20 
6761 * 203* 199*  136 139  
K 4.9 4.2 4.3  99 102 CO2 33* 29 29 BUN 26* 17 12 CREA 0.69* 0.65* 0.74* CA 9.1 8.7 9.0 AGAP 5* 8 8  139* 145* TP 6.0* 6.1* 6.4 ALB 2.0* 1.9* 2.0*  
GLOB 4.0* 4.2* 4.4* AGRAT 0.5* 0.5* 0.5* Liver Enzymes:  
Recent Labs 04/10/20 
0319 TP 6.0* ALB 2.0*  
 SGOT 62* ABGs: No results for input(s): PH, PCO2, PO2, HCO3 in the last 72 hours. Inflammation studies: No results found for: SR, ESRA, CRP Microbiology:  
 
All Micro Results Procedure Component Value Units Date/Time P.JIROVECI BY PCR [[id number]] Collected:  04/09/20 1431 Order Status:  Completed Updated:  04/09/20 1500 EMERGENT DISEASE PANEL [[id number]] Collected:  04/07/20 8560 Order Status:  Completed Specimen:  Not Specified Updated:  04/09/20 1020 Emergent disease panel Not detected Comment: PERFORMED BY [facility] RESULTS SCANNED IN [facility] RESULTS VERIFIED, PHONED TO AND READ BACK BY 
Marco Barajas RN @08 4/9/20 HF 
  
  
 LEGIONELLA PNEUMOPHILA AG, URINE [[id number]] Collected:  04/08/20 1652 Order Status:  Completed Specimen:  Urine Updated:  04/08/20 1705 RESPIRATORY VIRAL PANEL, PCR [[id number]] Collected:  04/08/20 1545 Order Status:  Completed Specimen:  Sputum Updated:  04/08/20 1658 CULTURE, BLOOD [[id number]] Collected:  04/02/20 1439 Order Status:  Completed Specimen:  Blood Updated:  04/07/20 1238 Special Requests: --     
  RIGHT 
HAND Culture result: NO GROWTH 5 DAYS     
 CULTURE, BLOOD [[id number]] Collected:  04/02/20 4841 Order Status:  Completed Specimen:  Blood Updated:  04/07/20 1238 Special Requests: --     
  LEFT Antecubital 
  
  Culture result: NO GROWTH 5 DAYS     
 SARS-COV-2 [[id number]] Collected:  04/02/20 0955 Order Status:  Completed Specimen:  Nasopharyngeal Updated:  04/07/20 3895 COVID-19 NOT DETECTED Comment: Testing Performed by: 
[address] [place] [address] Maira Garcia 71 RESULTS SCANNED IN [facility] 
  
  
 CULTURE, RESPIRATORY/SPUTUM/BRONCH Tera Mayer STAIN [[id number]]  (Abnormal) Collected:  04/03/20 0206 Order Status:  Completed Specimen:  Sputum from Endotracheal aspirate Updated:  04/07/20 9576 Special Requests: NO SPECIAL REQUESTS     
  GRAM STAIN 10 TO 30 WBCS/OIF  
   0 TO 1 EPITHELIAL CELLS/OIF  
   FEW YEAST 3+ MUCUS PRESENT Culture result: LIGHT CORKY ALBICANS     
      
  SCANT NORMAL RESPIRATORY CLARA  
     
 CULTURE, URINE [[id number]] Collected:  04/03/20 0221 Order Status:  Completed Specimen:  Urine from [place] Specimen Updated:  04/05/20 0816 Special Requests: NO SPECIAL REQUESTS Culture result: NO GROWTH 2 DAYS     
 MSSA/MRSA SC BY PCR, NASAL SWAB [[id number]] Collected:  04/03/20 1345 Order Status:  Completed Specimen:  Nasal swab Updated:  04/04/20 1746 Special Requests: NO SPECIAL REQUESTS Culture result:    
  SA target not detected. A MRSA NEGATIVE, SA NEGATIVE test result does not preclude MRSA or SA nasal colonization. P.JIROVECI BY PCR [[id number]] Collected:  04/03/20 0206 Order Status:  Canceled RESPIRATORY VIRAL PANEL, PCR [[id number]] Collected:  04/02/20 1025 Order Status:  Canceled Specimen:  Sputum EMERGENT DISEASE PANEL [[id number]] Collected:  04/02/20 3250 Order Status:  Canceled RESPIRATORY VIRAL PANEL, PCR [[id number]] Collected:  03/31/20 2154 Order Status:  Canceled RESPIRATORY VIRAL PANEL, PCR [[id number]] Collected:  03/31/20 2147 Order Status:  Canceled Specimen:  Sputum CULTURE, BLOOD [[id number]] Order Status:  Canceled Specimen:  Blood CULTURE, BLOOD [[id number]] Order Status:  Canceled Specimen:  Blood Imaging:  
 
 
 
 
 
 
 
 
 
 
 
 
 
 
 
 
 
Signed By: Deepa Faustin MD   
 April 10, 2020

## 2020-04-10 NOTE — PROGRESS NOTES
Donna Torres Admission Date: 3/31/2020 Daily Progress Note: 4/10/2020 The patient's chart is reviewed and the patient is discussed with the staff. Mr Rufino Montiel is a 62year old male with a history of HTN and asbestosis as well as Glioblastoma multiforme that was diagnosed in July of last year. He is followed by Dr Reji Sheikh. He has undergone craniotomy with resection/XRT and he is now on Temodar. Last MRI 1/2020 showed no evidence of disease progression with subtle improvement of enhancement along margins of operative cavity and subtle improvement of perilesional mass effect. He is on decadron and prophylactic Bactrim. He had a virtual visit with Dr Reji Sheikh on 3/27. He reported leg edema so was started on lasix 20 mg per day. He denied any fever or known exposures. He and his wife had been careful at home and reported \"self quarantining\".     
  Mr Rufino Montiel presented to the ED on 3/31 with increasing shortness of breath. cough over the past several days and persistent leg edema. He was initially hypoxic on EMS arrival, with sats at 65% and improved to 93% on 4L NC. He also endorsed cough and ongoing leg swelling. While in the ED a CXR was done and showed an unchanged left lung base infiltrate. He was started on Rocephin and Zithromax and blood cultures done (still pending). A CT chest (PE protocol) was done which shows bilateral infiltrates. His proBNP was 966. A TTE was done and showed LVEF of 55-60% with a normal RV and RVSP 35 to 40 mmHg. He has received IV lasix and the fluid balance is now negative 2.2 liters. His oxygen needs have increased however and his is now on 10 liters. ABG -> 7.45/32/54/23. Covid 19 study is pending (ordered today).  
  
In room is awake and mildly confused. Reports is ok but not sure how he is breathing. Neck is large but reports has not been tested for ADRIANA.  Does note. He is on 15 LPM and was urinating and saturation in 70's to 80's. I placed non-rebreath and then up to 96% and comfortable. Took off and saturation still in 90's. Not in pain. 
  
Date of Admission:  3/31/2020 Intubated: 4/2 Required 100% FiO2. Flolan and paralytics added. Failed proning 4/7 with severe hypoxia. Subjective:  
 
Patient remains on FiO2 100% and high PEEP. Paralyzed. Unfortunately his flolan was stopped to transport him from chest pain unit to ICU and not restarted. Currently satting in the 70's. Current Facility-Administered Medications Medication Dose Route Frequency  [START ON 4/12/2020] dexamethasone (DECADRON) injection 6 mg  6 mg IntraVENous DAILY  metoprolol (LOPRESSOR) injection 5 mg  5 mg IntraVENous Q6H PRN  
 hydrALAZINE (APRESOLINE) 20 mg/mL injection 10 mg  10 mg IntraVENous Q6H PRN  
 furosemide (LASIX) injection 40 mg  40 mg IntraVENous DAILY  sulfamethoxazole-trimethoprim (BACTRIM;SEPTRA) 200-40 mg/5 mL oral suspension 60 mL  60 mL Per G Tube Q6H  
 cefepime (MAXIPIME) 2 g in 0.9% sodium chloride (MBP/ADV) 100 mL  2 g IntraVENous Q8H  
 vancomycin (VANCOCIN) 1500 mg in  ml infusion  1,500 mg IntraVENous Q12H  
 doxycycline (VIBRAMYCIN) 100 mg in 0.9% sodium chloride (MBP/ADV) 100 mL  100 mg IntraVENous Q12H  
 vecuronium (NORCURON) 100 mg/100 ml SWFI infusion  0-1.2 mcg/kg/min IntraVENous TITRATE  dexamethasone (DECADRON) 4 mg/mL injection 4 mg  4 mg IntraVENous Q8H  
 midazolam in normal saline (VERSED) 1 mg/mL infusion  0-10 mg/hr IntraVENous TITRATE  polyethylene glycol (MIRALAX) packet 17 g  17 g Oral BID  dexmedeTOMidine (PRECEDEX) 400 mcg in 0.9% sodium chloride 100 mL infusion  0.2-0.7 mcg/kg/hr IntraVENous TITRATE  propofol (DIPRIVAN) 10 mg/mL infusion  0-50 mcg/kg/min IntraVENous TITRATE  fentaNYL in normal saline (pf) 25 mcg/mL infusion  0-200 mcg/hr IntraVENous TITRATE  epoprostenol (FLOLAN) 30,000 ng/mL in diluent for epoprostenol (gly) 50 mL solution  10-50 ng/kg/min (Ideal) Nebulization TITRATE  
 0.9% sodium chloride for Flolan infusion  1.2-6.6 mL/hr Nebulization TITRATE  albuterol-ipratropium (DUO-NEB) 2.5 MG-0.5 MG/3 ML  3 mL Nebulization Q6H PRN  
 anidulafungin (ERAXIS) 100 mg in 0.9% sodium chloride 130 mL IVPB  100 mg IntraVENous Q24H  
 enoxaparin (LOVENOX) injection 40 mg  40 mg SubCUTAneous Q24H  
 levothyroxine (SYNTHROID) tablet 50 mcg  50 mcg Per G Tube ACB  acetaminophen (TYLENOL) tablet 650 mg  650 mg Per NG tube Q4H PRN  
 lansoprazole (PREVACID) 3 mg/mL oral suspension 30 mg  30 mg Per NG tube ACB  midazolam (VERSED) injection 2 mg  2 mg IntraVENous Q2H PRN  
 NUTRITIONAL SUPPORT ELECTROLYTE PRN ORDERS   Does Not Apply PRN  
 NOREPINephrine (LEVOPHED) 4 mg in 5% dextrose 250 mL infusion  0.5-30 mcg/min IntraVENous TITRATE  influenza vaccine 2019-20 (6 mos+)(PF) (FLUARIX/FLULAVAL/FLUZONE QUAD) injection 0.5 mL  0.5 mL IntraMUSCular PRIOR TO DISCHARGE  sodium chloride (NS) flush 5-40 mL  5-40 mL IntraVENous Q8H  
 sodium chloride (NS) flush 5-40 mL  5-40 mL IntraVENous PRN  
 ondansetron (ZOFRAN) injection 4 mg  4 mg IntraVENous Q4H PRN Review of Systems Unobtainable due to patient status. Objective:  
 
Vitals:  
 04/10/20 1030 04/10/20 1045 04/10/20 1100 04/10/20 1118 BP:      
Pulse: (!) 113 (!) 114 (!) 114 Resp: (!) 32 (!) 32 (!) 32 Temp:    98.8 °F (37.1 °C) SpO2: (!) 84% (!) 84% (!) 84% Weight:      
Height:      
 
Intake and Output:  
04/08 1901 - 04/10 0700 In: 6289.9 [I.V.:4776.9] Out: 9032 David Patel [PYVHB:[id number]] 04/10 0701 - 04/10 1900 In: -  
Out: 400 [Urine:400] Physical Exam:  
Constitution:  the patient is well developed and in no acute distress EENMT:  Sclera clear, pupils equal, oral mucosa moist 
Respiratory: Intubated. B inspiratory crackles.   
Cardiovascular:  RRR without M,G,R 
 Gastrointestinal: soft and non-tender; with positive bowel sounds. Musculoskeletal: warm without cyanosis. There is 1+ lower leg edema. Skin:  Purpura over trunk, most concentrated near right shoulder. Neurologic: sedated Psychiatric:  sedated CHEST XRAY:  
4/9/20 IMPRESSION: Stable diffuse reticular nodular interstitial pattern unchanged from 
the prior exam. 
 
 
 
LAB No lab exists for component: [place] Recent Labs 04/10/20 
0319 04/09/20 
9135 04/08/20 
1116 WBC 10.8 10.4  --   
HGB 8.9* 9.5*  --   
HCT 28.8* 29.9*  --   
 206  --   
INR  --   --  1.1 Recent Labs 04/10/20 
4080 04/09/20 
7478 04/08/20 
9825  136 139  
K 4.9 4.2 4.3  99 102 CO2 33* 29 29 * 203* 199* BUN 26* 17 12 CREA 0.69* 0.65* 0.74* CA 9.1 8.7 9.0 ALB 2.0* 1.9* 2.0*  
SGOT 62* 64* 56* ABG:   
Lab Results Component Value Date/Time PHI 7.282 (L) 04/10/2020 06:08 AM  
 PCO2I 75.7 (HH) 04/10/2020 06:08 AM  
 PO2I 67 (L) 04/10/2020 06:08 AM  
 HCO3I 35.7 (H) 04/10/2020 06:08 AM  
 FIO2I 100 04/10/2020 06:08 AM  
 
 
Assessment:  (Medical Decision Making) Hospital Problems  Date Reviewed: 3/27/2020 Codes Class Noted POA Purpura ([facility] ICD-10-CM: K74.6 ICD-9-CM: 287.2  4/8/2020 Unknown ARDS (adult respiratory distress syndrome) ([facility] ICD-10-CM: W44 
ICD-9-CM: 518.52  4/8/2020 Unknown Septic shock (HCC) ICD-10-CM: A41.9, R65.21 ICD-9-CM: 038.9, 785.52, 995.92  4/3/2020 Unknown Suspected Covid-19 Virus Infection ICD-10-CM: R68.89  4/2/2020 Yes Suspected sleep apnea ICD-10-CM: R29.818 ICD-9-CM: 781.99  4/2/2020 Unknown Morbid obesity ([facility] ICD-10-CM: E66.01 
ICD-9-CM: 278.01  4/2/2020 Unknown * (Principal) Acute hypoxemic respiratory failure ([facility] ICD-10-CM: J96.01 
ICD-9-CM: 518.81  3/31/2020 Unknown Transaminitis ICD-10-CM: R74.0 ICD-9-CM: 790.4  3/31/2020 Yes  Hypertension (Chronic) ICD-10-CM: I10 
 ICD-9-CM: 401.9  8/12/2019 Yes GBM (glioblastoma multiforme) (HCC) (Chronic) ICD-10-CM: C71.9 ICD-9-CM: 191.9  6/26/2019 Yes Asbestosis ([address] (Chronic) ICD-10-CM: A43 ICD-9-CM: 359  6/22/2019 Yes Overview Signed 6/25/2019  7:59 AM by Dayanara Malin, [address] [facility] Last Assessment & Plan:  
 Was Diagnosed in November Sharlene Ou Appears Asymptomatic Currently. Not Short of Breath Breathing Well. Patient with severe hypoxic respiratory failure, covid vs other viral pneumonias or PJP on the ddx. FiO2 100%, paralyzed, PEEP 16. Was on flolan 50 but somehow stopped for transport to ICU. Failed proning with severe drop in sats. Plan:  (Medical Decision Making)  
-need to restart flolan at 50ng/kg/min. Cont PEEP 16. paralytics. -ongoing palliative discussions with wife. So far no improvement and do not expect significant change. He is DNR but so far family wants ongoing intense measures. -heme onc commented on his elevated D dimer. Purpura seems stable.  
-high peak pressures on vent. Some better with reduction in Vt.  
-hydroxychloroquine and azithro (completed) for covid coverage. Covid test negative x 2. Would like to bronch for PJP check but too hypoxic at present. -ID following. Bactrim for PJP coverage. Eraxis. Added vanc and cefepime 4/8 for bacteremia coverage. -f/u all cultures 
-prevacid.  
-tolerating tube feedings. -vent day 8 (4/2) Critically ill patient with complicated medical issues and high risk of further decompensation. Total critical care time spent thus far (exclusive of procedures): 35 minutes Marin Perez MD

## 2020-04-10 NOTE — PROGRESS NOTES
Nutrition F/U: 
TF Management for [facility] Pulmonary. Assessment: 
Weight 104.9 kg (ICU bed 4/8/2020), edema - 2+ generalized. The patient remains intubated, ventilated, sedated and TF-dependent. TF is currently infusing @ 45 ml/hr (goal rate 55 ml/hr). Good GI tolerance is reported with low gastric residuals reported, however no bowel movement has been reported yet. Miralax twice daily was started on 4/6. Labs are remarkable for AM glucose 157 (on Decadron), potassium 4.9, BUN 26 and creatinine 0.69. Noted decline in status over the last few days with the patient now a DNR and the wife notified of his poor condition. Enteral Access: 
 OGT. Macronutrient Needs: (95.3 kg Listed body weight) Estimated energy requirements:  7928-4900 kcal /day (15-20 kcal/kg) Estimated protein requirements:  114-191 grams protein/day (1.2-2 grams/kg) CHO limit per day: 262 grams CHO/day (55% calories) Estimated fluid/day: 1.4-1.9 liters/day (~1ml/kcal/day) Intake/Comparative Standards: 
Current intake of TF (Vital AF 1.2 @ 45 ml/hr with a 25 ml/hr water flush and 2 pouches ProSource daily) provides 1376 calories/day (96% calorie goal), 103 grams protein/day (90% protein goal), 120 grams CHO/day (does not exceed max CHO limit) and 1475 ml water/day (88% fluid goal). Intervention:  
Meals and Snacks: NPO. Enteral Nutrition: Cont to advance TF to its goal rate as tolerated. Mineral Supplement Therapy: Nutrition Support Orders/Electrolyte Management Replacement Protocols are active on the MAR. Coordination of Nutrition Care by a Nutrition Professional: AM ICU rounds, collaboration with [facility]. RN. Nutrition Discharge Plan: Too soon to determine. Preston Manuel 
[phone]

## 2020-04-10 NOTE — PROGRESS NOTES
Palliative Care Progress Note Patient: Blaine Rodriguez MRN: [id number]  SSN: [id number] YOB: 1955  Age: 62 y.o. Sex: male Assessment/Plan: Chief Complaint/Interval History: pt remains on 100% O2 sedated, paralyzed. Difficulty ventilating overnight as now with hypercarbia. Principal Diagnosis: · Altered Mental Status R41.82 Additional Diagnoses: · Acute Respiratory Failure, Unspecified  J96.00 · Debility, Unspecified  R53.81 
· Frailty  R54 · Counseling, Encounter for Medical Advice  Z71.9 
· Encounter for Palliative Care  Z51.5 Palliative Performance Scale (PPS) 
 paralyzed and sedated. Medical Decision Making:  
Reviewed and summarized labs and imaging. Reached pt spouse via phone. Updated on current condition. Explained that pt is too unstable to perform bronchoscopy currently. Discussed that pt condition has continued to decline. Wife with questions about what stopping ventilator would be like. Reviewed process of compassionate extubation and explained she would be given opportunity to be present. Wife is tearful and states she just cannot bring herself to stop ongoing care. Acknowledged difficulties with these decisions. poor prognosis as maximum ventilator treatment appears to be failing. Will discuss findings with members of the interdisciplinary team.   
 
More than 50% of this 25 minute visit was spent counseling and coordination of care as outlined above. Subjective:  
 
Review of Systems unable to obtain due to menation Objective:  
 
Visit Vitals /70 Pulse (!) 113 Temp 98.6 °F (37 °C) Resp (!) 32 Ht 5' 8\" (1.727 m) Wt 231 lb 4.2 oz (104.9 kg) SpO2 (!) 83% BMI 35.16 kg/m² Physical Exam: 
 
General:  No acute distress. Eyes:  Conjunctivae/corneas clear Nose: Nares normal. Septum midline. Neck: Supple, symmetrical, trachea midline, no JVD Lungs:   Crackles bilateral  
 Heart:  Regular rate and rhythm, no murmur Abdomen:   Soft, non-tender, non-distended Extremities: Normal, atraumatic, no cyanosis or edema Skin: Purpura over abdomen Neurologic: paralyzed Psych: sedated Signed By: Abhi Guzman MD   
 April 10, 2020

## 2020-04-10 NOTE — PROGRESS NOTES
Chart reviewed and pt discussed in am IDR,  as pt remains ICU. Covid negative per MD notes. Pt is intubated/vent and in critical condition with poor prognosis per MD notes. Palliative care following.  CM will be available for any assist.

## 2020-04-10 NOTE — PROGRESS NOTES
Pharmacokinetic Consult to Pharmacist 
 
Justina Ruiz is a 62 y.o. male being treated for sepsis of unknown origin with vancomycin. Height: 5' 8\" (172.7 cm)  Weight: 104.9 kg (231 lb 4.2 oz) Lab Results Component Value Date/Time BUN 26 (H) 04/10/2020 03:19 AM  
 Creatinine 0.69 (L) 04/10/2020 03:19 AM  
 WBC 10.8 04/10/2020 03:19 AM  
 Procalcitonin 0.14 04/02/2020 06:51 AM  
 Lactic Acid (POC) 0.80 08/12/2019 07:12 PM  
  
Estimated Creatinine Clearance: 126.8 mL/min (A) (by C-G formula based on SCr of 0.69 mg/dL (L)). Lab Results Component Value Date/Time Vancomycin,trough 17.7 04/10/2020 03:19 AM  
 
Day 3 of vancomycin. Goal trough is 15-20. Vancomycin trough therapeutic at 17.7, will continue 1500 mg q12h for now. Will continue to follow patient and order levels when clinically indicated. Thank you, Pepito Evans, PharmD, BCPS Clinical Pharmacy Specialist 
[phone]

## 2020-04-10 NOTE — PROGRESS NOTES
Bedside shift change report given to [facility], RN (oncoming nurse) by Megan RN (offgoing nurse). Report included the following information SBAR, Kardex, ED Summary, Intake/Output, MAR, Recent Results, Med Rec Status, Cardiac Rhythm ST, Alarm Parameters  and Quality Measures. Gtt's verified fentanyl and versed

## 2020-04-10 NOTE — PROGRESS NOTES
Bedside shift change report given to Tay Thorne RN (oncoming nurse) by Nina Kaiser RN (offgoing nurse). Report included the following information SBAR, Kardex, ED Summary, Procedure Summary, Intake/Output, MAR, Recent Results, Med Rec Status, Cardiac Rhythm ST, Alarm Parameters  and Quality Measures. Fentanyl and versed gtt's verified Pt repositioned Pt on vent 100% fiO2  Set rate of 28 Will continue to monitor pt condition

## 2020-04-11 NOTE — PROGRESS NOTES
Given ongoing COVID pandemic and in an effort to limit exposure, pt chart reviewed and case d/w pulmonology. D dimer elevated however recent contrasted Ct chest without PE. PT/PTT and Fibrinogen pointing against over DIC. Certainly b/l LE dopplers could be considered for completion. Elevated D dimer likely related to current acute illness. Already on enoxaparin for DVT prophylaxis. Purpura over chest being monitored w/ further w/u to be considered if recovers from current critical illness.

## 2020-04-11 NOTE — ROUTINE PROCESS
Ventilator check complete; patient has a #8. 0 ET tube secured at the 24 at the lip. Breath sounds are coarse and diminished. Trachea is midline, Negative for subcutaneous air, and chest excursion is symmetric. Patient is also Negative for cyanosis and is Positive for pitting edema. All alarms are set and audible. Resuscitation bag is at the head of the bed. Ventilator Settings Mode FIO2 Rate Tidal Volume Pressure PEEP I:E Ratio PRVC  100 %    400 ml     18 cm H20  1:2.1 Peak airway pressure: 37 cm H2O Minute ventilation: 12.7 l/min ABG: No results for input(s): PH, PCO2, PO2, HCO3 in the last 72 hours.  
 
 
Liliya Weston, RT

## 2020-04-11 NOTE — PROGRESS NOTES
Cris Masters Admission Date: 3/31/2020 Daily Progress Note: 4/11/2020 The patient's chart is reviewed and the patient is discussed with the staff. Mr Hermes Bello is a 62year old male with a history of HTN and asbestosis as well as Glioblastoma multiforme that was diagnosed in July of last year. He is followed by Dr Elijah Goel. He has undergone craniotomy with resection/XRT and he is now on Temodar. Last MRI 1/2020 showed no evidence of disease progression with subtle improvement of enhancement along margins of operative cavity and subtle improvement of perilesional mass effect. He is on decadron and prophylactic Bactrim. He had a virtual visit with Dr Elijah Goel on 3/27. He reported leg edema so was started on lasix 20 mg per day. He denied any fever or known exposures. He and his wife had been careful at home and reported \"self quarantining\".     
  Mr Hermes Bello presented to the ED on 3/31 with increasing shortness of breath. cough over the past several days and persistent leg edema. He was initially hypoxic on EMS arrival, with sats at 65% and improved to 93% on 4L NC. He also endorsed cough and ongoing leg swelling. While in the ED a CXR was done and showed an unchanged left lung base infiltrate. He was started on Rocephin and Zithromax and blood cultures done (still pending). A CT chest (PE protocol) was done which shows bilateral infiltrates. His proBNP was 966. A TTE was done and showed LVEF of 55-60% with a normal RV and RVSP 35 to 40 mmHg. He has received IV lasix and the fluid balance is now negative 2.2 liters. His oxygen needs have increased however and his is now on 10 liters. ABG -> 7.45/32/54/23. Covid 19 study is pending (ordered today).  
  
In room is awake and mildly confused. Reports is ok but not sure how he is breathing. Neck is large but reports has not been tested for ADRIANA.  Does note. He is on 15 LPM and was urinating and saturation in 70's to 80's. I placed non-rebreath and then up to 96% and comfortable. Took off and saturation still in 90's. Not in pain. 
  
Date of Admission:  3/31/2020 Intubated: 4/2 Required 100% FiO2. Flolan and paralytics added. Failed proning 4/7 with severe hypoxia. Subjective:  
 
Patient remains on FiO2 100% and high PEEP. Paralyzed. FLolan restarted yesterday. Sats in the mid 80's. Family discussion held in person with wife and children yesterday outlining the severity of his condition, the likelihood that he would not survive. Addressed many of their concerns surrounding the limitation of visitation given the covid 19 situation and their frustrations with that. Outlined the uncertain nature of his lung disease. Early on suspected covid but testing x 2 negative. PJP high on the ddx but currently too unstable for bronch with BAL. PJP test from endotracheal aspirate sent 4/9 but still pending. Current Facility-Administered Medications Medication Dose Route Frequency  polyethylene glycol (MIRALAX) packet 17 g  17 g Per NG tube BID  [START ON 4/12/2020] dexamethasone (DECADRON) injection 6 mg  6 mg IntraVENous DAILY  white petrolatum-mineral oiL (AKWA TEARS) 83-15 % ophthalmic ointment   Both Eyes Q4H  
 metoprolol (LOPRESSOR) injection 5 mg  5 mg IntraVENous Q6H PRN  
 hydrALAZINE (APRESOLINE) 20 mg/mL injection 10 mg  10 mg IntraVENous Q6H PRN  
 furosemide (LASIX) injection 40 mg  40 mg IntraVENous DAILY  sulfamethoxazole-trimethoprim (BACTRIM;SEPTRA) 200-40 mg/5 mL oral suspension 60 mL  60 mL Per G Tube Q6H  
 cefepime (MAXIPIME) 2 g in 0.9% sodium chloride (MBP/ADV) 100 mL  2 g IntraVENous Q8H  
 vancomycin (VANCOCIN) 1500 mg in  ml infusion  1,500 mg IntraVENous Q12H  
 doxycycline (VIBRAMYCIN) 100 mg in 0.9% sodium chloride (MBP/ADV) 100 mL  100 mg IntraVENous Q12H  vecuronium (NORCURON) 100 mg/100 ml SWFI infusion  0-1.2 mcg/kg/min IntraVENous TITRATE  dexamethasone (DECADRON) 4 mg/mL injection 4 mg  4 mg IntraVENous Q8H  
 midazolam in normal saline (VERSED) 1 mg/mL infusion  0-10 mg/hr IntraVENous TITRATE  dexmedeTOMidine (PRECEDEX) 400 mcg in 0.9% sodium chloride 100 mL infusion  0.2-0.7 mcg/kg/hr IntraVENous TITRATE  propofol (DIPRIVAN) 10 mg/mL infusion  0-50 mcg/kg/min IntraVENous TITRATE  fentaNYL in normal saline (pf) 25 mcg/mL infusion  0-200 mcg/hr IntraVENous TITRATE  epoprostenol (FLOLAN) 30,000 ng/mL in diluent for epoprostenol (gly) 50 mL solution  10-50 ng/kg/min (Ideal) Nebulization TITRATE  
 0.9% sodium chloride for Flolan infusion  1.2-6.6 mL/hr Nebulization TITRATE  albuterol-ipratropium (DUO-NEB) 2.5 MG-0.5 MG/3 ML  3 mL Nebulization Q6H PRN  
 anidulafungin (ERAXIS) 100 mg in 0.9% sodium chloride 130 mL IVPB  100 mg IntraVENous Q24H  
 [Held by provider] enoxaparin (LOVENOX) injection 40 mg  40 mg SubCUTAneous Q24H  
 levothyroxine (SYNTHROID) tablet 50 mcg  50 mcg Per G Tube ACB  acetaminophen (TYLENOL) tablet 650 mg  650 mg Per NG tube Q4H PRN  
 lansoprazole (PREVACID) 3 mg/mL oral suspension 30 mg  30 mg Per NG tube ACB  midazolam (VERSED) injection 2 mg  2 mg IntraVENous Q2H PRN  
 NUTRITIONAL SUPPORT ELECTROLYTE PRN ORDERS   Does Not Apply PRN  
 NOREPINephrine (LEVOPHED) 4 mg in 5% dextrose 250 mL infusion  0.5-30 mcg/min IntraVENous TITRATE  influenza vaccine 2019-20 (6 mos+)(PF) (FLUARIX/FLULAVAL/FLUZONE QUAD) injection 0.5 mL  0.5 mL IntraMUSCular PRIOR TO DISCHARGE  sodium chloride (NS) flush 5-40 mL  5-40 mL IntraVENous Q8H  
 sodium chloride (NS) flush 5-40 mL  5-40 mL IntraVENous PRN  
 ondansetron (ZOFRAN) injection 4 mg  4 mg IntraVENous Q4H PRN Review of Systems Unobtainable due to patient status. Objective:  
 
Vitals:  
 04/11/20 9802 04/11/20 7001 04/11/20 4734 04/11/20 0915 BP:   113/62 Pulse: (!) 106 (!) 106  (!) 106 Resp: (!) 32 (!) 32  (!) 32 Temp:      
SpO2: (!) 82% (!) 82%  (!) 85% Weight:      
Height:      
 
Intake and Output:  
04/09 1901 - 04/11 0700 In: 7507 [I.V.:1788] Out: 2950 [Urine:2950] 04/11 0701 - 04/11 1900 In: 100 Out: - Physical Exam:  
Constitution:  the patient is well developed and in no acute distress EENMT:  Sclera clear, pupils equal, oral mucosa moist 
Respiratory: Intubated. B inspiratory crackles. Cardiovascular:  RRR without M,G,R 
Gastrointestinal: soft and non-tender; with positive bowel sounds. Musculoskeletal: warm without cyanosis. There is 1+ lower leg edema. Skin:  Purpura over trunk, most concentrated near right shoulder. Neurologic: sedated Psychiatric:  sedated CHEST XRAY:  
4/11/20 1. Support apparatus remain in place. 2. Stable appearance of the bilateral lung opacities and probably small Effusions. LAB No lab exists for component: [place] Recent Labs 04/11/20 
0804 04/10/20 
0319 04/09/20 
5196 04/08/20 
1116 WBC 8.9 10.8 10.4  --   
HGB 7.7* 8.9* 9.5*  --   
HCT 25.8* 28.8* 29.9*  --   
* 181 206  --   
INR  --   --   --  1.1 Recent Labs 04/11/20 
5584 04/10/20 
0319 04/09/20 
3952  138 136  
K 5.5* 4.9 4.2  100 99 CO2 34* 33* 29 * 157* 203* BUN 53* 26* 17  
CREA 1.17 0.69* 0.65* CA 9.1 9.1 8.7 ALB 1.9* 2.0* 1.9*  
SGOT 42* 62* 64* ABG:   
Lab Results Component Value Date/Time PHI 7.248 (LL) 04/11/2020 03:23 AM  
 PCO2I 76.1 (HH) 04/11/2020 03:23 AM  
 PO2I 57 (L) 04/11/2020 03:23 AM  
 HCO3I 33.2 (H) 04/11/2020 03:23 AM  
 FIO2I 100 04/11/2020 03:23 AM  
 
 
Assessment:  (Medical Decision Making) Hospital Problems  Date Reviewed: 3/27/2020 Codes Class Noted POA Purpura ([facility] ICD-10-CM: N80.3 ICD-9-CM: 287.2  4/8/2020 Unknown  ARDS (adult respiratory distress syndrome) ([facility] ICD-10-CM: V48 
 ICD-9-CM: 518.52  4/8/2020 Unknown Septic shock (HCC) ICD-10-CM: A41.9, R65.21 ICD-9-CM: 038.9, 785.52, 995.92  4/3/2020 Unknown Suspected Covid-19 Virus Infection ICD-10-CM: R68.89  4/2/2020 Yes Suspected sleep apnea ICD-10-CM: R29.818 ICD-9-CM: 781.99  4/2/2020 Unknown Morbid obesity ([facility] ICD-10-CM: E66.01 
ICD-9-CM: 278.01  4/2/2020 Unknown * (Principal) Acute hypoxemic respiratory failure ([facility] ICD-10-CM: J96.01 
ICD-9-CM: 518.81  3/31/2020 Unknown Transaminitis ICD-10-CM: R74.0 ICD-9-CM: 790.4  3/31/2020 Yes Hypertension (Chronic) ICD-10-CM: I10 
ICD-9-CM: 401.9  8/12/2019 Yes GBM (glioblastoma multiforme) (HCC) (Chronic) ICD-10-CM: C71.9 ICD-9-CM: 191.9  6/26/2019 Yes Asbestosis ([facility] (Chronic) ICD-10-CM: P95 ICD-9-CM: 485  6/22/2019 Yes Overview Signed 6/25/2019  7:59 AM by Dayanara Malin, [address] [place] [address] Last Assessment & Plan:  
 Was Diagnosed in November Ankitchristopher Ou Appears Asymptomatic Currently. Not Short of Breath Breathing Well. Patient with severe hypoxic respiratory failure, covid vs other viral pneumonias or PJP on the ddx. COVID negative x 2 now, so PJP suspected. FiO2 100%, paralyzed, PEEP 16. Was on flolan 50 but stopped for transport to ICU. Back on. Sats in mid 80's. Failed proning with severe drop in sats. Plan:  (Medical Decision Making)  
-will try to wean flolan and see if sats are any worse without it. Having to break his circuit frequently to change filters with it. Cont PEEP 18. paralytics. -family very fixated on the therapeutic aspects of bronchoscopy. Reviewed with them again that this would be for diagnostic purposes and not likely to be therapeutic.  Holding on bronch until his sats are at least in the 90's and even then would be very high risk, even life threatening but would consider a brief airway inspection at family's request.  
 -ongoing palliative discussions with wife. So far no improvement and do not expect significant change. He is DNR but so far family wants ongoing intense measures.   
-hydroxychloroquine and azithro (completed) for covid coverage. Covid test negative x 2. -ID following. Bactrim for PJP coverage. Eraxis. Added vanc and cefepime 4/8 for bacteremia coverage. -f/u all cultures 
-prevacid.  
-tolerating tube feedings. -vent day 9 (4/2) Critically ill patient with complicated medical issues and high risk of further decompensation. Total critical care time spent thus far (exclusive of procedures): 33 minutes Cory Bishop MD

## 2020-04-11 NOTE — PROGRESS NOTES
Patient's son Sharmila Corrales updated via telephone. Asks that if family need to be contacted to call him rather than patient's wife. She has stated she would rather her son communicate with staff at this time d/t overwhelming stress.

## 2020-04-11 NOTE — PROGRESS NOTES
Flolan weaned per MD order. O2 sat dropped into 70's. Will titrate back up. BP decreased, MAP 59. Will start levo as ordered.

## 2020-04-11 NOTE — PROGRESS NOTES
Shift report received from Levi Shepherd RN Interdisciplinary team rounds were held 4/11/2020 with the following team members:Nursing, Physician, Respiratory Therapy and Clinical Coordinator and the patient. Plan of care discussed. See clinical pathway and/or care plan for interventions and desired outcomes.

## 2020-04-11 NOTE — ROUTINE PROCESS
Ventilator check complete; patient has a #8. 0 ET tube secured at the 24 at the lip. Patient is sedated. Patient is not able to follow commands. Breath sounds are coarse and diminished. Trachea is midline, Negative for subcutaneous air, and chest excursion is symmetric. Patient is also Negative for cyanosis and is Positive for pitting edema. All alarms are set and audible. Resuscitation bag is at the head of the bed. Ventilator Settings Mode FIO2 Rate Tidal Volume Pressure PEEP I:E Ratio PRVC  100 % 32 400 ml     18 cm H20  1:2.1 Peak airway pressure: 37 cm H2O Minute ventilation: 12.8 l/min Eldonna Ringer, RT

## 2020-04-11 NOTE — PROGRESS NOTES
Verbal shift change report given to Chayo Meneses RN (oncoming nurse) by Jayne Brannon RN (offgoing nurse). Report included the following information SBAR, Kardex, Intake/Output, MAR, Recent Results and Cardiac Rhythm ST. Dual verification of gtts performed.

## 2020-04-11 NOTE — PROGRESS NOTES
Bedside, Verbal and Written shift change report given to [address] [place] [address]  (oncoming nurse) by Omkar Patel  (offgoing nurse). Report included the following information SBAR, Kardex, Intake/Output, MAR, Recent Results and Alarm Parameters . gtts dual verified

## 2020-04-12 NOTE — DISCHARGE SUMMARY
Death Summary Vangie Rosenbaum Admission date:  3/31/2020 Discharge date:  4/12/2020 Admitting Diagnosis:  CHF (congestive heart failure) ([place] [address] [I50.9] Discharge Diagnosis:   
Problem List as of 4/12/2020 Date Reviewed: 3/27/2020 Codes Class Noted - Resolved Purpura ([place] [address] ICD-10-CM: I44.8 ICD-9-CM: 287.2  4/8/2020 - Present ARDS (adult respiratory distress syndrome) ([place] [address] ICD-10-CM: R26 
ICD-9-CM: 518.52  4/8/2020 - Present Septic shock (HCC) ICD-10-CM: A41.9, R65.21 ICD-9-CM: 038.9, 785.52, 995.92  4/3/2020 - Present Suspected Covid-19 Virus Infection ICD-10-CM: R68.89  4/2/2020 - Present Suspected sleep apnea ICD-10-CM: R29.818 ICD-9-CM: 781.99  4/2/2020 - Present Morbid obesity ([place] [address] ICD-10-CM: E66.01 
ICD-9-CM: 278.01  4/2/2020 - Present * (Principal) Acute hypoxemic respiratory failure ([place] [address] ICD-10-CM: J96.01 
ICD-9-CM: 518.81  3/31/2020 - Present Transaminitis ICD-10-CM: R74.0 ICD-9-CM: 790.4  3/31/2020 - Present Fever ICD-10-CM: R50.9 ICD-9-CM: 780.60  8/12/2019 - Present Nausea and vomiting ICD-10-CM: R11.2 ICD-9-CM: 787.01  8/12/2019 - Present Hypertension (Chronic) ICD-10-CM: I10 
ICD-9-CM: 401.9  8/12/2019 - Present Depression (Chronic) ICD-10-CM: F32.9 ICD-9-CM: 378  8/12/2019 - Present Postoperative CSF leak ICD-10-CM: O69.79 
ICD-9-CM: 997.09  7/22/2019 - Present Hyponatremia ICD-10-CM: E87.1 ICD-9-CM: 276.1  7/18/2019 - Present GBM (glioblastoma multiforme) (HCC) (Chronic) ICD-10-CM: C71.9 ICD-9-CM: 191.9  6/26/2019 - Present Asbestosis ([address] (Chronic) ICD-10-CM: M92 ICD-9-CM: 845  6/22/2019 - Present Overview Signed 6/25/2019  7:59 AM by Jacklyn Miner, [address] [place] [address] Last Assessment & Plan:  
 Was Diagnosed in November Pankaj Dickey Appears Asymptomatic Currently. Not Short of Breath Breathing Well. Consultants: Infectious disease, oncology, palliative care Studies/Procedures: Intubation mechanical ventilation Central line placement, arterial line placement Hospital course:Mr Leopold Georgis is a 62year old male with a history of HTN and asbestosis as well as Glioblastoma multiforme that was diagnosed in July of last year. Gian Sinha is followed by Dr Robert Fryer has undergone craniotomy with resection/XRT and he is now on Temodar.   
Last MRI 1/2020 showed no evidence of disease progression with subtle improvement of enhancement along margins of operative cavity and subtle improvement of perilesional mass effect. He is on decadron and prophylactic Bactrim. Gian Sinha had a virtual visit with Dr Benigno He on 3/27. He reported leg edema so was started on lasix 20 mg per day. He denied any fever or known exposures. He and his wife had been careful at home and reported \"self quarantining\".     
  Mr Leopold Georgis presented to the ED on 3/31 with increasing shortness of breath. cough over the past several days and persistent leg edema.  He was initially hypoxic on EMS arrival, with sats at 65% and improved to 93% on 4L NC. He also endorsed cough and ongoing leg swelling.    
   While in the ED a CXR was done and showed an unchanged left lung base infiltrate. He was started on Rocephin and Zithromax and blood cultures done (still pending).  A CT chest (PE protocol) was done which shows bilateral infiltrates. His proBNP was 966. A TTE was done and showed LVEF of 55-60% with a normal RV and RVSP 35 to 40 mmHg. He was treated with adequate diuresis but his status does not improve and subsequently he was transferred to the intensive care unit and intubated and start mechanical ventilation. He required aggressive support included multiple pressors, full ventilatory support for ARDS shock. He was tested for COVID 19 twice and the test was negative.   He was treated empirically for possible pneumocystis carinae pneumonia and he was followed by ID very closely. His ARDS continue to progress and he did not tolerate pronation at all. Because of profound hypoxemia even on maximal ventilatory support Flolan was ordered but the patient continued to deteriorate and did not respond to all above measures. Eventually he became pulseless without any spontaneous respiration and he was pronounced dead at 00 27 on 4/12/2020. His wife has been notified. Final: 
--Pronounced dead at 0027. --Total discharge greater than 30 minutes in duration.  
 
Carito Sommers MD

## 2020-04-12 NOTE — ROUTINE PROCESS
Family has gone home. Pt taken [facility] by house supervisor and RN.

## 2020-04-12 NOTE — PROGRESS NOTES
I was called to pronounce this patient. He became pulseless without any spontaneous respiration. Pupils were fixed and dilated. He was pronounced dead at 00 27. His wife was notified.  
 
Neptali Campos MD

## 2020-04-12 NOTE — ROUTINE PROCESS
Attempted to clean patient up after having bowel movement and despite this RN's attempt to limit patient movement and not lay patient flat, patient deteriorated quickly. O2 sat began dropping into the 60s then 50s. Pt already on 100% fio2 and PEEP of 18, on flolan. Blood pressure dropped to 53/34. Levophed gtt titrated to max rate ordered. HR then began to omaira and patient became PEA. Pt confirmed to be DNR so no extensive measures taken. Dr. Porfirio Bull made aware of patient's imminent passing. No new orders at this time. Patient's son, Gema Scott contacted and stated he would contact the patient's wife and let hospital staff know if they plan on visiting patient. At 2427 patient became asystole with no blood pressure, no pulse and no apparent signs of life. Dr. Porfirio Bull contacted. House supervisor contacted. Patient's son Gema Scott contacted and made aware of patient's passing. Gema Scott stated he and patient's wife would like to visit patient, but would not like to see any of the \"hospital equipment or tubes\" as it would be upsetting to his mother. House supervisor and security notified.  home notified by house supervisor. ETT, OGT, Central line, parks and ART line removed. Patient cleaned up and made presentable for family.

## 2020-04-13 ENCOUNTER — HOME CARE VISIT (OUTPATIENT)
Dept: HOME HEALTH SERVICES | Facility: HOME HEALTH | Age: 65
End: 2020-04-13
Payer: SUBSIDIZED

## 2020-04-13 LAB
P JIROVECII DNA # BAL NAA+PROBE: NEGATIVE
SOURCE, PJPB1: NORMAL
SPECIMEN SOURCE: NORMAL

## 2020-04-14 LAB
FLUAV RNA SPEC QL NAA+PROBE: NEGATIVE
FLUBV RNA SPEC QL NAA+PROBE: NEGATIVE
HADV DNA SPEC QL NAA+PROBE: NEGATIVE
HMPV RNA SPEC QL NAA+PROBE: NEGATIVE
HPIV1 RNA SPEC QL NAA+PROBE: NEGATIVE
HPIV2 RNA SPEC QL NAA+PROBE: NEGATIVE
HPIV3 RNA SPEC QL NAA+PROBE: NEGATIVE
RHINOVIRUS RNA SPEC QL NAA+PROBE: NEGATIVE
RSV A RNA SPEC QL NAA+PROBE: NEGATIVE
RSV B RNA SPEC QL NAA+PROBE: NEGATIVE
SPECIMEN SOURCE: NORMAL

## 2020-05-18 LAB
BACTERIA SPEC CULT: NORMAL
SERVICE CMNT-IMP: NORMAL

## 2020-07-02 NOTE — CONSULTS
[facility] EVALUATION -- [facility]    The Patient is current in the ICU for: intracranial edema s/p surgery 2 weeks ago for glioblastoma. He is being managed by:  Neurosurgery. No need for any additional acute ICU interventions. Patient is Currently hemodynamically stable but lethargic  Please Call if Needed. No Charge. Clydell Kehr, MD    Electronically signed. Statement Selected
